# Patient Record
Sex: FEMALE | Race: WHITE | NOT HISPANIC OR LATINO | Employment: OTHER | URBAN - METROPOLITAN AREA
[De-identification: names, ages, dates, MRNs, and addresses within clinical notes are randomized per-mention and may not be internally consistent; named-entity substitution may affect disease eponyms.]

---

## 2017-01-17 ENCOUNTER — PATIENT MESSAGE (OUTPATIENT)
Dept: INTERNAL MEDICINE | Facility: CLINIC | Age: 32
End: 2017-01-17

## 2017-02-09 ENCOUNTER — TELEPHONE (OUTPATIENT)
Dept: INTERNAL MEDICINE | Facility: CLINIC | Age: 32
End: 2017-02-09

## 2017-02-16 ENCOUNTER — OFFICE VISIT (OUTPATIENT)
Dept: INTERNAL MEDICINE | Facility: CLINIC | Age: 32
End: 2017-02-16
Attending: INTERNAL MEDICINE
Payer: COMMERCIAL

## 2017-02-16 ENCOUNTER — TELEPHONE (OUTPATIENT)
Dept: INTERNAL MEDICINE | Facility: CLINIC | Age: 32
End: 2017-02-16

## 2017-02-16 VITALS
DIASTOLIC BLOOD PRESSURE: 70 MMHG | OXYGEN SATURATION: 96 % | WEIGHT: 114.63 LBS | BODY MASS INDEX: 19.57 KG/M2 | HEIGHT: 64 IN | HEART RATE: 75 BPM | SYSTOLIC BLOOD PRESSURE: 90 MMHG

## 2017-02-16 DIAGNOSIS — M65.4 DE QUERVAIN'S TENOSYNOVITIS, LEFT: ICD-10-CM

## 2017-02-16 DIAGNOSIS — D80.6 SPECIFIC ANTIBODY DEFICIENCY WITH NORMAL IMMUNOGLOBULIN CONCENTRATION AND NORMAL NUMBER OF B CELLS: ICD-10-CM

## 2017-02-16 DIAGNOSIS — Z00.00 ANNUAL PHYSICAL EXAM: Primary | ICD-10-CM

## 2017-02-16 PROCEDURE — 99999 PR PBB SHADOW E&M-EST. PATIENT-LVL III: CPT | Mod: PBBFAC,,, | Performed by: INTERNAL MEDICINE

## 2017-02-16 PROCEDURE — 99395 PREV VISIT EST AGE 18-39: CPT | Mod: S$GLB,,, | Performed by: INTERNAL MEDICINE

## 2017-02-16 NOTE — MR AVS SNAPSHOT
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      [facility]     [facility] On Call Nurse Care Line - 24/7 Assistance  Registered nurses in the [facility] On Call Center provide clinical advisement, health education, appointment booking, and other advisory services.  Call for this free service at [phone].             Medications           Message regarding Medications     Verify the changes and/or additions to your medication regime listed below are the same as discussed with your clinician today.  If any of these changes or additions are incorrect, please notify your healthcare provider.             Verify that the below list of medications is an accurate representation of the medications you are currently taking.  If none reported, the list may be blank. If incorrect, please contact your healthcare provider. Carry this list with you in case of emergency.           Current Medications     moxifloxacin (VIGAMOX) 0.5 % ophthalmic solution 1 drop 3 (three) times daily.    TOBREX 0.3 % ophthalmic ointment            Clinical Reference Information           Your Vitals Were     BP Pulse Height Weight SpO2 BMI    90/70 (BP Location: Left arm,  "Patient Position: Sitting, BP Method: Manual) 75 5' 4" (1.626 m) 52 kg (114 lb 10.2 oz) 96% 19.68 kg/m2      Blood Pressure          Most Recent Value    BP  90/70      Allergies as of 2/16/2017     Ceclor [Cefaclor]    Pcn [Penicillins]      Immunizations Administered on Date of Encounter - 2/16/2017     None      Instructions    Your test results will be communicated to you via: [facility], Telephone or Letter.  If you have not received your test results within one week. Please contact the clinic at [phone].      Nutrition While Breastfeeding  Do I need a special diet for breastfeeding?  You don't have to eat a special diet to produce enough milk for your baby. Also, your milk will be of good quality for your baby regardless of what you eat. However, your body needs fuel to make breastmilk, so eat your fill of a variety of foods. Breastfeeding isnt an excuse to eat and drink everything you want, but its not a reason to avoid favorite foods either.   Healthy diet for the new mother  A healthy diet is recommended for all women and offers many benefits to the new mother. Choosing a variety of healthy foods creates a pattern for the entire family, and each family member benefits. Women who are breastfeeding need about 500 extra calories per day. Some women might need more, while others might need less. When choosing foods, use the nutrition chart below as a guide.    Bread, cereal, rice, and pasta Vegetables Fruit   Milk, yogurt, and cheese Meat, poultry, fish,  dry beans, eggs, and nuts Fats, oils, and sweets  (use sparingly)   Whats good for you?  Here are some things to do:  · Breastfeeding women need to drink when they feel thirsty. There is no specific amount of water you need to drink to make enough milk.  · Follow healthy eating guidelines.  · Snack on fruit or low-fat dairy products if youre hungry between meals.  · If your healthcare provider recommends it, keep taking prenatal vitamins.  Whats not " good for you?  Here are other things to consider:  · Limit fatty foods and foods that are high in sugar (cookies, cakes).  · Be aware that what enters your body may pass into your breastmilk. Limit caffeine. It is not just in coffee, but is also in cola, tea, and chocolate.  · Talk with your healthcare provider before taking any medicines. It is important to let your healthcare provider know that you are nursing. Some medicines are not safe with breastfeeding.  · Remember: alcohol, cigarettes, and drugs also affect your breastmilk and your baby. Talk with your healthcare provider.  Date Last Reviewed: 9/7/2015 © 2000-2016 [employer]. [address] [place] [address], [place] [address]. All rights reserved. This information is not intended as a substitute for professional medical care. Always follow your healthcare professional's instructions.        Breastfeeding: Caring for Yourself  When you have a new little person in your life, its easy to forget about yourself. There are new demands on your time. There are also new responsibilities. But its important to take care of yourself. This will help you take better care of your new baby.     Healthy habits  Here are some healthy tips:  · Get exercise when you can. If you leak milk, it will help to nurse right before the activity.  · Avoid smoking. Smoking is unhealthy for you and may cause you to make less milk. Secondhand smoke is also harmful to your baby.  · Talk to your healthcare provider about alcohol, if you choose to drink.  · When youre sick, tell your healthcare provider that you are breastfeeding. Few medicines and illnesses affect breastfeeding, but it is important to check.  · Ask your healthcare provider before taking any prescription or over-the-counter medicines, herbs, or supplements.  Comfy clothes  Suggestions for being comfortable when breastfeeding include:  · Find a comfortable nursing bra. Many women find underwire uncomfortable. Some  stores offer on-site fittings. Ask your healthcare provider or nurse for a referral.  · If you have leaking milk, place breast pads inside your bra.  · Choose an extra-supportive bra for exercise. Or you can wear two bras at the same time for more support.  · Wear loose tops that can be lifted for breastfeeding. You can also buy clothes specially made for breastfeeding moms.  A note about sex  After delivery, it may take a while before your interest in sex returns. Share your feelings with your partner. Your healthcare provider will let you know when it is safe to resume having sex. When youre ready, know that:  · There are several forms of birth control that can be used while breastfeeding. Ask your healthcare provider what to use for pregnancy prevention while you are nursing.  · Breastfeeding hormones may cause vaginal dryness. Some women find using a water-based lubricant makes sex more comfortable.  · Milk may let down when you are aroused. Applying pressure on the nipple, using breast pads, or a towel may help with this.  When to call your healthcare provider  Call your healthcare provider if:  · You feel overwhelmed and don't know where to turn.  · You feel very sad or dont want to be with your baby.  · You feel like your baby cries all the time and won't be soothed  · You are unable to exercise, or have sex, without discomfort.  · You are unsure about a medicine, illness, or activity and its effect on breastfeeding.   Date Last Reviewed: 9/7/2015 © 2000-2016 [employer]. [address] [place] [address], [place] [address]. All rights reserved. This information is not intended as a substitute for professional medical care. Always follow your healthcare professional's instructions.             Language Assistance Services     ATTENTION: Language assistance services are available, free of charge. Please call [phone].      ATENCIÓN: Si habla español, tiene a leung disposición servicios gratuitos de  asistencia lingüística. Pita zamora [phone].     MIGUEL ÁNGEL Ý: N?u b?n nói Ti?ng Vi?t, có các d?ch v? h? tr? ngôn ng? mi?n phí dành cho b?n. G?i s? [phone].         [facility] complies with applicable Federal civil rights laws and does not discriminate on the basis of race, color, national origin, age, disability, or sex.

## 2017-02-16 NOTE — TELEPHONE ENCOUNTER
Received verbal msg from Sepideh Chan stating that pt called very upset crying about her appt that wasn't scheduled today for 1:40 pm that she was advised of/ was able to accommodate appt today due to  issues     Pt advised of appt/ no further questions or concerns at this time

## 2017-02-16 NOTE — PROGRESS NOTES
Subjective:       Patient ID: Karen Purdy is a 31 y.o. female.    Chief Complaint: Annual Exam     Karen Purdy is a 31 y.o.  female who presents for Annual Exam  .  HPI Comments: Pt complains of joint pain, tendonitis worsening, migrates.  She had lost wt recently over several months but has been recently increased her weight. She is breast feeding her 9 month old son.  Occasional hot flashes.  Child has not been able to take a bottle so she has been able to leave her son for any period of time.  He has started solids.     Pain in left hand just at wrist.     She states her anxiety was worse just after baby was born but this has improved with her sleep.     Review of Systems   Constitutional: Negative for chills and fever.   HENT: Negative for rhinorrhea and sore throat.    Respiratory: Negative for cough and shortness of breath.    Cardiovascular: Negative for chest pain and palpitations.   Gastrointestinal: Negative for nausea and vomiting.   Genitourinary: Negative for dysuria and hematuria.   Musculoskeletal: Negative for arthralgias and back pain.   Skin: Negative for color change and rash.   Neurological: Negative for weakness and numbness.   Psychiatric/Behavioral: Negative for agitation and dysphoric mood.       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     Unspecified vitamin D deficiency        Past Surgical History   Procedure Laterality Date    Knee surgery Right      torn meniscus       Family History   Problem Relation Age of Onset    Cancer Maternal Grandfather      lung    Dementia Paternal Grandmother     Hyperlipidemia Mother     Hypertension Mother      "Hypertension Father     Hyperlipidemia Father     Breast cancer Neg Hx     Colon cancer Neg Hx     Ovarian cancer Neg Hx        Social History   Substance Use Topics    Smoking status: Former Smoker    Smokeless tobacco: Never Used    Alcohol use No      Comment: socially       Objective:   Blood pressure 90/70, pulse 75, height 5' 4" (1.626 m), weight 52 kg (114 lb 10.2 oz), SpO2 96 %, currently breastfeeding.     Physical Exam   Constitutional: She is oriented to person, place, and time. She appears well-developed and well-nourished. No distress.   HENT:   Head: Normocephalic and atraumatic.   Right Ear: External ear normal.   Left Ear: External ear normal.   Eyes: Conjunctivae are normal. No scleral icterus.   Neck: No JVD present. No thyromegaly present.   Cardiovascular: Normal heart sounds.  Exam reveals no gallop and no friction rub.    No murmur heard.  Pulmonary/Chest: Effort normal and breath sounds normal. She has no wheezes. She has no rales.   Abdominal: Soft. Bowel sounds are normal. She exhibits no distension. There is no tenderness.   Musculoskeletal: She exhibits no edema or tenderness.        Arms:  Lymphadenopathy:     She has no cervical adenopathy.   Neurological: She is alert and oriented to person, place, and time.   Skin: Skin is warm and dry.   Psychiatric: She has a normal mood and affect. Thought content normal.       Prior labs reviewed  Assessment/Plan:        Karen was seen today for annual exam.    Diagnoses and all orders for this visit:    Annual physical exam  Recommend daily sunscreen, cardiovascular exercise min 30 min 5 days per week. Seatbelts routinely.    Breast feeding status of mother  Increase fluids    De Quervain's tenosynovitis, left  Tylenol prn. 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

## 2017-02-16 NOTE — PATIENT INSTRUCTIONS
Your test results will be communicated to you via: [facility], Telephone or Letter.  If you have not received your test results within one week. Please contact the clinic at [phone].      Nutrition While Breastfeeding  Do I need a special diet for breastfeeding?  You don't have to eat a special diet to produce enough milk for your baby. Also, your milk will be of good quality for your baby regardless of what you eat. However, your body needs fuel to make breastmilk, so eat your fill of a variety of foods. Breastfeeding isnt an excuse to eat and drink everything you want, but its not a reason to avoid favorite foods either.   Healthy diet for the new mother  A healthy diet is recommended for all women and offers many benefits to the new mother. Choosing a variety of healthy foods creates a pattern for the entire family, and each family member benefits. Women who are breastfeeding need about 500 extra calories per day. Some women might need more, while others might need less. When choosing foods, use the nutrition chart below as a guide.    Bread, cereal, rice, and pasta Vegetables Fruit   Milk, yogurt, and cheese Meat, poultry, fish,  dry beans, eggs, and nuts Fats, oils, and sweets  (use sparingly)   Whats good for you?  Here are some things to do:  · Breastfeeding women need to drink when they feel thirsty. There is no specific amount of water you need to drink to make enough milk.  · Follow healthy eating guidelines.  · Snack on fruit or low-fat dairy products if youre hungry between meals.  · If your healthcare provider recommends it, keep taking prenatal vitamins.  Whats not good for you?  Here are other things to consider:  · Limit fatty foods and foods that are high in sugar (cookies, cakes).  · Be aware that what enters your body may pass into your breastmilk. Limit caffeine. It is not just in coffee, but is also in cola, tea, and chocolate.  · Talk with your healthcare provider before taking  any medicines. It is important to let your healthcare provider know that you are nursing. Some medicines are not safe with breastfeeding.  · Remember: alcohol, cigarettes, and drugs also affect your breastmilk and your baby. Talk with your healthcare provider.  Date Last Reviewed: 9/7/2015  © 2843-2962 [employer]. [address] [place] [address], [place] [address]. All rights reserved. This information is not intended as a substitute for professional medical care. Always follow your healthcare professional's instructions.        Breastfeeding: Caring for Yourself  When you have a new little person in your life, its easy to forget about yourself. There are new demands on your time. There are also new responsibilities. But its important to take care of yourself. This will help you take better care of your new baby.     Healthy habits  Here are some healthy tips:  · Get exercise when you can. If you leak milk, it will help to nurse right before the activity.  · Avoid smoking. Smoking is unhealthy for you and may cause you to make less milk. Secondhand smoke is also harmful to your baby.  · Talk to your healthcare provider about alcohol, if you choose to drink.  · When youre sick, tell your healthcare provider that you are breastfeeding. Few medicines and illnesses affect breastfeeding, but it is important to check.  · Ask your healthcare provider before taking any prescription or over-the-counter medicines, herbs, or supplements.  Comfy clothes  Suggestions for being comfortable when breastfeeding include:  · Find a comfortable nursing bra. Many women find underwire uncomfortable. Some stores offer on-site fittings. Ask your healthcare provider or nurse for a referral.  · If you have leaking milk, place breast pads inside your bra.  · Choose an extra-supportive bra for exercise. Or you can wear two bras at the same time for more support.  · Wear loose tops that can be lifted for breastfeeding. You can also buy  clothes specially made for breastfeeding moms.  A note about sex  After delivery, it may take a while before your interest in sex returns. Share your feelings with your partner. Your healthcare provider will let you know when it is safe to resume having sex. When youre ready, know that:  · There are several forms of birth control that can be used while breastfeeding. Ask your healthcare provider what to use for pregnancy prevention while you are nursing.  · Breastfeeding hormones may cause vaginal dryness. Some women find using a water-based lubricant makes sex more comfortable.  · Milk may let down when you are aroused. Applying pressure on the nipple, using breast pads, or a towel may help with this.  When to call your healthcare provider  Call your healthcare provider if:  · You feel overwhelmed and don't know where to turn.  · You feel very sad or dont want to be with your baby.  · You feel like your baby cries all the time and won't be soothed  · You are unable to exercise, or have sex, without discomfort.  · You are unsure about a medicine, illness, or activity and its effect on breastfeeding.   Date Last Reviewed: 9/7/2015  © 4720-7230 [employer]. [address] [place] [address], [place] [address]. All rights reserved. This information is not intended as a substitute for professional medical care. Always follow your healthcare professional's instructions.

## 2017-02-20 ENCOUNTER — PATIENT MESSAGE (OUTPATIENT)
Dept: INTERNAL MEDICINE | Facility: CLINIC | Age: 32
End: 2017-02-20

## 2017-10-04 ENCOUNTER — TELEPHONE (OUTPATIENT)
Dept: OBSTETRICS AND GYNECOLOGY | Facility: CLINIC | Age: 32
End: 2017-10-04

## 2017-10-04 ENCOUNTER — OFFICE VISIT (OUTPATIENT)
Dept: OBSTETRICS AND GYNECOLOGY | Facility: CLINIC | Age: 32
End: 2017-10-04
Attending: OBSTETRICS & GYNECOLOGY
Payer: COMMERCIAL

## 2017-10-04 ENCOUNTER — LAB VISIT (OUTPATIENT)
Dept: LAB | Facility: OTHER | Age: 32
End: 2017-10-04
Attending: OBSTETRICS & GYNECOLOGY
Payer: COMMERCIAL

## 2017-10-04 VITALS
BODY MASS INDEX: 20.06 KG/M2 | HEIGHT: 64 IN | WEIGHT: 117.5 LBS | DIASTOLIC BLOOD PRESSURE: 70 MMHG | SYSTOLIC BLOOD PRESSURE: 114 MMHG

## 2017-10-04 DIAGNOSIS — N91.5 OLIGOMENORRHEA, UNSPECIFIED TYPE: ICD-10-CM

## 2017-10-04 DIAGNOSIS — Z36.89 ESTABLISH GESTATIONAL AGE, ULTRASOUND: Primary | ICD-10-CM

## 2017-10-04 DIAGNOSIS — N91.5 OLIGOMENORRHEA, UNSPECIFIED TYPE: Primary | ICD-10-CM

## 2017-10-04 LAB
BASOPHILS # BLD AUTO: 0.03 K/UL
BASOPHILS NFR BLD: 0.3 %
C TRACH DNA SPEC QL NAA+PROBE: NOT DETECTED
DIFFERENTIAL METHOD: NORMAL
EOSINOPHIL # BLD AUTO: 0.1 K/UL
EOSINOPHIL NFR BLD: 1 %
ERYTHROCYTE [DISTWIDTH] IN BLOOD BY AUTOMATED COUNT: 12.4 %
HCG INTACT+B SERPL-ACNC: 468 MIU/ML
HCT VFR BLD AUTO: 41.1 %
HGB BLD-MCNC: 13.7 G/DL
LYMPHOCYTES # BLD AUTO: 2.5 K/UL
LYMPHOCYTES NFR BLD: 23.7 %
MCH RBC QN AUTO: 28.7 PG
MCHC RBC AUTO-ENTMCNC: 33.3 G/DL
MCV RBC AUTO: 86 FL
MONOCYTES # BLD AUTO: 0.8 K/UL
MONOCYTES NFR BLD: 7.8 %
N GONORRHOEA DNA SPEC QL NAA+PROBE: NOT DETECTED
NEUTROPHILS # BLD AUTO: 7 K/UL
NEUTROPHILS NFR BLD: 67 %
PLATELET # BLD AUTO: 348 K/UL
PMV BLD AUTO: 10.3 FL
RBC # BLD AUTO: 4.78 M/UL
WBC # BLD AUTO: 10.47 K/UL

## 2017-10-04 PROCEDURE — 84702 CHORIONIC GONADOTROPIN TEST: CPT

## 2017-10-04 PROCEDURE — 85025 COMPLETE CBC W/AUTO DIFF WBC: CPT

## 2017-10-04 PROCEDURE — 87591 N.GONORRHOEAE DNA AMP PROB: CPT

## 2017-10-04 PROCEDURE — 87086 URINE CULTURE/COLONY COUNT: CPT

## 2017-10-04 PROCEDURE — 99999 PR PBB SHADOW E&M-EST. PATIENT-LVL III: CPT | Mod: PBBFAC,,, | Performed by: OBSTETRICS & GYNECOLOGY

## 2017-10-04 PROCEDURE — 87088 URINE BACTERIA CULTURE: CPT

## 2017-10-04 PROCEDURE — 87147 CULTURE TYPE IMMUNOLOGIC: CPT

## 2017-10-04 PROCEDURE — 99215 OFFICE O/P EST HI 40 MIN: CPT | Mod: S$GLB,,, | Performed by: OBSTETRICS & GYNECOLOGY

## 2017-10-04 PROCEDURE — 87186 SC STD MICRODIL/AGAR DIL: CPT

## 2017-10-04 NOTE — TELEPHONE ENCOUNTER
----- Message from Magalys Yun MA sent at 10/4/2017 12:47 PM CDT -----  Pt needs to schedule her 8 week new ob appointment and dating u/s.

## 2017-10-04 NOTE — PROGRESS NOTES
"HPI: Pt is a 31 y.o. female who presents complaining of missed menses and (+) home UPT. She denies vaginal bleeding or abdominal pain. She does not have nausea and vomiting. LMP was 9/3/17.  The patient is currently breastfeeding her 16 mo. old though she is is weaning and is only feeding in the am at this point.  Last delivery was an  at 41w4d, uncomplicated.  She reports that she was supposed to have thyroid studies done prior to finding out she was pregnant due to hot flashes and hair loss, did not have these drawn and is interested in checking this in T2.  The patient is interested in aneuploidy screening.  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  Her  is of [employer] background and is possibly interested in carrier screening, they will discuss and if he is, we will refer him to genetics.    ROS:  GENERAL: Feeling well overall.   SKIN: Denies rash or lesions.   HEAD: Denies head injury or headache.   NODES: Denies enlarged lymph nodes.   CHEST: Denies chest pain or shortness of breath.   CARDIOVASCULAR: Denies palpitations or left sided chest pain.   ABDOMEN: No abdominal pain, constipation, diarrhea or rectal bleeding.   URINARY: No dysuria, hematuria, or burning on urination.  REPRODUCTIVE: See HPI.   BREASTS: Denies pain, lumps, or nipple discharge.   HEMATOLOGIC: No easy bruisability or excessive bleeding.   MUSCULOSKELETAL: Denies joint pain or swelling.   NEUROLOGIC: Denies syncope or weakness.   PSYCHIATRIC: Denies depression, anxiety or mood swings.    PE:   /70 (BP Method: Small (Manual))   Ht 5' 4" (1.626 m)   Wt 53.3 kg (117 lb 8.1 oz)   LMP 2017 (Exact Date)   BMI 20.17 kg/m²     APPEARANCE: Well nourished, well developed, in no acute distress.  AFFECT: WNL, alert and oriented x 3.  SKIN: No acne or hirsutism.  NECK: Neck symmetric, without masses or thyromegaly.  NODES: No inguinal, cervical, axillary or femoral lymph node " enlargement.  CHEST: Good respiratory effort.   ABDOMEN: Soft. No tenderness or masses. No hepatosplenomegaly. No hernias.  BREASTS: Symmetrical, no skin changes or visible lesions. No palpable masses, adenopathy, or nipple discharge bilaterally.  PELVIC: External female genitalia without lesions. Female hair distribution. Adequate perineal body, Normal urethral meatus. Vagina moist and well rugated without lesions or discharge. There is no significant cystocele or rectocele. Cervix pink without lesions, discharge or tenderness. Uterus is 4 week size, regular, mobile and nontender. Adnexa without masses.  EXTREMITIES: No edema.  PSYCH:  appropriate mood and affect  NEURO: CN II-XII grossly intact.    PROCEDURES:  - UPT: positive  - Urine dip: negative  - Dating U/S: to be scheduled with GynUS      Diagnosis:  1. Oligomenorrhea, unspecified type        Plan:     Orders Placed This Encounter    Urine culture    C. trachomatis/N. gonorrhoeae by AMP DNA Cervix    HIV-1 and HIV-2 antibodies    RPR    Hepatitis B surface antigen    Rubella antibody, IgG    CBC auto differential    hCG, quantitative    US OB/GYN Procedure ([facility])       - Rx: Prenatal vitamins  - She does want 1st trimester screening with MFM.     Patient was counseled today on routine 1st trimester precautions, including vaginal bleeding and abdominal pain. We also discussed proper weight gain based on the [place] [facility]'s recommendations based on her pre-pregnancy weight, foods to avoid in pregnancy (i.e. sushi, fish that are high in mercury, cold deli meat, and unpasteurized cheeses), environmental precautions such as cat litter, and prenatal vitamin options (i.e. stool softener, DHA).     Total face to face time spent with the patient was 40 minutes and over half spent in counseling on the above related issues.     Follow-up with me in 4 weeks for OB check

## 2017-10-06 ENCOUNTER — PATIENT MESSAGE (OUTPATIENT)
Dept: OBSTETRICS AND GYNECOLOGY | Facility: CLINIC | Age: 32
End: 2017-10-06

## 2017-10-09 ENCOUNTER — PATIENT MESSAGE (OUTPATIENT)
Dept: OBSTETRICS AND GYNECOLOGY | Facility: HOSPITAL | Age: 32
End: 2017-10-09

## 2017-10-09 RX ORDER — CLINDAMYCIN HYDROCHLORIDE 150 MG/1
150 CAPSULE ORAL 2 TIMES DAILY
Qty: 14 CAPSULE | Refills: 0 | Status: SHIPPED | OUTPATIENT
Start: 2017-10-09 | End: 2017-10-16

## 2017-10-10 ENCOUNTER — TELEPHONE (OUTPATIENT)
Dept: OBSTETRICS AND GYNECOLOGY | Facility: CLINIC | Age: 32
End: 2017-10-10

## 2017-10-10 LAB — BACTERIA UR CULT: NORMAL

## 2017-10-10 NOTE — TELEPHONE ENCOUNTER
----- Message from Mary Flannery sent at 10/10/2017  3:55 PM CDT -----  Contact: self  Pt needing a call back, regarding a Rx, she can be reached at [phone].

## 2017-10-10 NOTE — TELEPHONE ENCOUNTER
Called patient to go over Urine culture results. Counseled on GBS UTI and GBS in pregnancy. Patient with allergy to PCN and cephalosporins (anaphylaxis as a child). Called micro lab to request susceptibility be performed on urine culture to determine if susceptible to clindamycin. Explained to patient that this result should take about 24 hours. All questions were answered. Will follow up results and treat accordingly.

## 2017-10-11 ENCOUNTER — PATIENT MESSAGE (OUTPATIENT)
Dept: OBSTETRICS AND GYNECOLOGY | Facility: CLINIC | Age: 32
End: 2017-10-11

## 2017-10-11 NOTE — TELEPHONE ENCOUNTER
Spoke with patient informing her per  that clindamycin while breastfeeding is ok, the warning is there because it may cause the baby to have  Diarrhea. Pt stated that she understood.

## 2017-10-19 ENCOUNTER — TELEPHONE (OUTPATIENT)
Dept: OBSTETRICS AND GYNECOLOGY | Facility: CLINIC | Age: 32
End: 2017-10-19

## 2017-10-19 NOTE — TELEPHONE ENCOUNTER
----- Message from Liz Murillo CNM sent at 10/10/2017  6:06 PM CDT -----  Please call pt re meds.    Called patient to address questions regarding medications. No answer, left voicemail to call back.

## 2017-10-25 ENCOUNTER — PATIENT MESSAGE (OUTPATIENT)
Dept: OBSTETRICS AND GYNECOLOGY | Facility: CLINIC | Age: 32
End: 2017-10-25

## 2017-10-31 ENCOUNTER — PROCEDURE VISIT (OUTPATIENT)
Dept: OBSTETRICS AND GYNECOLOGY | Facility: CLINIC | Age: 32
End: 2017-10-31
Attending: OBSTETRICS & GYNECOLOGY
Payer: COMMERCIAL

## 2017-10-31 DIAGNOSIS — N91.2 AMENORRHEA: ICD-10-CM

## 2017-10-31 DIAGNOSIS — N91.5 OLIGOMENORRHEA, UNSPECIFIED TYPE: ICD-10-CM

## 2017-10-31 DIAGNOSIS — Z36.89 ESTABLISH GESTATIONAL AGE, ULTRASOUND: ICD-10-CM

## 2017-10-31 PROCEDURE — 76817 TRANSVAGINAL US OBSTETRIC: CPT | Mod: S$GLB,,, | Performed by: OBSTETRICS & GYNECOLOGY

## 2017-10-31 NOTE — PROCEDURES
Procedures   Obstetrical ultrasound completed today.  See report in imaging section of [facility].

## 2017-11-07 ENCOUNTER — INITIAL PRENATAL (OUTPATIENT)
Dept: OBSTETRICS AND GYNECOLOGY | Facility: CLINIC | Age: 32
End: 2017-11-07
Attending: OBSTETRICS & GYNECOLOGY
Payer: COMMERCIAL

## 2017-11-07 VITALS — BODY MASS INDEX: 20.62 KG/M2 | WEIGHT: 120.13 LBS | DIASTOLIC BLOOD PRESSURE: 68 MMHG | SYSTOLIC BLOOD PRESSURE: 90 MMHG

## 2017-11-07 DIAGNOSIS — O21.9 NAUSEA AND VOMITING DURING PREGNANCY PRIOR TO 22 WEEKS GESTATION: ICD-10-CM

## 2017-11-07 DIAGNOSIS — Z34.90 PREGNANCY WITH ONE FETUS, ANTEPARTUM: ICD-10-CM

## 2017-11-07 PROCEDURE — 99999 PR PBB SHADOW E&M-EST. PATIENT-LVL II: CPT | Mod: PBBFAC,,, | Performed by: OBSTETRICS & GYNECOLOGY

## 2017-11-07 PROCEDURE — 0502F SUBSEQUENT PRENATAL CARE: CPT | Mod: S$GLB,,, | Performed by: OBSTETRICS & GYNECOLOGY

## 2017-11-07 RX ORDER — DOXYLAMINE SUCCINATE AND PYRIDOXINE HYDROCHLORIDE, DELAYED RELEASE TABLETS 10 MG/10 MG 10; 10 MG/1; MG/1
2 TABLET, DELAYED RELEASE ORAL NIGHTLY PRN
Qty: 30 TABLET | Refills: 1 | Status: SHIPPED | OUTPATIENT
Start: 2017-11-07 | End: 2017-11-08

## 2017-11-08 ENCOUNTER — PATIENT MESSAGE (OUTPATIENT)
Dept: OBSTETRICS AND GYNECOLOGY | Facility: CLINIC | Age: 32
End: 2017-11-08

## 2017-11-08 NOTE — PROGRESS NOTES
Patient with continued nause and severe fatigue though reports insomnia.  Discussed diclegis, will try this, may try zofran for travel if needed [facility].  Discussed aneuploidy screening, will rn her insurance for MT21, sequential and anatomy ordered, will get sequential if MT21 not covered.  F/U in 4 weeks.

## 2017-11-14 RX ORDER — ONDANSETRON 4 MG/1
4 TABLET, FILM COATED ORAL DAILY PRN
Qty: 30 TABLET | Refills: 1 | Status: SHIPPED | OUTPATIENT
Start: 2017-11-14 | End: 2018-01-02

## 2017-11-17 ENCOUNTER — OFFICE VISIT (OUTPATIENT)
Dept: URGENT CARE | Facility: CLINIC | Age: 32
End: 2017-11-17
Payer: COMMERCIAL

## 2017-11-17 ENCOUNTER — PATIENT MESSAGE (OUTPATIENT)
Dept: OBSTETRICS AND GYNECOLOGY | Facility: CLINIC | Age: 32
End: 2017-11-17

## 2017-11-17 VITALS
SYSTOLIC BLOOD PRESSURE: 122 MMHG | RESPIRATION RATE: 18 BRPM | BODY MASS INDEX: 20.49 KG/M2 | HEART RATE: 83 BPM | TEMPERATURE: 97 F | HEIGHT: 64 IN | WEIGHT: 120 LBS | OXYGEN SATURATION: 97 % | DIASTOLIC BLOOD PRESSURE: 73 MMHG

## 2017-11-17 DIAGNOSIS — J02.9 SORE THROAT: Primary | ICD-10-CM

## 2017-11-17 LAB
CTP QC/QA: YES
S PYO RRNA THROAT QL PROBE: NEGATIVE

## 2017-11-17 PROCEDURE — 87880 STREP A ASSAY W/OPTIC: CPT | Mod: QW,S$GLB,, | Performed by: EMERGENCY MEDICINE

## 2017-11-17 PROCEDURE — 99214 OFFICE O/P EST MOD 30 MIN: CPT | Mod: S$GLB,,, | Performed by: EMERGENCY MEDICINE

## 2017-11-17 RX ORDER — DOXYLAMINE SUCCINATE AND PYRIDOXINE HYDROCHLORIDE, DELAYED RELEASE TABLETS 10 MG/10 MG 10; 10 MG/1; MG/1
2 TABLET, DELAYED RELEASE ORAL NIGHTLY
COMMUNITY
End: 2017-12-05 | Stop reason: SDUPTHER

## 2017-11-17 NOTE — PROGRESS NOTES
"Subjective:       Patient ID: Karen Purdy is a 32 y.o. female.    Vitals:  height is 5' 4" (1.626 m) and weight is 54.4 kg (120 lb). Her oral temperature is 97.1 °F (36.2 °C). Her blood pressure is 122/73 and her pulse is 83. Her respiration is 18 and oxygen saturation is 97%.     Chief Complaint: Sore Throat    Pt is going out of town next week and states her throat is sore, body aches, neck pain,  sputum, face hurts.  Pt states this started up yesterday.  Minimal cough. No fever. Pt is first trimester pregnancy      Sore Throat    This is a new problem. The current episode started yesterday. The problem has been unchanged. Neither side of throat is experiencing more pain than the other. There has been no fever. The fever has been present for less than 1 day. The pain is at a severity of 7/10. The pain is moderate. Associated symptoms include coughing (clearing throat). Pertinent negatives include no abdominal pain, congestion, ear pain, headaches, hoarse voice or shortness of breath. She has tried nothing for the symptoms. The treatment provided no relief.     Review of Systems   Constitution: Negative for chills, fever and malaise/fatigue.        Body aches   HENT: Positive for sore throat. Negative for congestion, ear pain and hoarse voice.    Eyes: Negative for discharge and redness.   Cardiovascular: Negative for chest pain, dyspnea on exertion and leg swelling.   Respiratory: Positive for cough (clearing throat) and sputum production (post nasal drainage). Negative for shortness of breath and wheezing.    Skin: Negative for rash.   Musculoskeletal: Negative for myalgias.        Neck pain   Gastrointestinal: Negative for abdominal pain and nausea.   Neurological: Negative for headaches.       Objective:      Physical Exam   Constitutional: She is oriented to person, place, and time. She appears well-developed and well-nourished. She is cooperative.  Non-toxic appearance. No distress.   HENT:   Head: " Normocephalic and atraumatic.   Right Ear: Hearing, tympanic membrane, external ear and ear canal normal.   Left Ear: Hearing, tympanic membrane, external ear and ear canal normal.   Nose: Mucosal edema present. No rhinorrhea or nasal deformity. No epistaxis. Right sinus exhibits no maxillary sinus tenderness and no frontal sinus tenderness. Left sinus exhibits no maxillary sinus tenderness and no frontal sinus tenderness.   Mouth/Throat: Uvula is midline and mucous membranes are normal. No trismus in the jaw. Normal dentition. No uvula swelling. Posterior oropharyngeal erythema present. Tonsils are 1+ on the right. Tonsils are 1+ on the left.   Eyes: Conjunctivae, EOM and lids are normal. Pupils are equal, round, and reactive to light. No scleral icterus.   Sclera clear bilat   Neck: Trachea normal, normal range of motion, full passive range of motion without pain and phonation normal. Neck supple.   Cardiovascular: Normal rate, regular rhythm, normal heart sounds and normal pulses.    Pulmonary/Chest: Effort normal and breath sounds normal. No respiratory distress.   Abdominal: Soft. Normal appearance and bowel sounds are normal. She exhibits no distension. There is no tenderness.   Musculoskeletal: Normal range of motion. She exhibits no edema or deformity.   Lymphadenopathy:     She has cervical adenopathy.   Neurological: She is alert and oriented to person, place, and time. She exhibits normal muscle tone. Coordination normal.   Skin: Skin is warm, dry and intact. She is not diaphoretic. No pallor.   Psychiatric: She has a normal mood and affect. Her speech is normal and behavior is normal. Judgment and thought content normal. Cognition and memory are normal.   Nursing note and vitals reviewed.      Office Visit on 11/17/2017   Component Date Value Ref Range Status    Rapid Strep A Screen 11/17/2017 Negative  Negative Final     Acceptable 11/17/2017 Yes   Final     Assessment:       1. Sore  throat        Plan:         Sore throat  -     POCT rapid strep A  -     Strep A culture, throat

## 2017-11-17 NOTE — PATIENT INSTRUCTIONS
Pharyngitis   If your condition worsens or fails to improve we recommend that you receive another evaluation at the ER immediately or contact your PCP to discuss your concerns or return here. You must understand that you've received an urgent care treatment only and that you may be released before all your medical problems are known or treated. You the patient will arrange for followup care as instructed.   The majority of all sore throats or tonsillitis are viral and antibiotics will not treat this.   If the strept culture was done and returns negative in 3-5 days and you are still having a sore throat, you may need to get a mono spot test done or repeated.   Tylenol or ibuprofen for pain may help as long as you are not allergic to these meds or have a medical condition such as stomach ulcers, liver or kidney disease or taking blood thinners etc that would   prevent you from using these medications.   Rest and fluids will help as well.   If you were prescribed antibiotics and are female and on BCP use additional methods to prevent pregnancy while on the antibiotics and for one cycle after   Since you are pregnant only use meds that your Ob doctor said you could have

## 2017-11-19 ENCOUNTER — TELEPHONE (OUTPATIENT)
Dept: URGENT CARE | Facility: CLINIC | Age: 32
End: 2017-11-19

## 2017-11-19 LAB — S PYO THROAT QL CULT: NEGATIVE

## 2017-11-19 NOTE — TELEPHONE ENCOUNTER
----- Message from Koki Castanon MD sent at 11/19/2017 12:29 PM CST -----  Notify patient that all lab results are normal. No need for meds for her throat. Neg strept. Followup with primary care doctor as needed

## 2017-11-19 NOTE — TELEPHONE ENCOUNTER
Notify patient with her negative strep. Patient states she is still feeling a little sick. Patient states she will f/u with her pcp.

## 2017-11-29 ENCOUNTER — TELEPHONE (OUTPATIENT)
Dept: OBSTETRICS AND GYNECOLOGY | Facility: CLINIC | Age: 32
End: 2017-11-29

## 2017-11-29 NOTE — TELEPHONE ENCOUNTER
Patient stated that while out of town her Rx for Diclegis ran out, she became extremely, went into the urgent care where she was given reglan. Pt stated that she threw that up shortly after, she made it home and was able to refill her Diclegis but still has not been able to keep anything down. Pt states that she vomits at least twice an hour this has been going on for a little over 24hrs. Pt was advised to try to get something on her stomach and make her way in the [facility] for IV fluids.

## 2017-11-29 NOTE — TELEPHONE ENCOUNTER
----- Message from Luba Castanon sent at 11/29/2017 10:26 AM CST -----  Contact: self  X  _  1st Request  _  2nd Request  _  3rd Request    Who: pt    Why: pt needs to speak with a nurse she is 13 weeks and she has been throwing up every hour.. Please advise    What Number to Call Back: [phone]    When to Expect a call back: (Before the end of the day)   -- if call after 3:00 call back will be tomorrow.

## 2017-12-05 ENCOUNTER — OFFICE VISIT (OUTPATIENT)
Dept: MATERNAL FETAL MEDICINE | Facility: CLINIC | Age: 32
End: 2017-12-05
Attending: OBSTETRICS & GYNECOLOGY
Payer: COMMERCIAL

## 2017-12-05 ENCOUNTER — ROUTINE PRENATAL (OUTPATIENT)
Dept: OBSTETRICS AND GYNECOLOGY | Facility: CLINIC | Age: 32
End: 2017-12-05
Attending: OBSTETRICS & GYNECOLOGY
Payer: COMMERCIAL

## 2017-12-05 ENCOUNTER — LAB VISIT (OUTPATIENT)
Dept: LAB | Facility: OTHER | Age: 32
End: 2017-12-05
Attending: OBSTETRICS & GYNECOLOGY
Payer: COMMERCIAL

## 2017-12-05 VITALS — WEIGHT: 123.25 LBS | BODY MASS INDEX: 21.15 KG/M2

## 2017-12-05 VITALS — DIASTOLIC BLOOD PRESSURE: 60 MMHG | BODY MASS INDEX: 21 KG/M2 | WEIGHT: 122.38 LBS | SYSTOLIC BLOOD PRESSURE: 100 MMHG

## 2017-12-05 DIAGNOSIS — Z36.82 ENCOUNTER FOR NUCHAL TRANSLUCENCY TESTING: ICD-10-CM

## 2017-12-05 DIAGNOSIS — O21.9 NAUSEA AND VOMITING DURING PREGNANCY PRIOR TO 22 WEEKS GESTATION: ICD-10-CM

## 2017-12-05 DIAGNOSIS — Z34.90 PREGNANCY WITH ONE FETUS, ANTEPARTUM: Primary | ICD-10-CM

## 2017-12-05 DIAGNOSIS — Z34.90 PREGNANCY WITH ONE FETUS, ANTEPARTUM: ICD-10-CM

## 2017-12-05 DIAGNOSIS — Z36.89 ENCOUNTER FOR FETAL ANATOMIC SURVEY: Primary | ICD-10-CM

## 2017-12-05 DIAGNOSIS — D80.6 SPECIFIC ANTIBODY DEFICIENCY WITH NORMAL IMMUNOGLOBULIN CONCENTRATION AND NORMAL NUMBER OF B CELLS: ICD-10-CM

## 2017-12-05 PROCEDURE — 99999 PR PBB SHADOW E&M-EST. PATIENT-LVL II: CPT | Mod: PBBFAC,,, | Performed by: OBSTETRICS & GYNECOLOGY

## 2017-12-05 PROCEDURE — 81508 FTL CGEN ABNOR TWO PROTEINS: CPT

## 2017-12-05 PROCEDURE — 99999 PR PBB SHADOW E&M-EST. PATIENT-LVL I: CPT | Mod: PBBFAC,,,

## 2017-12-05 PROCEDURE — 36415 COLL VENOUS BLD VENIPUNCTURE: CPT

## 2017-12-05 PROCEDURE — 0502F SUBSEQUENT PRENATAL CARE: CPT | Mod: S$GLB,,, | Performed by: OBSTETRICS & GYNECOLOGY

## 2017-12-05 PROCEDURE — 99499 UNLISTED E&M SERVICE: CPT | Mod: S$GLB,,, | Performed by: OBSTETRICS & GYNECOLOGY

## 2017-12-05 PROCEDURE — 76813 OB US NUCHAL MEAS 1 GEST: CPT | Mod: S$GLB,,, | Performed by: OBSTETRICS & GYNECOLOGY

## 2017-12-05 RX ORDER — DOXYLAMINE SUCCINATE AND PYRIDOXINE HYDROCHLORIDE, DELAYED RELEASE TABLETS 10 MG/10 MG 10; 10 MG/1; MG/1
2 TABLET, DELAYED RELEASE ORAL NIGHTLY
Qty: 30 TABLET | Refills: 3 | Status: SHIPPED | OUTPATIENT
Start: 2017-12-05 | End: 2018-03-22

## 2017-12-05 NOTE — PROGRESS NOTES
OB Ultrasound Report (see PDF version under imaging tab)    Indication  ========    First trimester screening.    Method  ======    Transabdominal ultrasound examination. View: Good view.    Pregnancy  =========    Olivarez pregnancy. Number of fetuses: 1.    Dating  ======    Cycle: regular cycle  Ultrasound examination on: 12/5/2017  GA by U/S based upon: CRL  GA by U/S 13 w + 4 d  JM by U/S: 6/8/2018  Assigned: Dating performed on 10/31/2017, based on the LMP  Assigned GA 13 w + 2 d  Assigned JM: 6/10/2018    General Evaluation  ===============    Cardiac activity: present.  Cord vessels: 3 vessel cord.  Amniotic fluid: normal amount.    Fetal Biometry  ===========    CRL 74.5 mm 13w 4d Hadlock  NT 1.9 mm   bpm    Fetal Anatomy  ===========    The following structures appear normal:  Cranium. Thorax. Abdominal wall.    The following structures were visualized:  GI tract. Urogenital tract. Arms. Legs.    Maternal Structures  ===============    Uterus / Cervix  Uterus: Normal  Ovaries / Tubes / Adnexa  Rt ovary: Visualized  Rt ovary D1 3.3 cm  Rt ovary D2 3.4 cm  Rt ovary D3 1.6 cm  Rt ovary mean 2.7 cm  Rt ovary vol 9.1 cm³  Rt ovarian corpus luteum: visualized  Rt ovarian corpus luteum D1 13.2 mm  Rt ovarian corpus luteum D2 15.8 mm  Rt ovarian corpus luteum D3 7.5 mm  Lt ovary: Not visualized  Pouch of Ishan / Other Structures  Cul de Sac: Visualized    Impression  =========    Fetal size is consistent with established dating, and normal fetal heart motion is seen. The nuchal translucency is measured, and a  sample of maternal blood will be sent today for the first portion of the integrated screen.    Recommendation  ==============    First portion of sequential screening completed today. Results to be sent to primary pregnancy care provider. Recommend to complete  second blood draw beyond 15 weeks EGA of pregnancy. Ultrasound for fetal anatomical survey scheduled by M today for 19-20  weeks  EGA.

## 2017-12-05 NOTE — PROGRESS NOTES
Recent severe URI, also flu-like symptoms after travelling.  Recent ran out of diclegis and has increased N/V at that time.  Was seen in urgent care and was given reglan and IVF.  Remains with very low energy.    Able to tolerate po with Diclegis, recently refilled.

## 2017-12-07 LAB
# FETUSES US: NORMAL
AGE AT DELIVERY: 33
B-HCG MOM SERPL: NORMAL
B-HCG SERPL-ACNC: 73.2 IU/ML
FET CRL US.MEAS: 74.5 MM
FET NASAL BONE LENGTH US.MEAS: NORMAL MM
FET NUCHAL FOLD MOM THICKNESS US.MEAS: NORMAL
FET NUCHAL FOLD THICKNESS US.MEAS: 1.9 MM
FET TS 21 RISK FROM MAT AGE: NORMAL
GA (DAYS): 4 D
GA (WEEKS): 13 WK
IDDM PATIENT QL: NORMAL
INTEGRATED SCN PATIENT-IMP: NEGATIVE
PAPP-A MOM SERPL: NORMAL
PAPP-A SERPL-MCNC: 929.4 NG/ML
SEQUENTIAL SCREEN I INTERP.: NORMAL
SMOKING STATUS FTND: NO
TS 18 RISK FETUS: NORMAL
TS 21 RISK FETUS: NORMAL
US DATE: NORMAL

## 2018-01-02 ENCOUNTER — ROUTINE PRENATAL (OUTPATIENT)
Dept: OBSTETRICS AND GYNECOLOGY | Facility: CLINIC | Age: 33
End: 2018-01-02
Attending: OBSTETRICS & GYNECOLOGY
Payer: COMMERCIAL

## 2018-01-02 VITALS — SYSTOLIC BLOOD PRESSURE: 98 MMHG | WEIGHT: 132.69 LBS | DIASTOLIC BLOOD PRESSURE: 62 MMHG | BODY MASS INDEX: 22.78 KG/M2

## 2018-01-02 DIAGNOSIS — O21.9 NAUSEA AND VOMITING DURING PREGNANCY PRIOR TO 22 WEEKS GESTATION: ICD-10-CM

## 2018-01-02 DIAGNOSIS — Z34.90 PREGNANCY WITH ONE FETUS, ANTEPARTUM: Primary | ICD-10-CM

## 2018-01-02 PROCEDURE — 0502F SUBSEQUENT PRENATAL CARE: CPT | Mod: S$GLB,,, | Performed by: OBSTETRICS & GYNECOLOGY

## 2018-01-02 PROCEDURE — 99999 PR PBB SHADOW E&M-EST. PATIENT-LVL II: CPT | Mod: PBBFAC,,, | Performed by: OBSTETRICS & GYNECOLOGY

## 2018-01-02 NOTE — PROGRESS NOTES
Doing well  Weight gain good, still taking diclegis but feels she may not continue to need.  Discussed upcoming visits, interested in connected MOM, ordered today.  F/U 4 weeks, discueed anatomy scan.

## 2018-01-16 ENCOUNTER — PATIENT MESSAGE (OUTPATIENT)
Dept: OBSTETRICS AND GYNECOLOGY | Facility: CLINIC | Age: 33
End: 2018-01-16

## 2018-01-16 ENCOUNTER — OFFICE VISIT (OUTPATIENT)
Dept: MATERNAL FETAL MEDICINE | Facility: CLINIC | Age: 33
End: 2018-01-16
Attending: OBSTETRICS & GYNECOLOGY
Payer: COMMERCIAL

## 2018-01-16 VITALS — WEIGHT: 132.25 LBS | BODY MASS INDEX: 22.71 KG/M2

## 2018-01-16 DIAGNOSIS — Z36.89 ENCOUNTER FOR FETAL ANATOMIC SURVEY: ICD-10-CM

## 2018-01-16 DIAGNOSIS — O43.192 MARGINAL INSERTION OF UMBILICAL CORD AFFECTING MANAGEMENT OF MOTHER IN SECOND TRIMESTER: ICD-10-CM

## 2018-01-16 DIAGNOSIS — Z36.82 ENCOUNTER FOR ANTENATAL SCREENING FOR NUCHAL TRANSLUCENCY: Primary | ICD-10-CM

## 2018-01-16 DIAGNOSIS — O43.129 VELAMENTOUS INSERTION OF UMBILICAL CORD, ANTEPARTUM: ICD-10-CM

## 2018-01-16 DIAGNOSIS — Z34.90 PREGNANCY WITH ONE FETUS, ANTEPARTUM: ICD-10-CM

## 2018-01-16 DIAGNOSIS — Z36.89 ENCOUNTER FOR ULTRASOUND TO CHECK FETAL GROWTH: ICD-10-CM

## 2018-01-16 PROCEDURE — 99214 OFFICE O/P EST MOD 30 MIN: CPT | Mod: 25,S$GLB,, | Performed by: OBSTETRICS & GYNECOLOGY

## 2018-01-16 PROCEDURE — 99999 PR PBB SHADOW E&M-EST. PATIENT-LVL I: CPT | Mod: PBBFAC,,,

## 2018-01-16 PROCEDURE — 76805 OB US >/= 14 WKS SNGL FETUS: CPT | Mod: S$GLB,,, | Performed by: OBSTETRICS & GYNECOLOGY

## 2018-01-16 PROCEDURE — 76817 TRANSVAGINAL US OBSTETRIC: CPT | Mod: S$GLB,,, | Performed by: OBSTETRICS & GYNECOLOGY

## 2018-01-16 NOTE — PROGRESS NOTES
OB Ultrasound Report (see PDF version under imaging tab) and consultation    Indication  ========    Fetal anatomy survey.    Method  ======    Transabdominal and transvaginal ultrasound examination. View: Good view.    Pregnancy  =========    Olivarez pregnancy. Number of fetuses: 1.    Dating  ======    Cycle: regular cycle  Ultrasound examination on: 1/16/2018  GA by U/S based upon: AC, BPD, Femur, HC  GA by U/S 18 w + 5 d  JM by U/S: 6/14/2018  Assigned: Dating performed on 10/31/2017, based on the LMP  Assigned GA 19 w + 2 d  Assigned JM: 6/10/2018    General Evaluation  ===============    Cardiac activity: present.  bpm.  Fetal movements: visualized.  Presentation: breech.  Placenta: anterior.  Umbilical cord: Cord vessels: 3 vessel cord. Cord insertion: placental insertion: marginal.  Amniotic fluid: Amount of AF: normal amount.    Fetal Biometry  ===========    Fetal Biometry  BPD 40.5 mm 18w 2d Hadlock  OFD 54.8 mm 19w 3d Rashad  .3 mm 18w 2d Hadlock  .0 mm 19w 0d Hadlock  Femur 29.3 mm 19w 0d Hadlock  Cerebellum tr 19.0 mm 19w 1d Leiva  CM 2.5 mm  Nuchal fold 3.51 mm  Humerus 27.6 mm 18w 6d Rashad   g  Calculated by: Hadlock (BPD-HC-AC-FL)  EFW (lb) 0 lb  EFW (oz) 9 oz  Cephalic index 0.74  HC / AC 1.13  FL / BPD 0.72  FL / AC 0.22   bpm  Head / Face / Neck   6.5 mm    Fetal Anatomy  ===========    Cranium: normal  Lateral ventricles: normal  Choroid plexus: normal  Midline falx: normal  Cavum septi pellucidi: normal  Cerebellum: normal  Cisterna magna: normal  Lips: normal  Profile: suboptimal  Nose: normal  RVOT: suboptimal  LVOT: suboptimal  4-chamber view: septum suboptimal, 4-chamber normal  Situs: normal  Aortic arch: normal  Ductal arch: normal  SVC: normal  IVC: normal  3-vessel view: normal  Rt lung: normal  Lt lung: normal  Diaphragm: suboptimal  Cord insertion: normal  Stomach: normal  Kidneys: normal  Bladder: normal  Cervical spine: normal  Thoracic  spine: normal  Lumbar spine: normal  Sacral spine: normal  Arms: both visualized  Legs: both visualized  Rt upper arm: normal  Rt forearm: normal  Rt hand: visualized  Rt hand: open  Lt upper arm: normal  Lt forearm: normal  Lt hand: visualized  Lt hand: open  Rt upper leg: normal  Rt lower leg: normal  Rt foot: visualized  Rt foot: plantar surface wnl  Lt upper leg: normal  Lt lower leg: normal  Lt foot: visualized  Lt foot: plantar surface wnl  Wants to know gender: no    Maternal Structures  ===============    Uterus / Cervix  Approach: Transabdominal  Cervical length 46.8 mm  Ovaries / Tubes / Adnexa  Rt ovary: Not visualized  Lt ovary: Not visualized    Consultation  ==========    Type: Abnormal Ultrasound Finding.  I spent 30 minutes on the consultation today with the patient regarding findings from today's ultrasound exam. More than 50% of the  consultation was spent in face-to-face counseling and coordination of care.  A marginal placental cord insertion site (cord inserted onto placenta less than 1 cm from the placental edge) is identified. Marginal  cord insertions (MCI) have a modest association with fetal growth restriction; therefore, a follow-up exam to assess fetal growth at 30 -  34 weeks is recommended and will be scheduled. Additionally, the placental cord insertion site will be reexamined at the follow-up  study. If the placental cord insertion site is located in the lower uterine segment, will also assess for vasa previa by transvaginal scan  with color flow imaging. These possible outcomes and plan for follow-up were discussed with the patient today.    Ultrasound Impression  =========    A delaney living IUP is identified. Fetal size is appropriate for established dating. No fetal structural malformations are identified;  however, the anatomic survey is incomplete as noted above. The patient will be scheduled for a f/u exam to complete the anatomic  survey. Cervical length is normal, and  placental location is normal without evidence of previa.  The placental cord insertion site is marginal at the distal end of the placenta. TV scanning was therefore also performed to assess for  evidence of vasa previa. Vasa previa is not suspected; however, because the cord is tethered near the internal os (located  approximately 4 - 5 cm lateral and cephalad to the internal cervical os), a free loop of cord remained near the internal cervical os. Will  reassess location of the distal end of the cord and will reassess for vasa previa at the next exam. F/U growth exams will also be  scheduled at 30 - 32 weeks.

## 2018-01-17 ENCOUNTER — TELEPHONE (OUTPATIENT)
Dept: OBSTETRICS AND GYNECOLOGY | Facility: CLINIC | Age: 33
End: 2018-01-17

## 2018-01-17 NOTE — TELEPHONE ENCOUNTER
Called patient to address concerns about placentation and cord location.  Have spoken with Dr. Escoto about this, appears to be a marginal insertion with corn near the internal os.  Discussed this with the patient.  Voiced understanding. Recommend changing exercise to be more mild, also discussed pelvic rest as a precaution.

## 2018-01-19 ENCOUNTER — TELEPHONE (OUTPATIENT)
Dept: OBSTETRICS AND GYNECOLOGY | Facility: CLINIC | Age: 33
End: 2018-01-19

## 2018-01-19 ENCOUNTER — LAB VISIT (OUTPATIENT)
Dept: LAB | Facility: OTHER | Age: 33
End: 2018-01-19
Attending: OBSTETRICS & GYNECOLOGY
Payer: COMMERCIAL

## 2018-01-19 DIAGNOSIS — Z36.82 ENCOUNTER FOR ANTENATAL SCREENING FOR NUCHAL TRANSLUCENCY: Primary | ICD-10-CM

## 2018-01-19 DIAGNOSIS — Z36.82 ENCOUNTER FOR ANTENATAL SCREENING FOR NUCHAL TRANSLUCENCY: ICD-10-CM

## 2018-01-19 PROCEDURE — 81511 FTL CGEN ABNOR FOUR ANAL: CPT

## 2018-01-19 PROCEDURE — 36415 COLL VENOUS BLD VENIPUNCTURE: CPT

## 2018-01-22 LAB
# FETUSES US: NORMAL
AFP MOM SERPL: 0.69
AFP SERPL-MCNC: 42.6 NG/ML
AGE AT DELIVERY: 33
B-HCG MOM SERPL: 1.12
B-HCG SERPL-ACNC: 23.7 IU/ML
COLLECT DATE BLD: NORMAL
COLLECT DATE: NORMAL
FET NASAL BONE LENGTH US.MEAS: NORMAL MM
FET NUCHAL FOLD MOM THICKNESS US.MEAS: 1.1
FET NUCHAL FOLD THICKNESS US.MEAS: 1.9 MM
FET TS 21 RISK FROM MAT AGE: NORMAL
GA (DAYS): 4 D
GA (WEEKS): 20 WK
GA METHOD: NORMAL
GEST. AGE (DAYS) 2ND SAMPLE (SS2): 0
GEST. AGE (WKS) 1ST SAMPLE (SS2): 13
IDDM PATIENT QL: NORMAL
INHIBIN A MOM SERPL: 0.69
INHIBIN A SERPL-MCNC: 130.5 PG/ML
INTEGRATED SCN PATIENT-IMP: NEGATIVE
PAPP-A MOM SERPL: 0.58
PAPP-A SERPL-MCNC: 929.4 NG/ML
SEQUENTIAL SCREEN PART 2 INTERP: NORMAL
TS 18 RISK FETUS: NORMAL
TS 21 RISK FETUS: NORMAL
U ESTRIOL MOM SERPL: 0.86
U ESTRIOL SERPL-MCNC: 1.74 NG/ML

## 2018-01-30 ENCOUNTER — ROUTINE PRENATAL (OUTPATIENT)
Dept: OBSTETRICS AND GYNECOLOGY | Facility: CLINIC | Age: 33
End: 2018-01-30
Attending: OBSTETRICS & GYNECOLOGY
Payer: COMMERCIAL

## 2018-01-30 ENCOUNTER — TELEPHONE (OUTPATIENT)
Dept: OBSTETRICS AND GYNECOLOGY | Facility: CLINIC | Age: 33
End: 2018-01-30

## 2018-01-30 VITALS
WEIGHT: 132.06 LBS | BODY MASS INDEX: 22.67 KG/M2 | DIASTOLIC BLOOD PRESSURE: 62 MMHG | SYSTOLIC BLOOD PRESSURE: 110 MMHG

## 2018-01-30 DIAGNOSIS — O43.199 MARGINAL INSERTION OF UMBILICAL CORD AFFECTING MANAGEMENT OF MOTHER: ICD-10-CM

## 2018-01-30 DIAGNOSIS — Z34.90 PREGNANCY WITH ONE FETUS, ANTEPARTUM: Primary | ICD-10-CM

## 2018-01-30 DIAGNOSIS — Z3A.21 21 WEEKS GESTATION OF PREGNANCY: Primary | ICD-10-CM

## 2018-01-30 PROCEDURE — 99999 PR PBB SHADOW E&M-EST. PATIENT-LVL II: CPT | Mod: PBBFAC,,, | Performed by: OBSTETRICS & GYNECOLOGY

## 2018-01-30 PROCEDURE — 0502F SUBSEQUENT PRENATAL CARE: CPT | Mod: S$GLB,,, | Performed by: OBSTETRICS & GYNECOLOGY

## 2018-01-30 NOTE — PROGRESS NOTES
Patient seen and examined.  No complaints, denies VB/LOF/Ctxns, reports good fetal movement. Precautions for Bleeding/ ROM/ Decreased fetal movement, reviewed.  F/U scheduled 4 weeks.  Discussed restrictions with marginal insert.  Cannot r/o vasa previa.  Discussed how this may effect delivery.

## 2018-02-15 ENCOUNTER — PATIENT MESSAGE (OUTPATIENT)
Dept: OBSTETRICS AND GYNECOLOGY | Facility: CLINIC | Age: 33
End: 2018-02-15

## 2018-02-27 ENCOUNTER — LAB VISIT (OUTPATIENT)
Dept: LAB | Facility: OTHER | Age: 33
End: 2018-02-27
Attending: OBSTETRICS & GYNECOLOGY
Payer: COMMERCIAL

## 2018-02-27 ENCOUNTER — OFFICE VISIT (OUTPATIENT)
Dept: MATERNAL FETAL MEDICINE | Facility: CLINIC | Age: 33
End: 2018-02-27
Attending: OBSTETRICS & GYNECOLOGY
Payer: COMMERCIAL

## 2018-02-27 ENCOUNTER — TELEPHONE (OUTPATIENT)
Dept: OBSTETRICS AND GYNECOLOGY | Facility: CLINIC | Age: 33
End: 2018-02-27

## 2018-02-27 ENCOUNTER — ROUTINE PRENATAL (OUTPATIENT)
Dept: OBSTETRICS AND GYNECOLOGY | Facility: CLINIC | Age: 33
End: 2018-02-27
Attending: OBSTETRICS & GYNECOLOGY
Payer: COMMERCIAL

## 2018-02-27 VITALS
SYSTOLIC BLOOD PRESSURE: 110 MMHG | DIASTOLIC BLOOD PRESSURE: 70 MMHG | BODY MASS INDEX: 23.27 KG/M2 | WEIGHT: 135.56 LBS

## 2018-02-27 DIAGNOSIS — O43.199 MARGINAL INSERTION OF UMBILICAL CORD AFFECTING MANAGEMENT OF MOTHER: ICD-10-CM

## 2018-02-27 DIAGNOSIS — Z36.89 ENCOUNTER FOR ULTRASOUND TO CHECK FETAL GROWTH: ICD-10-CM

## 2018-02-27 DIAGNOSIS — Z3A.21 21 WEEKS GESTATION OF PREGNANCY: ICD-10-CM

## 2018-02-27 DIAGNOSIS — O43.129 VELAMENTOUS INSERTION OF UMBILICAL CORD, ANTEPARTUM: ICD-10-CM

## 2018-02-27 DIAGNOSIS — Z3A.25 25 WEEKS GESTATION OF PREGNANCY: Primary | ICD-10-CM

## 2018-02-27 DIAGNOSIS — Z23 NEED FOR TDAP VACCINATION: ICD-10-CM

## 2018-02-27 LAB — GLUCOSE SERPL-MCNC: 95 MG/DL

## 2018-02-27 PROCEDURE — 99999 PR PBB SHADOW E&M-EST. PATIENT-LVL III: CPT | Mod: PBBFAC,,, | Performed by: NURSE PRACTITIONER

## 2018-02-27 PROCEDURE — 0502F SUBSEQUENT PRENATAL CARE: CPT | Mod: S$GLB,,, | Performed by: NURSE PRACTITIONER

## 2018-02-27 PROCEDURE — 99499 UNLISTED E&M SERVICE: CPT | Mod: S$GLB,,, | Performed by: OBSTETRICS & GYNECOLOGY

## 2018-02-27 PROCEDURE — 36415 COLL VENOUS BLD VENIPUNCTURE: CPT

## 2018-02-27 PROCEDURE — 76816 OB US FOLLOW-UP PER FETUS: CPT | Mod: S$GLB,,, | Performed by: OBSTETRICS & GYNECOLOGY

## 2018-02-27 PROCEDURE — 82950 GLUCOSE TEST: CPT

## 2018-02-27 NOTE — TELEPHONE ENCOUNTER
----- Message from Capri Smith sent at 2/27/2018  8:09 AM CST -----  Contact: Patient   X _1st Request  _  2nd Request  _  3rd Request    Who:WILLIE CALVIN [[id number]]    Why:Patient states she is running a little late she is in the lab waiting to drink the glucose medicine she will be up as soon as they give it to her     What Number to Call Back:[phone]    When to Expect a call back: (Before the end of the day)   -- if call after 3:00 call back will be tomorrow.

## 2018-02-27 NOTE — TELEPHONE ENCOUNTER
----- Message from Isabella Roe MD sent at 2/27/2018 11:06 AM CST -----      ----- Message -----  From: Interface, Lab In [facility]  Sent: 2/27/2018  10:55 AM  To: Isabella Roe MD

## 2018-02-27 NOTE — PROGRESS NOTES
OB Ultrasound Report (see PDF version under imaging tab)    Indication  ========    Evaluation of fetal growth/finish anatomy/marginal cord insertion.    Method  ======    Transabdominal ultrasound examination. View: Good view.    Pregnancy  =========    Olivarez pregnancy. Number of fetuses: 1.    Dating  ======    Cycle: regular cycle  Ultrasound examination on: 2/27/2018  GA by U/S based upon: AC, BPD, Femur, HC  GA by U/S 24 w + 6 d  JM by U/S: 6/13/2018  Assigned: Dating performed on 10/31/2017, based on the LMP  Assigned GA 25 w + 2 d  Assigned JM: 6/10/2018    General Evaluation  ===============    Cardiac activity: present.  bpm.  Fetal movements: visualized.  Presentation: footling breech.  Placenta:  Placental site: anterior.  Umbilical cord: Cord vessels: 3 vessel cord. Cord insertion: placental insertion: marginal.  Amniotic fluid: Amount of AF: normal amount.    Fetal Biometry  ===========    Fetal Biometry  BPD 60.1 mm 24w 4d Hadlock  OFD 77.8 mm 25w 3d Rashad  .0 mm 24w 2d Hadlock  .4 mm 25w 5d Hadlock  Femur 44.5 mm 24w 5d Hadlock   g 42% Javier  Calculated by: Hadlock (BPD-HC-AC-FL)  EFW (lb) 1 lb  EFW (oz) 11 oz  Cephalic index 0.77  HC / AC 1.05  FL / BPD 0.74  FL / AC 0.21   bpm    Fetal Anatomy  ===========    Cranium: normal  Posterior fossa: normal  Profile: suboptimal  4-chamber view: normal  RVOT: normal  LVOT: normal  Heart / Thorax: septum appears wnl  Diaphragm: normal  Stomach: normal  Kidneys: normal  Bladder: normal  Wants to know gender: no  Other: A full anatomy survey previously performed.    Impression  =========    Other than the facial profile, the fetal anatomic survey is completed today and is normal. Will attempt to visualize the facial profile  again when the patient returns at 30 - 32 weeks for the f/u growth scan.  Interval fetal growth has been normal. The marginal cord insertion site is confirmed and now appears to be located more  cephalad  than at the time of the last study. Color flow imaging over the internal cervical os is not suggestive of vasa previa.

## 2018-02-27 NOTE — PROGRESS NOTES
Here for routine OB appt at 25w2d, with c/o having some diarrhea and indigestion at times. Discussed staying hydrated and letting it run its course. Encouraged tums or zantac prn. Does not plan to find out gender. Recently moved. Having left hip pain-has seen PT in the past for it and has a support belt at home. GTT and MFM u/s today. Denies VB and cramping. +FM  Pain, bleeding, and PTL precautions given.  F/U scheduled 4 weeks  Tdap at next visit.

## 2018-03-06 ENCOUNTER — PATIENT MESSAGE (OUTPATIENT)
Dept: OBSTETRICS AND GYNECOLOGY | Facility: CLINIC | Age: 33
End: 2018-03-06

## 2018-03-19 ENCOUNTER — PATIENT MESSAGE (OUTPATIENT)
Dept: OBSTETRICS AND GYNECOLOGY | Facility: CLINIC | Age: 33
End: 2018-03-19

## 2018-03-19 ENCOUNTER — TELEPHONE (OUTPATIENT)
Dept: OBSTETRICS AND GYNECOLOGY | Facility: CLINIC | Age: 33
End: 2018-03-19

## 2018-03-19 NOTE — TELEPHONE ENCOUNTER
Pt is c/o frequent urination pt was advised to come in and give a urine sample how ever her son was taking a nap and then he had soccer practice so she isn't going to be able to come in pt was advised to call or come in to l&d if her symptons get worse.

## 2018-03-20 ENCOUNTER — TELEPHONE (OUTPATIENT)
Dept: OBSTETRICS AND GYNECOLOGY | Facility: CLINIC | Age: 33
End: 2018-03-20

## 2018-03-20 ENCOUNTER — HOSPITAL ENCOUNTER (EMERGENCY)
Facility: OTHER | Age: 33
Discharge: HOME | End: 2018-03-20
Attending: STUDENT IN AN ORGANIZED HEALTH CARE EDUCATION/TRAINING PROGRAM
Payer: COMMERCIAL

## 2018-03-20 VITALS
SYSTOLIC BLOOD PRESSURE: 119 MMHG | TEMPERATURE: 98 F | HEART RATE: 72 BPM | OXYGEN SATURATION: 100 % | DIASTOLIC BLOOD PRESSURE: 72 MMHG

## 2018-03-20 DIAGNOSIS — E86.0 MILD DEHYDRATION: ICD-10-CM

## 2018-03-20 DIAGNOSIS — Z3A.28 28 WEEKS GESTATION OF PREGNANCY: ICD-10-CM

## 2018-03-20 DIAGNOSIS — N30.00 ACUTE CYSTITIS WITHOUT HEMATURIA: Primary | ICD-10-CM

## 2018-03-20 LAB
AMORPH CRY URNS QL MICRO: ABNORMAL
BACTERIA #/AREA URNS HPF: ABNORMAL /HPF
BILIRUB UR QL STRIP: NEGATIVE
CLARITY UR: ABNORMAL
COLOR UR: YELLOW
GLUCOSE UR QL STRIP: NEGATIVE
HGB UR QL STRIP: NEGATIVE
KETONES UR QL STRIP: NEGATIVE
LEUKOCYTE ESTERASE UR QL STRIP: ABNORMAL
MICROSCOPIC COMMENT: ABNORMAL
NITRITE UR QL STRIP: NEGATIVE
PH UR STRIP: 7 [PH] (ref 5–8)
PROT UR QL STRIP: NEGATIVE
RBC #/AREA URNS HPF: 0 /HPF (ref 0–4)
SP GR UR STRIP: 1.01 (ref 1–1.03)
URN SPEC COLLECT METH UR: ABNORMAL
UROBILINOGEN UR STRIP-ACNC: NEGATIVE EU/DL
WBC #/AREA URNS HPF: 4 /HPF (ref 0–5)

## 2018-03-20 PROCEDURE — 25000003 PHARM REV CODE 250: Performed by: STUDENT IN AN ORGANIZED HEALTH CARE EDUCATION/TRAINING PROGRAM

## 2018-03-20 PROCEDURE — 99284 EMERGENCY DEPT VISIT MOD MDM: CPT | Mod: 25,,, | Performed by: STUDENT IN AN ORGANIZED HEALTH CARE EDUCATION/TRAINING PROGRAM

## 2018-03-20 PROCEDURE — 81000 URINALYSIS NONAUTO W/SCOPE: CPT

## 2018-03-20 PROCEDURE — 59025 FETAL NON-STRESS TEST: CPT

## 2018-03-20 PROCEDURE — 87086 URINE CULTURE/COLONY COUNT: CPT

## 2018-03-20 PROCEDURE — 99284 EMERGENCY DEPT VISIT MOD MDM: CPT | Mod: 25

## 2018-03-20 PROCEDURE — 59025 FETAL NON-STRESS TEST: CPT | Mod: 26,,, | Performed by: STUDENT IN AN ORGANIZED HEALTH CARE EDUCATION/TRAINING PROGRAM

## 2018-03-20 RX ORDER — ONDANSETRON 4 MG/1
4 TABLET, ORALLY DISINTEGRATING ORAL ONCE
Status: COMPLETED | OUTPATIENT
Start: 2018-03-20 | End: 2018-03-20

## 2018-03-20 RX ORDER — NITROFURANTOIN 25; 75 MG/1; MG/1
100 CAPSULE ORAL 2 TIMES DAILY
Qty: 14 CAPSULE | Refills: 0 | Status: SHIPPED | OUTPATIENT
Start: 2018-03-20 | End: 2018-03-27

## 2018-03-20 RX ORDER — NITROFURANTOIN 25; 75 MG/1; MG/1
100 CAPSULE ORAL 2 TIMES DAILY
Qty: 10 CAPSULE | Refills: 0 | Status: SHIPPED | OUTPATIENT
Start: 2018-03-20 | End: 2018-03-20

## 2018-03-20 RX ORDER — ACETAMINOPHEN 500 MG
1000 TABLET ORAL EVERY 6 HOURS PRN
Status: DISCONTINUED | OUTPATIENT
Start: 2018-03-20 | End: 2018-03-20 | Stop reason: HOSPADM

## 2018-03-20 RX ADMIN — SODIUM CHLORIDE, POTASSIUM CHLORIDE, SODIUM LACTATE AND CALCIUM CHLORIDE 1000 ML: 600; 310; 30; 20 INJECTION, SOLUTION INTRAVENOUS at 10:03

## 2018-03-20 RX ADMIN — ONDANSETRON 4 MG: 4 TABLET, ORALLY DISINTEGRATING ORAL at 10:03

## 2018-03-20 RX ADMIN — ACETAMINOPHEN 1000 MG: 500 TABLET ORAL at 10:03

## 2018-03-20 NOTE — ED NOTES
Pt presents to [facility] c/o contractions. Pt placed in assessment 2 oriented to room and dr clements notified af arrival

## 2018-03-20 NOTE — TELEPHONE ENCOUNTER
----- Message from Neftaly Leo sent at 3/20/2018  8:49 AM CDT -----  28 WEEKS OB PT HAVING BAD CRAMPING ON HER WAY TO L& D [phone]

## 2018-03-20 NOTE — DISCHARGE INSTRUCTIONS
Call clinic [phone] or L & D after hours at [phone] for vaginal bleeding, leakage of fluids, regular contractions every 5 mins for 2 hours, decreased fetal movements ( 10 kicks in 2 hours), headache not relieved by Tylenol, blurry vision, or temp of 100.4 or greater.  Begin doing fetal kick counts, at least 10 movements in 2 hours starting at 28 weeks gestation.  Keep next clinic appointment.

## 2018-03-20 NOTE — ED PROVIDER NOTES
"Encounter Date: 3/20/2018       History     Chief Complaint   Patient presents with    Contractions     Karen Purdy is a 32 y.o. [id number] at 28w2d presents complaining of abdominal pain. Patient states that yesterday she started having pain "like I had a bladder infection". She states she wasn't able to come into the office yesterday. She complains of vaginal pain, dysuria, and urgency. Patient previously had GBS uti in 1st trimester, was treated. Patient states that last night she had some abdominal cramping last a few hours however that is was not rhythmic like contraction pain. She also complains of lower back pain "similar to the kind you get with menstrual cramps". Patient denies taking anything for pain medication. Denies history of UTIs and kidney stones. This IUP is complicated by GBS UTI in pregnancy, marginal cord insertion, and immunodeficiency with antibody defects.  Patient denies contractions, denies vaginal bleeding, denies LOF.   Fetal Movement: normal.            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     Unspecified vitamin D deficiency      Past Surgical History:   Procedure Laterality Date    KNEE SURGERY Right     torn meniscus     Family History   Problem Relation Age of Onset    Cancer Maternal Grandfather      lung    Dementia Paternal Grandmother     Hyperlipidemia Mother     Hypertension Mother     Hypertension Father     Hyperlipidemia Father     Breast cancer Neg Hx     Colon cancer Neg Hx     Ovarian cancer Neg Hx      Social History   Substance Use Topics    Smoking status: Former Smoker    Smokeless tobacco: Never Used "    Alcohol use No      Comment: socially     Review of Systems   Constitutional: Negative for chills, fatigue and fever.   HENT: Negative for congestion.    Eyes: Negative for visual disturbance.   Respiratory: Negative for cough and shortness of breath.    Cardiovascular: Negative for chest pain and palpitations.   Gastrointestinal: Positive for nausea. Negative for abdominal distention, abdominal pain, constipation, diarrhea and vomiting.   Genitourinary: Positive for dysuria, frequency, pelvic pain and urgency. Negative for difficulty urinating, flank pain, hematuria, vaginal bleeding and vaginal discharge.   Musculoskeletal: Positive for back pain.   Skin: Negative for rash.   Neurological: Negative for dizziness, seizures, light-headedness and headaches.   Hematological: Does not bruise/bleed easily.   Psychiatric/Behavioral: Negative for dysphoric mood. The patient is not nervous/anxious.        Physical Exam     Initial Vitals   BP Pulse Resp Temp SpO2   -- -- -- -- --      MAP       --         Physical Exam    Vitals reviewed.  Constitutional: She appears well-developed and well-nourished. She is not diaphoretic. No distress.   HENT:   Head: Normocephalic and atraumatic.   Eyes: Conjunctivae are normal.   Neck: Neck supple.   Cardiovascular: Normal rate and regular rhythm.   Pulmonary/Chest: Breath sounds normal. No respiratory distress. She has no wheezes.   Abdominal: Soft. She exhibits no distension. There is no tenderness. There is no rebound.   Suprapubic tenderness present  NO CVA tenderness bilaterally      Genitourinary:   Genitourinary Comments: BME revealed cl/th/high  Anterior vaginal wall tender to palpation.    Musculoskeletal: She exhibits no edema.   Neurological: She is alert and oriented to person, place, and time.   Skin: Skin is warm and dry.   Psychiatric: She has a normal mood and affect. Her behavior is normal. Judgment and thought content normal.     OB LABOR EXAM:   Pre-Term Labor:  No.   Membranes ruptured: No.   Method: Sterile vaginal exam per MD and Sterile speculum exam per MD.   Vaginal Bleeding: none present.               Comments: Physiologic cervical mucus present at os  Cervical ectropion noted  Cervix closed/thick/high on BME         ED Course   Obtain Fetal nonstress test (NST)  Date/Time: 3/20/2018 10:39 AM  Performed by: TIMOTHY SERNA  Authorized by: TIMOTHY SERNA     Nonstress Test:     Variability:  6-25 BPM    Decelerations:  None    Accelerations:  10 bpm    Baseline:  140    Uterine Irritability: No      Contractions:  Not present  Biophysical Profile:     Overall Impression:  Reassuring      Labs Reviewed   URINALYSIS - Abnormal; Notable for the following:        Result Value    Appearance, UA Cloudy (*)     Leukocytes, UA Trace (*)     All other components within normal limits   URINALYSIS MICROSCOPIC - Abnormal; Notable for the following:     Bacteria, UA Few (*)     All other components within normal limits   CULTURE, URINE   VAGINOSIS SCREEN BY DNA PROBE   POCT URINALYSIS, DIPSTICK OR TABLET REAGENT, AUTOMATED, WITH MICROSCOP             Medical Decision Making:   ED Management:  - UA revealed few leukocytes  - Afebrile, VSS  - Urine culture sent 2/2 to history of GBS uti in early pregnancy  - Will empirically treat patient with macrobid 100mg BID x 7 days  - Cervix closed/thick/high on exam  - NST reassuring, no uterine irritability, no contractions  - wet prep WNL, no yeast, trich or BV seen  - no affirms in OB ED  - Patient slightly nauseated, improved with zofran and IV fluid bolus  - Patient still complains of lower back, slightly improved with tylenol  - Dr. Parker and I both discussed with patient that we have low suspicion for kidney infection or kidney stone. Dr. Parker discussed patient with Dr. Pearson  - ED precautions given to patient, patient will have close f/u with Dr. Pearson for symptom improvement   - ED and labor precautions discussed                Attending Attestation:   Physician Attestation Statement for Resident:  As the supervising MD   Physician Attestation Statement: I have personally seen and examined this patient.   I agree with the above history. -:   As the supervising MD I agree with the above PE.    As the supervising MD I agree with the above treatment, course, plan, and disposition.  I was personally present during the critical portions of the procedure(s) performed by the resident and was immediately available in the ED to provide services and assistance as needed during the entire procedure.  I have reviewed and agree with the residents interpretation of the following: rhythm strips and lab data.  I have reviewed the following: old records at this facility.                    ED Course as of Mar 20 1136   Tue Mar 20, 2018   0928 33 yo  at 28 weeks with dysuria and frequency.  [AV]   0944 UA with trace leuks.  SVE C/T/H.  [AV]   1005 UA with trace leuks and wet prep negative.  Patient now with c/o nausea.  IVF bolus, zofran, tylenol.  [AV]   1101 Feeling improved s/p IVFs, zofran, tylenol.  Afebrile.  Will treat empirically for UTI with Macrobid.  NST reactive.  No contractions.  [AV]      ED Course User Index  [AV] Tigist Barron MD     Clinical Impression:   The primary encounter diagnosis was Acute cystitis without hematuria. Diagnoses of 28 weeks gestation of pregnancy and Mild dehydration were also pertinent to this visit.                           Jaz Pond MD  Resident  18 [phone]       Tigist Barron MD  18 [phone]

## 2018-03-21 LAB
BACTERIA UR CULT: NORMAL
BACTERIA UR CULT: NORMAL

## 2018-03-22 ENCOUNTER — ROUTINE PRENATAL (OUTPATIENT)
Dept: OBSTETRICS AND GYNECOLOGY | Facility: CLINIC | Age: 33
End: 2018-03-22
Attending: OBSTETRICS & GYNECOLOGY
Payer: COMMERCIAL

## 2018-03-22 VITALS
SYSTOLIC BLOOD PRESSURE: 100 MMHG | BODY MASS INDEX: 24.07 KG/M2 | DIASTOLIC BLOOD PRESSURE: 72 MMHG | WEIGHT: 140.19 LBS

## 2018-03-22 DIAGNOSIS — D80.9 IMMUNODEFICIENCY WITH PREDOMINANTLY ANTIBODY DEFECTS: ICD-10-CM

## 2018-03-22 DIAGNOSIS — O43.199 MARGINAL INSERTION OF UMBILICAL CORD AFFECTING MANAGEMENT OF MOTHER: ICD-10-CM

## 2018-03-22 DIAGNOSIS — Z34.90 PREGNANCY WITH ONE FETUS, ANTEPARTUM: Primary | ICD-10-CM

## 2018-03-22 PROBLEM — O21.9 NAUSEA AND VOMITING DURING PREGNANCY PRIOR TO 22 WEEKS GESTATION: Status: RESOLVED | Noted: 2017-11-07 | Resolved: 2018-03-22

## 2018-03-22 PROCEDURE — 99999 PR PBB SHADOW E&M-EST. PATIENT-LVL II: CPT | Mod: PBBFAC,,, | Performed by: OBSTETRICS & GYNECOLOGY

## 2018-03-22 PROCEDURE — 0502F SUBSEQUENT PRENATAL CARE: CPT | Mod: S$GLB,,, | Performed by: OBSTETRICS & GYNECOLOGY

## 2018-03-26 ENCOUNTER — PATIENT MESSAGE (OUTPATIENT)
Dept: OBSTETRICS AND GYNECOLOGY | Facility: CLINIC | Age: 33
End: 2018-03-26

## 2018-04-05 ENCOUNTER — ROUTINE PRENATAL (OUTPATIENT)
Dept: OBSTETRICS AND GYNECOLOGY | Facility: CLINIC | Age: 33
End: 2018-04-05
Payer: COMMERCIAL

## 2018-04-05 VITALS
WEIGHT: 141.75 LBS | BODY MASS INDEX: 24.33 KG/M2 | DIASTOLIC BLOOD PRESSURE: 64 MMHG | SYSTOLIC BLOOD PRESSURE: 100 MMHG

## 2018-04-05 DIAGNOSIS — Z23 NEED FOR TDAP VACCINATION: ICD-10-CM

## 2018-04-05 DIAGNOSIS — Z3A.30 30 WEEKS GESTATION OF PREGNANCY: Primary | ICD-10-CM

## 2018-04-05 PROCEDURE — 0502F SUBSEQUENT PRENATAL CARE: CPT | Mod: S$GLB,,, | Performed by: OBSTETRICS & GYNECOLOGY

## 2018-04-05 PROCEDURE — 99999 PR PBB SHADOW E&M-EST. PATIENT-LVL II: CPT | Mod: PBBFAC,,,

## 2018-04-05 NOTE — PROGRESS NOTES
Here for routine OB appt at 30w4d, with no complaints.  Reports good FM.  Denies LOF, denies VB, denies contractions. Denies any UTI s/s. Seen in [place] a few weeks ago-better with IVF. Not finding out gender. Occ hip pain.  Reviewed warning signs of Labor and Preeclampsia.  Daily FM counts reinforced.  F/U scheduled 2 weeks; Tdap today.

## 2018-04-16 ENCOUNTER — ROUTINE PRENATAL (OUTPATIENT)
Dept: OBSTETRICS AND GYNECOLOGY | Facility: CLINIC | Age: 33
End: 2018-04-16
Attending: OBSTETRICS & GYNECOLOGY
Payer: COMMERCIAL

## 2018-04-16 VITALS
WEIGHT: 146.19 LBS | SYSTOLIC BLOOD PRESSURE: 102 MMHG | DIASTOLIC BLOOD PRESSURE: 60 MMHG | BODY MASS INDEX: 25.09 KG/M2

## 2018-04-16 DIAGNOSIS — Z34.90 PREGNANCY WITH ONE FETUS, ANTEPARTUM: ICD-10-CM

## 2018-04-16 DIAGNOSIS — O12.03 SWELLING OF LOWER EXTREMITY DURING PREGNANCY IN THIRD TRIMESTER: Primary | ICD-10-CM

## 2018-04-16 DIAGNOSIS — O43.199 MARGINAL INSERTION OF UMBILICAL CORD AFFECTING MANAGEMENT OF MOTHER: ICD-10-CM

## 2018-04-16 PROCEDURE — 0502F SUBSEQUENT PRENATAL CARE: CPT | Mod: S$GLB,,, | Performed by: OBSTETRICS & GYNECOLOGY

## 2018-04-16 PROCEDURE — 99999 PR PBB SHADOW E&M-EST. PATIENT-LVL II: CPT | Mod: PBBFAC,,, | Performed by: OBSTETRICS & GYNECOLOGY

## 2018-04-16 NOTE — PROGRESS NOTES
Some pain over abdomen associated with growth.  Discussed maternity spanx and support belt.  Will try.  Also given compression stocking rx as concerned about swelling.  Repeat U/S discussed to follow growth, marginal insertion and re-evaluate possible vasa previa, though low suspicion last u/s.  F/U in 2 weeks.  All scheduled/

## 2018-04-17 ENCOUNTER — OFFICE VISIT (OUTPATIENT)
Dept: MATERNAL FETAL MEDICINE | Facility: CLINIC | Age: 33
End: 2018-04-17
Payer: COMMERCIAL

## 2018-04-17 DIAGNOSIS — O43.199 MARGINAL INSERTION OF UMBILICAL CORD AFFECTING MANAGEMENT OF MOTHER: ICD-10-CM

## 2018-04-17 DIAGNOSIS — Z36.89 ENCOUNTER FOR ULTRASOUND TO CHECK FETAL GROWTH: ICD-10-CM

## 2018-04-17 PROCEDURE — 99499 UNLISTED E&M SERVICE: CPT | Mod: S$GLB,,, | Performed by: OBSTETRICS & GYNECOLOGY

## 2018-04-17 PROCEDURE — 76816 OB US FOLLOW-UP PER FETUS: CPT | Mod: S$GLB,,, | Performed by: OBSTETRICS & GYNECOLOGY

## 2018-04-17 PROCEDURE — 76817 TRANSVAGINAL US OBSTETRIC: CPT | Mod: S$GLB,,, | Performed by: OBSTETRICS & GYNECOLOGY

## 2018-04-17 NOTE — PROGRESS NOTES
OB Ultrasound Report (see PDF version under imaging tab)    Indication  ========    Evaluation of fetal growth, Marginal cord insertion.    Method  ======    [place] E10, Transabdominal and transvaginal ultrasound examination. View: Suboptimal view: limited by fetal position.    Pregnancy  =========    Olivarez pregnancy. Number of fetuses: 1.    Dating  ======    Cycle: regular cycle  Ultrasound examination on: 4/17/2018  GA by U/S based upon: AC, BPD, Femur, HC  GA by U/S 31 w + 4 d  JM by U/S: 6/15/2018  Assigned: Dating performed on 10/31/2017, based on the LMP  Assigned GA 32 w + 2 d  Assigned JM: 6/10/2018    General Evaluation  ===============    Cardiac activity: present.  bpm.  Fetal movements: visualized.  Presentation: cephalic.  Placenta:  Placental site: anterior.  Umbilical cord: Cord vessels: 3 vessel cord. placental insertion: marginal.  Amniotic fluid: Amount of AF: normal amount. MVP 4.7 cm.    Fetal Biometry  ===========    Fetal Biometry  BPD 77.1 mm 31w 0d Hadlock  .7 mm 34w 2d Rashad  .2 mm 32w 0d Hadlock  .7 mm 33w 0d Hadlock  Femur 57.6 mm 30w 1d Hadlock  EFW 1,854 g 22% Javier  Calculated by: Hadlock (BPD-HC-AC-FL)  EFW (lb) 4 lb  EFW (oz) 1 oz  Cephalic index 0.74  HC / AC 1.00  FL / BPD 0.75  FL / AC 0.20  MVP 4.7 cm   bpm  Head / Face / Neck   6.4 mm    Fetal Anatomy  ===========    Cranium: normal  Profile: normal  4-chamber view: visualized  Stomach: normal  Kidneys: normal  Bladder: normal  Wants to know gender: no  Other: A full anatomy survey previously performed.    Impression  =========    Fetal size is AGA with the EFW plotting at the 22nd percentile. The fetal facial profile is visualized adequately today and is normal.  The placental cord insertion site is marginal in the left, lower third of the uterus. Both TA and TV scanning were used to assess for  evidence of vasa previa. Color flow imaging is not suggestive of vasa previa.  AFV is  normal.    Recommendation  ==============    Repeat ultrasound study as clinically indicated.

## 2018-05-03 ENCOUNTER — LAB VISIT (OUTPATIENT)
Dept: LAB | Facility: OTHER | Age: 33
End: 2018-05-03
Attending: OBSTETRICS & GYNECOLOGY
Payer: COMMERCIAL

## 2018-05-03 ENCOUNTER — ROUTINE PRENATAL (OUTPATIENT)
Dept: OBSTETRICS AND GYNECOLOGY | Facility: CLINIC | Age: 33
End: 2018-05-03
Attending: OBSTETRICS & GYNECOLOGY
Payer: COMMERCIAL

## 2018-05-03 VITALS
SYSTOLIC BLOOD PRESSURE: 108 MMHG | BODY MASS INDEX: 25.16 KG/M2 | WEIGHT: 146.63 LBS | DIASTOLIC BLOOD PRESSURE: 60 MMHG

## 2018-05-03 DIAGNOSIS — Z34.90 PREGNANCY WITH ONE FETUS, ANTEPARTUM: Primary | ICD-10-CM

## 2018-05-03 DIAGNOSIS — O43.199 MARGINAL INSERTION OF UMBILICAL CORD AFFECTING MANAGEMENT OF MOTHER: ICD-10-CM

## 2018-05-03 DIAGNOSIS — Z34.90 PREGNANCY WITH ONE FETUS, ANTEPARTUM: ICD-10-CM

## 2018-05-03 LAB
BASOPHILS # BLD AUTO: 0.02 K/UL
BASOPHILS NFR BLD: 0.2 %
DIFFERENTIAL METHOD: ABNORMAL
EOSINOPHIL # BLD AUTO: 0.1 K/UL
EOSINOPHIL NFR BLD: 0.6 %
ERYTHROCYTE [DISTWIDTH] IN BLOOD BY AUTOMATED COUNT: 13.2 %
HCT VFR BLD AUTO: 36.1 %
HGB BLD-MCNC: 12 G/DL
LYMPHOCYTES # BLD AUTO: 1.7 K/UL
LYMPHOCYTES NFR BLD: 13.6 %
MCH RBC QN AUTO: 29.1 PG
MCHC RBC AUTO-ENTMCNC: 33.2 G/DL
MCV RBC AUTO: 87 FL
MONOCYTES # BLD AUTO: 0.9 K/UL
MONOCYTES NFR BLD: 6.8 %
NEUTROPHILS # BLD AUTO: 9.8 K/UL
NEUTROPHILS NFR BLD: 78 %
PLATELET # BLD AUTO: 193 K/UL
PMV BLD AUTO: 11.1 FL
RBC # BLD AUTO: 4.13 M/UL
WBC # BLD AUTO: 12.61 K/UL

## 2018-05-03 PROCEDURE — 99999 PR PBB SHADOW E&M-EST. PATIENT-LVL II: CPT | Mod: PBBFAC,,, | Performed by: OBSTETRICS & GYNECOLOGY

## 2018-05-03 PROCEDURE — 86703 HIV-1/HIV-2 1 RESULT ANTBDY: CPT

## 2018-05-03 PROCEDURE — 36415 COLL VENOUS BLD VENIPUNCTURE: CPT

## 2018-05-03 PROCEDURE — 86592 SYPHILIS TEST NON-TREP QUAL: CPT

## 2018-05-03 PROCEDURE — 87184 SC STD DISK METHOD PER PLATE: CPT

## 2018-05-03 PROCEDURE — 0502F SUBSEQUENT PRENATAL CARE: CPT | Mod: S$GLB,,, | Performed by: OBSTETRICS & GYNECOLOGY

## 2018-05-03 PROCEDURE — 87147 CULTURE TYPE IMMUNOLOGIC: CPT

## 2018-05-03 PROCEDURE — 87081 CULTURE SCREEN ONLY: CPT

## 2018-05-03 PROCEDURE — 85025 COMPLETE CBC W/AUTO DIFF WBC: CPT

## 2018-05-03 NOTE — PROGRESS NOTES
Increased vaginal pressure, states baby feels lower, increased madhuri-ledezma.   GBBS today as well.    No dilation noted.    Pelvic pain improved.  F/U 1

## 2018-05-04 LAB
HIV 1+2 AB+HIV1 P24 AG SERPL QL IA: NEGATIVE
RPR SER QL: NORMAL

## 2018-05-07 LAB — BACTERIA SPEC AEROBE CULT: NORMAL

## 2018-05-17 ENCOUNTER — ROUTINE PRENATAL (OUTPATIENT)
Dept: OBSTETRICS AND GYNECOLOGY | Facility: CLINIC | Age: 33
End: 2018-05-17
Attending: OBSTETRICS & GYNECOLOGY
Payer: COMMERCIAL

## 2018-05-17 ENCOUNTER — OFFICE VISIT (OUTPATIENT)
Dept: MATERNAL FETAL MEDICINE | Facility: CLINIC | Age: 33
End: 2018-05-17
Attending: OBSTETRICS & GYNECOLOGY
Payer: COMMERCIAL

## 2018-05-17 VITALS — BODY MASS INDEX: 25.51 KG/M2 | DIASTOLIC BLOOD PRESSURE: 62 MMHG | WEIGHT: 148.56 LBS | SYSTOLIC BLOOD PRESSURE: 98 MMHG

## 2018-05-17 DIAGNOSIS — Z34.90 PREGNANCY WITH ONE FETUS, ANTEPARTUM: Primary | ICD-10-CM

## 2018-05-17 DIAGNOSIS — Z36.89 ENCOUNTER FOR ULTRASOUND TO CHECK FETAL GROWTH: ICD-10-CM

## 2018-05-17 DIAGNOSIS — O43.199 MARGINAL INSERTION OF UMBILICAL CORD AFFECTING MANAGEMENT OF MOTHER: ICD-10-CM

## 2018-05-17 PROCEDURE — 99999 PR PBB SHADOW E&M-EST. PATIENT-LVL II: CPT | Mod: PBBFAC,,, | Performed by: OBSTETRICS & GYNECOLOGY

## 2018-05-17 PROCEDURE — 0502F SUBSEQUENT PRENATAL CARE: CPT | Mod: S$GLB,,, | Performed by: OBSTETRICS & GYNECOLOGY

## 2018-05-17 PROCEDURE — 76816 OB US FOLLOW-UP PER FETUS: CPT | Mod: S$GLB,,, | Performed by: OBSTETRICS & GYNECOLOGY

## 2018-05-17 PROCEDURE — 99499 UNLISTED E&M SERVICE: CPT | Mod: S$GLB,,, | Performed by: OBSTETRICS & GYNECOLOGY

## 2018-05-17 NOTE — PROGRESS NOTES
Patient seen and examined.  No complaints, denies VB/LOF/Ctxns, reports good fetal movement. Precautions for Bleeding/ ROM/ Decreased fetal movement/ Pre-E reviewed.  F/U scheduled 1 weeks  Discussed GBS +, f/u U/S today.

## 2018-05-24 ENCOUNTER — ROUTINE PRENATAL (OUTPATIENT)
Dept: OBSTETRICS AND GYNECOLOGY | Facility: CLINIC | Age: 33
End: 2018-05-24
Attending: OBSTETRICS & GYNECOLOGY
Payer: COMMERCIAL

## 2018-05-24 VITALS — BODY MASS INDEX: 25.66 KG/M2 | WEIGHT: 149.5 LBS | SYSTOLIC BLOOD PRESSURE: 98 MMHG | DIASTOLIC BLOOD PRESSURE: 70 MMHG

## 2018-05-24 DIAGNOSIS — Z34.90 PREGNANCY WITH ONE FETUS, ANTEPARTUM: Primary | ICD-10-CM

## 2018-05-24 DIAGNOSIS — O43.199 MARGINAL INSERTION OF UMBILICAL CORD AFFECTING MANAGEMENT OF MOTHER: ICD-10-CM

## 2018-05-24 PROCEDURE — 99999 PR PBB SHADOW E&M-EST. PATIENT-LVL II: CPT | Mod: PBBFAC,,, | Performed by: OBSTETRICS & GYNECOLOGY

## 2018-05-24 PROCEDURE — 0502F SUBSEQUENT PRENATAL CARE: CPT | Mod: S$GLB,,, | Performed by: OBSTETRICS & GYNECOLOGY

## 2018-05-24 NOTE — PROGRESS NOTES
Patient seen and examined.  No complaints, denies VB/LOF/Ctxns, reports good fetal movement. Precautions for Bleeding/ ROM/ Decreased fetal movement/ Pre-E reviewed.  F/U scheduled 1 weeks

## 2018-05-29 ENCOUNTER — HOSPITAL ENCOUNTER (EMERGENCY)
Facility: OTHER | Age: 33
Discharge: HOME | End: 2018-05-29
Attending: OBSTETRICS & GYNECOLOGY
Payer: COMMERCIAL

## 2018-05-29 VITALS
HEART RATE: 77 BPM | OXYGEN SATURATION: 99 % | DIASTOLIC BLOOD PRESSURE: 64 MMHG | RESPIRATION RATE: 18 BRPM | SYSTOLIC BLOOD PRESSURE: 106 MMHG | TEMPERATURE: 97 F

## 2018-05-29 DIAGNOSIS — Z36.89 NST (NON-STRESS TEST) REACTIVE: ICD-10-CM

## 2018-05-29 DIAGNOSIS — Z3A.38 38 WEEKS GESTATION OF PREGNANCY: ICD-10-CM

## 2018-05-29 DIAGNOSIS — Z03.71 ENCOUNTER FOR SUSPECTED PREMATURE RUPTURE OF MEMBRANES, WITH RUPTURE OF MEMBRANES NOT FOUND: Primary | ICD-10-CM

## 2018-05-29 PROCEDURE — 99284 EMERGENCY DEPT VISIT MOD MDM: CPT | Mod: 25

## 2018-05-29 PROCEDURE — 59025 FETAL NON-STRESS TEST: CPT | Mod: 26,,, | Performed by: OBSTETRICS & GYNECOLOGY

## 2018-05-29 PROCEDURE — 99283 EMERGENCY DEPT VISIT LOW MDM: CPT | Mod: 25,,, | Performed by: OBSTETRICS & GYNECOLOGY

## 2018-05-29 PROCEDURE — 59025 FETAL NON-STRESS TEST: CPT

## 2018-05-29 NOTE — ED PROVIDER NOTES
"Encounter Date: 2018       History     Chief Complaint   Patient presents with    Rupture of Membranes     Karen Purdy is a 32 y.o.  at 38w2d who presents to the OB ED today (2018) with CC of possible ROM this morning. She has reflux this pregnancy, and vomited after laying down to take a nap. When she was vomiting, she says "I either urinated on myself, or maybe my water broke." She has had no LOF since.  She reports no vaginal bleeding, no leakage of fluid, and no contractions.   She reports good fetal movement.  This pregnancy is complicated by immune deficiency with predominantly antibody defects, as well as marginal cord insertion (vasa previa ruled out on US).              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     Unspecified vitamin D deficiency      Past Surgical History:   Procedure Laterality Date    KNEE SURGERY Right     torn meniscus     Family History   Problem Relation Age of Onset    Cancer Maternal Grandfather         lung    Dementia Paternal Grandmother     Hyperlipidemia Mother     Hypertension Mother     Hypertension Father     Hyperlipidemia Father     Breast cancer Neg Hx     Colon cancer Neg Hx     Ovarian cancer Neg Hx      Social History   Substance Use Topics    Smoking status: Former Smoker    Smokeless tobacco: Never Used    Alcohol use No      Comment: socially     Review of Systems   Constitutional: Negative for chills, diaphoresis and fever.   HENT: Negative for nosebleeds and sore throat.    Eyes: Negative for photophobia and visual disturbance.   Respiratory: Negative for cough and shortness of " breath.    Cardiovascular: Negative for chest pain.   Gastrointestinal: Positive for vomiting. Negative for constipation, diarrhea and nausea.   Genitourinary: Positive for vaginal discharge (possibly clear fluid versus urine). Negative for dysuria, flank pain, vaginal bleeding and vaginal pain.   Musculoskeletal: Negative for back pain.   Skin: Negative for rash.   Neurological: Negative for syncope, weakness and headaches.   Hematological: Does not bruise/bleed easily.       Physical Exam     Initial Vitals   BP Pulse Resp Temp SpO2   05/29/18 1427 05/29/18 1427 05/29/18 1427 05/29/18 1427 05/29/18 1428   106/64 82 18 97.2 °F (36.2 °C) 99 %      MAP       05/29/18 1427       78         Physical Exam    Constitutional: She appears well-developed and well-nourished. She is not diaphoretic. No distress.   HENT:   Head: Normocephalic and atraumatic.   Eyes: Conjunctivae are normal.   Neck: Normal range of motion.   Cardiovascular: Normal rate, regular rhythm, normal heart sounds and intact distal pulses.   Pulmonary/Chest: Breath sounds normal. No respiratory distress.   Abdominal: Soft. Bowel sounds are normal. There is no tenderness.   gravid   Genitourinary: Vagina normal.   Genitourinary Comments: Cervix visually closed  No pooling of fluid  Negative valsalva  Negative nitrazine  Negative ferning   Neurological: She is alert and oriented to person, place, and time.   Psychiatric: She has a normal mood and affect. Her behavior is normal. Judgment and thought content normal.         ED Course   Obtain Fetal nonstress test (NST)  Date/Time: 5/29/2018 6:02 PM  Performed by: MICHELA PATEL  Authorized by: SINDY FREEMAN     Nonstress Test:     Variability:  6-25 BPM    Decelerations:  None    Accelerations:  15 bpm    Acoustic Stimulator: No      Baseline:  135    Uterine Irritability: No      Contractions:  Not present  Biophysical Profile:     Nonstress Test Interpretation: reactive      Overall Impression:   Reassuring  Post-procedure:     Patient tolerance:  Patient tolerated the procedure well with no immediate complications      Labs Reviewed - No data to display          Medical Decision Making:   Initial Assessment:   Possible ROM during episode of vomiting today.  ED Management:  NST reactive and reassuring  No contractions on toco  Rupture exam negative  No further vomiting, patient denies nausea  Patient given PTL precautions and discharged in good condition  Other:   I discussed test(s) with the performing physician.  I have discussed this case with another health care provider.                      Clinical Impression:     1. Encounter for suspected premature rupture of membranes, with rupture of membranes not found    2. 38 weeks gestation of pregnancy    3. NST (non-stress test) reactive                                 Casa Roberts MD  Resident  05/29/18 [id number]

## 2018-05-29 NOTE — DISCHARGE INSTRUCTIONS
Call clinic [phone] or L & D after hours at [phone] for vaginal bleeding, leakage of fluids, regular contractions every 5 mins for 2 hours, decreased fetal movements ( 10 kicks in 2 hours), headache not relieved by Tylenol, blurry vision, or temp of 100.4 or greater.  Begin doing fetal kick counts, at least 10 movements in 2 hours starting at 28 weeks gestation.  Keep next clinic appointment

## 2018-05-29 NOTE — ED TRIAGE NOTES
Pt arrived to OB ED with complaints of SROM at 1100 today clear.  Pt reports no leaking since initial gush.  Pt denies vaginal bleeding or ctx.  Reports +FM.  MD notified.  TOCO and EFM applied.

## 2018-05-31 ENCOUNTER — ROUTINE PRENATAL (OUTPATIENT)
Dept: OBSTETRICS AND GYNECOLOGY | Facility: CLINIC | Age: 33
End: 2018-05-31
Attending: OBSTETRICS & GYNECOLOGY
Payer: COMMERCIAL

## 2018-05-31 VITALS
DIASTOLIC BLOOD PRESSURE: 63 MMHG | BODY MASS INDEX: 25.88 KG/M2 | SYSTOLIC BLOOD PRESSURE: 100 MMHG | WEIGHT: 150.81 LBS

## 2018-05-31 DIAGNOSIS — Z34.90 PREGNANCY WITH ONE FETUS, ANTEPARTUM: Primary | ICD-10-CM

## 2018-05-31 PROCEDURE — 0502F SUBSEQUENT PRENATAL CARE: CPT | Mod: S$GLB,,, | Performed by: OBSTETRICS & GYNECOLOGY

## 2018-05-31 PROCEDURE — 99999 PR PBB SHADOW E&M-EST. PATIENT-LVL II: CPT | Mod: PBBFAC,,, | Performed by: OBSTETRICS & GYNECOLOGY

## 2018-06-01 ENCOUNTER — ANESTHESIA EVENT (OUTPATIENT)
Dept: OBSTETRICS AND GYNECOLOGY | Facility: OTHER | Age: 33
End: 2018-06-01
Payer: COMMERCIAL

## 2018-06-01 ENCOUNTER — ANESTHESIA (OUTPATIENT)
Dept: OBSTETRICS AND GYNECOLOGY | Facility: OTHER | Age: 33
End: 2018-06-01
Payer: COMMERCIAL

## 2018-06-01 ENCOUNTER — HOSPITAL ENCOUNTER (INPATIENT)
Facility: OTHER | Age: 33
LOS: 1 days | Discharge: HOME | End: 2018-06-02
Attending: OBSTETRICS & GYNECOLOGY | Admitting: OBSTETRICS & GYNECOLOGY
Payer: COMMERCIAL

## 2018-06-01 DIAGNOSIS — O47.9 UTERINE CONTRACTIONS DURING PREGNANCY: ICD-10-CM

## 2018-06-01 DIAGNOSIS — Z3A.38 38 WEEKS GESTATION OF PREGNANCY: Primary | ICD-10-CM

## 2018-06-01 PROBLEM — B95.5 BETA HEMOLYTIC STREPTOCOCCUS URINARY TRACT INFECTION AFFECTING PREGNANCY IN THIRD TRIMESTER: Status: ACTIVE | Noted: 2018-06-01

## 2018-06-01 PROBLEM — O43.199 MARGINAL INSERTION OF UMBILICAL CORD AFFECTING MANAGEMENT OF MOTHER: Status: RESOLVED | Noted: 2018-01-30 | Resolved: 2018-06-01

## 2018-06-01 PROBLEM — O23.43 BETA HEMOLYTIC STREPTOCOCCUS URINARY TRACT INFECTION AFFECTING PREGNANCY IN THIRD TRIMESTER: Status: ACTIVE | Noted: 2018-06-01

## 2018-06-01 PROBLEM — B95.5 BETA HEMOLYTIC STREPTOCOCCUS URINARY TRACT INFECTION AFFECTING PREGNANCY IN THIRD TRIMESTER: Status: RESOLVED | Noted: 2018-06-01 | Resolved: 2018-06-01

## 2018-06-01 PROBLEM — O23.43 BETA HEMOLYTIC STREPTOCOCCUS URINARY TRACT INFECTION AFFECTING PREGNANCY IN THIRD TRIMESTER: Status: RESOLVED | Noted: 2018-06-01 | Resolved: 2018-06-01

## 2018-06-01 LAB
ABO + RH BLD: NORMAL
BASOPHILS # BLD AUTO: 0.03 K/UL
BASOPHILS NFR BLD: 0.2 %
BLD GP AB SCN CELLS X3 SERPL QL: NORMAL
DIFFERENTIAL METHOD: ABNORMAL
EOSINOPHIL # BLD AUTO: 0.1 K/UL
EOSINOPHIL NFR BLD: 0.7 %
ERYTHROCYTE [DISTWIDTH] IN BLOOD BY AUTOMATED COUNT: 13.1 %
HCT VFR BLD AUTO: 37.9 %
HGB BLD-MCNC: 12.6 G/DL
LYMPHOCYTES # BLD AUTO: 2.1 K/UL
LYMPHOCYTES NFR BLD: 14.6 %
MCH RBC QN AUTO: 29 PG
MCHC RBC AUTO-ENTMCNC: 33.2 G/DL
MCV RBC AUTO: 87 FL
MONOCYTES # BLD AUTO: 0.8 K/UL
MONOCYTES NFR BLD: 5.4 %
NEUTROPHILS # BLD AUTO: 11.3 K/UL
NEUTROPHILS NFR BLD: 78.8 %
PLATELET # BLD AUTO: 169 K/UL
PMV BLD AUTO: 11.4 FL
RBC # BLD AUTO: 4.34 M/UL
WBC # BLD AUTO: 14.36 K/UL

## 2018-06-01 PROCEDURE — 51702 INSERT TEMP BLADDER CATH: CPT

## 2018-06-01 PROCEDURE — 25000003 PHARM REV CODE 250: Performed by: STUDENT IN AN ORGANIZED HEALTH CARE EDUCATION/TRAINING PROGRAM

## 2018-06-01 PROCEDURE — 25000003 PHARM REV CODE 250: Performed by: OBSTETRICS & GYNECOLOGY

## 2018-06-01 PROCEDURE — 10907ZC DRAINAGE OF AMNIOTIC FLUID, THERAPEUTIC FROM PRODUCTS OF CONCEPTION, VIA NATURAL OR ARTIFICIAL OPENING: ICD-10-PCS | Performed by: OBSTETRICS & GYNECOLOGY

## 2018-06-01 PROCEDURE — 63600175 PHARM REV CODE 636 W HCPCS: Performed by: OBSTETRICS & GYNECOLOGY

## 2018-06-01 PROCEDURE — 27800517 HC TRAY,EPIDURAL-CONTINUOUS: Performed by: STUDENT IN AN ORGANIZED HEALTH CARE EDUCATION/TRAINING PROGRAM

## 2018-06-01 PROCEDURE — 99283 EMERGENCY DEPT VISIT LOW MDM: CPT | Mod: 25,,, | Performed by: OBSTETRICS & GYNECOLOGY

## 2018-06-01 PROCEDURE — 62326 NJX INTERLAMINAR LMBR/SAC: CPT | Performed by: ANESTHESIOLOGY

## 2018-06-01 PROCEDURE — 0HQ9XZZ REPAIR PERINEUM SKIN, EXTERNAL APPROACH: ICD-10-PCS | Performed by: OBSTETRICS & GYNECOLOGY

## 2018-06-01 PROCEDURE — 59025 FETAL NON-STRESS TEST: CPT | Mod: 26,,, | Performed by: OBSTETRICS & GYNECOLOGY

## 2018-06-01 PROCEDURE — 86901 BLOOD TYPING SEROLOGIC RH(D): CPT

## 2018-06-01 PROCEDURE — 59025 FETAL NON-STRESS TEST: CPT

## 2018-06-01 PROCEDURE — 59400 OBSTETRICAL CARE: CPT | Mod: QY,,, | Performed by: ANESTHESIOLOGY

## 2018-06-01 PROCEDURE — 4A0HXCZ MEASUREMENT OF PRODUCTS OF CONCEPTION, CARDIAC RATE, EXTERNAL APPROACH: ICD-10-PCS | Performed by: OBSTETRICS & GYNECOLOGY

## 2018-06-01 PROCEDURE — 11000001 HC ACUTE MED/SURG PRIVATE ROOM

## 2018-06-01 PROCEDURE — 99285 EMERGENCY DEPT VISIT HI MDM: CPT | Mod: 25

## 2018-06-01 PROCEDURE — 72200005 HC VAGINAL DELIVERY LEVEL II

## 2018-06-01 PROCEDURE — 72100002 HC LABOR CARE, 1ST 8 HOURS

## 2018-06-01 PROCEDURE — 85025 COMPLETE CBC W/AUTO DIFF WBC: CPT

## 2018-06-01 PROCEDURE — 63600175 PHARM REV CODE 636 W HCPCS: Performed by: STUDENT IN AN ORGANIZED HEALTH CARE EDUCATION/TRAINING PROGRAM

## 2018-06-01 PROCEDURE — 63600175 PHARM REV CODE 636 W HCPCS: Performed by: ANESTHESIOLOGY

## 2018-06-01 PROCEDURE — 27200710 HC EPIDURAL INFUSION PUMP SET: Performed by: STUDENT IN AN ORGANIZED HEALTH CARE EDUCATION/TRAINING PROGRAM

## 2018-06-01 RX ORDER — FENTANYL/BUPIVACAINE/NS/PF 2MCG/ML-.1
PLASTIC BAG, INJECTION (ML) INJECTION CONTINUOUS
Status: DISCONTINUED | OUTPATIENT
Start: 2018-06-01 | End: 2018-06-01

## 2018-06-01 RX ORDER — MISOPROSTOL 200 UG/1
TABLET ORAL
Status: DISCONTINUED
Start: 2018-06-01 | End: 2018-06-01 | Stop reason: WASHOUT

## 2018-06-01 RX ORDER — OXYTOCIN/RINGER'S LACTATE 20/1000 ML
41.65 PLASTIC BAG, INJECTION (ML) INTRAVENOUS CONTINUOUS
Status: ACTIVE | OUTPATIENT
Start: 2018-06-01 | End: 2018-06-01

## 2018-06-01 RX ORDER — ONDANSETRON 8 MG/1
8 TABLET, ORALLY DISINTEGRATING ORAL EVERY 8 HOURS PRN
Status: DISCONTINUED | OUTPATIENT
Start: 2018-06-01 | End: 2018-06-02 | Stop reason: HOSPADM

## 2018-06-01 RX ORDER — LIDOCAINE HYDROCHLORIDE AND EPINEPHRINE 15; 5 MG/ML; UG/ML
INJECTION, SOLUTION EPIDURAL
Status: DISCONTINUED | OUTPATIENT
Start: 2018-06-01 | End: 2018-06-01

## 2018-06-01 RX ORDER — CARBOPROST TROMETHAMINE 250 UG/ML
INJECTION, SOLUTION INTRAMUSCULAR
Status: DISCONTINUED
Start: 2018-06-01 | End: 2018-06-01 | Stop reason: WASHOUT

## 2018-06-01 RX ORDER — FENTANYL/BUPIVACAINE/NS/PF 2MCG/ML-.1
PLASTIC BAG, INJECTION (ML) INJECTION
Status: DISPENSED
Start: 2018-06-01 | End: 2018-06-01

## 2018-06-01 RX ORDER — DIPHENHYDRAMINE HCL 25 MG
25 CAPSULE ORAL EVERY 4 HOURS PRN
Status: DISCONTINUED | OUTPATIENT
Start: 2018-06-01 | End: 2018-06-02 | Stop reason: HOSPADM

## 2018-06-01 RX ORDER — VANCOMYCIN HCL IN 5 % DEXTROSE 1G/250ML
1000 PLASTIC BAG, INJECTION (ML) INTRAVENOUS
Status: DISCONTINUED | OUTPATIENT
Start: 2018-06-01 | End: 2018-06-01

## 2018-06-01 RX ORDER — OXYTOCIN/RINGER'S LACTATE 20/1000 ML
2 PLASTIC BAG, INJECTION (ML) INTRAVENOUS CONTINUOUS
Status: DISCONTINUED | OUTPATIENT
Start: 2018-06-01 | End: 2018-06-02 | Stop reason: HOSPADM

## 2018-06-01 RX ORDER — HYDROCORTISONE 25 MG/G
CREAM TOPICAL 3 TIMES DAILY PRN
Status: DISCONTINUED | OUTPATIENT
Start: 2018-06-01 | End: 2018-06-02 | Stop reason: HOSPADM

## 2018-06-01 RX ORDER — OXYTOCIN 10 [USP'U]/ML
INJECTION, SOLUTION INTRAMUSCULAR; INTRAVENOUS
Status: DISCONTINUED
Start: 2018-06-01 | End: 2018-06-01 | Stop reason: WASHOUT

## 2018-06-01 RX ORDER — SODIUM CITRATE AND CITRIC ACID MONOHYDRATE 334; 500 MG/5ML; MG/5ML
30 SOLUTION ORAL ONCE
Status: DISCONTINUED | OUTPATIENT
Start: 2018-06-01 | End: 2018-06-01

## 2018-06-01 RX ORDER — SODIUM CHLORIDE, SODIUM LACTATE, POTASSIUM CHLORIDE, CALCIUM CHLORIDE 600; 310; 30; 20 MG/100ML; MG/100ML; MG/100ML; MG/100ML
INJECTION, SOLUTION INTRAVENOUS CONTINUOUS
Status: DISCONTINUED | OUTPATIENT
Start: 2018-06-01 | End: 2018-06-01

## 2018-06-01 RX ORDER — DIPHENHYDRAMINE HYDROCHLORIDE 50 MG/ML
25 INJECTION INTRAMUSCULAR; INTRAVENOUS EVERY 4 HOURS PRN
Status: DISCONTINUED | OUTPATIENT
Start: 2018-06-01 | End: 2018-06-02 | Stop reason: HOSPADM

## 2018-06-01 RX ORDER — HYDROCODONE BITARTRATE AND ACETAMINOPHEN 10; 325 MG/1; MG/1
1 TABLET ORAL EVERY 4 HOURS PRN
Status: DISCONTINUED | OUTPATIENT
Start: 2018-06-01 | End: 2018-06-02 | Stop reason: HOSPADM

## 2018-06-01 RX ORDER — HYDROCODONE BITARTRATE AND ACETAMINOPHEN 5; 325 MG/1; MG/1
1 TABLET ORAL EVERY 4 HOURS PRN
Status: DISCONTINUED | OUTPATIENT
Start: 2018-06-01 | End: 2018-06-02 | Stop reason: HOSPADM

## 2018-06-01 RX ORDER — ACETAMINOPHEN 325 MG/1
650 TABLET ORAL EVERY 6 HOURS PRN
Status: DISCONTINUED | OUTPATIENT
Start: 2018-06-01 | End: 2018-06-02 | Stop reason: HOSPADM

## 2018-06-01 RX ORDER — IBUPROFEN 600 MG/1
600 TABLET ORAL EVERY 6 HOURS
Status: DISCONTINUED | OUTPATIENT
Start: 2018-06-01 | End: 2018-06-02 | Stop reason: HOSPADM

## 2018-06-01 RX ORDER — EPHEDRINE SULFATE 50 MG/ML
INJECTION, SOLUTION INTRAVENOUS
Status: DISCONTINUED | OUTPATIENT
Start: 2018-06-01 | End: 2018-06-01

## 2018-06-01 RX ORDER — DOCUSATE SODIUM 100 MG/1
200 CAPSULE, LIQUID FILLED ORAL 2 TIMES DAILY PRN
Status: DISCONTINUED | OUTPATIENT
Start: 2018-06-01 | End: 2018-06-02 | Stop reason: HOSPADM

## 2018-06-01 RX ORDER — FENTANYL/BUPIVACAINE/NS/PF 2MCG/ML-.1
PLASTIC BAG, INJECTION (ML) INJECTION CONTINUOUS PRN
Status: DISCONTINUED | OUTPATIENT
Start: 2018-06-01 | End: 2018-06-01

## 2018-06-01 RX ORDER — SODIUM CHLORIDE, SODIUM LACTATE, POTASSIUM CHLORIDE, CALCIUM CHLORIDE 600; 310; 30; 20 MG/100ML; MG/100ML; MG/100ML; MG/100ML
INJECTION, SOLUTION INTRAVENOUS CONTINUOUS
Status: DISCONTINUED | OUTPATIENT
Start: 2018-06-01 | End: 2018-06-01 | Stop reason: SDUPTHER

## 2018-06-01 RX ORDER — PHENYLEPHRINE HYDROCHLORIDE 10 MG/ML
INJECTION INTRAVENOUS
Status: DISCONTINUED | OUTPATIENT
Start: 2018-06-01 | End: 2018-06-01

## 2018-06-01 RX ORDER — MISOPROSTOL 200 UG/1
600 TABLET ORAL
Status: DISCONTINUED | OUTPATIENT
Start: 2018-06-01 | End: 2018-06-02 | Stop reason: HOSPADM

## 2018-06-01 RX ORDER — METHYLERGONOVINE MALEATE 0.2 MG/ML
INJECTION INTRAVENOUS
Status: DISCONTINUED
Start: 2018-06-01 | End: 2018-06-01 | Stop reason: WASHOUT

## 2018-06-01 RX ORDER — OXYTOCIN/RINGER'S LACTATE 20/1000 ML
2 PLASTIC BAG, INJECTION (ML) INTRAVENOUS CONTINUOUS
Status: DISCONTINUED | OUTPATIENT
Start: 2018-06-01 | End: 2018-06-01

## 2018-06-01 RX ADMIN — EPHEDRINE SULFATE 25 MG: 50 INJECTION INTRAMUSCULAR; INTRAVENOUS; SUBCUTANEOUS at 10:06

## 2018-06-01 RX ADMIN — LIDOCAINE HYDROCHLORIDE,EPINEPHRINE BITARTRATE 3 ML: 15; .005 INJECTION, SOLUTION EPIDURAL; INFILTRATION; INTRACAUDAL; PERINEURAL at 10:06

## 2018-06-01 RX ADMIN — PHENYLEPHRINE HYDROCHLORIDE 100 MCG: 10 INJECTION INTRAVENOUS at 10:06

## 2018-06-01 RX ADMIN — SODIUM CHLORIDE, SODIUM LACTATE, POTASSIUM CHLORIDE, AND CALCIUM CHLORIDE 1000 ML: .6; .31; .03; .02 INJECTION, SOLUTION INTRAVENOUS at 09:06

## 2018-06-01 RX ADMIN — Medication 10 ML/HR: at 10:06

## 2018-06-01 RX ADMIN — SODIUM CHLORIDE, SODIUM LACTATE, POTASSIUM CHLORIDE, AND CALCIUM CHLORIDE: .6; .31; .03; .02 INJECTION, SOLUTION INTRAVENOUS at 11:06

## 2018-06-01 RX ADMIN — Medication 41.65 MILLI-UNITS/MIN: at 02:06

## 2018-06-01 RX ADMIN — PHENYLEPHRINE HYDROCHLORIDE 150 MCG: 10 INJECTION INTRAVENOUS at 10:06

## 2018-06-01 RX ADMIN — Medication 2 MILLI-UNITS/MIN: at 11:06

## 2018-06-01 RX ADMIN — Medication 1000 MG: at 10:06

## 2018-06-01 RX ADMIN — PHENYLEPHRINE HYDROCHLORIDE 200 MCG: 10 INJECTION INTRAVENOUS at 11:06

## 2018-06-01 RX ADMIN — IBUPROFEN 600 MG: 600 TABLET, FILM COATED ORAL at 08:06

## 2018-06-01 NOTE — Clinical Note
I certify that Inpatient services for greater than or equal to 2 midnights are medically necessary:: Yes   Transfer To (Destination): [facility] LABOR AND DELIVERY [[id number]]   Diagnosis: 38 weeks gestation of pregnancy [127385]   Admitting Provider:: CLINT GARCIA [[id number]]   Future Attending Provider: JASPER ZHENG [[id number]]   Bed Type Preference: Standard [6]   Bed request comments: None   Reason for IP Medical Treatment  (Clinical interventions that can only be accomplished in the IP setting? ) :: Labor   Estimated Length of Stay:: 2 midnights   Plans for Post-Acute care--if anticipated (pick the single best option):: A. No post acute care anticipated at this time   Special Needs:: No Special Needs [1]

## 2018-06-01 NOTE — ANESTHESIA PROCEDURE NOTES
Epidural    Patient location during procedure: OB   Reason for block: primary anesthetic   Diagnosis: IUP   Start time: 6/1/2018 9:51 AM  Timeout: 6/1/2018 9:50 AM  End time: 6/1/2018 10:05 AM  Surgery related to: Vaginal Delivery  Staffing  Anesthesiologist: BRYN WEBB  Resident/CRNA: CHACORTA BARKLEY  Other anesthesia staff: LEE SOTO  Performed: resident/CRNA   Preanesthetic Checklist  Completed: patient identified, site marked, surgical consent, pre-op evaluation, timeout performed, IV checked, risks and benefits discussed, monitors and equipment checked, anesthesia consent given, hand hygiene performed and patient being monitored  Preparation  Patient position: sitting  Prep: ChloraPrep  Patient monitoring: Pulse Ox  Epidural  Skin Anesthetic: lidocaine 1%  Skin Wheal: 3 mL  Administration type: continuous  Approach: midline  Interspace: L3-4  Injection technique: TESSIE saline  Needle and Epidural Catheter  Needle type: Tuohy   Needle gauge: 17  Needle length: 3.5 inches  Needle insertion depth: 4.5 cm  Catheter type: springwound and multi-orifice  Catheter size: 19 G  Catheter at skin depth: 9.5 cm  Test dose: 3 mL of lidocaine 1.5% with Epi 1-to-200,000  Additional Documentation: incremental injection, negative aspiration for heme and CSF, no paresthesia on injection, no signs/symptoms of IV or SA injection, no significant pain on injection and no significant complaints from patient  Needle localization: anatomical landmarks  Medications:  Bolus administered: 10 mL of 0.1% bupivacaine  Opioid administered: 20 mcg of   fentanyl  Volume per aspiration: 5 mL  Time between aspirations: 3 minutes  Assessment  Ease of block: easy  Patient's tolerance of the procedure: comfortable throughout block and no complaints

## 2018-06-01 NOTE — HOSPITAL COURSE
2018 - Admitted due to cervical change. Infrequent contractions. Will AROM and pitocin if needed. GBS positive - will need vancomycin due to allergies.   2018 - Patient PPD 1 s/p , uncomplicated. Meeting all post partum milestones. Stable for discharge.

## 2018-06-01 NOTE — H&P
"[facility]  Obstetrics  History & Physical    Patient Name: Karen Purdy  MRN: [id number]  Admission Date: 2018  Primary Care Provider: Leah Garcia MD    Subjective:     Principal Problem:38 weeks gestation of pregnancy    History of Present Illness:  Karen Purdy is a 32 y.o. [id number] at 38w5d presented complaining of contractions that have been occurring for several days.  She reports the contractions have become more "labor-like" over the last day, states occurring every 4 minutes. She also reports vaginal spotting that started around 430 this AM. She denies LOF, reports good FM.    This IUP is complicated by marginal cord insertion.    GBS positive.        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       Review of patient's allergies indicates:   Allergen Reactions    Ceclor [cefaclor] Anaphylaxis, Swelling and Rash    Pcn [penicillins] Anaphylaxis, Swelling and Rash        Family History     Problem Relation (Age of Onset)    Cancer Maternal Grandfather    Dementia Paternal Grandmother    Hyperlipidemia Mother, Father    Hypertension Mother, Father        Social History Main Topics    Smoking status: Former Smoker    Smokeless " tobacco: Never Used    Alcohol use No      Comment: socially    Drug use: No    Sexual activity: Yes     Partners: Male     Birth control/ protection: None     Review of Systems   Constitutional: Negative.  Negative for activity change, fatigue and fever.   Respiratory: Negative for cough and shortness of breath.    Cardiovascular: Negative for chest pain and palpitations.   Gastrointestinal: Positive for abdominal pain. Negative for constipation, diarrhea, nausea and vomiting.   Genitourinary: Positive for pelvic pain. Negative for frequency, vaginal bleeding, vaginal discharge, vaginal pain, postmenopausal bleeding and vaginal odor.   Neurological: Negative for headaches.   Psychiatric/Behavioral: Negative for depression.   Breast: Negative for breast pain     Objective:     Vital Signs (Most Recent):  Temp: 97.2 °F (36.2 °C) (06/01/18 0557)  Pulse: 78 (06/01/18 0608)  BP: (!) 117/55 (06/01/18 0557)  SpO2: 97 % (06/01/18 0608) Vital Signs (24h Range):  Temp:  [97.2 °F (36.2 °C)] 97.2 °F (36.2 °C)  Pulse:  [68-78] 78  SpO2:  [97 %-100 %] 97 %  BP: (117)/(55) 117/55     Weight: 68 kg (150 lb)  Body mass index is 25.75 kg/m².    FHT: Cat 1 (reassuring)  TOCO:  Q 5-10 minutes    Physical Exam:   Constitutional: She is oriented to person, place, and time. She appears well-developed and well-nourished. No distress.    HENT:   Head: Normocephalic and atraumatic.    Eyes: Conjunctivae are normal.    Neck: Normal range of motion.    Cardiovascular: Normal rate and intact distal pulses.     Pulmonary/Chest: Effort normal and breath sounds normal.        Abdominal: Soft. Bowel sounds are normal. She exhibits no distension. There is no tenderness. There is no rebound and no guarding.   Gravid             Musculoskeletal: Normal range of motion and moves all extremeties.       Neurological: She is alert and oriented to person, place, and time.    Skin: Skin is warm and dry. She is not diaphoretic.    Psychiatric: She has a  normal mood and affect. Her behavior is normal. Judgment and thought content normal.       Cervix:  Dilation:  6  Effacement:  90%  Station: -2  Presentation: Vertex     Significant Labs:  Lab Results   Component Value Date    GROUPTRH A POS 2016    HEPBSAG Negative 10/04/2017    STREPBCULT  2018     STREPTOCOCCUS AGALACTIAE (GROUP B)  Beta-hemolytic streptococci are routinely susceptible to   penicillins,cephalosporins and carbapenems.         I have personallly reviewed all pertinent lab results from the last 24 hours.    Assessment/Plan:     32 y.o. female  at 38w5d for:    * 38 weeks gestation of pregnancy    - Consents signed and to chart  - Admit to Labor and Delivery unit  - Plan for active for labor management    - Epidural per Anesthesia  - Draw CBC, T&S  - Notify Staff  - Ultrasound performed, fetus in vertex position.  - Recheck in 2 hrs or PRN  - Post-Partum Hemorrhage risk - low        Beta hemolytic Streptococcus urinary tract infection affecting pregnancy in third trimester    - Allergic to PCN cefalcor with anaphylaxis   - Will get vancomycin during labor        Marginal insertion of umbilical cord affecting management of mother    - Vasa previa ruled out            Casa Roberts MD  Obstetrics  [facility]    .  I have reviewed the resident's note, evaluated the patient and agree with the diagnosis and management plan

## 2018-06-01 NOTE — ASSESSMENT & PLAN NOTE
- Consents signed and to chart  - Admit to Labor and Delivery unit  - Plan for active for labor management    - Epidural per Anesthesia  - Draw CBC, T&S  - Notify Staff  - Ultrasound performed, fetus in vertex position.  - Recheck in 2 hrs or PRN  - Post-Partum Hemorrhage risk - low

## 2018-06-01 NOTE — SUBJECTIVE & OBJECTIVE
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       Review of patient's allergies indicates:   Allergen Reactions    Ceclor [cefaclor] Anaphylaxis, Swelling and Rash    Pcn [penicillins] Anaphylaxis, Swelling and Rash        Family History     Problem Relation (Age of Onset)    Cancer Maternal Grandfather    Dementia Paternal Grandmother    Hyperlipidemia Mother, Father    Hypertension Mother, Father        Social History Main Topics    Smoking status: Former Smoker    Smokeless tobacco: Never Used    Alcohol use No      Comment: socially    Drug use: No    Sexual activity: Yes     Partners: Male     Birth control/ protection: None     Review of Systems   Constitutional: Negative.  Negative for activity change, fatigue and fever.   Respiratory: Negative for cough and shortness of breath.    Cardiovascular: Negative for chest pain and palpitations.   Gastrointestinal: Positive for abdominal pain. Negative for constipation, diarrhea, nausea and vomiting.   Genitourinary: Positive for pelvic pain. Negative for frequency, vaginal bleeding, vaginal discharge, vaginal pain, postmenopausal bleeding and vaginal odor.   Neurological: Negative for headaches.    Psychiatric/Behavioral: Negative for depression.   Breast: Negative for breast pain     Objective:     Vital Signs (Most Recent):  Temp: 97.2 °F (36.2 °C) (06/01/18 0557)  Pulse: 78 (06/01/18 0608)  BP: (!) 117/55 (06/01/18 0557)  SpO2: 97 % (06/01/18 0608) Vital Signs (24h Range):  Temp:  [97.2 °F (36.2 °C)] 97.2 °F (36.2 °C)  Pulse:  [68-78] 78  SpO2:  [97 %-100 %] 97 %  BP: (117)/(55) 117/55     Weight: 68 kg (150 lb)  Body mass index is 25.75 kg/m².    FHT: Cat 1 (reassuring)  TOCO:  Q 5-10 minutes    Physical Exam:   Constitutional: She is oriented to person, place, and time. She appears well-developed and well-nourished. No distress.    HENT:   Head: Normocephalic and atraumatic.    Eyes: Conjunctivae are normal.    Neck: Normal range of motion.    Cardiovascular: Normal rate and intact distal pulses.     Pulmonary/Chest: Effort normal and breath sounds normal.        Abdominal: Soft. Bowel sounds are normal. She exhibits no distension. There is no tenderness. There is no rebound and no guarding.   Gravid             Musculoskeletal: Normal range of motion and moves all extremeties.       Neurological: She is alert and oriented to person, place, and time.    Skin: Skin is warm and dry. She is not diaphoretic.    Psychiatric: She has a normal mood and affect. Her behavior is normal. Judgment and thought content normal.       Cervix:  Dilation:  6  Effacement:  90%  Station: -2  Presentation: Vertex     Significant Labs:  Lab Results   Component Value Date    GROUPTRH A POS 05/12/2016    HEPBSAG Negative 10/04/2017    STREPBCULT  05/03/2018     STREPTOCOCCUS AGALACTIAE (GROUP B)  Beta-hemolytic streptococci are routinely susceptible to   penicillins,cephalosporins and carbapenems.         I have personallly reviewed all pertinent lab results from the last 24 hours.

## 2018-06-01 NOTE — PROGRESS NOTES
"LABOR NOTE    Karen Purdy is a 32 y.o. 32 y.o.  at 38w5d GA who presented in labor.    S: Complaints: Nausea due to low BP - being assessed by anesthesia. Epidural working well.     O: BP (!) 117/55 (BP Location: Left arm, Patient Position: Lying)   Pulse 78   Temp 97.2 °F (36.2 °C) (Temporal)   Wt 68 kg (150 lb)   LMP 2017 (Exact Date)   SpO2 97%   Breastfeeding? No   BMI 25.75 kg/m²     FHT: Baseline rate - 125 BPM. Categotry 1 tracing. Moderate BTBV. No decelerations.   CTX: Every 6-10 minutes  SVE: 6/80/-2, membranes AROM @ 0945.    A:   32 y.o.  at 38w5d, in active labor with reassuring fetal heart tracing.     Active Hospital Problems    Diagnosis  POA    *38 weeks gestation of pregnancy [Z3A.38]  Not Applicable    Beta hemolytic Streptococcus urinary tract infection affecting pregnancy in third trimester [O23.43, B95.5]  Unknown    Marginal insertion of umbilical cord affecting management of mother [O43.199]  Yes      Resolved Hospital Problems    Diagnosis Date Resolved POA   No resolved problems to display.       P:    Active labor management - S/p AROM  Patient would like to "try" without pitocin  Will start Pitocin augmentation per protocol in 1 hour if contractions do not become more frequent  Continue close maternal and fetal monitoring  Recheck 2 hours or sooner if needed.    Casa Roberts M.D.  Obstetrics & Gynecology  PGY-1      "

## 2018-06-01 NOTE — PROGRESS NOTES
LABOR NOTE    Karen Purdy is a 32 y.o. 32 y.o.  at 38w5d GA who presented in labor.    S: Complaints: none    O: BP (!) 117/55 (BP Location: Left arm, Patient Position: Lying)   Pulse 78   Temp 97.2 °F (36.2 °C) (Temporal)   Wt 68 kg (150 lb)   LMP 2017 (Exact Date)   SpO2 97%   Breastfeeding? No   BMI 25.75 kg/m²     FHT: Baseline rate - 125 BPM. Categotry 1 tracing. Moderate BTBV. No decelerations.   CTX: Every 6-10 minutes  SVE: 680/-2, membranes AROM @ 0945.    A:   32 y.o.  at 38w5d, in active labor with reassuring fetal heart tracing.     Active Hospital Problems    Diagnosis  POA    *38 weeks gestation of pregnancy [Z3A.38]  Not Applicable    Beta hemolytic Streptococcus urinary tract infection affecting pregnancy in third trimester [O23.43, B95.5]  Unknown    Marginal insertion of umbilical cord affecting management of mother [O43.199]  Yes      Resolved Hospital Problems    Diagnosis Date Resolved POA   No resolved problems to display.       P:    Active labor management - S/p AROM  Pitocin started  Continue close maternal and fetal monitoring  Recheck 2 hours or sooner if needed.    Jacob Avila MD  OB/GYN PGY-2  Pager: [phone]

## 2018-06-01 NOTE — DISCHARGE INSTRUCTIONS
Breastfeeding Discharge Instructions       Feed the baby at the earliest sign of hunger or comfort  o Hands to mouth, sucking motions  o Rooting or searching for something to suck on  o Dont wait for crying - it is a sign of distress     The feedings may be 8-12 times per 24hrs and will not follow a schedule   Avoid pacifiers and bottles for the first 4 weeks   Alternate the breast you start the feeding with, or start with the breast that feels the fullest   Switch breasts when the baby takes himself off the breast or falls asleep   Keep offering breasts until the baby looks full, no longer gives hunger signs, and stays asleep when placed on his back in the crib   If the baby is sleepy and wont wake for a feeding, put the baby skin-to-skin dressed in a diaper against the mothers bare chest   Sleep near your baby   The baby should be positioned and latched on to the breast correctly  o Chest-to-chest, chin in the breast  o Babys lips are flipped outward  o Babys mouth is stretched open wide like a shout  o Babys sucking should feel like tugging to the mother  - The baby should be drinking at the breast:  o You should hear swallowing or gulping throughout the feeding  o You should see milk on the babys lips when he comes off the breast  o Your breasts should be softer when the baby is finished feeding  o The baby should look relaxed at the end of feedings  o After the 4th day and your milk is in:  o The babys poop should turn bright yellow and be loose, watery, and seedy  o The baby should have at least 3-4 poops the size of the palm of your hand per day  o The baby should have at least 5-6 wet diapers per day  o The urine should be light yellow in color  You should drink when you are thirsty and eat a healthy diet when you are    hungry.     Take naps to get the rest you need.   Take medications and/or drink alcohol only with permission of your obstetrician    or the babys pediatrician.  You can  also call the [facility],   ([phone]), Monday-Friday, 8am-5pm Central time, to get the most   up-to-date evidence-based information on the use of medications during   pregnancy and breastfeeding.      The baby should be examined by a pediatrician at 3-5 days of age.  Once your   milk comes in, the baby should be gaining at least ½ - 1oz each day and should be back to birthweight no later than 10-14 days of age.          Community Resources    [facility] Breastfeeding Warmline: [phone]   Local [facility] clinics: provide incentives and breastpumps to eligible mothers  [employer] Leche [employer] (LLLI):  mother-to-mother support group website        www.[employer]  Local [employer] Leche League mother-to-mother support groups:        www."[employer]        [facility]eche League [facility]   Dr. Andrew Daigle website for latch videos and general information:        www.breastfeedinginc.ca  [facility] is a call center that provides information about the safety of taking medications while breastfeeding.  Call 1-468.261.4471, M-F, 8am-5pm, [place].  International Lactation Consultant Association provides resources for assistance:        www.ilca.org  [place] Breastfeeding Coalition provides informationand resources for parents  and the community    www.LaBreastfeedingSupport.org     Melany Miguel is a mom-to-mom support group:                             www.[employer].com//breastfeedng-support/  [employer] for Healthy Babies:  1-832-391-BABY(7178)  [employer]: a breastfeeding support group for women of color, [phone]

## 2018-06-01 NOTE — ANESTHESIA PREPROCEDURE EVALUATION
"Karen Purdy is a 32 y.o. female [id number] at 39w5d complaining of contractions that have been occurring for several days.  She reports the contractions have become more "labor-like" over the last day, states occurring every 4 minutes.  She also reports vaginal spotting that started around 430 this AM. She denies LOF, reports good FM.      This IUP is complicated by marginal cord insertion.      OB History    Para Term  AB Living   3 1 1 0 1 1   SAB TAB Ectopic Multiple Live Births   0 1 0 0 1      # Outcome Date GA Lbr Agapito/2nd Weight Sex Delivery Anes PTL Lv   3 Current            2 Term 16 41w4d / 01:11 3.09 kg (6 lb 13 oz) M Vag-Spont EPI N SHERRY   1 TAB                   Wt Readings from Last 1 Encounters:   18 0849 68 kg (150 lb)       BP Readings from Last 3 Encounters:   18 (!) 117/55   18 100/63   18 106/64       Patient Active Problem List   Diagnosis    Anxiety    Unspecified vitamin D deficiency    Chronic fatigue    Immunodeficiency with predominantly antibody defects    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    Pregnancy with one fetus, antepartum    Marginal insertion of umbilical cord affecting management of mother    38 weeks gestation of pregnancy       Past Surgical History:   Procedure Laterality Date    KNEE SURGERY Right     torn meniscus       Social History     Social History    Marital status:      Spouse name: N/A    Number of children: N/A    Years of education: N/A     Occupational History    [employer]     Social History Main Topics    Smoking status: Former Smoker    Smokeless tobacco: Never Used    Alcohol use No      Comment: socially    Drug use: No    Sexual activity: Yes     Partners: Male     Birth control/ protection: None     Other Topics Concern    Not on file     Social History Narrative    No narrative on file         " Chemistry        Component Value Date/Time     2013 0859    K 4.6 2013 0859     2013 0859    CO2 25 2013 0859    BUN 16 2013 0859    CREATININE 1.0 2013 0859    GLU 96 2013 0859        Component Value Date/Time    CALCIUM 10.3 2013 0859    ALKPHOS 56 2013 0859    AST 21 2013 0859    ALT 16 2013 0859    BILITOT 0.5 2013 0859    ESTGFRAFRICA >60 2013 0859    EGFRNONAA >60 2013 0859            Lab Results   Component Value Date    WBC 14.36 (H) 2018    HGB 12.6 2018    HCT 37.9 2018    MCV 87 2018     2018       No results for input(s): PT, INR, PROTIME, APTT in the last 72 hours.                  Anesthesia Evaluation    I have reviewed the Patient Summary Reports.    I have reviewed the Nursing Notes.   I have reviewed the Medications.     Review of Systems  Anesthesia Hx:  History of prior surgery of interest to airway management or planning: Previous anesthesia: Epidural   Procedure performed at an [facility]. Personal Hx of Anesthesia complications, Post-Operative Nausea/Vomiting   Hematology/Oncology:  Hematology Normal        Cardiovascular:  Cardiovascular Normal Exercise tolerance: good     Pulmonary:   Denies Asthma.  Denies Shortness of breath.    Renal/:  Renal/ Normal     Hepatic/GI:  Hepatic/GI Normal    OB/GYN/PEDS:  [id number]   Neurological:  Neurology Normal    Endocrine:  Endocrine Normal        Physical Exam  General:  Well nourished    Airway/Jaw/Neck:  Airway Findings: Mouth Opening: Normal Tongue: Normal  Mallampati: II  TM Distance: Normal, at least 6 cm      Dental:  Dental Findings: In tact        Mental Status:  Mental Status Findings:  Cooperative, Alert and Oriented         Anesthesia Plan  Type of Anesthesia, risks & benefits discussed:  Anesthesia Type:  general, epidural, CSE, spinal  Patient's Preference:   Intra-op Monitoring Plan: standard ASA  monitors  Intra-op Monitoring Plan Comments:   Post Op Pain Control Plan:   Post Op Pain Control Plan Comments:   Induction:    Beta Blocker:         Informed Consent: Patient understands risks and agrees with Anesthesia plan.  Questions answered. Anesthesia consent signed with patient.  ASA Score: 2     Day of Surgery Review of History & Physical: I have interviewed and examined the patient. I have reviewed the patient's H&P dated:    H&P update referred to the surgeon.         Ready For Surgery From Anesthesia Perspective.

## 2018-06-01 NOTE — ED PROVIDER NOTES
"Encounter Date: 2018       History     Chief Complaint   Patient presents with    Contractions    Vaginal Bleeding     Karen Purdy is a 32 y.o. [id number] at 38w5d presents complaining of contractions that have been occurring for several days.  She reports the contractions have become more "labor-like" over the last day, states occurring every 4 minutes.  She also reports vaginal spotting that started around 430 this AM. She denies LOF, reports good FM.    This IUP is complicated by marginal cord insertion.            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     Unspecified vitamin D deficiency      Past Surgical History:   Procedure Laterality Date    KNEE SURGERY Right     torn meniscus     Family History   Problem Relation Age of Onset    Cancer Maternal Grandfather         lung    Dementia Paternal Grandmother     Hyperlipidemia Mother     Hypertension Mother     Hypertension Father     Hyperlipidemia Father     Breast cancer Neg Hx     Colon cancer Neg Hx     Ovarian cancer Neg Hx      Social History   Substance Use Topics    Smoking status: Former Smoker    Smokeless tobacco: Never Used    Alcohol use No      Comment: socially     Review of Systems   Constitutional: Negative for fever.   HENT: Negative for sore throat.    Respiratory: Negative for shortness of breath.    Cardiovascular: Negative for chest pain.   Gastrointestinal: Positive for abdominal pain (contractions). Negative for nausea.   Genitourinary: Negative for dysuria.   Musculoskeletal: Negative for back pain.   Skin: Negative for rash.   Neurological: Negative for weakness. "   Hematological: Does not bruise/bleed easily.       Physical Exam     Initial Vitals   BP Pulse Resp Temp SpO2   06/01/18 0557 06/01/18 0557 06/01/18 0952 06/01/18 0557 06/01/18 0558   (!) 117/55 68 18 97.2 °F (36.2 °C) 100 %      MAP       06/01/18 0557       75.67         Physical Exam    Nursing note and vitals reviewed.  Constitutional: She appears well-developed and well-nourished. She is not diaphoretic. No distress.   HENT:   Head: Normocephalic and atraumatic.   Eyes: Conjunctivae and EOM are normal.   Neck: Normal range of motion.   Cardiovascular: Normal rate.   Pulmonary/Chest: No respiratory distress.   Musculoskeletal: Normal range of motion.   Neurological: She is alert and oriented to person, place, and time.   Skin: Skin is warm and dry.   Psychiatric: She has a normal mood and affect.     OB LABOR EXAM:   Pre-Term Labor: No.   Membranes ruptured: No.   Method: Sterile vaginal exam per MD.   Vaginal Bleeding: bloody show.     Dilatation: 4.   Station: -2.   Effacement: 80%.       Comments: 2 hour recheck with progression to 6 cm       ED Course   Obtain Fetal nonstress test (NST)  Date/Time: 6/1/2018 6:21 AM  Performed by: NEO FREEMAN  Authorized by: RICHELLE PINK     Nonstress Test:     Variability:  6-25 BPM    Decelerations:  None    Accelerations:  15 bpm    Baseline:  125    Contractions:  Regular (occasional)    Contraction Frequency:  6 min  Biophysical Profile:     Nonstress Test Interpretation: reactive      Overall Impression:  Reassuring        Labs Reviewed   CBC W/ AUTO DIFFERENTIAL - Abnormal; Notable for the following:        Result Value    WBC 14.36 (*)     Gran # (ANC) 11.3 (*)     Gran% 78.8 (*)     Lymph% 14.6 (*)     All other components within normal limits             Medical Decision Making:   ED Management:  - NST reactive and reassuring  - Cervix 4/80/-2 --> 6 cm on 2 hour recheck   - Will admit to L&D, management per primary    - Consents signed and to chart  -  Admit to Labor and Delivery unit  - Plan for active for labor management    - Epidural per Anesthesia  - Draw CBC, T&S  - Notify Staff  - Ultrasound performed, fetus in vertex position.  - Recheck in 2 hrs or PRN  - Post-Partum Hemorrhage risk - low          Other:   I have discussed this case with another health care provider.              Attending Attestation:   Physician Attestation Statement for Resident:  As the supervising MD   Physician Attestation Statement: I have personally seen and examined this patient.   I agree with the above history. -:   As the supervising MD I agree with the above treatment, course, plan, and disposition.   -: Patient evaluated and found to be stable, agree with resident's assessment and plan.  I was personally present during the critical portions of the procedure(s) performed by the resident and was immediately available in the ED to provide services and assistance as needed during the entire procedure.  I have reviewed the following: old records at this facility.                    ED Course as of    0616 I evaluated Ms. Purdy and discussed plan with Dr. Sanabria.  Pt presents at 38wga with contraction that started yesterday, and then noted vaginal bleeding this morning.  She was 4cm on exam yesterday with Dr. Pearson.  Pregnancy complicated by marginal cord insertion, GBS+ and immune deficiency.  Fetal status reactive and reassuring with baseline at 125.  Contractions every 4 minutes.  Cervix unchanged from exam yesterday, 4/80/-2  [HU]   0806 Repeat SVE /80/-2, BBOW, vertex, increased bloody show. Admit to labor and delivery. GBBS+, sens to erythromycin.   [AR]      ED Course User Index  [AR] Yani Cruz MD  [HU] Aniyah Gomez DO     Clinical Impression:   The primary encounter diagnosis was 38 weeks gestation of pregnancy. Diagnoses of Uterine contractions during pregnancy and  (spontaneous vaginal delivery) were also pertinent to  this visit.    Disposition:   Disposition: Admitted  Condition: Stable                        Margarita Sanabria MD  Resident  06/03/18 4600       Yani Cruz MD  06/05/18 8289

## 2018-06-01 NOTE — L&D DELIVERY NOTE
[facility]  Vaginal Delivery   Operative Note    Fetus in vertex presentation. Position OA.   after approximately 5 minutes of maternal pushing.  Patient was under epidural anesthesia.  Infant delivered OA over intact perineum.  No nuchal cord.  Female infant also tolerated the delivery well, bulb suctioning was performed, and she was placed on mothers abdomen for skin-to-skin contact. Bulb suctioning was again performed by RN.  Cord clamping was delayed until pulse was diminished to palpation in the cord.  Cord was then clamped and cut, and umbilical arterial gas and venous blood were obtained.  Gentle traction on the umbilical cord yielded delivery of the placenta, and IV pitocin was started.   Bimanual uterine massage confirmed good uterine tone.   Cervix, vagina, and vulva were inspected. Cervix was found to be free of lacerations.  First degree perineal laceration was identified and was repaired with 2-0 vicryl in the usual fashion and was then found to be hemostatic.  Uterine tone noted again.   Patient and infant tolerated delivery well.  EBL 150mL.  Dr. Pearson was present for the entire procedure.   S/L/N counts correct x 2.    Casa Roberts M.D.  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    Pregnancy with one fetus, antepartum    Marginal insertion of umbilical cord affecting management of mother    38 weeks gestation of pregnancy    Beta hemolytic Streptococcus urinary tract infection affecting pregnancy in third trimester     Admitting GA: 38w5d    Delivery Information for Lucero Zavala    Birth information:  YOB: 2019   Time of  birth: 2:08 PM   Sex: female   Head Delivery Date/Time: 2018  2:08 PM   Delivery type: Vaginal, Spontaneous Delivery   Gestational Age: 38w5d    Delivery Providers    Delivering clinician:  Michelle Pearson DO   Provider Role    Casa Roberts MD Resident    Rosalba Mcclain, RN Delivery Nurse    Priscilla Pruitt, RN Delivery Nurse             Measurements    Weight:  3430 g  Length:  48.9 cm  Head circumference:  32.4 cm  Chest circumference:  34.3 cm          Assessment    Living status:  Living  Apgars:     1 Minute:   5 Minute:   10 Minute:   15 Minute:   20 Minute:     Skin Color:   0  1       Heart Rate:   2  2       Reflex Irritability:   2  2       Muscle Tone:   2  2       Respiratory Effort:   2  2       Total:   8  9               Apgars Assigned By:  FELTON SILVEIRA         Assisted Delivery Details:    Forceps attempted?:  No  Vacuum extractor attempted?:  No         Shoulder Dystocia    Shoulder dystocia present?:  No           Presentation and Position    Presentation:  Vertex  Position:  Middle Occiput Anterior           Interventions/Resuscitation    Method:  Bulb Suctioning, Tactile Stimulation       Cord    Vessels:  3 vessels  Complications:  None  Delayed Cord Clamping?:  Yes  Cord Clamped Date/Time:  2018  2:08 PM  Cord Blood Disposition:  Sent with Baby  Gases Sent?:  No  Stem Cell Collection (by MD):  No       Placenta    Date and time:  2018  2:13 PM  Removal:  Spontaneous  Appearance:  Intact  Placenta disposition:  family           Labor Events:       labor: No     Labor Onset Date/Time:         Dilation Complete Date/Time: 2018 13:45     Start Pushing Date/Time: 2018 14:03     Rupture Date/Time:              Rupture type: bulging         Fluid Amount:        Fluid Color:        Fluid Odor:        Membrane Status (PeriCalm): ARM (Artificial Rupture)      Rupture Date/Time (PeriCalm): 2018 10:54:00      Fluid Amount (PeriCalm): Moderate       Fluid Color (PeriCalm): Clear       steroids: None     Antibiotics given for GBS: Yes     Induction: none     Indications for induction:        Augmentation: oxytocin;amniotomy     Indications for augmentation: Ineffective Contraction Pattern     Labor complications: None     Additional complications:          Cervical ripening:                     Delivery:      Episiotomy: None     Indication for Episiotomy:       Perineal Lacerations: 1st Repaired:  Yes   Periurethral Laceration: none Repaired:     Labial Laceration: none Repaired:     Sulcus Laceration: none Repaired:     Vaginal Laceration: No Repaired:     Cervical Laceration: No Repaired:     Repair suture:       Repair # of packets: 2     Vaginal delivery QBL (mL): 150      QBL (mL): 0     Combined Blood Loss (mL): 150     Vaginal Sweep Performed: Yes     Surgicount Correct: Yes       Other providers:       Anesthesia    Method:  Epidural          Details (if applicable):  Trial of Labor      Categorization:      Priority:     Indications for :     Incision Type:       Additional  information:  Forceps:    Vacuum:    Breech:    Observed anomalies    Other (Comments):

## 2018-06-01 NOTE — HPI
"Karen Purdy is a 32 y.o. [id number] at 38w5d presented complaining of contractions that have been occurring for several days.  She reports the contractions have become more "labor-like" over the last day, states occurring every 4 minutes. She also reports vaginal spotting that started around 430 this AM. She denies LOF, reports good FM.    This IUP is complicated by marginal cord insertion.    GBS positive.  "

## 2018-06-02 VITALS
WEIGHT: 150 LBS | HEART RATE: 70 BPM | DIASTOLIC BLOOD PRESSURE: 54 MMHG | TEMPERATURE: 98 F | SYSTOLIC BLOOD PRESSURE: 99 MMHG | RESPIRATION RATE: 18 BRPM | OXYGEN SATURATION: 96 % | BODY MASS INDEX: 25.61 KG/M2 | HEIGHT: 64 IN

## 2018-06-02 PROBLEM — Z3A.38 38 WEEKS GESTATION OF PREGNANCY: Status: RESOLVED | Noted: 2018-06-01 | Resolved: 2018-06-02

## 2018-06-02 LAB
BASOPHILS # BLD AUTO: 0.03 K/UL
BASOPHILS NFR BLD: 0.2 %
DIFFERENTIAL METHOD: ABNORMAL
EOSINOPHIL # BLD AUTO: 0.2 K/UL
EOSINOPHIL NFR BLD: 1.1 %
ERYTHROCYTE [DISTWIDTH] IN BLOOD BY AUTOMATED COUNT: 13 %
HCT VFR BLD AUTO: 34.9 %
HGB BLD-MCNC: 11.5 G/DL
LYMPHOCYTES # BLD AUTO: 2.2 K/UL
LYMPHOCYTES NFR BLD: 15.5 %
MCH RBC QN AUTO: 28.8 PG
MCHC RBC AUTO-ENTMCNC: 33 G/DL
MCV RBC AUTO: 88 FL
MONOCYTES # BLD AUTO: 1.1 K/UL
MONOCYTES NFR BLD: 7.3 %
NEUTROPHILS # BLD AUTO: 10.9 K/UL
NEUTROPHILS NFR BLD: 75.6 %
PLATELET # BLD AUTO: 159 K/UL
PMV BLD AUTO: 11.3 FL
RBC # BLD AUTO: 3.99 M/UL
WBC # BLD AUTO: 14.42 K/UL

## 2018-06-02 PROCEDURE — 36415 COLL VENOUS BLD VENIPUNCTURE: CPT

## 2018-06-02 PROCEDURE — 25000003 PHARM REV CODE 250: Performed by: STUDENT IN AN ORGANIZED HEALTH CARE EDUCATION/TRAINING PROGRAM

## 2018-06-02 PROCEDURE — 85025 COMPLETE CBC W/AUTO DIFF WBC: CPT

## 2018-06-02 PROCEDURE — 99024 POSTOP FOLLOW-UP VISIT: CPT | Mod: ,,, | Performed by: OBSTETRICS & GYNECOLOGY

## 2018-06-02 RX ADMIN — IBUPROFEN 600 MG: 600 TABLET, FILM COATED ORAL at 02:06

## 2018-06-02 RX ADMIN — IBUPROFEN 600 MG: 600 TABLET, FILM COATED ORAL at 08:06

## 2018-06-02 RX ADMIN — IBUPROFEN 600 MG: 600 TABLET, FILM COATED ORAL at 01:06

## 2018-06-02 NOTE — ASSESSMENT & PLAN NOTE
Postpartum care:  - Patient doing well. Continue routine management and advances.  - Continue PO pain meds. Pain well controlled.  - Encourage ambulation.   - Heme: Pre Delivery h/h 12/38 --> Post Delivery h/h 11/35  - Contraception - defer  - Lactation - The patient is breast feeding. Lactation nurse following along PRN  - Rh Status - pos

## 2018-06-02 NOTE — SUBJECTIVE & OBJECTIVE
Hospital course: 2018 - Admitted due to cervical change. Infrequent contractions. Will AROM and pitocin if needed. GBS positive - will need vancomycin due to allergies.   2018 - Patient PPD 1 s/p , uncomplicated. Meeting all post partum milestones. Stable for discharge.     Interval History:     Patient is PPD 1 s/p . Meeting all post partum milestones, no complaints.       Objective:     Vital Signs (Most Recent):  Temp: 98.4 °F (36.9 °C) (18 0040)  Pulse: 65 (18 0040)  Resp: 18 (18 0520)  BP: 115/66 (180)  SpO2: 100 % (18) Vital Signs (24h Range):  Temp:  [97.2 °F (36.2 °C)-98.7 °F (37.1 °C)] 98.4 °F (36.9 °C)  Pulse:  [] 65  Resp:  [18] 18  SpO2:  [96 %-100 %] 100 %  BP: ()/(42-79) 115/66     Weight: 68 kg (150 lb)  Body mass index is 25.75 kg/m².      Intake/Output Summary (Last 24 hours) at 18 0642  Last data filed at 18 2030   Gross per 24 hour   Intake                0 ml   Output             2750 ml   Net            -2750 ml       Significant Labs:  Lab Results   Component Value Date    GROUPTRH A POS 2018    HEPBSAG Negative 10/04/2017    STREPBCULT  2018     STREPTOCOCCUS AGALACTIAE (GROUP B)  Beta-hemolytic streptococci are routinely susceptible to   penicillins,cephalosporins and carbapenems.         Recent Labs  Lab 18  0500   HGB 11.5*   HCT 34.9*       I have personallly reviewed all pertinent lab results from the last 24 hours.    Physical Exam:   Constitutional: She appears well-developed and well-nourished. No distress.       Cardiovascular: Normal rate and regular rhythm.     Pulmonary/Chest: Effort normal.        Abdominal: Soft. She exhibits no distension and no abdominal incision. There is no tenderness.     Genitourinary: Vagina normal.                Skin: She is not diaphoretic.

## 2018-06-02 NOTE — LACTATION NOTE
06/02/18 1030   Maternal Infant Assessment   Breast Shape Bilateral:;angled   Breast Density Bilateral:;soft   Areola Bilateral:;elastic   Nipple(s) Bilateral:;everted   Infant Assessment   Sucking Reflex present   Rooting Reflex present   Swallow Reflex present   LATCH Score   Latch 2-->grasps breast, tongue down, lips flanged, rhythmic sucking   Audible Swallowing 2-->spontaneous and intermittent (24 hrs old)   Type Of Nipple 2-->everted (after stimulation)   Comfort (Breast/Nipple) 2-->soft/nontender   Hold (Positioning) 2-->no assist from staff, mother able to position/hold infant   Score (less than 7 for 2/more consecutive times, consult Lactation Consultant) 10   Maternal Infant Feeding   Maternal Emotional State independent   Infant Positioning cradle   Signs of Milk Transfer audible swallow   Time Spent (min) 30-60 min   Breastfeeding History   Currently Breastfeeding yes   Feeding Infant   Effective Latch During Feeding yes   Audible Swallow yes   Suck/Swallow Coordination present   Skin-to-Skin Contact During Feeding yes   Lactation Referrals   Lactation Consult Breastfeeding assessment;Initial assessment   Lactation Interventions   Attachment Promotion counseling provided;skin-to-skin contact encouraged   Breastfeeding Assistance feeding cue recognition promoted;infant latch-on verified;infant suck/swallow verified;support offered   Maternal Breastfeeding Support lactation counseling provided   pt able to latch baby to breast independently. Baby needs stimulation to keep actively nursing. Good tugs and pulls observed. Discharge lactation education given. Questions answered. Pt has lactation contact number.

## 2018-06-02 NOTE — PLAN OF CARE
Problem: Patient Care Overview  Goal: Plan of Care Review  Pt to follow basic lactation education.

## 2018-06-02 NOTE — DISCHARGE SUMMARY
"[facility]  Obstetrics  Discharge Summary      Patient Name: Karen Purdy  MRN: [id number]  Admission Date: 2018  Hospital Length of Stay: 1 days  Discharge Date and Time: No discharge date for patient encounter.  Attending Physician: Michelle Pearson DO   Discharging Provider: Jacob Avila MD  Primary Care Provider: Leah Garcia MD    HPI: Karen Purdy is a 32 y.o. [id number] at 38w5d presented complaining of contractions that have been occurring for several days.  She reports the contractions have become more "labor-like" over the last day, states occurring every 4 minutes. She also reports vaginal spotting that started around 430 this AM. She denies LOF, reports good FM.    This IUP is complicated by marginal cord insertion.    GBS positive.    * No surgery found *     Hospital Course:   2018 - Admitted due to cervical change. Infrequent contractions. Will AROM and pitocin if needed. GBS positive - will need vancomycin due to allergies.   2018 - Patient PPD 1 s/p , uncomplicated. Meeting all post partum milestones. Stable for discharge.         Final Active Diagnoses:    Diagnosis Date Noted POA     (spontaneous vaginal delivery) [O80] 2016 Not Applicable      Problems Resolved During this Admission:    Diagnosis Date Noted Date Resolved POA    PRINCIPAL PROBLEM:  38 weeks gestation of pregnancy [Z3A.38] 2018 Not Applicable    Beta hemolytic Streptococcus urinary tract infection affecting pregnancy in third trimester [O23.43, B95.5] 2018 Unknown    38 weeks gestation of pregnancy [Z3A.38] 2018 Not Applicable    Marginal insertion of umbilical cord affecting management of mother [O43.199] 2018 Yes        Labs: All labs within the past 24 hours have been reviewed    Feeding Method: breast    Immunizations     Date Immunization Status Dose Route/Site Given by    18 1542 " MMR Incomplete 0.5 mL Subcutaneous/Left deltoid     06/01/18 1542 Tdap Incomplete 0.5 mL Intramuscular/Left deltoid           Delivery:    Episiotomy: None   Lacerations: 1st   Repair suture:     Repair # of packets: 2   Blood loss (ml): 150     Birth information:  YOB: 2019   Time of birth: 2:08 PM   Sex: female   Delivery type: Vaginal, Spontaneous Delivery   Gestational Age: 38w5d    Delivery Clinician:      Other providers:       Additional  information:  Forceps:    Vacuum:    Breech:    Observed anomalies      Living?:           APGARS  One minute Five minutes Ten minutes   Skin color:         Heart rate:         Grimace:         Muscle tone:         Breathing:         Totals: 8  9        Placenta: Delivered:       appearance    Pending Diagnostic Studies:     None          Discharged Condition: good    Disposition: Home or Self Care    Follow Up:  Follow-up Information     Michelle Pearson DO In 6 weeks.    Specialty:  Obstetrics and Gynecology  Why:  Post Partum Visit  Contact information:  [address] [place] [id number]  [phone]                 Patient Instructions:     Diet Adult Regular     Activity as tolerated     Notify your health care provider if you experience any of the following:  temperature >100.4     Notify your health care provider if you experience any of the following:  persistent nausea and vomiting or diarrhea     Notify your health care provider if you experience any of the following:  severe uncontrolled pain     Notify your health care provider if you experience any of the following:  redness, tenderness, or signs of infection (pain, swelling, redness, odor or green/yellow discharge around incision site)     Notify your health care provider if you experience any of the following:  difficulty breathing or increased cough       Medications:  Current Discharge Medication List      CONTINUE these medications which have NOT CHANGED    Details    PRENATAL VIT CALC,IRON,FOLIC (PRENATAL VITAMIN ORAL) Take by mouth.             Jacob Avila MD  Obstetrics  [facility]

## 2018-06-02 NOTE — PROGRESS NOTES
[facility]  Obstetrics  Postpartum Progress Note    Patient Name: Karen Purdy  MRN: [id number]  Admission Date: 2018  Hospital Length of Stay: 1 days  Attending Physician: Michelle Pearson DO  Primary Care Provider: Leah Garcia MD    Subjective:     Principal Problem: (spontaneous vaginal delivery)    Hospital course: 2018 - Admitted due to cervical change. Infrequent contractions. Will AROM and pitocin if needed. GBS positive - will need vancomycin due to allergies.   2018 - Patient PPD 1 s/p , uncomplicated. Meeting all post partum milestones. Stable for discharge.     Interval History:     Patient is PPD 1 s/p . Meeting all post partum milestones, no complaints.       Objective:     Vital Signs (Most Recent):  Temp: 98.4 °F (36.9 °C) (18 0040)  Pulse: 65 (18 0040)  Resp: 18 (18 0520)  BP: 115/66 (180)  SpO2: 100 % (180) Vital Signs (24h Range):  Temp:  [97.2 °F (36.2 °C)-98.7 °F (37.1 °C)] 98.4 °F (36.9 °C)  Pulse:  [] 65  Resp:  [18] 18  SpO2:  [96 %-100 %] 100 %  BP: ()/(42-79) 115/66     Weight: 68 kg (150 lb)  Body mass index is 25.75 kg/m².      Intake/Output Summary (Last 24 hours) at 18 0642  Last data filed at 18 2030   Gross per 24 hour   Intake                0 ml   Output             2750 ml   Net            -2750 ml       Significant Labs:  Lab Results   Component Value Date    GROUPTRH A POS 2018    HEPBSAG Negative 10/04/2017    STREPBCULT  2018     STREPTOCOCCUS AGALACTIAE (GROUP B)  Beta-hemolytic streptococci are routinely susceptible to   penicillins,cephalosporins and carbapenems.         Recent Labs  Lab 18  0500   HGB 11.5*   HCT 34.9*       I have personallly reviewed all pertinent lab results from the last 24 hours.    Physical Exam:   Constitutional: She appears well-developed and well-nourished. No distress.       Cardiovascular: Normal rate and regular  rhythm.     Pulmonary/Chest: Effort normal.        Abdominal: Soft. She exhibits no distension and no abdominal incision. There is no tenderness.     Genitourinary: Vagina normal.                Skin: She is not diaphoretic.        Assessment/Plan:     32 y.o. female  for:    *  (spontaneous vaginal delivery)    Postpartum care:  - Patient doing well. Continue routine management and advances.  - Continue PO pain meds. Pain well controlled.  - Encourage ambulation.   - Heme: Pre Delivery h/h  --> Post Delivery h/h   - Contraception - defer  - Lactation - The patient is breast feeding. Lactation nurse following along PRN  - Rh Status - pos                    Disposition: As patient meets milestones, will plan to discharge today.    Jacob Avila MD  Obstetrics  [facility] [place]

## 2018-06-02 NOTE — PLAN OF CARE
Problem: Patient Care Overview  Goal: Plan of Care Review  Outcome: Outcome(s) achieved Date Met: 06/02/18  VSS, Ambulating and voiding without difficulty. Fundus is firm and midline. Vaginal bleeding is small. Tolerating a regular diet. Pain controlled with oral pain medication. Mother baby care guide reviewed on previous shift; questions answered. Ok to d/c home per MD Order. Discharge instructions reviewed with patient and spouse; patient verbalizes understanding. ID bands verified. Paperwork signed.

## 2018-06-02 NOTE — ANESTHESIA POSTPROCEDURE EVALUATION
"Anesthesia Post Evaluation    Patient: Karen Purdy    Procedure(s) Performed: * No procedures listed *    Final Anesthesia Type: epidural  Patient location during evaluation: labor & delivery  Patient participation: Yes- Able to Participate  Level of consciousness: awake and alert and oriented  Post-procedure vital signs: reviewed and stable  Pain management: adequate  Airway patency: patent  PONV status at discharge: No PONV  Anesthetic complications: no      Cardiovascular status: blood pressure returned to baseline  Respiratory status: unassisted and room air  Hydration status: euvolemic          Visit Vitals  BP (!) 99/54   Pulse 70   Temp 36.9 °C (98.4 °F) (Oral)   Resp 18   Ht 5' 4" (1.626 m)   Wt 68 kg (150 lb)   LMP 09/03/2017 (Exact Date)   SpO2 96%   Breastfeeding? Yes   BMI 25.75 kg/m²       Pain/Junior Score: Pain Rating Prior to Med Admin: 3 (6/2/2018  8:43 AM)  Pain Rating Post Med Admin: 0 (6/2/2018  9:40 AM)      "

## 2018-07-12 ENCOUNTER — OFFICE VISIT (OUTPATIENT)
Dept: OBSTETRICS AND GYNECOLOGY | Facility: CLINIC | Age: 33
End: 2018-07-12
Attending: OBSTETRICS & GYNECOLOGY
Payer: COMMERCIAL

## 2018-07-12 VITALS
WEIGHT: 124.75 LBS | HEIGHT: 64 IN | SYSTOLIC BLOOD PRESSURE: 92 MMHG | DIASTOLIC BLOOD PRESSURE: 60 MMHG | BODY MASS INDEX: 21.3 KG/M2

## 2018-07-12 PROBLEM — Z34.90 PREGNANCY WITH ONE FETUS, ANTEPARTUM: Status: RESOLVED | Noted: 2017-11-07 | Resolved: 2018-07-12

## 2018-07-12 PROCEDURE — 0503F POSTPARTUM CARE VISIT: CPT | Mod: S$GLB,,, | Performed by: OBSTETRICS & GYNECOLOGY

## 2018-07-12 PROCEDURE — 99999 PR PBB SHADOW E&M-EST. PATIENT-LVL III: CPT | Mod: PBBFAC,,, | Performed by: OBSTETRICS & GYNECOLOGY

## 2018-07-12 NOTE — PROGRESS NOTES
"CC: Post-partum follow-up    HPI:  Karen Purdy is a 32 y.o. female  presents for post-partum visit s/p a .  Some pain at laceration site, feels that she is not completely healed.    Delivery Date: 2018  Delivery MD: Michel  Gender: female  Birth Weight: 3430g  Breast Feeding: YES  Depression: NO  Contraception: IUD at a future time    Pregnancy was complicated by:  History of immune disorder  ROS:  GENERAL: No fever, chills, fatigability or weight loss.  VULVAR: No pain, no lesions and no itching.  VAGINAL: No relaxation, no itching, no discharge, no abnormal bleeding and no lesions.  ABDOMEN: No abdominal pain. Denies nausea. Denies vomiting. No diarrhea. No constipation  BREAST: Denies pain. No lumps. No discharge.  URINARY: No incontinence, no nocturia, no frequency and no dysuria.  CARDIOVASCULAR: No chest pain. No shortness of breath. No leg cramps.  NEUROLOGICAL: No headaches. No vision changes.    PHYSICAL EXAM:  BP 92/60   Ht 5' 4" (1.626 m)   Wt 56.6 kg (124 lb 12.5 oz)   Breastfeeding? Yes   BMI 21.42 kg/m²   PELVIC: exam chaperoned by nurse, normal vagina and vulva, though incompletely healed, some suture material remaining at laceration site, atrophic vaginal changes noted, multiparous os, uterus normal size, shape, consistency, no mass or tenderness          Diagnosis:  1.  (spontaneous vaginal delivery)        Plan:   F/u in 2 weeks for re-eval of lac site.         Patient was counseled today on A.C.S. Pap guidelines and recommendations for yearly pelvic exams and monthly self breast exams; to see her PCP for other health maintenance.  *  "

## 2018-07-27 ENCOUNTER — POSTPARTUM VISIT (OUTPATIENT)
Dept: OBSTETRICS AND GYNECOLOGY | Facility: CLINIC | Age: 33
End: 2018-07-27
Attending: OBSTETRICS & GYNECOLOGY
Payer: COMMERCIAL

## 2018-07-27 VITALS
DIASTOLIC BLOOD PRESSURE: 63 MMHG | WEIGHT: 124.56 LBS | HEIGHT: 64 IN | SYSTOLIC BLOOD PRESSURE: 104 MMHG | BODY MASS INDEX: 21.27 KG/M2

## 2018-07-27 DIAGNOSIS — S31.41XD VAGINAL LACERATION, SUBSEQUENT ENCOUNTER: Primary | ICD-10-CM

## 2018-07-27 PROCEDURE — 0503F POSTPARTUM CARE VISIT: CPT | Mod: S$GLB,,, | Performed by: OBSTETRICS & GYNECOLOGY

## 2018-07-27 NOTE — PROGRESS NOTES
"CC: Post-partum follow-up    HPI:  Karen Purdy is a 32 y.o. female  presents for post-partum visit s/p a .  Some pain at laceration site, but improved, states it feels somewhat tight.    Delivery Date: 2018  Delivery MD: Michel  Gender: female  Birth Weight: 3430g  Breast Feeding: YES  Depression: NO  Contraception: IUD at a future time    Pregnancy was complicated by:  History of immune disorder  ROS:  GENERAL: No fever, chills, fatigability or weight loss.  VULVAR: No pain, no lesions and no itching.  VAGINAL: No relaxation, no itching, no discharge, no abnormal bleeding and no lesions.  ABDOMEN: No abdominal pain. Denies nausea. Denies vomiting. No diarrhea. No constipation  BREAST: Denies pain. No lumps. No discharge.  URINARY: No incontinence, no nocturia, no frequency and no dysuria.  CARDIOVASCULAR: No chest pain. No shortness of breath. No leg cramps.  NEUROLOGICAL: No headaches. No vision changes.    PHYSICAL EXAM:  /63   Ht 5' 4" (1.626 m)   Wt 56.5 kg (124 lb 9 oz)   Breastfeeding? Yes   BMI 21.38 kg/m²   PELVIC: exam chaperoned by nurse, normal vagina and vulva, healed completely at this time.      Diagnosis:  1. Vaginal laceration, subsequent encounter        Plan:     Orders Placed This Encounter    conjugated estrogens (PREMARIN) vaginal cream         Patient was counseled today on A.C.S. Pap guidelines and recommendations for yearly pelvic exams and monthly self breast exams; to see her PCP for other health maintenance.  *  "

## 2018-09-04 ENCOUNTER — OFFICE VISIT (OUTPATIENT)
Dept: INTERNAL MEDICINE | Facility: CLINIC | Age: 33
End: 2018-09-04
Payer: COMMERCIAL

## 2018-09-04 ENCOUNTER — LAB VISIT (OUTPATIENT)
Dept: LAB | Facility: OTHER | Age: 33
End: 2018-09-04
Payer: COMMERCIAL

## 2018-09-04 VITALS
HEART RATE: 87 BPM | BODY MASS INDEX: 20.67 KG/M2 | HEIGHT: 64 IN | DIASTOLIC BLOOD PRESSURE: 73 MMHG | WEIGHT: 121.06 LBS | SYSTOLIC BLOOD PRESSURE: 116 MMHG

## 2018-09-04 DIAGNOSIS — J02.9 ACUTE PHARYNGITIS, UNSPECIFIED ETIOLOGY: Primary | ICD-10-CM

## 2018-09-04 DIAGNOSIS — J02.9 ACUTE PHARYNGITIS, UNSPECIFIED ETIOLOGY: ICD-10-CM

## 2018-09-04 LAB
CTP QC/QA: YES
HETEROPH AB SERPL QL IA: NEGATIVE
S PYO RRNA THROAT QL PROBE: NEGATIVE

## 2018-09-04 PROCEDURE — 99999 PR PBB SHADOW E&M-EST. PATIENT-LVL IV: CPT | Mod: PBBFAC,,, | Performed by: NURSE PRACTITIONER

## 2018-09-04 PROCEDURE — 36415 COLL VENOUS BLD VENIPUNCTURE: CPT

## 2018-09-04 PROCEDURE — 87081 CULTURE SCREEN ONLY: CPT

## 2018-09-04 PROCEDURE — 87880 STREP A ASSAY W/OPTIC: CPT | Mod: QW,S$GLB,, | Performed by: NURSE PRACTITIONER

## 2018-09-04 PROCEDURE — 3008F BODY MASS INDEX DOCD: CPT | Mod: CPTII,S$GLB,, | Performed by: NURSE PRACTITIONER

## 2018-09-04 PROCEDURE — 99213 OFFICE O/P EST LOW 20 MIN: CPT | Mod: S$GLB,,, | Performed by: NURSE PRACTITIONER

## 2018-09-04 PROCEDURE — 86308 HETEROPHILE ANTIBODY SCREEN: CPT

## 2018-09-04 NOTE — PROGRESS NOTES
"Subjective:       Patient ID: Karen Purdy is a 32 y.o. female.    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 She presents with about a 4 day h/o gradually worsening sore throat. Associated symptoms include mild nasal congestion, pnd, a tickle in her throat causing a cough, R plugged ear sensation, and intermittent light-headedness. She admits that her 2 year old son has a "cold" and ear infection right now and that he attends . She also has a 3 month old whom she breasts feeds. Treatments tried include Motrin with significant relief of throat pain. She denies sinus pressure/pain.  Sore Throat    The current episode started in the past 7 days (Saturday). The problem has been gradually worsening. Sore throat worse side: pain on both sides. There has been no fever. The pain is at a severity of 5/10. The pain is moderate. Associated symptoms include congestion, coughing (dry), ear pain, headaches, a plugged ear sensation (Right ear), swollen glands and trouble swallowing. Pertinent negatives include no abdominal pain, diarrhea, drooling, ear discharge, hoarse voice, neck pain, shortness of breath, stridor or vomiting. Associated symptoms comments: PND. She has had exposure to strep and mono (x 2 at the ages of 19 and 20). She has tried NSAIDs (Motrin) for the symptoms. The treatment provided significant relief.      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    Past Surgical History: Patient has a past surgical history that includes Knee surgery (Right).    Social History: Patient reports that " "she has quit smoking. she has never used smokeless tobacco. She reports that she does not drink alcohol or use drugs.    Family History: family history includes Cancer in her maternal grandfather; Dementia in her paternal grandmother; Hyperlipidemia in her father and mother; Hypertension in her father and mother.    Medications:   Current Outpatient Medications   Medication Sig    PRENATAL VIT CALC,IRON,FOLIC (PRENATAL VITAMIN ORAL) Take by mouth.    conjugated estrogens (PREMARIN) vaginal cream Place 1 g vaginally every Mon, Wed, Fri.     No current facility-administered medications for this visit.        Allergies: Patient is allergic to ceclor [cefaclor] and pcn [penicillins].    Review of Systems   Constitutional: Negative for activity change, appetite change, fatigue, fever and unexpected weight change.   HENT: Positive for congestion, ear pain, postnasal drip and trouble swallowing. Negative for dental problem, drooling, ear discharge, facial swelling, hearing loss, hoarse voice, nosebleeds, rhinorrhea, sinus pressure, sneezing, sore throat, tinnitus and voice change.    Eyes: Negative for pain and visual disturbance.   Respiratory: Positive for cough (dry). Negative for chest tightness, shortness of breath, wheezing and stridor.    Cardiovascular: Negative for chest pain.   Gastrointestinal: Negative for abdominal pain, diarrhea and vomiting.   Musculoskeletal: Negative for gait problem and neck pain.   Skin: Negative for color change and rash.   Allergic/Immunologic: Negative for environmental allergies.   Neurological: Positive for headaches. Negative for dizziness, seizures, syncope, facial asymmetry, speech difficulty, weakness, light-headedness and numbness.   Psychiatric/Behavioral: Negative for agitation and confusion. The patient is not nervous/anxious.        Objective:       /73 (BP Location: Left arm, Patient Position: Sitting)   Pulse 87   Ht 5' 4" (1.626 m)   Wt 54.9 kg (121 lb 0.5 oz) "   BMI 20.78 kg/m²     Physical Exam   Constitutional: She is oriented to person, place, and time. She appears well-developed and well-nourished.   HENT:   Head: Normocephalic and atraumatic. Not macrocephalic and not microcephalic. Head is without raccoon's eyes, without Cordero's sign, without abrasion, without contusion, without laceration, without right periorbital erythema and without left periorbital erythema. Hair is normal.   Right Ear: Tympanic membrane, external ear and ear canal normal. No lacerations. No drainage, swelling or tenderness. No foreign bodies. No mastoid tenderness. Tympanic membrane is not injected, not scarred, not perforated, not erythematous, not retracted and not bulging. Tympanic membrane mobility is normal. No middle ear effusion. No hemotympanum. No decreased hearing is noted.   Left Ear: Tympanic membrane, external ear and ear canal normal. No lacerations. No drainage, swelling or tenderness. No foreign bodies. No mastoid tenderness. Tympanic membrane is not injected, not scarred, not perforated, not erythematous, not retracted and not bulging. Tympanic membrane mobility is normal.  No middle ear effusion. No hemotympanum. No decreased hearing is noted.   Nose: Nose normal. No mucosal edema, rhinorrhea, nose lacerations, sinus tenderness, nasal deformity, septal deviation or nasal septal hematoma. No epistaxis.  No foreign bodies. Right sinus exhibits no maxillary sinus tenderness and no frontal sinus tenderness. Left sinus exhibits no maxillary sinus tenderness and no frontal sinus tenderness.   Mouth/Throat: Uvula is midline, oropharynx is clear and moist and mucous membranes are normal. Mucous membranes are not pale, not dry and not cyanotic. She does not have dentures. No oral lesions. No trismus in the jaw. Normal dentition. No dental abscesses, uvula swelling, lacerations or dental caries. No oropharyngeal exudate, posterior oropharyngeal edema, posterior oropharyngeal erythema  or tonsillar abscesses. No tonsillar exudate.   Eyes: Conjunctivae and lids are normal. No scleral icterus.   Neck: Trachea normal. Neck supple. No spinous process tenderness and no muscular tenderness present. No neck rigidity. No edema, no erythema and normal range of motion present. No thyroid mass and no thyromegaly present.   Cardiovascular: Normal rate, regular rhythm, normal heart sounds and intact distal pulses. Exam reveals no gallop and no friction rub.   No murmur heard.  Pulmonary/Chest: Effort normal and breath sounds normal. No stridor. No respiratory distress. She has no wheezes. She exhibits no tenderness.   Abdominal: Soft. Bowel sounds are normal.   Musculoskeletal: Normal range of motion.   Lymphadenopathy:        Head (right side): No submental, no submandibular, no tonsillar, no preauricular and no posterior auricular adenopathy present.        Head (left side): No submental, no submandibular, no tonsillar, no preauricular, no posterior auricular and no occipital adenopathy present.   Neurological: She is alert and oriented to person, place, and time.   Skin: Skin is warm and dry.   Psychiatric: Her behavior is normal. Judgment and thought content normal. Her affect is blunt.   Vitals reviewed.      Assessment:       1. Acute pharyngitis, unspecified etiology        Plan:       Negative Rapid strep test   Throat culture.  Monospot.  Warm salt water gargles(1/2 teaspoon of salt to 1 cup warm water and gargle as desired).  Warm tea with honey.  Throat lozenges.  Fluids.  Rest.  Report to Emergency Department if symptoms worsen.  F/U with ENT.  F/U with PCP.  RTC prn.

## 2018-09-04 NOTE — PROGRESS NOTES
Collected specimen for Step A and results negative.  Collected specimen for throat culture, placed label and printed the lab slip

## 2018-09-04 NOTE — PATIENT INSTRUCTIONS
Negative Rapid strep test   Throat culture.  Monospot.  Warm salt water gargles(1/2 teaspoon of salt to 1 cup warm water and gargle as desired).  Warm tea with honey.  Throat lozenges.  Fluids.  Rest.  Report to Emergency Department if symptoms worsen.  F/U with ENT.  F/U with PCP.  RTC prn.      Pharyngitis (Sore Throat), Report Pending    Pharyngitis (sore throat) is often due to a virus. It can also be caused by the streptococcus, or strep, bacterium, often called strep throat. Both viral and strep infections can cause throat pain that is worse when swallowing, aching all over with headache, and fever. Both types of infections are contagious. They may be spread by coughing, kissing, or touching others after touching your mouth or nose.  A test has been done to find out whether you (or your child, if your child is the patient) have strep throat. Call this facility or your healthcare provider if you were not given your test results. If the test is positive for strep infection, you will need to take antibiotic medicines. A prescription can be called into your pharmacy at that time. If the test is negative, you probably have a viral pharyngitis. This does not need to be treated with antibiotics. Until you receive the results of the strep test, you should stay home from work. If your child is being tested, he or she should stay home from school.  Home care  · Rest at home. Drink plenty of fluids so you won't get dehydrated.  · If the test is positive for strep, don't go to work or school for the first 2 days of taking the antibiotics. After this time, you will not be contagious. You can then return to work or school if you are feeling better.   · Take the antibiotic medicine for the full 10 days, even if you feel better. This is very important to make sure the infection is treated. It is also important to prevent drug-resistant germs from developing. If you were given an antibiotic shot, you won't need more  antibiotics.  · For children: Use acetaminophen for fever, fussiness, or discomfort. In infants older than 6 months of age, you may use ibuprofen instead of acetaminophen. Talk with your child's healthcare provider before giving these medicines if your child has chronic liver or kidney disease or ever had a stomach ulcer or GI bleeding. Never give aspirin to a child under 18 years of age who is ill with a fever. It may cause severe liver damage.  · For adults: Use acetaminophen or ibuprofen to control pain or fever, unless another medicine was prescribed for this. Talk with your healthcare provider before taking these medicines if you have chronic liver or kidney disease or ever had a stomach ulcer or GI bleeding.  · Use throat lozenges or numbing throat sprays to help reduce pain. Gargling with warm salt water will also help reduce throat pain. For this, dissolve 1/2 teaspoon of salt in 1 glass of warm water. To help soothe a sore throat, children can sip on juice or a popsicle. Children 5 years and older can also suck on a lollipop or hard candy.  · Don't eat salty or spicy foods. These can irritate the throat.  Follow-up care  Follow up with your healthcare provider or our staff if you don't get better over the next week.  When to seek medical advice  Call your healthcare provider right away if any of these occur:  · Fever as directed by your healthcare provider. For children, seek care if:  ¨ Your child is of any age and has repeated fevers above 104°F (40°C).  ¨ Your child is younger than 2 years of age and has a fever of 100.4°F (38°C) that continues for more than 1 day.  ¨ Your child is 2 years old or older and has a fever of 100.4°F (38°C) that continues for more than 3 days.  · New or worsening ear pain, sinus pain, or headache  · Painful lumps in the back of neck  · Stiff neck  · Lymph nodes are getting larger  · Inability to swallow liquids, excessive drooling, or inability to open mouth wide due to  throat pain  · Signs of dehydration (very dark urine or no urine, sunken eyes, dizziness)  · Trouble breathing or noisy breathing  · Muffled voice  · New rash  · Child appears to be getting sicker  Date Last Reviewed: 4/13/2015  © 7348-9501 [employer]. [address] [place] [id number]. All rights reserved. This information is not intended as a substitute for professional medical care. Always follow your healthcare professional's instructions.

## 2018-09-06 LAB — BACTERIA THROAT CULT: NORMAL

## 2018-09-07 ENCOUNTER — PATIENT MESSAGE (OUTPATIENT)
Dept: INTERNAL MEDICINE | Facility: CLINIC | Age: 33
End: 2018-09-07

## 2018-09-07 NOTE — TELEPHONE ENCOUNTER
Called pt to obtain more information on her symptoms to determine best course of action.   No answer on number provided in chart. Left message on VM - awaiting callback

## 2018-09-10 RX ORDER — CLINDAMYCIN HYDROCHLORIDE 150 MG/1
150 CAPSULE ORAL 2 TIMES DAILY
Qty: 14 CAPSULE | Refills: 0 | Status: SHIPPED | OUTPATIENT
Start: 2018-09-10 | End: 2019-02-14 | Stop reason: ALTCHOICE

## 2018-09-10 NOTE — TELEPHONE ENCOUNTER
Hx of immunodef. Breast feeding  Cleocin rx sent in- take only if not improving  Breast feeding recs reviewed

## 2019-02-14 ENCOUNTER — OFFICE VISIT (OUTPATIENT)
Dept: URGENT CARE | Facility: CLINIC | Age: 34
End: 2019-02-14
Payer: COMMERCIAL

## 2019-02-14 VITALS
OXYGEN SATURATION: 98 % | TEMPERATURE: 98 F | DIASTOLIC BLOOD PRESSURE: 70 MMHG | SYSTOLIC BLOOD PRESSURE: 110 MMHG | RESPIRATION RATE: 18 BRPM | HEIGHT: 64 IN | WEIGHT: 110 LBS | HEART RATE: 80 BPM | BODY MASS INDEX: 18.78 KG/M2

## 2019-02-14 DIAGNOSIS — J02.9 PHARYNGITIS, UNSPECIFIED ETIOLOGY: Primary | ICD-10-CM

## 2019-02-14 LAB
CTP QC/QA: YES
CTP QC/QA: YES
FLUAV AG NPH QL: NEGATIVE
FLUBV AG NPH QL: NEGATIVE
S PYO RRNA THROAT QL PROBE: NEGATIVE

## 2019-02-14 PROCEDURE — 99214 PR OFFICE/OUTPT VISIT, EST, LEVL IV, 30-39 MIN: ICD-10-PCS | Mod: S$GLB,,, | Performed by: FAMILY MEDICINE

## 2019-02-14 PROCEDURE — 99000 SPECIMEN HANDLING OFFICE-LAB: CPT | Mod: S$GLB,,, | Performed by: FAMILY MEDICINE

## 2019-02-14 PROCEDURE — 3008F PR BODY MASS INDEX (BMI) DOCUMENTED: ICD-10-PCS | Mod: CPTII,S$GLB,, | Performed by: FAMILY MEDICINE

## 2019-02-14 PROCEDURE — 87880 STREP A ASSAY W/OPTIC: CPT | Mod: QW,S$GLB,, | Performed by: FAMILY MEDICINE

## 2019-02-14 PROCEDURE — 87804 POCT INFLUENZA A/B: ICD-10-PCS | Mod: 59,QW,S$GLB, | Performed by: FAMILY MEDICINE

## 2019-02-14 PROCEDURE — 99000 PR SPECIMEN HANDLING,DR OFF->LAB: ICD-10-PCS | Mod: S$GLB,,, | Performed by: FAMILY MEDICINE

## 2019-02-14 PROCEDURE — 87880 POCT RAPID STREP A: ICD-10-PCS | Mod: QW,S$GLB,, | Performed by: FAMILY MEDICINE

## 2019-02-14 PROCEDURE — 87804 INFLUENZA ASSAY W/OPTIC: CPT | Mod: QW,S$GLB,, | Performed by: FAMILY MEDICINE

## 2019-02-14 PROCEDURE — 3008F BODY MASS INDEX DOCD: CPT | Mod: CPTII,S$GLB,, | Performed by: FAMILY MEDICINE

## 2019-02-14 PROCEDURE — 99214 OFFICE O/P EST MOD 30 MIN: CPT | Mod: S$GLB,,, | Performed by: FAMILY MEDICINE

## 2019-02-14 NOTE — PROGRESS NOTES
"Subjective:       Patient ID: Karen Purdy is a 33 y.o. female.    Vitals:  height is 5' 4" (1.626 m) and weight is 49.9 kg (110 lb). Her tympanic temperature is 97.6 °F (36.4 °C). Her blood pressure is 110/70 and her pulse is 80. Her respiration is 18 and oxygen saturation is 98%.     Chief Complaint: Sore Throat    Patient presents with c/o sore throat for 4 days. Notes pain when swallowing at night. Sore throat is paired with productive cough. Patient with body aches. Patient did get a flu shot this season. Advil is not helping. Pt wants flu and strep test, she is breastfeeding. Son recently with strep/      Sore Throat    This is a new problem. The current episode started in the past 7 days. The problem has been unchanged. Neither side of throat is experiencing more pain than the other. There has been no fever. Associated symptoms include congestion. Pertinent negatives include no coughing, ear pain, shortness of breath, stridor or vomiting. She has had no exposure to strep or mono. She has tried NSAIDs for the symptoms. The treatment provided no relief.       Constitution: Negative for chills, sweating, fatigue and fever.   HENT: Positive for congestion, sinus pain, sinus pressure, sore throat and voice change. Negative for ear pain.    Neck: Negative for painful lymph nodes.   Eyes: Negative for eye redness.   Respiratory: Positive for sputum production. Negative for chest tightness, cough, bloody sputum, COPD, shortness of breath, stridor, wheezing and asthma.    Gastrointestinal: Positive for nausea. Negative for vomiting.   Musculoskeletal: Positive for muscle ache.   Skin: Negative for rash.   Allergic/Immunologic: Negative for seasonal allergies and asthma.   Hematologic/Lymphatic: Negative for swollen lymph nodes.       Objective:      Physical Exam   Constitutional: She is oriented to person, place, and time. She appears well-developed and well-nourished. She is cooperative.  Non-toxic " appearance. She does not appear ill. No distress.   HENT:   Head: Normocephalic and atraumatic.   Right Ear: Hearing, tympanic membrane, external ear and ear canal normal.   Left Ear: Hearing, tympanic membrane, external ear and ear canal normal.   Nose: Mucosal edema present. No rhinorrhea or nasal deformity. No epistaxis. Right sinus exhibits no maxillary sinus tenderness and no frontal sinus tenderness. Left sinus exhibits no maxillary sinus tenderness and no frontal sinus tenderness.   Mouth/Throat: Uvula is midline, oropharynx is clear and moist and mucous membranes are normal. No trismus in the jaw. Normal dentition. No uvula swelling. No posterior oropharyngeal erythema. Tonsils are 2+ on the right. Tonsils are 2+ on the left. Tonsillar exudate.   Eyes: Conjunctivae and lids are normal. No scleral icterus.   Sclera clear bilat   Neck: Trachea normal, full passive range of motion without pain and phonation normal. Neck supple.   Cardiovascular: Normal rate, regular rhythm, normal heart sounds, intact distal pulses and normal pulses.   Pulmonary/Chest: Effort normal and breath sounds normal. No respiratory distress. She has no decreased breath sounds. She has no wheezes. She has no rhonchi. She has no rales.   Abdominal: Soft. Normal appearance and bowel sounds are normal. She exhibits no distension. There is no tenderness.   Musculoskeletal: Normal range of motion. She exhibits no edema or deformity.   Lymphadenopathy:        Head (right side): No submandibular adenopathy present.     She has cervical adenopathy.   Neurological: She is alert and oriented to person, place, and time. She exhibits normal muscle tone. Coordination normal.   Skin: Skin is warm, dry and intact. She is not diaphoretic. No pallor.   Psychiatric: She has a normal mood and affect. Her speech is normal and behavior is normal. Judgment and thought content normal. Cognition and memory are normal.   Nursing note and vitals reviewed.       Results for orders placed or performed in visit on 02/14/19   POCT Influenza A/B   Result Value Ref Range    Rapid Influenza A Ag Negative Negative    Rapid Influenza B Ag Negative Negative     Acceptable Yes    POCT rapid strep A   Result Value Ref Range    Rapid Strep A Screen Negative Negative     Acceptable Yes        Assessment:       1. Pharyngitis, unspecified etiology        Plan:         Pharyngitis, unspecified etiology  -     POCT Influenza A/B  -     POCT rapid strep A  -     Strep A culture, throat          Patient Instructions   PLEASE READ YOUR DISCHARGE INSTRUCTIONS ENTIRELY AS IT CONTAINS IMPORTANT INFORMATION.      Sore throat recommendations: Warm fluids, warm salt water gargles, throat lozenges, tea, honey, soup, rest, hydration.    Tylenol and ibuprofen    A culture of your throat was sent. You will be contacted once it results in about 3 days and appropriate action will be taken.     Please return or see your primary care doctor if you develop new or worsening symptoms.     Please arrange follow up with your primary medical clinic as soon as possible. You must understand that you've received an Urgent Care treatment only and that you may be released before all of your medical problems are known or treated. You, the patient, will arrange for follow up as instructed. If your symptoms worsen or fail to improve you should go to the Emergency Room.    Pharyngitis (Sore Throat), Report Pending    Pharyngitis (sore throat) is often due to a virus. It can also be caused by the streptococcus, or strep, bacterium, often called strep throat. Both viral and strep infections can cause throat pain that is worse when swallowing, aching all over with headache, and fever. Both types of infections are contagious. They may be spread by coughing, kissing, or touching others after touching your mouth or nose.  A test has been done to find out whether you (or your child, if your child  is the patient) have strep throat. Call this facility or your healthcare provider if you were not given your test results. If the test is positive for strep infection, you will need to take antibiotic medicines. A prescription can be called into your pharmacy at that time. If the test is negative, you probably have a viral pharyngitis. This does not need to be treated with antibiotics. Until you receive the results of the strep test, you should stay home from work. If your child is being tested, he or she should stay home from school.  Home care  · Rest at home. Drink plenty of fluids so you won't get dehydrated.  · If the test is positive for strep, don't go to work or school for the first 2 days of taking the antibiotics. After this time, you will not be contagious. You can then return to work or school if you are feeling better.   · Take the antibiotic medicine for the full 10 days, even if you feel better. This is very important to make sure the infection is treated. It is also important to prevent drug-resistant germs from developing. If you were given an antibiotic shot, you won't need more antibiotics.  · For children: Use acetaminophen for fever, fussiness, or discomfort. In infants older than 6 months of age, you may use ibuprofen instead of acetaminophen. Talk with your child's healthcare provider before giving these medicines if your child has chronic liver or kidney disease or ever had a stomach ulcer or GI bleeding. Never give aspirin to a child under 18 years of age who is ill with a fever. It may cause severe liver damage.  · For adults: Use acetaminophen or ibuprofen to control pain or fever, unless another medicine was prescribed for this. Talk with your healthcare provider before taking these medicines if you have chronic liver or kidney disease or ever had a stomach ulcer or GI bleeding.  · Use throat lozenges or numbing throat sprays to help reduce pain. Gargling with warm salt water will also  help reduce throat pain. For this, dissolve 1/2 teaspoon of salt in 1 glass of warm water. To help soothe a sore throat, children can sip on juice or a popsicle. Children 5 years and older can also suck on a lollipop or hard candy.  · Don't eat salty or spicy foods. These can irritate the throat.  Follow-up care  Follow up with your healthcare provider or our staff if you don't get better over the next week.  When to seek medical advice  Call your healthcare provider right away if any of these occur:  · Fever as directed by your healthcare provider. For children, seek care if:  ¨ Your child is of any age and has repeated fevers above 104°F (40°C).  ¨ Your child is younger than 2 years of age and has a fever of 100.4°F (38°C) that continues for more than 1 day.  ¨ Your child is 2 years old or older and has a fever of 100.4°F (38°C) that continues for more than 3 days.  · New or worsening ear pain, sinus pain, or headache  · Painful lumps in the back of neck  · Stiff neck  · Lymph nodes are getting larger  · Inability to swallow liquids, excessive drooling, or inability to open mouth wide due to throat pain  · Signs of dehydration (very dark urine or no urine, sunken eyes, dizziness)  · Trouble breathing or noisy breathing  · Muffled voice  · New rash  · Child appears to be getting sicker  Date Last Reviewed: 4/13/2015  © 9888-7571 [employer]. [address] [place] [address], [place] [address]. All rights reserved. This information is not intended as a substitute for professional medical care. Always follow your healthcare professional's instructions.

## 2019-02-14 NOTE — PATIENT INSTRUCTIONS
PLEASE READ YOUR DISCHARGE INSTRUCTIONS ENTIRELY AS IT CONTAINS IMPORTANT INFORMATION.      Sore throat recommendations: Warm fluids, warm salt water gargles, throat lozenges, tea, honey, soup, rest, hydration.    Tylenol and ibuprofen    A culture of your throat was sent. You will be contacted once it results in about 3 days and appropriate action will be taken.     Please return or see your primary care doctor if you develop new or worsening symptoms.     Please arrange follow up with your primary medical clinic as soon as possible. You must understand that you've received an Urgent Care treatment only and that you may be released before all of your medical problems are known or treated. You, the patient, will arrange for follow up as instructed. If your symptoms worsen or fail to improve you should go to the Emergency Room.    Pharyngitis (Sore Throat), Report Pending    Pharyngitis (sore throat) is often due to a virus. It can also be caused by the streptococcus, or strep, bacterium, often called strep throat. Both viral and strep infections can cause throat pain that is worse when swallowing, aching all over with headache, and fever. Both types of infections are contagious. They may be spread by coughing, kissing, or touching others after touching your mouth or nose.  A test has been done to find out whether you (or your child, if your child is the patient) have strep throat. Call this facility or your healthcare provider if you were not given your test results. If the test is positive for strep infection, you will need to take antibiotic medicines. A prescription can be called into your pharmacy at that time. If the test is negative, you probably have a viral pharyngitis. This does not need to be treated with antibiotics. Until you receive the results of the strep test, you should stay home from work. If your child is being tested, he or she should stay home from school.  Home care  · Rest at home. Drink  plenty of fluids so you won't get dehydrated.  · If the test is positive for strep, don't go to work or school for the first 2 days of taking the antibiotics. After this time, you will not be contagious. You can then return to work or school if you are feeling better.   · Take the antibiotic medicine for the full 10 days, even if you feel better. This is very important to make sure the infection is treated. It is also important to prevent drug-resistant germs from developing. If you were given an antibiotic shot, you won't need more antibiotics.  · For children: Use acetaminophen for fever, fussiness, or discomfort. In infants older than 6 months of age, you may use ibuprofen instead of acetaminophen. Talk with your child's healthcare provider before giving these medicines if your child has chronic liver or kidney disease or ever had a stomach ulcer or GI bleeding. Never give aspirin to a child under 18 years of age who is ill with a fever. It may cause severe liver damage.  · For adults: Use acetaminophen or ibuprofen to control pain or fever, unless another medicine was prescribed for this. Talk with your healthcare provider before taking these medicines if you have chronic liver or kidney disease or ever had a stomach ulcer or GI bleeding.  · Use throat lozenges or numbing throat sprays to help reduce pain. Gargling with warm salt water will also help reduce throat pain. For this, dissolve 1/2 teaspoon of salt in 1 glass of warm water. To help soothe a sore throat, children can sip on juice or a popsicle. Children 5 years and older can also suck on a lollipop or hard candy.  · Don't eat salty or spicy foods. These can irritate the throat.  Follow-up care  Follow up with your healthcare provider or our staff if you don't get better over the next week.  When to seek medical advice  Call your healthcare provider right away if any of these occur:  · Fever as directed by your healthcare provider. For children, seek  care if:  ¨ Your child is of any age and has repeated fevers above 104°F (40°C).  ¨ Your child is younger than 2 years of age and has a fever of 100.4°F (38°C) that continues for more than 1 day.  ¨ Your child is 2 years old or older and has a fever of 100.4°F (38°C) that continues for more than 3 days.  · New or worsening ear pain, sinus pain, or headache  · Painful lumps in the back of neck  · Stiff neck  · Lymph nodes are getting larger  · Inability to swallow liquids, excessive drooling, or inability to open mouth wide due to throat pain  · Signs of dehydration (very dark urine or no urine, sunken eyes, dizziness)  · Trouble breathing or noisy breathing  · Muffled voice  · New rash  · Child appears to be getting sicker  Date Last Reviewed: 4/13/2015  © 6870-1380 [employer]. [address] [place] [address], [place] [address]. All rights reserved. This information is not intended as a substitute for professional medical care. Always follow your healthcare professional's instructions.

## 2019-02-17 LAB — S PYO THROAT QL CULT: NEGATIVE

## 2019-02-19 ENCOUNTER — TELEPHONE (OUTPATIENT)
Dept: URGENT CARE | Facility: CLINIC | Age: 34
End: 2019-02-19

## 2019-02-19 NOTE — TELEPHONE ENCOUNTER
----- Message from Derek Buckner NP sent at 2/19/2019  9:16 AM CST -----  Okay to call pt with result: strep culture negative, follow up with pcp if still having symptoms

## 2019-02-22 NOTE — TELEPHONE ENCOUNTER
Negative strep throat cx results given to patient. No questions at time of call. [facility]

## 2019-03-10 ENCOUNTER — PATIENT MESSAGE (OUTPATIENT)
Dept: INTERNAL MEDICINE | Facility: CLINIC | Age: 34
End: 2019-03-10

## 2019-03-11 ENCOUNTER — PATIENT MESSAGE (OUTPATIENT)
Dept: INTERNAL MEDICINE | Facility: CLINIC | Age: 34
End: 2019-03-11

## 2019-03-11 RX ORDER — AZITHROMYCIN 250 MG/1
TABLET, FILM COATED ORAL
Qty: 6 TABLET | Refills: 0 | Status: SHIPPED | OUTPATIENT
Start: 2019-03-11 | End: 2019-10-11

## 2019-04-10 ENCOUNTER — OFFICE VISIT (OUTPATIENT)
Dept: SPINE | Facility: CLINIC | Age: 34
End: 2019-04-10
Payer: COMMERCIAL

## 2019-04-10 VITALS
DIASTOLIC BLOOD PRESSURE: 68 MMHG | HEIGHT: 64 IN | TEMPERATURE: 98 F | RESPIRATION RATE: 18 BRPM | WEIGHT: 112.44 LBS | BODY MASS INDEX: 19.2 KG/M2 | HEART RATE: 59 BPM | SYSTOLIC BLOOD PRESSURE: 100 MMHG

## 2019-04-10 DIAGNOSIS — M54.2 NECK PAIN: Primary | ICD-10-CM

## 2019-04-10 DIAGNOSIS — M79.18 MYOFASCIAL PAIN: ICD-10-CM

## 2019-04-10 PROCEDURE — 20553 PR INJECT TRIGGER POINTS, > 3: ICD-10-PCS | Mod: S$GLB,,, | Performed by: ANESTHESIOLOGY

## 2019-04-10 PROCEDURE — 99999 PR PBB SHADOW E&M-EST. PATIENT-LVL IV: CPT | Mod: PBBFAC,,, | Performed by: ANESTHESIOLOGY

## 2019-04-10 PROCEDURE — 99999 PR PBB SHADOW E&M-EST. PATIENT-LVL IV: ICD-10-PCS | Mod: PBBFAC,,, | Performed by: ANESTHESIOLOGY

## 2019-04-10 PROCEDURE — 99203 PR OFFICE/OUTPT VISIT, NEW, LEVL III, 30-44 MIN: ICD-10-PCS | Mod: 25,S$GLB,, | Performed by: ANESTHESIOLOGY

## 2019-04-10 PROCEDURE — 99203 OFFICE O/P NEW LOW 30 MIN: CPT | Mod: 25,S$GLB,, | Performed by: ANESTHESIOLOGY

## 2019-04-10 PROCEDURE — 3008F PR BODY MASS INDEX (BMI) DOCUMENTED: ICD-10-PCS | Mod: CPTII,S$GLB,, | Performed by: ANESTHESIOLOGY

## 2019-04-10 PROCEDURE — 3008F BODY MASS INDEX DOCD: CPT | Mod: CPTII,S$GLB,, | Performed by: ANESTHESIOLOGY

## 2019-04-10 PROCEDURE — 20553 NJX 1/MLT TRIGGER POINTS 3/>: CPT | Mod: S$GLB,,, | Performed by: ANESTHESIOLOGY

## 2019-04-10 RX ORDER — DICLOFENAC SODIUM 10 MG/G
2 GEL TOPICAL 4 TIMES DAILY
Qty: 1 TUBE | Refills: 2 | Status: SHIPPED | OUTPATIENT
Start: 2019-04-10 | End: 2019-10-11

## 2019-04-10 NOTE — PROGRESS NOTES
Chronic Pain - New Consult    Referring Physician: No ref. provider found    Chief Complaint:   Chief Complaint   Patient presents with    Neck Pain     radiates to right shoulder         SUBJECTIVE:    Karen Purdy presents to the clinic for the evaluation of neck pain. The pain started 2 months ago following unknown and symptoms have been worsening.The pain is located in the neck area and radiates to the right shoulder.  The pain is described as aching, shooting, stabbing, throbbing and tight band and is rated as 9/10. The pain is rated with a score of  2/10 on the BEST day and a score of 9/10 on the WORST day.  Symptoms interfere with daily activity and sleeping. The pain is exacerbated by Sitting, Laying, Bending, Touching, Night Time, Morning and Lifting.  The pain is mitigated by heat and medications. She reports spending 0 hours per day reclining. The patient reports 3-4 hours of uninterrupted sleep per night.    Pt reports muscle tightness around her R shoulder. She is carrying her infant carrier on that arm and has been feeling the tightness worsening. Describes a sharp pain w/o radiation alleviated by stretching.     Patient denies night fever/night sweats, urinary incontinence, bowel incontinence, significant weight loss, significant motor weakness and loss of sensations.    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     Unspecified vitamin D deficiency      Past Surgical History:    Procedure Laterality Date    KNEE SURGERY Right     torn meniscus     Social History     Socioeconomic History    Marital status:      Spouse name: Not on file    Number of children: Not on file    Years of education: Not on file    Highest education level: Not on file   Occupational History    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     Employer: [employer]   Social Needs    Financial resource strain: Not on file    Food insecurity:     Worry: Not on file     Inability: Not on file    Transportation needs:     Medical: Not on file     Non-medical: Not on file   Tobacco Use    Smoking status: Former Smoker    Smokeless tobacco: Never Used   Substance and Sexual Activity    Alcohol use: No     Comment: socially    Drug use: No    Sexual activity: Yes     Partners: Male     Birth control/protection: None   Lifestyle    Physical activity:     Days per week: Not on file     Minutes per session: Not on file    Stress: Not on file   Relationships    Social connections:     Talks on phone: Not on file     Gets together: Not on file     Attends Nondenominational service: Not on file     Active member of club or organization: Not on file     Attends meetings of clubs or organizations: Not on file     Relationship status: Not on file   Other Topics Concern    Not on file   Social History Narrative    Not on file     Family History   Problem Relation Age of Onset    Cancer Maternal Grandfather         lung    Dementia Paternal Grandmother     Hyperlipidemia Mother     Hypertension Mother     Hypertension Father     Hyperlipidemia Father     Breast cancer Neg Hx     Colon cancer Neg Hx     Ovarian cancer Neg Hx        Review of patient's allergies indicates:   Allergen Reactions    Ceclor [cefaclor] Anaphylaxis, Swelling and Rash    Pcn [penicillins] Anaphylaxis, Swelling and Rash       Current Outpatient Medications   Medication Sig    PRENATAL VIT CALC,IRON,FOLIC (PRENATAL VITAMIN ORAL) Take by  "mouth.    azithromycin (ZITHROMAX Z-ARLETH) 250 MG tablet Two po once then one po daily    diclofenac sodium (VOLTAREN) 1 % Gel Apply 2 g topically 4 (four) times daily.     No current facility-administered medications for this visit.        REVIEW OF SYSTEMS:    GENERAL:  No weight loss, malaise or fevers. Breastfeeding mother.  HEENT:  Negative for frequent or significant headaches.  NECK:  Negative for lumps, goiter, pain and significant neck swelling.  RESPIRATORY:  Negative for cough, wheezing or shortness of breath.  CARDIOVASCULAR:  Negative for chest pain, leg swelling or palpitations.  GI:  Negative for abdominal discomfort, blood in stools or black stools or change in bowel habits.  MUSCULOSKELETAL:  See HPI.  SKIN:  Negative for lesions, rash, and itching.  PSYCH:  Negative for sleep disturbance, mood disorder and recent psychosocial stressors.  HEMATOLOGY/LYMPHOLOGY:  Negative for prolonged bleeding, bruising easily or swollen nodes.  NEURO:   No history of headaches, syncope, paralysis, seizures or tremors.  All other reviewed and negative other than HPI.    OBJECTIVE:    /68   Pulse (!) 59   Temp 98.1 °F (36.7 °C) (Oral)   Resp 18   Ht 5' 4" (1.626 m)   Wt 51 kg (112 lb 7 oz)   BMI 19.30 kg/m²     PHYSICAL EXAMINATION:    General appearance: Well appearing, in no acute distress, alert and oriented x3.  Psych:  Mood and affect appropriate.  Skin: Skin color, texture, turgor normal, no rashes or lesions, in both upper and lower body.  Head/face:  Normocephalic, atraumatic. No palpable lymph nodes.  Neck: No pain to palpation over the cervical paraspinous muscles. Spurling Negative. No pain with neck flexion, extension, or lateral flexion.   Cor: RRR  Pulm: CTA  GI:  Soft and non-tender.  Back: Straight leg raising in the sitting and supine positions is negative to radicular pain. No pain to palpation over the spine or costovertebral angles. Normal range of motion without pain " reproduction.  Extremities: Peripheral joint ROM is full and pain free without obvious instability or laxity in all four extremities. No deformities, edema, or skin discoloration. Good capillary refill.  Musculoskeletal: Shoulder, hip, sacroiliac and knee provocative maneuvers are negative. Bilateral upper and lower extremity strength is normal and symmetric.  No atrophy or tone abnormalities are noted. Muscle tightness appreciated in her R suprascapular and trapezius.   Neuro: Bilateral upper and lower extremity coordination and muscle stretch reflexes are physiologic and symmetric.  Plantar response are downgoing. No loss of sensation is noted.  Gait: normal.    ASSESSMENT: 33 y.o. year old female with R shoulder pain, consistent with myofascial tightness.     1. Neck pain  Ambulatory consult to Physical Therapy   2. Myofascial pain  Ambulatory consult to Physical Therapy         PLAN:     - I have stressed the importance of physical activity and a home exercise plan to help with pain and improve health.  - Referral to Physical therapy for shoulder and hip stabilization, core strengthening, and a home exercise program.  - Patient can continue with medications for now since they are providing benefits, using them appropriately, and without side effects.  - Trigger point injection to R and L shoulder.   - Ordered Voltaren gel 1% to apply over the painful area as needed.  - RTC 4 weeks if needed  - Counseled patient regarding the importance of activity modification and physical therapy.    The above plan and management options were discussed at length with patient. Patient is in agreement with the above and verbalized understanding. It will be communicated with the referring physician via electronic record, fax, or mail.      RAY Patel-3  04/11/2019         I have reviewed and concur with the resident's history, physical, assessment, and plan.  I have personally interviewed and examined the patient at  bedside.  See below addendum for my evaluation and additional findings.  33-year-old female went right shoulder pain consistent med myofascial pain. On exam there is tenderness to palpation or diarrhea right trapezoid and thoracic paraspinal muscles.  We will refer her to physical therapy, start Voltaren gel 1% apply over the painful area as needed and performed trigger point injection today.      Issa Chong MD            Patient Name: Karen Purdy  MRN: [id number]    INFORMED CONSENT: The procedure, risks, benefits and options were discussed with patient. There are no contraindications to the procedure. The patient expressed understanding and agreed to proceed. The personnel performing the procedure was discussed. I verify that I personally obtained Karen's consent prior to the start of the procedure and the signed consent can be found on the patient's chart.    Procedure Date: 04/11/2019    Anesthesia: Topical    Pre Procedure diagnosis:   1. Neck pain    2. Myofascial pain        Post-Procedure diagnosis: same      Sedation: None    PROCEDURE: TRIGGER POINT INJECTION  The patient was placed in a seated position. The site of pain and procedure were confirmed with the patient prior to starting the procedure. After performing time out. The patient's  trigger points were identified and marked at right trapezoid and thoracic paraspinal muscles and left thoracic paraspinal muscle. The skin was prepped with chlorhexidine three times.   After performing time out A 27-gauge 1.5 inch  needle was advanced through the skin and subcutaneous tissues.  Aspiration for blood, air and CSF was negative.  A total of 5 ml of Bupivacaine 0.25% and 40 mg Kenalog  was injected at all trigger point.  No complications were evident. No specimens collected.    Blood Loss: Nill  Specimen: None    Issa Chong MD

## 2019-05-07 ENCOUNTER — TELEPHONE (OUTPATIENT)
Dept: PAIN MEDICINE | Facility: CLINIC | Age: 34
End: 2019-05-07

## 2019-05-07 NOTE — TELEPHONE ENCOUNTER
"Staff contacted the patient to confirm her 5/8/19 3:30 pm  appointment with Dr. Chong in back and spine and review IPM. Patient can contact our office at [phone] to reschedule or cancel if needed.    Patient states," Can I cancel because my  went out of town and I so not have anyone to keep my children."    Staff asked if the patient would like to reschedule now.     Patient states," No, I will give you all a call back to schedule.     Staff verbalized understanding and informed the patient that her appointment is now cancelled.     Patient verbalized understanding and expressed thanks.    "

## 2019-05-20 ENCOUNTER — OFFICE VISIT (OUTPATIENT)
Dept: OBSTETRICS AND GYNECOLOGY | Facility: CLINIC | Age: 34
End: 2019-05-20
Attending: OBSTETRICS & GYNECOLOGY
Payer: COMMERCIAL

## 2019-05-20 VITALS
BODY MASS INDEX: 18.93 KG/M2 | WEIGHT: 110.88 LBS | SYSTOLIC BLOOD PRESSURE: 94 MMHG | HEIGHT: 64 IN | DIASTOLIC BLOOD PRESSURE: 70 MMHG

## 2019-05-20 DIAGNOSIS — N92.6 IRREGULAR MENSES: Primary | ICD-10-CM

## 2019-05-20 PROCEDURE — 99395 PREV VISIT EST AGE 18-39: CPT | Mod: S$GLB,,, | Performed by: OBSTETRICS & GYNECOLOGY

## 2019-05-20 PROCEDURE — 99999 PR PBB SHADOW E&M-EST. PATIENT-LVL II: CPT | Mod: PBBFAC,,, | Performed by: OBSTETRICS & GYNECOLOGY

## 2019-05-20 PROCEDURE — 99395 PR PREVENTIVE VISIT,EST,18-39: ICD-10-PCS | Mod: S$GLB,,, | Performed by: OBSTETRICS & GYNECOLOGY

## 2019-05-20 PROCEDURE — 99999 PR PBB SHADOW E&M-EST. PATIENT-LVL II: ICD-10-PCS | Mod: PBBFAC,,, | Performed by: OBSTETRICS & GYNECOLOGY

## 2019-05-20 NOTE — PROGRESS NOTES
CC: Well woman exam    Karen Purdy is a 33 y.o. female  presents for well woman exam.  LMP: Patient's last menstrual period was 2019..  Reports that she has been having intermenstrual spotting x 5 weeks.  Patient is currently breastfeeding, still nursing regularly throughout the day.  Over the past 5 weeks has started having more spotting and started having more bleeding yesterday.        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     Employer: [employer]   Social Needs    Financial resource strain: Not on file    Food insecurity:     Worry: Not on file     Inability: Not on file    Transportation needs:     Medical: Not on file     Non-medical: Not on file   Tobacco Use    Smoking status: Former Smoker    Smokeless tobacco: Never Used   Substance and Sexual Activity    Alcohol use: Yes     Comment: occ    Drug use: No    Sexual activity: Yes     Partners: Male     Birth control/protection: None   Lifestyle    Physical activity:     Days per week: Not on file     Minutes per session: Not on file    Stress: Not on file   Relationships    Social connections:     Talks on phone: Not on file     Gets together: Not on file     Attends Congregational service: Not on file     Active member of club or organization: Not on file     Attends meetings of clubs or organizations: Not on file     Relationship status: Not on  "file   Other Topics Concern    Not on file   Social History Narrative    Not on file     Family History   Problem Relation Age of Onset    Cancer Maternal Grandfather         lung    Dementia Paternal Grandmother     Hyperlipidemia Mother     Hypertension Mother     Hypertension Father     Hyperlipidemia Father     Breast cancer Neg Hx     Colon cancer Neg Hx     Ovarian cancer Neg Hx      OB History        3    Para   2    Term   2       0    AB   1    Living   2       SAB   0    TAB   1    Ectopic   0    Multiple   0    Live Births   2                 BP 94/70   Ht 5' 4" (1.626 m)   Wt 50.3 kg (110 lb 14.3 oz)   LMP 2019   Breastfeeding? Yes   BMI 19.03 kg/m²       ROS:  GENERAL: Denies weight gain or weight loss. Feeling well overall.   SKIN: Denies rash or lesions.   HEAD: Denies head injury or headache.   NODES: Denies enlarged lymph nodes.   CHEST: Denies chest pain or shortness of breath.   CARDIOVASCULAR: Denies palpitations or left sided chest pain.   ABDOMEN: No abdominal pain, constipation, diarrhea, nausea, vomiting or rectal bleeding.   URINARY: No frequency, dysuria, hematuria, or burning on urination.  REPRODUCTIVE: See HPI.   BREASTS: The patient performs breast self-examination and denies pain, lumps, or nipple discharge.   HEMATOLOGIC: No easy bruisability or excessive bleeding.   MUSCULOSKELETAL: Denies joint pain or swelling.   NEUROLOGIC: Denies syncope or weakness.   PSYCHIATRIC: Denies depression, anxiety or mood swings.    PHYSICAL EXAM:  APPEARANCE: Well nourished, well developed, in no acute distress.  AFFECT: WNL, alert and oriented x 3  SKIN: No acne or hirsutism  NECK: Neck symmetric without masses or thyromegaly  NODES: No inguinal, cervical, axillary, or femoral lymph node enlargement  CHEST: Good respiratory effect  ABDOMEN: Soft.  No tenderness or masses.  No hepatosplenomegaly.  No hernias.  BREASTS: Symmetrical, no skin changes or visible " lesions.  No palpable masses, nipple discharge bilaterally.  PELVIC: Normal external genitalia without lesions.  Normal hair distribution.  Adequate perineal body, normal urethral meatus.  Vagina moist and well rugated without lesions or discharge.  Cervix pink, without lesions, discharge or tenderness.  No significant cystocele or rectocele.  Bimanual exam shows uterus to be normal size, regular, mobile and nontender.  Adnexa without masses or tenderness.    EXTREMITIES: No edema.    Well woman exam with routine gynecological exam  -     Liquid-based pap smear, screening  -     HPV High Risk Genotypes, PCR            Patient was counseled today on A.C.S. Pap guidelines and recommendations for yearly pelvic exams, mammograms and monthly self breast exams; to see her PCP for other health maintenance.     No follow-ups on file.

## 2019-09-06 ENCOUNTER — PATIENT MESSAGE (OUTPATIENT)
Dept: INTERNAL MEDICINE | Facility: CLINIC | Age: 34
End: 2019-09-06

## 2019-10-11 ENCOUNTER — OFFICE VISIT (OUTPATIENT)
Dept: INTERNAL MEDICINE | Facility: CLINIC | Age: 34
End: 2019-10-11
Payer: COMMERCIAL

## 2019-10-11 ENCOUNTER — LAB VISIT (OUTPATIENT)
Dept: LAB | Facility: OTHER | Age: 34
End: 2019-10-11
Attending: INTERNAL MEDICINE
Payer: COMMERCIAL

## 2019-10-11 VITALS
HEIGHT: 64 IN | HEART RATE: 75 BPM | TEMPERATURE: 98 F | DIASTOLIC BLOOD PRESSURE: 74 MMHG | BODY MASS INDEX: 19.96 KG/M2 | SYSTOLIC BLOOD PRESSURE: 96 MMHG | WEIGHT: 116.88 LBS | OXYGEN SATURATION: 98 %

## 2019-10-11 DIAGNOSIS — J00 ACUTE NASOPHARYNGITIS: ICD-10-CM

## 2019-10-11 DIAGNOSIS — J00 ACUTE NASOPHARYNGITIS: Primary | ICD-10-CM

## 2019-10-11 DIAGNOSIS — D80.9 IMMUNODEFICIENCY WITH PREDOMINANTLY ANTIBODY DEFECTS: ICD-10-CM

## 2019-10-11 LAB
BASOPHILS # BLD AUTO: 0.06 K/UL (ref 0–0.2)
BASOPHILS NFR BLD: 0.5 % (ref 0–1.9)
CTP QC/QA: YES
DIFFERENTIAL METHOD: ABNORMAL
EOSINOPHIL # BLD AUTO: 0.2 K/UL (ref 0–0.5)
EOSINOPHIL NFR BLD: 1.5 % (ref 0–8)
ERYTHROCYTE [DISTWIDTH] IN BLOOD BY AUTOMATED COUNT: 12.4 % (ref 11.5–14.5)
HCT VFR BLD AUTO: 42.1 % (ref 37–48.5)
HETEROPH AB SERPL QL IA: NEGATIVE
HGB BLD-MCNC: 13.6 G/DL (ref 12–16)
IMM GRANULOCYTES # BLD AUTO: 0.04 K/UL (ref 0–0.04)
IMM GRANULOCYTES NFR BLD AUTO: 0.4 % (ref 0–0.5)
LYMPHOCYTES # BLD AUTO: 2.1 K/UL (ref 1–4.8)
LYMPHOCYTES NFR BLD: 18.5 % (ref 18–48)
MCH RBC QN AUTO: 27.8 PG (ref 27–31)
MCHC RBC AUTO-ENTMCNC: 32.3 G/DL (ref 32–36)
MCV RBC AUTO: 86 FL (ref 82–98)
MONOCYTES # BLD AUTO: 0.8 K/UL (ref 0.3–1)
MONOCYTES NFR BLD: 6.7 % (ref 4–15)
NEUTROPHILS # BLD AUTO: 8.1 K/UL (ref 1.8–7.7)
NEUTROPHILS NFR BLD: 72.4 % (ref 38–73)
NRBC BLD-RTO: 0 /100 WBC
PLATELET # BLD AUTO: 323 K/UL (ref 150–350)
PMV BLD AUTO: 10.8 FL (ref 9.2–12.9)
RBC # BLD AUTO: 4.89 M/UL (ref 4–5.4)
S PYO RRNA THROAT QL PROBE: NEGATIVE
WBC # BLD AUTO: 11.24 K/UL (ref 3.9–12.7)

## 2019-10-11 PROCEDURE — 87880 POCT RAPID STREP A: ICD-10-PCS | Mod: QW,S$GLB,, | Performed by: INTERNAL MEDICINE

## 2019-10-11 PROCEDURE — 3008F BODY MASS INDEX DOCD: CPT | Mod: CPTII,S$GLB,, | Performed by: INTERNAL MEDICINE

## 2019-10-11 PROCEDURE — 99215 OFFICE O/P EST HI 40 MIN: CPT | Mod: S$GLB,,, | Performed by: INTERNAL MEDICINE

## 2019-10-11 PROCEDURE — 3008F PR BODY MASS INDEX (BMI) DOCUMENTED: ICD-10-PCS | Mod: CPTII,S$GLB,, | Performed by: INTERNAL MEDICINE

## 2019-10-11 PROCEDURE — 99999 PR PBB SHADOW E&M-EST. PATIENT-LVL III: CPT | Mod: PBBFAC,,, | Performed by: INTERNAL MEDICINE

## 2019-10-11 PROCEDURE — 86308 HETEROPHILE ANTIBODY SCREEN: CPT

## 2019-10-11 PROCEDURE — 87081 CULTURE SCREEN ONLY: CPT

## 2019-10-11 PROCEDURE — 87880 STREP A ASSAY W/OPTIC: CPT | Mod: QW,S$GLB,, | Performed by: INTERNAL MEDICINE

## 2019-10-11 PROCEDURE — 99999 PR PBB SHADOW E&M-EST. PATIENT-LVL III: ICD-10-PCS | Mod: PBBFAC,,, | Performed by: INTERNAL MEDICINE

## 2019-10-11 PROCEDURE — 99215 PR OFFICE/OUTPT VISIT, EST, LEVL V, 40-54 MIN: ICD-10-PCS | Mod: S$GLB,,, | Performed by: INTERNAL MEDICINE

## 2019-10-11 PROCEDURE — 36415 COLL VENOUS BLD VENIPUNCTURE: CPT

## 2019-10-11 PROCEDURE — 85025 COMPLETE CBC W/AUTO DIFF WBC: CPT

## 2019-10-11 NOTE — PATIENT INSTRUCTIONS
Sandra Cruz,     We are always striving for excellence. Should you receive a patient experience survey electronically or by mail, we would appreciate if you would take a few moments to give us your feedback. These surveys let us know our strengths as well as areas of opportunity for improvement to better serve you.    Thank you for your time,  Ioana Banda MA    I recommend plenty of rest and hydration.  Tylenol and NSAIDs, such as ibuprofen, Motrin, naproxen can be helpful for sore throat, headache, ear pain, muscle and joint pain, sneezing, fatigue.  Chloroseptic spray for sore throat.  Over-the-counter antihistamines (Allegra, Claritin, Zyrtec) for runny nose and decongestants (Sudafed) can also be helpful.  Nasal saline once to twice a day.  Hand washing can help prevent the spread of respiratory illnesses, especially from younger children.

## 2019-10-11 NOTE — PROGRESS NOTES
Subjective:       Patient ID: Karen Purdy is a 33 y.o. 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    Chief Complaint: Adenopathy (swollen neck. noticed swelling on the back of neck about 4 days ago and yesterday anterior neck swell. sob at night)     History was obtained from the patient and supplemented through chart review  She is a patient of my colleague, Dr. Garcia.  Was last seen  for annual exam.    HPI    LAD:  4 days ago with b/l posterior neck LAD that resolved the next day, but then developed b/l upper cervical LAD at the anterior aspect of her neck which has improved. + hoarseness 2 nights ago.  Painful to swallow.  Woke up at night with SOB; felt uncomfortable while dreaming.  No wheezing or history of asthma. +Postnasal drip at night.  No rhinorrhea, congestion.  Slight NP cough before going to bed last night.  No body aches.  Temp 99.5 4 days ago.  B/l ears with itchy/irritation, no drainage.  Kids are sick; had croup.  No recent travel.     Immunodeficiency:    History of recurrent bacterial infections, strep, EBV.  Doesn't mount adequate response to vaccines.  Seen by immunology at [facility] and offered IVIG, but she declined.    Review of Systems   Constitutional: Negative for fever and unexpected weight change.   HENT: Positive for postnasal drip and sore throat. Negative for congestion and rhinorrhea.    Eyes: Negative for redness and itching.   Respiratory: Positive for cough and shortness of breath. Negative for wheezing.    Cardiovascular: Negative for chest pain and palpitations.   Gastrointestinal: Negative for abdominal pain and diarrhea.   Genitourinary: Negative for dysuria and hematuria.   Musculoskeletal: Negative for gait problem and joint swelling.   Skin: Negative for color change and rash.    Neurological: Negative for dizziness and light-headedness.   Hematological: Negative for adenopathy.   Psychiatric/Behavioral: Negative for confusion. The patient is not nervous/anxious.          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     Unspecified vitamin D deficiency      Past Surgical History:   Procedure Laterality Date    KNEE SURGERY Right     torn meniscus     Family History   Problem Relation Age of Onset    Cancer Maternal Grandfather         lung    Dementia Paternal Grandmother     Hyperlipidemia Mother     Hypertension Mother     Hypertension Father     Hyperlipidemia Father     Breast cancer Neg Hx     Colon cancer Neg Hx     Ovarian cancer Neg Hx      Social History     Socioeconomic History    Marital status:      Spouse name: Not on file    Number of children: Not on file    Years of education: Not on file    Highest education level: Not on file   Occupational History    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     Employer: [employer]   Social Needs    Financial resource strain: Not on file    Food insecurity:     Worry: Not on file     Inability: Not on file    Transportation needs:     Medical: Not on file     Non-medical: Not on file   Tobacco Use    Smoking status: Former Smoker    Smokeless tobacco: Never Used   Substance and Sexual Activity    Alcohol use: Yes     Comment: occ    Drug use: No    Sexual activity: Yes     Partners: Male     Birth control/protection: None   Lifestyle    Physical activity:     Days per week: Not on file     Minutes per session: Not on file    Stress: Not on file   Relationships    Social connections:     Talks on phone: Not on file     Gets together: Not on file     Attends Confucianist service: Not on file     Active member of club or organization: Not on file  "    Attends meetings of clubs or organizations: Not on file     Relationship status: Not on file   Other Topics Concern    Not on file   Social History Narrative    Not on file     Objective:      Vitals:    10/11/19 1330   BP: 96/74   BP Location: Right arm   Patient Position: Sitting   BP Method: Medium (Manual)   Pulse: 75   Temp: 97.9 °F (36.6 °C)   SpO2: 98%   Weight: 53 kg (116 lb 13.5 oz)   Height: 5' 4" (1.626 m)      Physical Exam   Constitutional: She appears well-developed and well-nourished. No distress.   HENT:   Head: Normocephalic and atraumatic.   Right Ear: Ear canal normal. No drainage.   Left Ear: Tympanic membrane and ear canal normal. No drainage.   Nose: Mucosal edema present. Right sinus exhibits maxillary sinus tenderness and frontal sinus tenderness. Left sinus exhibits maxillary sinus tenderness and frontal sinus tenderness.   Mouth/Throat: Uvula is midline and mucous membranes are normal. Posterior oropharyngeal erythema present. No oropharyngeal exudate or posterior oropharyngeal edema.   Right ear drum partially occluded by cerumen, but no impaction.   Eyes: Pupils are equal, round, and reactive to light. EOM are normal. Right eye exhibits no discharge. Left eye exhibits no discharge. Right conjunctiva is not injected. Left conjunctiva is not injected. No scleral icterus.   Neck: Neck supple. No tracheal deviation present. No thyromegaly present.   Cardiovascular: Normal rate, regular rhythm, normal heart sounds and intact distal pulses.   No murmur heard.  Pulmonary/Chest: Effort normal and breath sounds normal. No stridor. No respiratory distress. She has no wheezes.   No egophony, dullness to percussion.  Good air movement in all lung fields.  Speaking in complete sentences.   Abdominal: Soft. Bowel sounds are normal. She exhibits no distension. There is no tenderness.   Musculoskeletal: She exhibits no edema or deformity.   Lymphadenopathy:        Head (right side): Submandibular " adenopathy present. No posterior auricular and no occipital adenopathy present.        Head (left side): Submandibular adenopathy present. No posterior auricular and no occipital adenopathy present.     She has no cervical adenopathy.   NTTP bilateral submandibular LAD, soft, mobile   Neurological: She is alert. Gait normal.   Skin: Skin is warm and dry. She is not diaphoretic. No erythema.   Psychiatric: She has a normal mood and affect. Her behavior is normal.         Lab Results   Component Value Date    WBC 14.42 (H) 06/02/2018    HGB 11.5 (L) 06/02/2018    HCT 34.9 (L) 06/02/2018     06/02/2018    CHOL 201 (H) 05/29/2013    TRIG 92 05/29/2013    HDL 59 05/29/2013    ALT 16 05/29/2013    AST 21 05/29/2013     05/29/2013    K 4.6 05/29/2013     05/29/2013    CREATININE 1.0 05/29/2013    BUN 16 05/29/2013    CO2 25 05/29/2013    TSH 2.008 05/29/2013     POCT Strep Neg.    The ASCVD Risk score (Hudson LIZZ Jr., et al., 2013) failed to calculate for the following reasons:    The 2013 ASCVD risk score is only valid for ages 40 to 79    (Imaging have been independently reviewed)  Pelvic ultrasound 2015 without abnormality.    Assessment:       1. Acute nasopharyngitis    2. Immunodeficiency with predominantly antibody defects          Plan:       Karen was seen today for adenopathy.    Diagnoses and all orders for this visit:    Acute nasopharyngitis  Comments:  Centor 2. Strep neg. Throat Cx. Check EBV, but tx is supportive. Rec PO antihistamine, rest, hydration, NSAIDs for sore throat. ED precautions for compression.  Orders:  -     HETEROPHILE AB SCREEN; Future  -     POCT rapid strep A  -     Strep A culture, throat  -     CBC auto differential; Future    Immunodeficiency with predominantly antibody defects  Comments:  Recurrent bacterial infections.  W/u as above.  Follow up with immunology at [facility].         Patient is breast feeding.  Checked Lactmed; antihistamine is expressed in low levels  in breast milk and is considered safe for the baby.    Side effects of medication(s) were discussed in detail and patient voiced understanding.  Patient will call back for any issues or complications.     RTC in 1 month(s) or sooner PRN for annual exam with PCP, Dr. Garcia.

## 2019-10-13 LAB — BACTERIA THROAT CULT: NORMAL

## 2019-11-11 ENCOUNTER — OFFICE VISIT (OUTPATIENT)
Dept: INTERNAL MEDICINE | Facility: CLINIC | Age: 34
End: 2019-11-11
Attending: INTERNAL MEDICINE
Payer: COMMERCIAL

## 2019-11-11 VITALS
BODY MASS INDEX: 19.84 KG/M2 | HEART RATE: 82 BPM | DIASTOLIC BLOOD PRESSURE: 56 MMHG | TEMPERATURE: 98 F | HEIGHT: 64 IN | OXYGEN SATURATION: 100 % | WEIGHT: 116.19 LBS | SYSTOLIC BLOOD PRESSURE: 100 MMHG

## 2019-11-11 DIAGNOSIS — D80.6 SPECIFIC ANTIBODY DEFICIENCY WITH NORMAL IMMUNOGLOBULIN CONCENTRATION AND NORMAL NUMBER OF B CELLS: ICD-10-CM

## 2019-11-11 DIAGNOSIS — Z00.00 ANNUAL PHYSICAL EXAM: Primary | ICD-10-CM

## 2019-11-11 PROCEDURE — 90471 PNEUMOCOCCAL POLYSACCHARIDE VACCINE 23-VALENT =>2YO SQ IM: ICD-10-PCS | Mod: S$GLB,,, | Performed by: INTERNAL MEDICINE

## 2019-11-11 PROCEDURE — 99999 PR PBB SHADOW E&M-EST. PATIENT-LVL III: CPT | Mod: PBBFAC,,, | Performed by: INTERNAL MEDICINE

## 2019-11-11 PROCEDURE — 90732 PPSV23 VACC 2 YRS+ SUBQ/IM: CPT | Mod: S$GLB,,, | Performed by: INTERNAL MEDICINE

## 2019-11-11 PROCEDURE — 99999 PR PBB SHADOW E&M-EST. PATIENT-LVL III: ICD-10-PCS | Mod: PBBFAC,,, | Performed by: INTERNAL MEDICINE

## 2019-11-11 PROCEDURE — 99395 PR PREVENTIVE VISIT,EST,18-39: ICD-10-PCS | Mod: 25,S$GLB,, | Performed by: INTERNAL MEDICINE

## 2019-11-11 PROCEDURE — 99395 PREV VISIT EST AGE 18-39: CPT | Mod: 25,S$GLB,, | Performed by: INTERNAL MEDICINE

## 2019-11-11 PROCEDURE — 90732 PNEUMOCOCCAL POLYSACCHARIDE VACCINE 23-VALENT =>2YO SQ IM: ICD-10-PCS | Mod: S$GLB,,, | Performed by: INTERNAL MEDICINE

## 2019-11-11 PROCEDURE — 90471 IMMUNIZATION ADMIN: CPT | Mod: S$GLB,,, | Performed by: INTERNAL MEDICINE

## 2019-11-11 NOTE — PROGRESS NOTES
"Subjective:       Patient ID: Karen Purdy is a 34 y.o. female.    Chief Complaint: Annual Exam     Karen Purdy is a 34 y.o.  female who presents for Annual Exam  .  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  female who presents for Annual Exam  She has a history of immunodeficiency which had been followed by Dr Ward at [facility] in the past. She was to have IVIb infusions but insurance did not cover it. She since has , had two children, now 3 and 1 1/2.  She has been avoiding etOH and following a healthier lifestyle and had been doing well. States she has had "fevers" twice over the last couple of months. Most recent fever was last month and associated with URI.  States in the past that she has not mounted fevers.      Health Maintenance       Date Due Completion Date    Pneumococcal Vaccine (Highest Risk) (1 of 3 - PCV13) 10/18/2003 ---    Pap Smear with HPV Cotest 06/23/2019 6/23/2016    Override on 7/27/2013: Done    Influenza Vaccine (1) 09/01/2019 11/9/2015    TETANUS VACCINE 04/05/2028 4/5/2018          Review of Systems   Constitutional: Positive for fatigue. Negative for chills and fever.   HENT: Negative for rhinorrhea and sore throat.    Respiratory: Negative for cough and shortness of breath.    Cardiovascular: Negative for chest pain and palpitations.   Gastrointestinal: Negative for nausea and vomiting.   Genitourinary: Negative for dysuria and hematuria.   Musculoskeletal: Negative for arthralgias and back pain.   Skin: Negative for color change and rash.   Neurological: Negative for weakness and numbness.   Psychiatric/Behavioral: Positive for sleep disturbance (toddler wakes her " "up). Negative for agitation and dysphoric mood.         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     Unspecified vitamin D deficiency        Past Surgical History:   Procedure Laterality Date    KNEE SURGERY Right     torn meniscus       Family History   Problem Relation Age of Onset    Cancer Maternal Grandfather         lung    Dementia Paternal Grandmother     Hyperlipidemia Mother     Hypertension Mother     Hypertension Father     Hyperlipidemia Father     Breast cancer Neg Hx     Colon cancer Neg Hx     Ovarian cancer Neg Hx        Social History     Tobacco Use    Smoking status: Former Smoker     Packs/day: 0.10     Years: 2.00     Pack years: 0.20    Smokeless tobacco: Never Used   Substance Use Topics    Alcohol use: Yes     Comment: occ - 1/mo    Drug use: No             Objective:   Blood pressure (!) 100/56, pulse 82, temperature 97.8 °F (36.6 °C), temperature source Oral, height 5' 4" (1.626 m), weight 52.7 kg (116 lb 2.9 oz), SpO2 100 %, currently breastfeeding.     Physical Exam   Constitutional: She is oriented to person, place, and time. She appears well-developed and well-nourished.   HENT:   Head: Normocephalic and atraumatic.   Eyes: Right eye exhibits no discharge. Left eye exhibits no discharge.   Neck: Carotid bruit is not present. No thyromegaly present.   Cardiovascular: Normal rate and normal heart sounds. Exam reveals no gallop and no friction rub.   No murmur heard.  Pulmonary/Chest: Effort normal and breath sounds normal. She has no wheezes. She has no rales.   Abdominal: Soft. Bowel sounds are normal. She exhibits no distension. There is no tenderness.   Musculoskeletal: She exhibits no edema or tenderness.   Lymphadenopathy:     She has no cervical adenopathy.        Right: No supraclavicular " adenopathy present.        Left: No supraclavicular adenopathy present.   Neurological: She is alert and oriented to person, place, and time. She has normal reflexes.   Skin: Skin is warm and dry.   Psychiatric: She has a normal mood and affect. Her behavior is normal.       Prior labs reviewed  Assessment/Plan:        Karen was seen today for annual exam.    Diagnoses and all orders for this visit:    Annual physical exam  Recommend daily sunscreen, cardiovascular exercise min 30 min 5 days per week. Seatbelts routinely.  Recommend monthly self breast exams and annual mammogram.  Also screening  pap with reflex HPV q 3 years or as recommended by gyn provider.    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

## 2019-11-11 NOTE — PROGRESS NOTES
"Patient was given vaccine information sheet for the [id number] (pneumococcal polyvalent) vaccine. The area of injection was palpated using the acromion process as a landmark. This area was cleaned with alcohol. Using a 25g 1" safety needle, 0.5mL of the vaccine was placed into the left muscle. The injection site was dressed with a bandage. Patient experienced no complications and was discharged in stable condition. Pneumovax 23 (pneumococcal polyvalent) vaccine Lot: [id number] Exp: 03/06/21  "

## 2020-02-10 DIAGNOSIS — Z20.828 EXPOSURE TO INFLUENZA: Primary | ICD-10-CM

## 2020-02-10 RX ORDER — OSELTAMIVIR PHOSPHATE 30 MG/1
30 CAPSULE ORAL DAILY
Qty: 10 CAPSULE | Refills: 0 | Status: SHIPPED | OUTPATIENT
Start: 2020-02-10 | End: 2020-02-20

## 2020-02-10 NOTE — TELEPHONE ENCOUNTER
----- Message from Gustavo Mcconnell, Patient Care Assistant sent at 2/10/2020 12:04 PM CST -----  Contact: WILLIE CALVIN [[id number]]  Name of Who is Calling: WILLIE CALVIN [[id number]]    What is the request in detail:Requesting a call back in regards of getting Rx Tamiflu stating  Both of her kids have the Flu.  Please contact to further discuss and advise      Can the clinic reply by [facility]: No    [facility] Back [facility]   [phone]

## 2020-02-10 NOTE — TELEPHONE ENCOUNTER
Rx has been sent to her pharmacy.  Please clarify with the patient that, unless she has been feeling sick herself, the preventive dose of this medication is Tamiflu 1 tablet daily for 10 days.  She will need to monitor for onset of nausea, headache, or dizziness on this medication.  Thanks.

## 2020-02-10 NOTE — TELEPHONE ENCOUNTER
Spoke to MsGina Rajwinder and informed her that Rx has been sent to her pharmacy.  Please clarify with the patient that, unless she has been feeling sick herself, the preventive dose of this medication is Tamiflu 1 tablet daily for 10 days.  She will need to monitor for onset of nausea, headache, or dizziness on this medication.  Patient states understanding with no further questions.

## 2020-03-13 ENCOUNTER — PATIENT MESSAGE (OUTPATIENT)
Dept: INTERNAL MEDICINE | Facility: CLINIC | Age: 35
End: 2020-03-13

## 2020-04-06 ENCOUNTER — PATIENT MESSAGE (OUTPATIENT)
Dept: INTERNAL MEDICINE | Facility: CLINIC | Age: 35
End: 2020-04-06

## 2020-04-07 ENCOUNTER — OFFICE VISIT (OUTPATIENT)
Dept: INTERNAL MEDICINE | Facility: CLINIC | Age: 35
End: 2020-04-07
Payer: COMMERCIAL

## 2020-04-07 DIAGNOSIS — J02.9 PHARYNGITIS, UNSPECIFIED ETIOLOGY: Primary | ICD-10-CM

## 2020-04-07 PROCEDURE — 99213 OFFICE O/P EST LOW 20 MIN: CPT | Mod: 95,,, | Performed by: INTERNAL MEDICINE

## 2020-04-07 PROCEDURE — 99213 PR OFFICE/OUTPT VISIT, EST, LEVL III, 20-29 MIN: ICD-10-PCS | Mod: 95,,, | Performed by: INTERNAL MEDICINE

## 2020-04-07 RX ORDER — AZITHROMYCIN 250 MG/1
TABLET, FILM COATED ORAL
Qty: 6 TABLET | Refills: 0 | Status: SHIPPED | OUTPATIENT
Start: 2020-04-07 | End: 2020-04-12

## 2020-04-07 NOTE — PROGRESS NOTES
Subjective:       Patient ID: Karen Purdy is a 34 y.o. female.    Chief Complaint: Sore Throat    The patient location is: home  The chief complaint leading to consultation is: sore throat  Visit type: Virtual visit with synchronous audio and video  Total time spent with patient: 10 mins  Each patient to whom he or she provides medical services by telemedicine is:  (1) informed of the relationship between the physician and patient and the respective role of any other health care provider with respect to management of the patient; and (2) notified that he or she may decline to receive medical services by telemedicine and may withdraw from such care at any time.    Notes: Pt opted for telemed visit due to covid-19    Pt c/o sore throat for 4 weeks. Also c/o pain on L throat and swollen nodule. No fever. No nasal congestion/rhinorrhea/cough. She also c/o bilat ear fullness. No cp/sob/wheezing. She says sore throats are common but was more concerned with tender nodule near midline of upper throat. No dysphagia.     Review of Systems   Constitutional: Negative for fever.   HENT: Positive for sore throat. Negative for ear pain and rhinorrhea.    Respiratory: Negative for cough and shortness of breath.    Cardiovascular: Negative for chest pain.       Objective:      Physical Exam   Constitutional: She is oriented to person, place, and time. She appears well-developed and well-nourished.   HENT:   No apparent exudate in throat and minimal erythema but lighting was not great    Pulmonary/Chest: Effort normal. No respiratory distress.   Neurological: She is alert and oriented to person, place, and time.   Psychiatric: She has a normal mood and affect. Her behavior is normal. Judgment and thought content normal.       Assessment:       1. Pharyngitis, unspecified etiology        Plan:       1. Will trial allegra for a few days in case this is allergy related but if not improving, she will start zpak  2. otc meds  prn--proper use d/w pt  3. RTC prompts d/w pt

## 2020-05-01 ENCOUNTER — PATIENT MESSAGE (OUTPATIENT)
Dept: INTERNAL MEDICINE | Facility: CLINIC | Age: 35
End: 2020-05-01

## 2020-09-03 ENCOUNTER — PATIENT OUTREACH (OUTPATIENT)
Dept: ADMINISTRATIVE | Facility: HOSPITAL | Age: 35
End: 2020-09-03

## 2020-09-03 ENCOUNTER — PATIENT MESSAGE (OUTPATIENT)
Dept: INTERNAL MEDICINE | Facility: CLINIC | Age: 35
End: 2020-09-03

## 2020-09-03 DIAGNOSIS — Z00.00 ANNUAL PHYSICAL EXAM: Primary | ICD-10-CM

## 2020-09-03 DIAGNOSIS — Z12.4 SCREENING FOR CERVICAL CANCER: Primary | ICD-10-CM

## 2020-09-25 ENCOUNTER — TELEPHONE (OUTPATIENT)
Dept: OBSTETRICS AND GYNECOLOGY | Facility: CLINIC | Age: 35
End: 2020-09-25

## 2020-09-25 ENCOUNTER — PATIENT MESSAGE (OUTPATIENT)
Dept: OBSTETRICS AND GYNECOLOGY | Facility: CLINIC | Age: 35
End: 2020-09-25

## 2020-09-25 DIAGNOSIS — Z13.29 SCREENING FOR THYROID DISORDER: Primary | ICD-10-CM

## 2020-09-28 ENCOUNTER — LAB VISIT (OUTPATIENT)
Dept: LAB | Facility: OTHER | Age: 35
End: 2020-09-28
Attending: OBSTETRICS & GYNECOLOGY
Payer: COMMERCIAL

## 2020-09-28 ENCOUNTER — PATIENT OUTREACH (OUTPATIENT)
Dept: ADMINISTRATIVE | Facility: OTHER | Age: 35
End: 2020-09-28

## 2020-09-28 DIAGNOSIS — Z13.29 SCREENING FOR THYROID DISORDER: ICD-10-CM

## 2020-09-28 LAB
T4 FREE SERPL-MCNC: 0.93 NG/DL (ref 0.71–1.51)
TSH SERPL DL<=0.005 MIU/L-ACNC: 1.26 UIU/ML (ref 0.4–4)

## 2020-09-28 PROCEDURE — 84443 ASSAY THYROID STIM HORMONE: CPT

## 2020-09-28 PROCEDURE — 84439 ASSAY OF FREE THYROXINE: CPT

## 2020-09-28 PROCEDURE — 36415 COLL VENOUS BLD VENIPUNCTURE: CPT

## 2020-09-29 ENCOUNTER — OFFICE VISIT (OUTPATIENT)
Dept: OBSTETRICS AND GYNECOLOGY | Facility: CLINIC | Age: 35
End: 2020-09-29
Attending: OBSTETRICS & GYNECOLOGY
Payer: COMMERCIAL

## 2020-09-29 VITALS
HEIGHT: 64 IN | SYSTOLIC BLOOD PRESSURE: 116 MMHG | WEIGHT: 125 LBS | BODY MASS INDEX: 21.34 KG/M2 | DIASTOLIC BLOOD PRESSURE: 74 MMHG

## 2020-09-29 DIAGNOSIS — Z01.419 WELL WOMAN EXAM WITH ROUTINE GYNECOLOGICAL EXAM: Primary | ICD-10-CM

## 2020-09-29 PROCEDURE — 87624 HPV HI-RISK TYP POOLED RSLT: CPT

## 2020-09-29 PROCEDURE — 88141 PR  CYTOPATH CERV/VAG INTERPRET: ICD-10-PCS | Mod: ,,, | Performed by: PATHOLOGY

## 2020-09-29 PROCEDURE — 88141 CYTOPATH C/V INTERPRET: CPT | Mod: ,,, | Performed by: PATHOLOGY

## 2020-09-29 PROCEDURE — 99395 PR PREVENTIVE VISIT,EST,18-39: ICD-10-PCS | Mod: S$GLB,,, | Performed by: OBSTETRICS & GYNECOLOGY

## 2020-09-29 PROCEDURE — 88175 CYTOPATH C/V AUTO FLUID REDO: CPT | Performed by: PATHOLOGY

## 2020-09-29 PROCEDURE — 99999 PR PBB SHADOW E&M-EST. PATIENT-LVL II: ICD-10-PCS | Mod: PBBFAC,,, | Performed by: OBSTETRICS & GYNECOLOGY

## 2020-09-29 PROCEDURE — 99395 PREV VISIT EST AGE 18-39: CPT | Mod: S$GLB,,, | Performed by: OBSTETRICS & GYNECOLOGY

## 2020-09-29 PROCEDURE — 99999 PR PBB SHADOW E&M-EST. PATIENT-LVL II: CPT | Mod: PBBFAC,,, | Performed by: OBSTETRICS & GYNECOLOGY

## 2020-09-29 NOTE — PROGRESS NOTES
CC: Well woman exam    Karen Purdy is a 34 y.o. female  presents for well woman exam.  LMP: Patient's last menstrual period was 09/15/2020..  Reports some hot flashes at night only, when she hears kids wake.  Headaches starting with period. No current contraception, has had an IUD in the past. Discussed progesterone supplements prior to menses.  Periods are regular.  Reports weight is stable though higher than prior to pregnancy.    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   Social Needs    Financial resource strain: Not very hard    Food insecurity     Worry: Never true     Inability: Never true    Transportation needs     Medical: No     Non-medical: No   Tobacco Use    Smoking status: Former Smoker     Packs/day: 0.10     Years: 2.00     Pack years: 0.20    Smokeless tobacco: Never Used   Substance and Sexual Activity    Alcohol use: Yes     Comment: occ - 1/mo    Drug use: No    Sexual activity: Yes     Partners: Male     Birth control/protection: None, Coitus interruptus   Lifestyle    Physical activity     Days per week: 1 day     Minutes per session: 60 min    Stress: To some extent   Relationships    Social connections     Talks on phone: More than three times a week     Gets together: More than three times a week     Attends [employer] service: Never     Active member of club or organization: No     Attends meetings of clubs or  "organizations: Never     Relationship status:    Other Topics Concern    Not on file   Social History Narrative    Not on file     Family History   Problem Relation Age of Onset    Cancer Maternal Grandfather         lung    Dementia Paternal Grandmother     Hyperlipidemia Mother     Hypertension Mother     Hypertension Father     Hyperlipidemia Father     Breast cancer Neg Hx     Colon cancer Neg Hx     Ovarian cancer Neg Hx      OB History        3    Para   2    Term   2       0    AB   1    Living   2       SAB   0    TAB   1    Ectopic   0    Multiple   0    Live Births   2                 /74   Ht 5' 4" (1.626 m)   Wt 56.7 kg (125 lb)   LMP 09/15/2020   BMI 21.46 kg/m²       ROS:  GENERAL: Denies weight gain or weight loss. Feeling well overall.   SKIN: Denies rash or lesions.   HEAD: Denies head injury or headache.   NODES: Denies enlarged lymph nodes.   CHEST: Denies chest pain or shortness of breath.   CARDIOVASCULAR: Denies palpitations or left sided chest pain.   ABDOMEN: No abdominal pain, constipation, diarrhea, nausea, vomiting or rectal bleeding.   URINARY: No frequency, dysuria, hematuria, or burning on urination.  REPRODUCTIVE: See HPI.   BREASTS: The patient performs breast self-examination and denies pain, lumps, or nipple discharge.   HEMATOLOGIC: No easy bruisability or excessive bleeding.   MUSCULOSKELETAL: Denies joint pain or swelling.   NEUROLOGIC: Denies syncope or weakness.   PSYCHIATRIC: Denies depression, anxiety or mood swings.    PHYSICAL EXAM:  APPEARANCE: Well nourished, well developed, in no acute distress.  AFFECT: WNL, alert and oriented x 3  SKIN: No acne or hirsutism  NECK: Neck symmetric without masses or thyromegaly  NODES: No inguinal, cervical, axillary, or femoral lymph node enlargement  CHEST: Good respiratory effect  ABDOMEN: Soft.  No tenderness or masses.  No hepatosplenomegaly.  No hernias.  BREASTS: Symmetrical, no skin " changes or visible lesions.  No palpable masses, nipple discharge bilaterally.  PELVIC: Normal external genitalia without lesions.  Normal hair distribution.  Adequate perineal body, normal urethral meatus.  Vagina moist and well rugated without lesions or discharge.  Cervix pink, without lesions, discharge or tenderness.  No significant cystocele or rectocele.  Bimanual exam shows uterus to be normal size, regular, mobile and nontender.  Adnexa without masses or tenderness.    EXTREMITIES: No edema.    Well woman exam with routine gynecological exam  -     Liquid-Based Pap Smear, Screening  -     HPV High Risk Genotypes, PCR      Will consider IUD or progesterone if headaches continue or appear to be related to menses.        Patient was counseled today on A.C.S. Pap guidelines and recommendations for yearly pelvic exams, mammograms and monthly self breast exams; to see her PCP for other health maintenance.     No follow-ups on file.

## 2020-10-02 LAB
HPV HR 12 DNA SPEC QL NAA+PROBE: NEGATIVE
HPV16 AG SPEC QL: NEGATIVE
HPV18 DNA SPEC QL NAA+PROBE: NEGATIVE

## 2020-10-20 LAB
FINAL PATHOLOGIC DIAGNOSIS: NORMAL
Lab: NORMAL

## 2020-10-21 ENCOUNTER — PATIENT MESSAGE (OUTPATIENT)
Dept: PAIN MEDICINE | Facility: CLINIC | Age: 35
End: 2020-10-21

## 2020-10-22 ENCOUNTER — OFFICE VISIT (OUTPATIENT)
Dept: PAIN MEDICINE | Facility: CLINIC | Age: 35
End: 2020-10-22
Attending: ANESTHESIOLOGY
Payer: COMMERCIAL

## 2020-10-22 VITALS
BODY MASS INDEX: 21.27 KG/M2 | TEMPERATURE: 98 F | SYSTOLIC BLOOD PRESSURE: 104 MMHG | HEIGHT: 64 IN | HEART RATE: 72 BPM | WEIGHT: 124.56 LBS | DIASTOLIC BLOOD PRESSURE: 70 MMHG

## 2020-10-22 DIAGNOSIS — M54.12 RADICULOPATHY, CERVICAL REGION: ICD-10-CM

## 2020-10-22 DIAGNOSIS — G89.4 CHRONIC PAIN DISORDER: ICD-10-CM

## 2020-10-22 DIAGNOSIS — M79.18 MYOFASCIAL PAIN: Primary | ICD-10-CM

## 2020-10-22 DIAGNOSIS — G44.209 TENSION HEADACHE: ICD-10-CM

## 2020-10-22 PROCEDURE — 99214 PR OFFICE/OUTPT VISIT, EST, LEVL IV, 30-39 MIN: ICD-10-PCS | Mod: 25,S$GLB,, | Performed by: ANESTHESIOLOGY

## 2020-10-22 PROCEDURE — 20552 PR INJECT TRIGGER POINT, 1 OR 2: ICD-10-PCS | Mod: S$GLB,,, | Performed by: ANESTHESIOLOGY

## 2020-10-22 PROCEDURE — 3008F PR BODY MASS INDEX (BMI) DOCUMENTED: ICD-10-PCS | Mod: CPTII,S$GLB,, | Performed by: ANESTHESIOLOGY

## 2020-10-22 PROCEDURE — 99999 PR PBB SHADOW E&M-EST. PATIENT-LVL IV: ICD-10-PCS | Mod: PBBFAC,,, | Performed by: ANESTHESIOLOGY

## 2020-10-22 PROCEDURE — 99214 OFFICE O/P EST MOD 30 MIN: CPT | Mod: 25,S$GLB,, | Performed by: ANESTHESIOLOGY

## 2020-10-22 PROCEDURE — 3008F BODY MASS INDEX DOCD: CPT | Mod: CPTII,S$GLB,, | Performed by: ANESTHESIOLOGY

## 2020-10-22 PROCEDURE — 99999 PR PBB SHADOW E&M-EST. PATIENT-LVL IV: CPT | Mod: PBBFAC,,, | Performed by: ANESTHESIOLOGY

## 2020-10-22 PROCEDURE — 20552 NJX 1/MLT TRIGGER POINT 1/2: CPT | Mod: S$GLB,,, | Performed by: ANESTHESIOLOGY

## 2020-10-22 RX ORDER — MELOXICAM 15 MG/1
15 TABLET ORAL DAILY
Qty: 30 TABLET | Refills: 1 | Status: SHIPPED | OUTPATIENT
Start: 2020-10-22 | End: 2021-01-12 | Stop reason: SDUPTHER

## 2020-10-22 RX ORDER — METHOCARBAMOL 500 MG/1
500 TABLET, FILM COATED ORAL 4 TIMES DAILY
Qty: 40 TABLET | Refills: 1 | Status: SHIPPED | OUTPATIENT
Start: 2020-10-22 | End: 2020-11-01

## 2020-10-22 NOTE — PROGRESS NOTES
Chronic patient Established Note (Follow up visit)      SUBJECTIVE:    Interval history 10/22/2020  Karen Purdy presents to the clinic for a follow-up appointment for neck pain. Since the last visit, Karen Purdy states the pain has been worsening.  She had good relief after the TPI done at the last visit which lasted for a few months, but pain has returned since then and has been increasing in intensity.  Pain is located in the neck area and extends to the suprascapular areas bilaterally worse on the right side.  She also reports that she has been having headaches almost every day that last for a few hours each day.  Headaches described as a bilateral tightness.  Pain and headache are somewhat relieved by taking Advil upto 3 times a day.  Patient does not report any radicular symptoms, however she does state that she has noticed intemittent numbness in her last 2 fingers of the right hand over the last couple of months. Current pain intensity is 5/10.    Initial visit 04/10/2019  Karen Purdy presents to the clinic for the evaluation of neck pain. The pain started 2 months ago following unknown and symptoms have been worsening.The pain is located in the neck area and radiates to the right shoulder.  The pain is described as aching, shooting, stabbing, throbbing and tight band and is rated as 9/10. The pain is rated with a score of  2/10 on the BEST day and a score of 9/10 on the WORST day.  Symptoms interfere with daily activity and sleeping. The pain is exacerbated by Sitting, Laying, Bending, Touching, Night Time, Morning and Lifting.  The pain is mitigated by heat and medications. She reports spending 0 hours per day reclining. The patient reports 3-4 hours of uninterrupted sleep per night.     Pt reports muscle tightness around her R shoulder. She is carrying her infant carrier on that arm and has been feeling the tightness worsening. Describes a sharp pain w/o radiation alleviated  by stretching.      Patient denies night fever/night sweats, urinary incontinence, bowel incontinence, significant weight loss, significant motor weakness and loss of sensations.      Pain Disability Index Review:  Last 3 PDI Scores 10/22/2020 4/10/2019   Pain Disability Index (PDI) 13 28       Pain Medications:  Advil  Robaxin    Opioid Contract: no     report:  Reviewed and consistent with medication use as prescribed.    Pain Procedures:   N/A    Physical Therapy/Home Exercise: no    Imaging:   N/A    Allergies:   Review of patient's allergies indicates:   Allergen Reactions    Ceclor [cefaclor] Anaphylaxis, Swelling and Rash    Pcn [penicillins] Anaphylaxis, Swelling and Rash       Current Medications:   Current Outpatient Medications   Medication Sig Dispense Refill    PRENATAL VIT CALC,IRON,FOLIC (PRENATAL VITAMIN ORAL) Take by mouth.      meloxicam (MOBIC) 15 MG tablet Take 1 tablet (15 mg total) by mouth once daily. 30 tablet 1    methocarbamoL (ROBAXIN) 500 MG Tab Take 1 tablet (500 mg total) by mouth 4 (four) times daily. for 10 days 40 tablet 1     No current facility-administered medications for this visit.        REVIEW OF SYSTEMS:    GENERAL:  No weight loss, malaise or fevers.  HEENT:  Negative for frequent or significant headaches.  NECK:  Negative for lumps, goiter and significant neck swelling. + neck pain  RESPIRATORY:  Negative for cough, wheezing or shortness of breath.  CARDIOVASCULAR:  Negative for chest pain, leg swelling or palpitations.  GI:  Negative for abdominal discomfort, blood in stools or black stools or change in bowel habits.  MUSCULOSKELETAL:  See HPI.  SKIN:  Negative for lesions, rash, and itching.  PSYCH:  Negative for mood disorder and recent psychosocial stressors. +sleep disturbance  HEMATOLOGY/LYMPHOLOGY:  Negative for prolonged bleeding, bruising easily or swollen nodes.  NEURO:   No history of headaches, syncope, paralysis, seizures or tremors.  All other reviewed  and negative other than HPI.    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     Unspecified vitamin D deficiency        Past Surgical History:  Past Surgical History:   Procedure Laterality Date    KNEE SURGERY Right     torn meniscus       Family History:  Family History   Problem Relation Age of Onset    Cancer Maternal Grandfather         lung    Dementia Paternal Grandmother     Hyperlipidemia Mother     Hypertension Mother     Hypertension Father     Hyperlipidemia Father     Breast cancer Neg Hx     Colon cancer Neg Hx     Ovarian cancer Neg Hx        Social History:  Social History     Socioeconomic History    Marital status:      Spouse name: Mark    Number of children: 2    Years of education: Not on file    Highest education level: Not on file   Occupational History    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   Social Needs    Financial resource strain: Not very hard    Food insecurity     Worry: Never true     Inability: Never true    Transportation needs     Medical: No     Non-medical: No   Tobacco Use    Smoking status: Former Smoker     Packs/day: 0.10     Years: 2.00     Pack years: 0.20    Smokeless tobacco: Never Used   Substance and Sexual Activity    Alcohol use: Yes     Comment: occ - 1/mo    Drug use: No    Sexual activity: Yes     Partners: Male     Birth control/protection: None, Coitus interruptus   Lifestyle    Physical activity     Days per week: 1 day     Minutes per session: 60 min    Stress: To some extent   Relationships    Social connections     Talks on phone: More than three times a week     Gets together: More than three times a week     Attends Adventism service: Never     Active member of club or organization: No     Attends meetings of clubs or organizations: Never      "Relationship status:    Other Topics Concern    Not on file   Social History Narrative    Not on file       OBJECTIVE:    /70   Pulse 72   Temp 98.2 °F (36.8 °C) (Oral)   Ht 5' 4" (1.626 m)   Wt 56.5 kg (124 lb 9 oz)   BMI 21.38 kg/m²     PHYSICAL EXAMINATION:    General appearance: Well appearing, in no acute distress, alert and oriented x3.  Psych:  Mood and affect appropriate.  Skin: Skin color, texture, turgor normal, no rashes or lesions, in both upper and lower body.  Head/face:  Atraumatic, normocephalic. No palpable lymph nodes  Neck: Pain to palpation over the cervical paraspinous muscles, suprascapular and trapezius, right more than left. Spurling Negative. Pain with neck lateral flexion on contralateral side. .  Cor: RRR  Pulm: CTA  GI: Abdomen soft and non-tender.  Back: Straight leg raising in the sitting and supine positions is negative to radicular pain. No pain to palpation over the spine or costovertebral angles. Normal range of motion without pain reproduction.  Extremities: Peripheral joint ROM is full and pain free without obvious instability or laxity in all four extremities. No deformities, edema, or skin discoloration. Good capillary refill.  Musculoskeletal: Shoulder, hip, sacroiliac and knee provocative maneuvers are negative. Bilateral upper and lower extremity strength is normal and symmetric.  No atrophy or tone abnormalities are noted.  Neuro: Bilateral upper and lower extremity coordination and muscle stretch reflexes are physiologic and symmetric.  Plantar response are downgoing. No loss of sensation is noted.  Gait: Normal.    ASSESSMENT: 35 y.o. year old female with neck pain and headaches, consistent with     1. Myofascial pain     2. Radiculopathy, cervical region  MRI Cervical Spine Without Contrast    Ambulatory referral/consult to Physical/Occupational Therapy   3. Tension headache  Ambulatory referral/consult to Neurology         PLAN:     - I have stressed " the importance of physical activity and a home exercise plan to help with pain and improve health.  - performed trigger point injections to bilateral paracervical trapezius and suprascapular areas in the office today  - referral to physical therapy for evaluation and treatment of myofascial pain  - prescribed Mobic 15 mg once daily only as needed and Robaxin 500 mg as needed  - Encouraged the patient to avoid taking NSAIDs on a daily basis to avoid medication induced headache.  - Ordered MRI of the cervical Spine to evaluate whether cervical radiculopathy is the source of pain, especially considering the finger numbness that the patient reports.  -  also refer patient to Neurology for evaluation and treatment of headaches  - RTC in 2-3 weeks  - Counseled patient regarding the importance of activity modification, constant sleeping habits and physical therapy.    The above plan and management options were discussed at length with patient. Patient is in agreement with the above and verbalized understanding.    Jaciel Lopez  10/22/2020     I have reviewed and concur with the resident's history, physical, assessment, and plan.  I have personally interviewed and examined the patient at bedside.  See below addendum for my evaluation and additional findings. 35 year old female with chronic neck and shoulder pain consistent with myofascial pain. She also reports daily headaches likely consistent with tension headache. We will refer her to headache neurology for evaluation and treatment.  She also reports upper extremity 4th and 5th fingers numbness and tingling.  Will order cervical spine MRI to further evaluate the etiology for neck pain.  We will perform trigger point injection today and follow up with her in 2-3 weeks.    Issa Chong MD      Patient Name: Karen Purdy  MRN: [id number]    INFORMED CONSENT: The procedure, risks, benefits and options were discussed with patient. There are no contraindications  to the procedure. The patient expressed understanding and agreed to proceed. The personnel performing the procedure was discussed. I verify that I personally obtained Karen's consent prior to the start of the procedure and the signed consent can be found on the patient's chart.    Procedure Date: 10/22/2020    Anesthesia: Topical    Pre Procedure diagnosis:   1. Myofascial pain    2. Radiculopathy, cervical region    3. Tension headache    4. Chronic pain disorder        Post-Procedure diagnosis: same      Sedation: None    PROCEDURE: TRIGGER POINT INJECTION  The patient was placed in a seated position and time out was perfomed. The site of pain and procedure were confirmed with the patient prior to starting the procedure. After performing time out. The patient's  trigger points were identified and marked at bilateral cervical and trapezoid paraspinal muscles. The skin was prepped with chlorhexidine three times.   After performing time out A 27-gauge 1.5 inch  needle was advanced through the skin and subcutaneous tissues.  Aspiration for blood, air and CSF was negative.  A total of 9 ml of Bupivacaine 0.25% and 8 mg Decadron was injected at all trigger point.  No complications were evident. No specimens collected.    Blood Loss: Nill  Specimen: None    Issa Chong MD    I have reviewed the notes, assessments, and/or procedures performed by fellow, I concur with her/his documentation of Karen Purdy.    Issa Chong MD

## 2020-11-05 ENCOUNTER — CLINICAL SUPPORT (OUTPATIENT)
Dept: REHABILITATION | Facility: OTHER | Age: 35
End: 2020-11-05
Payer: COMMERCIAL

## 2020-11-05 ENCOUNTER — PATIENT MESSAGE (OUTPATIENT)
Dept: PAIN MEDICINE | Facility: CLINIC | Age: 35
End: 2020-11-05

## 2020-11-05 DIAGNOSIS — M54.2 NECK PAIN: ICD-10-CM

## 2020-11-05 DIAGNOSIS — M54.12 RADICULOPATHY, CERVICAL REGION: ICD-10-CM

## 2020-11-05 DIAGNOSIS — R53.1 WEAKNESS: ICD-10-CM

## 2020-11-05 DIAGNOSIS — G44.209 TENSION HEADACHE: Primary | ICD-10-CM

## 2020-11-05 DIAGNOSIS — M54.12 CERVICAL RADICULAR PAIN: ICD-10-CM

## 2020-11-05 DIAGNOSIS — R29.3 POSTURE IMBALANCE: ICD-10-CM

## 2020-11-05 PROCEDURE — 97162 PT EVAL MOD COMPLEX 30 MIN: CPT | Mod: PN | Performed by: PHYSICAL THERAPIST

## 2020-11-05 NOTE — PLAN OF CARE
[facility]  Physical Therapy Initial Evaluation    Name: Karen Purdy  Clinic Number: [id number]    Therapy Diagnosis:   Encounter Diagnoses   Name Primary?    Radiculopathy, cervical region     Neck pain     Weakness     Cervical radicular pain     Posture imbalance      Physician: Jaciel Lopez MD    Physician Orders: PT Eval and Treat   Medical Diagnosis from Referral: M54.12 (ICD-10-CM) - Radiculopathy, cervical region   Evaluation Date: 11/5/2020  Authorization Period Expiration: 10/22/2021 (eval only)  Plan of Care Expiration: 1/29/2021  Visit # / Visits authorized: 1/ 1 (eval only)    Time In: 0915  Time Out: 0955  Total Billable Time: 40 minutes    Precautions: Standard    Subjective   Date of onset: a little over a year  History of current condition - Sandra Cruz reports: chronic history of hip instability (reports PT to correct SI rotation). Has two young children, and with carrying them and breastfeeding started having a lot of tension and pain to neck and shoulders as well as frequent Has. Reports she feels her core and hip strength is poor, and when she tries to exercise she has to stop due to shoulder pain and HA. Reports numbness and pain that radiates down R UE to ring and pinky fingers.        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    Karen has a current medication list which includes the following prescription(s): meloxicam and prenatal vit calc,iron,folic.    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 Some difficulty with , but able to perform     Pain:  Current 2/10, worst 7/10, best 0/10   Location: neck, into B shoulders, radiates R UE to 4th and 5th digits  Description: some Aching, Burning and Sharp  Aggravating Factors: abdominal exercises, prolonged forward bending, carrying kids for long periods, difficulty sleeping  Easing Factors: warm bed, rest    Pts goals: be able to exercise to develop core strength. "I just want to correct the problem overall. I just want to be able to take care of myself."    Objective     Observation: pt is alert and oriented, good historian    Posture: rounded shoulders, slightly decreased cervical lordosis    Cervical Range of Motion:    Degrees Pain   Flexion WFL Causes dizziness     Extension WFL      Right Rotation WFL Tightness L SCM     Left Rotation WFL Tightness R SCM, pinch right CSP     Right Side Bending WFL Tight L UT   Left Side Bending WFL Tight R UT      Shoulder Range of Motion: grossly WFL      Upper Extremity Strength  (R) UE  (L) UE    Shoulder flexion: 5/5 Shoulder flexion: 5/5   Shoulder Abduction: 5/5 Shoulder abduction: 5/5   Shoulder ER 5/5 Shoulder ER 5/5   Shoulder IR 5/5 Shoulder IR 5/5   Elbow flexion: 5/5 Elbow flexion: 5/5   Elbow extension: 5/5 Elbow extension: 5/5   Wrist flexion: 5/5 Wrist flexion: 5/5   Wrist extension: 5/5 Wrist extension: 5/5    5/5 : 5/5   Lower Trap 4-/5 Lower Trap 3+/5   Upper Trap 4+/5 UpperTrap 4+/5   Rhomboids 4-/5 Rhomboids 4-/5         Special " Tests:  Distraction relieves   Compression relieves   Spurlings -   Sharp-Manoj -   Transverse ligament test + (c/o click/pressure in throat)   Lateral Flexion Alar Ligament -       Joint Mobility: stiffness noted with lateral gliding      Palpation: guarding and tenderness to B suboccipitals, CSP PVM, UT, LS, rhomboids      Sensation: grossly intact to light touch B UE    Flexibility: tightness noted to B pec major/minor. Report of tightness to B SCM, UT, LS          CMS Impairment/Limitation/Restriction for FOTO Neck Survey    Therapist reviewed FOTO scores for Karen Purdy on 11/5/2020.   FOTO documents entered into [employer] - see Media section.    Limitation Score: 44%    Goal: 33%         TREATMENT   Treatment Time In: 0950  Treatment Time Out: 0955  Total Treatment time separate from Evaluation: 5 minutes    Sandra Cruz received therapeutic exercises to develop flexibility for 5 minutes including:  Reviewed ulnar nerve glides for HEP        Home Exercises and Patient Education Provided    Education provided:   - Therapy rationale and plan of care    Written Home Exercises Provided: yes.  Exercises were reviewed and Sandra Cruz was able to demonstrate them prior to the end of the session.  Sandra Cruz demonstrated good  understanding of the education provided.     See EMR under Patient Instructions for exercises provided 11/5/2020.    Assessment   Karen is a 35 y.o. female referred to outpatient Physical Therapy with a medical diagnosis of M54.12 (ICD-10-CM) - Radiculopathy, cervical region. Pt presents with chronic pain to CSP with report of radicular pain to R UE in ulnar distribution. Weakness to periscapular mm B. Red flag findings with positive transverse ligament testing. Pt is scheduled to have MRI and follow-up MD visits prior to initiating PT treatment.     Pt prognosis is Good.   Pt will benefit from skilled outpatient Physical Therapy to address the deficits stated above and in the chart  below, provide pt/family education, and to maximize pt's level of independence.     Plan of care discussed with patient: Yes  Pt's spiritual, cultural and educational needs considered and patient is agreeable to the plan of care and goals as stated below:     Anticipated Barriers for therapy: red flag findings for transverse ligament laxity    Medical Necessity is demonstrated by the following  History  Co-morbidities and personal factors that may impact the plan of care Co-morbidities:   anxiety, difficulty sleeping and prior knee surgery, hx SI dysfunction    Personal Factors:   coping style  lifestyle     moderate   Examination  Body Structures and Functions, activity limitations and participation restrictions that may impact the plan of care Body Regions:   neck  back  lower extremities  upper extremities  trunk    Body Systems:    gross symmetry  ROM  strength  transfers  transitions  motor control  motor learning    Participation Restrictions:   Lifting, carrying, childcare, exercise, sleeping    Activity limitations:   Learning and applying knowledge  no deficits    General Tasks and Commands  no deficits    Communication  no deficits    Mobility  lifting and carrying objects  driving (bike, car, motorcycle)    Self care  no deficits    Domestic Life  shopping  cooking  doing house work (cleaning house, washing dishes, laundry)  assisting others    Interactions/Relationships  no deficits    Life Areas  no deficits    Community and Social Life  community life  recreation and leisure         high   Clinical Presentation unstable clinical presentation with unpredictable characteristics high   Decision Making/ Complexity Score: moderate     Goals:  Short Term Goals (4 Weeks):   1. Pt will report 20% reduction in pain of the cervical spine and RUE for ease with childcare  2. PT will demonstrate 1/3 MMT improvement in periscapular strength for ease with upright posture    Long Term Goals (12 Weeks):   1. Pt will  report being independent with HEP for maintenance of improvements gained during therapy sessions  2. PT will report 50% reduction of pain of the neck and RUE for ease with return to exercise  3. Pt will demonstrate full BUE and periscapular strength without the provocation of pain for ease with childcare.  4. Pt will demonstrate appropriate upright posture without external cueing for ease with driving.     Plan   Plan of care Certification: 11/5/2020 to 1/29/2021.    Outpatient Physical Therapy 2 times weekly for 12 weeks to include the following interventions: Manual Therapy, Moist Heat/ Ice, Neuromuscular Re-ed, Patient Education, Therapeutic Exercise and Dry Needling.     Shona Marte, PT

## 2020-11-06 ENCOUNTER — HOSPITAL ENCOUNTER (OUTPATIENT)
Dept: RADIOLOGY | Facility: OTHER | Age: 35
Discharge: HOME | End: 2020-11-06
Attending: ANESTHESIOLOGY
Payer: COMMERCIAL

## 2020-11-06 DIAGNOSIS — M54.12 RADICULOPATHY, CERVICAL REGION: ICD-10-CM

## 2020-11-06 PROCEDURE — 72141 MRI CERVICAL SPINE WITHOUT CONTRAST: ICD-10-PCS | Mod: 26,,, | Performed by: RADIOLOGY

## 2020-11-06 PROCEDURE — 72141 MRI NECK SPINE W/O DYE: CPT | Mod: 26,,, | Performed by: RADIOLOGY

## 2020-11-06 PROCEDURE — 72141 MRI NECK SPINE W/O DYE: CPT | Mod: TC

## 2020-11-10 ENCOUNTER — CLINICAL SUPPORT (OUTPATIENT)
Dept: REHABILITATION | Facility: OTHER | Age: 35
End: 2020-11-10
Payer: COMMERCIAL

## 2020-11-10 DIAGNOSIS — M54.12 CERVICAL RADICULAR PAIN: ICD-10-CM

## 2020-11-10 DIAGNOSIS — R53.1 WEAKNESS: ICD-10-CM

## 2020-11-10 DIAGNOSIS — M54.2 NECK PAIN: ICD-10-CM

## 2020-11-10 DIAGNOSIS — R29.3 POSTURE IMBALANCE: ICD-10-CM

## 2020-11-10 PROCEDURE — 97140 MANUAL THERAPY 1/> REGIONS: CPT | Mod: PN

## 2020-11-10 PROCEDURE — 97110 THERAPEUTIC EXERCISES: CPT | Mod: PN

## 2020-11-10 NOTE — PROGRESS NOTES
"  Physical Therapy Treatment Note     Name: Karen Cruz Rajwinder  Clinic Number: [id number]    Therapy Diagnosis:   Encounter Diagnoses   Name Primary?    Neck pain     Weakness     Cervical radicular pain     Posture imbalance      Physician: Jaciel Lopez MD    Visit Date: 11/10/2020    Physician Orders: PT Eval and Treat   Medical Diagnosis from Referral: M54.12 (ICD-10-CM) - Radiculopathy, cervical region   Evaluation Date: 11/5/2020  Authorization Period Expiration: 10/22/2021 (eval only)  Plan of Care Expiration: 1/29/2021  Visit # / Visits authorized: 1/ 1 (eval only)     Time In: 10:30  Time Out: 11:15  Total Billable Time: 45 minutes     Precautions: Standard    Subjective     Pt reports: neck pain feels the same. Woke up with a headache this morning but says it went away with drinking coffee. Says HEP does not help reduce c/o intermittent cramping in the R hand.  She was compliant with home exercise program.  Response to previous treatment: fair  Functional change: none    Pain: 3/10  Location: neck, into B shoulders, radiates R UE to 4th and 5th digits    Objective     11/6/2020: MRI to cervical spine: Mild cervical spondylosis as described in detail above at each disc level with findings most pronounced at C4-C5.  No malalignment or cervical spinal cord signal abnormality.    Sandra Cruz received therapeutic exercises to develop strength, endurance, ROM, flexibility, posture and core stabilization for 35 minutes including:  Ulnar nerve glides x 15  Supine Chin tucks x 20 x 3" holds  TA activation x 10 x 10" holds  Seated scap retractions 10 x 10"  Seated UT stretch 3 x 10"  Seated LS stretch 3 x 10"      Sandra Cruz received the following manual therapy techniques: Joint mobilizations, Myofacial release, Soft tissue Mobilization were applied to the: CSP for 10 minutes, including:  C2-7 US GI-II  Suboccipital release with/without C/R  Manual UT/LS/pec stretch - iván      Home Exercises Provided and " Patient Education Provided     Education provided:   - updated HEP, heavy edu on posture    Written Home Exercises Provided: Patient instructed to cont prior HEP.  Exercises were reviewed and Sandra Cruz was able to demonstrate them prior to the end of the session.  Sandra Cruz demonstrated good  understanding of the education provided.     See EMR under Patient Instructions for exercises provided prior visit, 11/10/2020.    Assessment     Good return technique with HEP indicating good compliance at home. New exercises added to promote postural awareness and neutral spine. Good tolerance but noted difficulty maintaining for long periods of time. Noted mild dizziness with stretching right levator scap, which resolved quickly after getting out of stretch. Continue progression of strengthening and postural endurance next visit.  Sandra Cruz is progressing well towards her goals.   Pt prognosis is Good.     Pt will continue to benefit from skilled outpatient physical therapy to address the deficits listed in the problem list box on initial evaluation, provide pt/family education and to maximize pt's level of independence in the home and community environment.     Pt's spiritual, cultural and educational needs considered and pt agreeable to plan of care and goals.     Anticipated barriers to physical therapy: red flag findings for transverse ligament laxity    Goals:   Short Term Goals (4 Weeks):   1. Pt will report 20% reduction in pain of the cervical spine and RUE for ease with childcare  2. PT will demonstrate 1/3 MMT improvement in periscapular strength for ease with upright posture     Long Term Goals (12 Weeks):   1. Pt will report being independent with HEP for maintenance of improvements gained during therapy sessions  2. PT will report 50% reduction of pain of the neck and RUE for ease with return to exercise  3. Pt will demonstrate full BUE and periscapular strength without the provocation of pain for ease  with childcare.  4. Pt will demonstrate appropriate upright posture without external cueing for ease with driving.     Plan     Continue with POC per PT goals.    Yuli Borges, PT

## 2020-11-13 ENCOUNTER — PATIENT OUTREACH (OUTPATIENT)
Dept: ADMINISTRATIVE | Facility: OTHER | Age: 35
End: 2020-11-13

## 2020-11-13 ENCOUNTER — PATIENT MESSAGE (OUTPATIENT)
Dept: PAIN MEDICINE | Facility: CLINIC | Age: 35
End: 2020-11-13

## 2020-11-13 NOTE — PROGRESS NOTES
Health Maintenance Due   Topic Date Due    Pneumococcal Vaccine (Highest Risk) (2 of 3 - PCV13) 11/11/2020     Updates were requested from care everywhere.  Chart was reviewed for overdue Proactive [facility] Encounters ([facility]) topics (CRS, Breast Cancer Screening, Eye exam)  Health Maintenance has been updated.  LINKS immunization registry triggered.  Immunizations were reconciled.

## 2020-11-16 ENCOUNTER — OFFICE VISIT (OUTPATIENT)
Dept: PAIN MEDICINE | Facility: CLINIC | Age: 35
End: 2020-11-16
Payer: COMMERCIAL

## 2020-11-16 ENCOUNTER — PATIENT MESSAGE (OUTPATIENT)
Dept: PAIN MEDICINE | Facility: CLINIC | Age: 35
End: 2020-11-16

## 2020-11-16 ENCOUNTER — OFFICE VISIT (OUTPATIENT)
Dept: INTERNAL MEDICINE | Facility: CLINIC | Age: 35
End: 2020-11-16
Attending: INTERNAL MEDICINE
Payer: COMMERCIAL

## 2020-11-16 VITALS
SYSTOLIC BLOOD PRESSURE: 120 MMHG | HEART RATE: 66 BPM | OXYGEN SATURATION: 100 % | DIASTOLIC BLOOD PRESSURE: 78 MMHG | BODY MASS INDEX: 20.89 KG/M2 | WEIGHT: 122.38 LBS | HEIGHT: 64 IN

## 2020-11-16 VITALS
HEIGHT: 64 IN | HEART RATE: 65 BPM | SYSTOLIC BLOOD PRESSURE: 105 MMHG | OXYGEN SATURATION: 100 % | DIASTOLIC BLOOD PRESSURE: 69 MMHG | WEIGHT: 121.25 LBS | TEMPERATURE: 98 F | BODY MASS INDEX: 20.7 KG/M2 | RESPIRATION RATE: 18 BRPM

## 2020-11-16 DIAGNOSIS — G89.4 CHRONIC PAIN DISORDER: ICD-10-CM

## 2020-11-16 DIAGNOSIS — M79.18 MYOFASCIAL PAIN: ICD-10-CM

## 2020-11-16 DIAGNOSIS — M54.2 NECK PAIN: Primary | ICD-10-CM

## 2020-11-16 DIAGNOSIS — G44.89 OTHER HEADACHE SYNDROME: ICD-10-CM

## 2020-11-16 DIAGNOSIS — Z00.00 ANNUAL PHYSICAL EXAM: Primary | ICD-10-CM

## 2020-11-16 PROCEDURE — 3008F PR BODY MASS INDEX (BMI) DOCUMENTED: ICD-10-PCS | Mod: CPTII,S$GLB,, | Performed by: INTERNAL MEDICINE

## 2020-11-16 PROCEDURE — 99213 PR OFFICE/OUTPT VISIT, EST, LEVL III, 20-29 MIN: ICD-10-PCS | Mod: S$GLB,,, | Performed by: ANESTHESIOLOGY

## 2020-11-16 PROCEDURE — 99999 PR PBB SHADOW E&M-EST. PATIENT-LVL III: CPT | Mod: PBBFAC,,, | Performed by: INTERNAL MEDICINE

## 2020-11-16 PROCEDURE — 99395 PR PREVENTIVE VISIT,EST,18-39: ICD-10-PCS | Mod: S$GLB,,, | Performed by: INTERNAL MEDICINE

## 2020-11-16 PROCEDURE — 99999 PR PBB SHADOW E&M-EST. PATIENT-LVL III: ICD-10-PCS | Mod: PBBFAC,,, | Performed by: ANESTHESIOLOGY

## 2020-11-16 PROCEDURE — 3008F BODY MASS INDEX DOCD: CPT | Mod: CPTII,S$GLB,, | Performed by: ANESTHESIOLOGY

## 2020-11-16 PROCEDURE — 99395 PREV VISIT EST AGE 18-39: CPT | Mod: S$GLB,,, | Performed by: INTERNAL MEDICINE

## 2020-11-16 PROCEDURE — 1126F AMNT PAIN NOTED NONE PRSNT: CPT | Mod: S$GLB,,, | Performed by: ANESTHESIOLOGY

## 2020-11-16 PROCEDURE — 1126F PR PAIN SEVERITY QUANTIFIED, NO PAIN PRESENT: ICD-10-PCS | Mod: S$GLB,,, | Performed by: ANESTHESIOLOGY

## 2020-11-16 PROCEDURE — 99213 OFFICE O/P EST LOW 20 MIN: CPT | Mod: S$GLB,,, | Performed by: ANESTHESIOLOGY

## 2020-11-16 PROCEDURE — 3008F BODY MASS INDEX DOCD: CPT | Mod: CPTII,S$GLB,, | Performed by: INTERNAL MEDICINE

## 2020-11-16 PROCEDURE — 1126F PR PAIN SEVERITY QUANTIFIED, NO PAIN PRESENT: ICD-10-PCS | Mod: S$GLB,,, | Performed by: INTERNAL MEDICINE

## 2020-11-16 PROCEDURE — 1126F AMNT PAIN NOTED NONE PRSNT: CPT | Mod: S$GLB,,, | Performed by: INTERNAL MEDICINE

## 2020-11-16 PROCEDURE — 99999 PR PBB SHADOW E&M-EST. PATIENT-LVL III: CPT | Mod: PBBFAC,,, | Performed by: ANESTHESIOLOGY

## 2020-11-16 PROCEDURE — 3008F PR BODY MASS INDEX (BMI) DOCUMENTED: ICD-10-PCS | Mod: CPTII,S$GLB,, | Performed by: ANESTHESIOLOGY

## 2020-11-16 PROCEDURE — 99999 PR PBB SHADOW E&M-EST. PATIENT-LVL III: ICD-10-PCS | Mod: PBBFAC,,, | Performed by: INTERNAL MEDICINE

## 2020-11-16 NOTE — PROGRESS NOTES
Subjective:       Patient ID: Karen Purdy is a 35 y.o. female.    Chief Complaint: Annual Exam     Karen Purdy is a 35 y.o.  female who presents for Annual Exam  .  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  female who presents for Annual Exam  .  Reports several episodes of syncope in her 20s.  Reports head trauma with the first episode. She had several syncopal episodes but this has not reoccurred.    She reports occasionally noticed a foul smell that her family denies being present.  She also has occasional headaches which start in the back of her neck. Has been improving with PT for neck pain.    Hx of immunodeficiency. No recent infections.     Health Maintenance       Date Due Completion Date    Pneumococcal Vaccine (Highest Risk) (2 of 3 - PCV13) 11/11/2020 11/11/2019    Cervical Cancer Screening 09/29/2023 9/29/2020    Override on 7/27/2013: Done    TETANUS VACCINE 04/05/2028 4/5/2018          Review of Systems   Constitutional: Negative for chills and fever.   HENT: Negative for rhinorrhea and sore throat.    Respiratory: Negative for cough and shortness of breath.    Cardiovascular: Negative for chest pain and palpitations.   Gastrointestinal: Negative for nausea and vomiting.   Genitourinary: Negative for dysuria and hematuria.   Musculoskeletal: Negative for arthralgias and back pain.   Skin: Negative for color change and rash.   Neurological: Positive for headaches. Negative for dizziness, syncope, weakness and numbness.   Psychiatric/Behavioral: Negative for agitation and dysphoric mood.         Past Medical History:   Diagnosis Date    Anxiety     Cystic fibrosis carrier     Pneumonia     frequent bouts    Specific antibody deficiency with normal  "immunoglobulin concentration and normal number of B cells     Unspecified vitamin D deficiency        Past Surgical History:   Procedure Laterality Date    KNEE SURGERY Right     torn meniscus       Family History   Problem Relation Age of Onset    Cancer Maternal Grandfather         lung    Dementia Paternal Grandmother     Hyperlipidemia Mother     Hypertension Mother     Hypertension Father     Hyperlipidemia Father     Breast cancer Neg Hx     Colon cancer Neg Hx     Ovarian cancer Neg Hx        Social History     Tobacco Use    Smoking status: Former Smoker     Packs/day: 0.10     Years: 2.00     Pack years: 0.20    Smokeless tobacco: Never Used   Substance Use Topics    Alcohol use: Yes     Comment: occ - 1/mo    Drug use: No             Objective:   Blood pressure 120/78, pulse 66, height 5' 4" (1.626 m), weight 55.5 kg (122 lb 5.7 oz), SpO2 100 %, currently breastfeeding.     Physical Exam  Constitutional:       General: She is not in acute distress.     Appearance: She is well-developed.   HENT:      Head: Normocephalic and atraumatic.      Right Ear: Tympanic membrane and external ear normal.      Left Ear: Tympanic membrane and external ear normal.      Mouth/Throat:      Pharynx: No oropharyngeal exudate.   Eyes:      General:         Right eye: No discharge.         Left eye: No discharge.      Conjunctiva/sclera: Conjunctivae normal.   Neck:      Thyroid: No thyromegaly.   Cardiovascular:      Heart sounds: Normal heart sounds. No murmur. No friction rub. No gallop.    Pulmonary:      Effort: Pulmonary effort is normal.      Breath sounds: Normal breath sounds. No wheezing or rales.   Abdominal:      General: Bowel sounds are normal. There is no distension.      Palpations: Abdomen is soft.      Tenderness: There is no abdominal tenderness.   Musculoskeletal:         General: No tenderness.   Lymphadenopathy:      Cervical: No cervical adenopathy.   Skin:     General: Skin is warm and " dry.   Neurological:      Mental Status: She is alert and oriented to person, place, and time.   Psychiatric:         Thought Content: Thought content normal.         Prior labs reviewed  Assessment/Plan:        Karen was seen today for annual exam.    Diagnoses and all orders for this visit:    Annual physical exam  Recommend daily sunscreen, cardiovascular exercise min 30 min 5 days per week. Seatbelts routinely. Masks over mouth and nose when in contact with others outside of your immediate household. Wash hands and surfaces frequently to avoid contact with viruses.  Recommend monthly self breast exams and annual mammogram OVER AGE 40 or as recommended.  Also screening  pap with reflex HPV q 3 years or as recommended by gyn provider.    Other headache syndrome  Has apt with neuro        Medication List with Changes/Refills   Current Medications    MELOXICAM (MOBIC) 15 MG TABLET    Take 1 tablet (15 mg total) by mouth once daily.    PRENATAL VIT CALC,IRON,FOLIC (PRENATAL VITAMIN ORAL)    Take by mouth.

## 2020-11-16 NOTE — PROGRESS NOTES
Chronic patient Established Note (Follow up visit)          Interval History 11/16/2020  Mrs. Purdy presents to the clinic today for a follow up appointment for her neck pain. She reports that her neck pain has substantially improved. She credits both the TPI as well as physical therapy. She states that her pain is currently a 0/10. She does still endorse having occasional headaches. However, her headache frequency and severity have also improved significantly. She now states she may be has a headache once or twice a week which she can manage with just over the counter tylenol. She has no radicular symptoms.      Interval history 10/22/2020  Karen Purdy presents to the clinic for a follow-up appointment for neck pain. Since the last visit, Karen Purdy states the pain has been worsening.  She had good relief after the TPI done at the last visit which lasted for a few months, but pain has returned since then and has been increasing in intensity.  Pain is located in the neck area and extends to the suprascapular areas bilaterally worse on the right side.  She also reports that she has been having headaches almost every day that last for a few hours each day.  Headaches described as a bilateral tightness.  Pain and headache are somewhat relieved by taking Advil upto 3 times a day.  Patient does not report any radicular symptoms, however she does state that she has noticed intemittent numbness in her last 2 fingers of the right hand over the last couple of months. Current pain intensity is 5/10.     Initial visit 04/10/2019  Karen Purdy presents to the clinic for the evaluation of neck pain. The pain started 2 months ago following unknown and symptoms have been worsening.The pain is located in the neck area and radiates to the right shoulder.  The pain is described as aching, shooting, stabbing, throbbing and tight band and is rated as 9/10. The pain is rated with a score of  2/10 on the  BEST day and a score of 9/10 on the WORST day.  Symptoms interfere with daily activity and sleeping. The pain is exacerbated by Sitting, Laying, Bending, Touching, Night Time, Morning and Lifting.  The pain is mitigated by heat and medications. She reports spending 0 hours per day reclining. The patient reports 3-4 hours of uninterrupted sleep per night.     Pt reports muscle tightness around her R shoulder. She is carrying her infant carrier on that arm and has been feeling the tightness worsening. Describes a sharp pain w/o radiation alleviated by stretching.      Patient denies night fever/night sweats, urinary incontinence, bowel incontinence, significant weight loss, significant motor weakness and loss of sensations.        Pain Disability Index Review:  Last 3 PDI Scores 10/22/2020 4/10/2019   Pain Disability Index (PDI) 13 28       Pain Medications:    Over the counter tylenol.    Opioid Contract: no     report:  Reviewed and consistent with medication use as prescribed.    Pain Procedures: TPI injections.     Physical Therapy/Home Exercise: yes    Imaging:   Narrative & Impression     EXAMINATION:  MRI CERVICAL SPINE WITHOUT CONTRAST     CLINICAL HISTORY:  Cervical radiculopathy;.  Radiculopathy, cervical region     TECHNIQUE:  Multiplanar, multisequence MR images of the cervical spine were acquired without the administration of contrast.     COMPARISON:  None     FINDINGS:  Cervical spine alignment is intact.     Signal in the cervical spinal cord appears homogeneous.  Posterior fossa structures are unremarkable as imaged.     Craniocervical junction is intact.     Marrow signal is homogeneous in the vertebrae.     C2-C3: There is no dorsal disc abnormality.  Left uncovertebral joint and facet hypertrophy contributes to minor left neural foraminal stenosis.     C3-C4: Is no dorsal disc abnormality.  There is no canal or foraminal stenosis.  Mild facet hypertrophy noted.     C4-C5: There is a small dorsal  disc protrusion.  There is mild effacement of the thecal sac and canal stenosis without cord compression deformity.  Uncovertebral joint hypertrophy is noted without foraminal stenosis bilaterally.     C5-C6: There is no dorsal disc abnormality or stenosis.  Mild facet hypertrophy bilaterally noted.     C6-C7: There is a small central disc protrusion and effacement of the thecal sac.  There is no significant canal stenosis or cord compression deformity.  There is uncovertebral joint and facet hypertrophy and mild left neural foraminal stenosis.     C7-T1: Normal.     Visualized paraspinous structures and soft tissues are unremarkable as imaged     Impression:     Mild cervical spondylosis as described in detail above at each disc level with findings most pronounced at C4-C5.     No malalignment or cervical spinal cord signal abnormality.        Electronically signed by: Brit Pillai  Date:                                            11/06/2020  Time:                                           13:         Allergies:   Review of patient's allergies indicates:   Allergen Reactions    Ceclor [cefaclor] Anaphylaxis, Swelling and Rash    Pcn [penicillins] Anaphylaxis, Swelling and Rash       Current Medications:   Current Outpatient Medications   Medication Sig Dispense Refill    PRENATAL VIT CALC,IRON,FOLIC (PRENATAL VITAMIN ORAL) Take by mouth.      meloxicam (MOBIC) 15 MG tablet Take 1 tablet (15 mg total) by mouth once daily. (Patient not taking: Reported on 11/16/2020) 30 tablet 1     No current facility-administered medications for this visit.        REVIEW OF SYSTEMS:    GENERAL:  No weight loss, malaise or fevers.  HEENT:  Negative for frequent or significant headaches.  NECK:  Negative for lumps, goiter and significant neck swelling. + neck pain  RESPIRATORY:  Negative for cough, wheezing or shortness of breath.  CARDIOVASCULAR:  Negative for chest pain, leg swelling or palpitations.  GI:  Negative for  abdominal discomfort, blood in stools or black stools or change in bowel habits.  MUSCULOSKELETAL:  See HPI.  SKIN:  Negative for lesions, rash, and itching.  PSYCH:  Negative for mood disorder and recent psychosocial stressors.  HEMATOLOGY/LYMPHOLOGY:  Negative for prolonged bleeding, bruising easily or swollen nodes.  NEURO:   No history of syncope, paralysis, seizures or tremors. Sleeping well. Positive for headaches.    All other reviewed and negative other than HPI.    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     Unspecified vitamin D deficiency        Past Surgical History:  Past Surgical History:   Procedure Laterality Date    KNEE SURGERY Right     torn meniscus       Family History:  Family History   Problem Relation Age of Onset    Cancer Maternal Grandfather         lung    Dementia Paternal Grandmother     Hyperlipidemia Mother     Hypertension Mother     Hypertension Father     Hyperlipidemia Father     Breast cancer Neg Hx     Colon cancer Neg Hx     Ovarian cancer Neg Hx        Social History:  Social History     Socioeconomic History    Marital status:      Spouse name: Mark    Number of children: 2    Years of education: Not on file    Highest education level: Not on file   Occupational History    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   Social Needs    Financial resource strain: Not very hard    Food insecurity     Worry: Never true     Inability: Never true    Transportation needs     Medical: No     Non-medical: No   Tobacco Use    Smoking status: Former Smoker     Packs/day: 0.10     Years: 2.00     Pack years: 0.20    Smokeless tobacco: Never Used   Substance and Sexual Activity    Alcohol use: Yes     Comment: occ - 1/mo    Drug use: No    Sexual activity: Yes     Partners: Male      "Birth control/protection: None, Coitus interruptus   Lifestyle    Physical activity     Days per week: 1 day     Minutes per session: 60 min    Stress: To some extent   Relationships    Social connections     Talks on phone: More than three times a week     Gets together: More than three times a week     Attends Shinto service: Never     Active member of club or organization: No     Attends meetings of clubs or organizations: Never     Relationship status:    Other Topics Concern    Not on file   Social History Narrative    Not on file       OBJECTIVE:    Temp 98.2 °F (36.8 °C)   Resp 18   Ht 5' 4" (1.626 m)   Wt 55 kg (121 lb 4.1 oz)   SpO2 100%   BMI 20.81 kg/m²     PHYSICAL EXAMINATION:    General appearance: Well appearing, in no acute distress, alert and oriented x3.  Psych:  Mood and affect appropriate.  Skin: Skin color, texture, turgor normal, no rashes or lesions, in both upper and lower body.  Head/face:  Atraumatic, normocephalic. No palpable lymph nodes  Neck: Minimal pain to palpation over the cervical paraspinous muscles, suprascapular and trapezius, right more than left. Spurling Negative. No pain with neck flection.   Cor: RRR  Pulm: CTA  GI: Abdomen soft and non-tender.  Back: Straight leg raising in the sitting and supine positions is negative to radicular pain. No pain to palpation over the spine or costovertebral angles. Normal range of motion without pain reproduction.  Extremities: Peripheral joint ROM is full and pain free without obvious instability or laxity in all four extremities. No deformities, edema, or skin discoloration. Good capillary refill.  Musculoskeletal: Shoulder, hip, sacroiliac and knee provocative maneuvers are negative. Bilateral upper and lower extremity strength is normal and symmetric.  No atrophy or tone abnormalities are noted.  Neuro: Bilateral upper and lower extremity coordination and muscle stretch reflexes are physiologic and symmetric.  " Plantar response are downgoing. No loss of sensation is noted.  Gait: Normal.    ASSESSMENT: 35 y.o. year old female with neck pain, consistent with myofascial pain     1. Neck pain     2. Chronic pain disorder     3. Myofascial pain           PLAN:     - I have stressed the importance of physical activity and a home exercise plan to help with pain and improve health.  - complete physical therapy for  treatment of myofascial pain.  - RTC as needed.   - Counseled patient regarding the importance of activity modification, constant sleeping habits and physical therapy.     The above plan and management options were discussed at length with patient. Patient is in agreement with the above and verbalized understanding.    Laz Guerra  11/16/2020     I have reviewed and concur with the resident's history, physical, assessment, and plan.  I have personally interviewed and examined the patient at bedside.  See below addendum for my evaluation and additional findings.  35-year-old female with chronic neck pain consistent with myofascial pain.  Patient is here today for follow-up status post trigger point injection with almost complete resolution of pain.  She does not take any medications at this point and her pain is well controlled. Cervical spine MRI results were reviewed with patient today.  Encouraged her to continue physical therapy and will follow up with her as needed.  If her pain returns will consider repeat trigger point injection in the future.    Issa Chong MD

## 2020-11-16 NOTE — PROGRESS NOTES
Chronic patient Established Note (Follow up visit)          Interval History 11/16/2020  Mrs. Purdy presents to the clinic today for a follow up appointment for her neck pain. She reports that her neck pain has substantially improved. She credits both the TPI as well as physical therapy. She states that her pain is currently a 0/10. She does still endorse having occasional headaches. However, her headache frequency and severity have also improved significantly. She now states she may be has a headache once or twice a week which she can manage with just over the counter tylenol. She has no radicular symptoms.      Interval history 10/22/2020  Karen Purdy presents to the clinic for a follow-up appointment for neck pain. Since the last visit, Karen Purdy states the pain has been worsening.  She had good relief after the TPI done at the last visit which lasted for a few months, but pain has returned since then and has been increasing in intensity.  Pain is located in the neck area and extends to the suprascapular areas bilaterally worse on the right side.  She also reports that she has been having headaches almost every day that last for a few hours each day.  Headaches described as a bilateral tightness.  Pain and headache are somewhat relieved by taking Advil upto 3 times a day.  Patient does not report any radicular symptoms, however she does state that she has noticed intemittent numbness in her last 2 fingers of the right hand over the last couple of months. Current pain intensity is 5/10.     Initial visit 04/10/2019  Karen Purdy presents to the clinic for the evaluation of neck pain. The pain started 2 months ago following unknown and symptoms have been worsening.The pain is located in the neck area and radiates to the right shoulder.  The pain is described as aching, shooting, stabbing, throbbing and tight band and is rated as 9/10. The pain is rated with a score of  2/10 on the  BEST day and a score of 9/10 on the WORST day.  Symptoms interfere with daily activity and sleeping. The pain is exacerbated by Sitting, Laying, Bending, Touching, Night Time, Morning and Lifting.  The pain is mitigated by heat and medications. She reports spending 0 hours per day reclining. The patient reports 3-4 hours of uninterrupted sleep per night.     Pt reports muscle tightness around her R shoulder. She is carrying her infant carrier on that arm and has been feeling the tightness worsening. Describes a sharp pain w/o radiation alleviated by stretching.      Patient denies night fever/night sweats, urinary incontinence, bowel incontinence, significant weight loss, significant motor weakness and loss of sensations.        Pain Disability Index Review:  Last 3 PDI Scores 10/22/2020 4/10/2019   Pain Disability Index (PDI) 13 28       Pain Medications:    Over the counter tylenol.    Opioid Contract: no     report:  Reviewed and consistent with medication use as prescribed.    Pain Procedures: TPI injections.     Physical Therapy/Home Exercise: yes    Imaging:   Narrative & Impression     EXAMINATION:  MRI CERVICAL SPINE WITHOUT CONTRAST     CLINICAL HISTORY:  Cervical radiculopathy;.  Radiculopathy, cervical region     TECHNIQUE:  Multiplanar, multisequence MR images of the cervical spine were acquired without the administration of contrast.     COMPARISON:  None     FINDINGS:  Cervical spine alignment is intact.     Signal in the cervical spinal cord appears homogeneous.  Posterior fossa structures are unremarkable as imaged.     Craniocervical junction is intact.     Marrow signal is homogeneous in the vertebrae.     C2-C3: There is no dorsal disc abnormality.  Left uncovertebral joint and facet hypertrophy contributes to minor left neural foraminal stenosis.     C3-C4: Is no dorsal disc abnormality.  There is no canal or foraminal stenosis.  Mild facet hypertrophy noted.     C4-C5: There is a small dorsal  disc protrusion.  There is mild effacement of the thecal sac and canal stenosis without cord compression deformity.  Uncovertebral joint hypertrophy is noted without foraminal stenosis bilaterally.     C5-C6: There is no dorsal disc abnormality or stenosis.  Mild facet hypertrophy bilaterally noted.     C6-C7: There is a small central disc protrusion and effacement of the thecal sac.  There is no significant canal stenosis or cord compression deformity.  There is uncovertebral joint and facet hypertrophy and mild left neural foraminal stenosis.     C7-T1: Normal.     Visualized paraspinous structures and soft tissues are unremarkable as imaged     Impression:     Mild cervical spondylosis as described in detail above at each disc level with findings most pronounced at C4-C5.     No malalignment or cervical spinal cord signal abnormality.        Electronically signed by: Brit Pillai  Date:                                            11/06/2020  Time:                                           13:         Allergies:   Review of patient's allergies indicates:   Allergen Reactions    Ceclor [cefaclor] Anaphylaxis, Swelling and Rash    Pcn [penicillins] Anaphylaxis, Swelling and Rash       Current Medications:   Current Outpatient Medications   Medication Sig Dispense Refill    PRENATAL VIT CALC,IRON,FOLIC (PRENATAL VITAMIN ORAL) Take by mouth.      meloxicam (MOBIC) 15 MG tablet Take 1 tablet (15 mg total) by mouth once daily. (Patient not taking: Reported on 11/16/2020) 30 tablet 1     No current facility-administered medications for this visit.        REVIEW OF SYSTEMS:    GENERAL:  No weight loss, malaise or fevers.  HEENT:  Negative for frequent or significant headaches.  NECK:  Negative for lumps, goiter and significant neck swelling. + neck pain  RESPIRATORY:  Negative for cough, wheezing or shortness of breath.  CARDIOVASCULAR:  Negative for chest pain, leg swelling or palpitations.  GI:  Negative for  abdominal discomfort, blood in stools or black stools or change in bowel habits.  MUSCULOSKELETAL:  See HPI.  SKIN:  Negative for lesions, rash, and itching.  PSYCH:  Negative for mood disorder and recent psychosocial stressors.  HEMATOLOGY/LYMPHOLOGY:  Negative for prolonged bleeding, bruising easily or swollen nodes.  NEURO:   No history of syncope, paralysis, seizures or tremors. Sleeping well. Positive for headaches.    All other reviewed and negative other than HPI.    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     Unspecified vitamin D deficiency        Past Surgical History:  Past Surgical History:   Procedure Laterality Date    KNEE SURGERY Right     torn meniscus       Family History:  Family History   Problem Relation Age of Onset    Cancer Maternal Grandfather         lung    Dementia Paternal Grandmother     Hyperlipidemia Mother     Hypertension Mother     Hypertension Father     Hyperlipidemia Father     Breast cancer Neg Hx     Colon cancer Neg Hx     Ovarian cancer Neg Hx        Social History:  Social History     Socioeconomic History    Marital status:      Spouse name: Mark    Number of children: 2    Years of education: Not on file    Highest education level: Not on file   Occupational History    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   Social Needs    Financial resource strain: Not very hard    Food insecurity     Worry: Never true     Inability: Never true    Transportation needs     Medical: No     Non-medical: No   Tobacco Use    Smoking status: Former Smoker     Packs/day: 0.10     Years: 2.00     Pack years: 0.20    Smokeless tobacco: Never Used   Substance and Sexual Activity    Alcohol use: Yes     Comment: occ - 1/mo    Drug use: No    Sexual activity: Yes     Partners: Male      "Birth control/protection: None, Coitus interruptus   Lifestyle    Physical activity     Days per week: 1 day     Minutes per session: 60 min    Stress: To some extent   Relationships    Social connections     Talks on phone: More than three times a week     Gets together: More than three times a week     Attends L[employer] service: Never     Active member of club or organization: No     Attends meetings of clubs or organizations: Never     Relationship status:    Other Topics Concern    Not on file   Social History Narrative    Not on file       OBJECTIVE:    Temp 98.2 °F (36.8 °C)   Resp 18   Ht 5' 4" (1.626 m)   Wt 55 kg (121 lb 4.1 oz)   SpO2 100%   BMI 20.81 kg/m²     PHYSICAL EXAMINATION:    General appearance: Well appearing, in no acute distress, alert and oriented x3.  Psych:  Mood and affect appropriate.  Skin: Skin color, texture, turgor normal, no rashes or lesions, in both upper and lower body.  Head/face:  Atraumatic, normocephalic. No palpable lymph nodes  Neck: Minimal pain to palpation over the cervical paraspinous muscles, suprascapular and trapezius, right more than left. Spurling Negative. No pain with neck flection.   Cor: RRR  Pulm: CTA  GI: Abdomen soft and non-tender.  Back: Straight leg raising in the sitting and supine positions is negative to radicular pain. No pain to palpation over the spine or costovertebral angles. Normal range of motion without pain reproduction.  Extremities: Peripheral joint ROM is full and pain free without obvious instability or laxity in all four extremities. No deformities, edema, or skin discoloration. Good capillary refill.  Musculoskeletal: Shoulder, hip, sacroiliac and knee provocative maneuvers are negative. Bilateral upper and lower extremity strength is normal and symmetric.  No atrophy or tone abnormalities are noted.  Neuro: Bilateral upper and lower extremity coordination and muscle stretch reflexes are physiologic and symmetric.  " Plantar response are downgoing. No loss of sensation is noted.  Gait: Normal.    ASSESSMENT: 35 y.o. year old female with neck pain, consistent with myofascial pain     1. Neck pain     2. Chronic pain disorder     3. Myofascial pain           PLAN:     - I have stressed the importance of physical activity and a home exercise plan to help with pain and improve health.  - complete physical therapy for  treatment of myofascial pain.  - RTC as needed.   - Counseled patient regarding the importance of activity modification, constant sleeping habits and physical therapy.     The above plan and management options were discussed at length with patient. Patient is in agreement with the above and verbalized understanding.    Laz Guerra  11/16/2020     I have reviewed and concur with the resident's history, physical, assessment, and plan.  I have personally interviewed and examined the patient at bedside.  See below addendum for my evaluation and additional findings.  35-year-old female with chronic neck pain consistent with myofascial pain.  Patient is here today for follow-up status post trigger point injection with almost complete resolution of pain.  She does not take any medications at this point and her pain is well controlled.  Cervical spine MRI results were reviewed with patient today.  Encouraged her to continue physical therapy and will follow up with her as needed.  If her pain returns will consider repeat trigger point injection in the future.    Issa Chong MD

## 2020-11-18 ENCOUNTER — PATIENT MESSAGE (OUTPATIENT)
Dept: INTERNAL MEDICINE | Facility: CLINIC | Age: 35
End: 2020-11-18

## 2020-11-18 DIAGNOSIS — R51.9 NONINTRACTABLE HEADACHE, UNSPECIFIED CHRONICITY PATTERN, UNSPECIFIED HEADACHE TYPE: Primary | ICD-10-CM

## 2020-11-18 DIAGNOSIS — Z03.818 ENCOUNTER FOR OBSERVATION FOR SUSPECTED EXPOSURE TO OTHER BIOLOGICAL AGENTS RULED OUT: ICD-10-CM

## 2020-11-18 DIAGNOSIS — R51.9 HEAD ACHE: ICD-10-CM

## 2020-11-20 ENCOUNTER — CLINICAL SUPPORT (OUTPATIENT)
Dept: REHABILITATION | Facility: OTHER | Age: 35
End: 2020-11-20
Payer: COMMERCIAL

## 2020-11-20 DIAGNOSIS — M54.2 NECK PAIN: ICD-10-CM

## 2020-11-20 DIAGNOSIS — R53.1 WEAKNESS: ICD-10-CM

## 2020-11-20 DIAGNOSIS — M54.12 CERVICAL RADICULAR PAIN: ICD-10-CM

## 2020-11-20 DIAGNOSIS — R29.3 POSTURE IMBALANCE: ICD-10-CM

## 2020-11-20 PROCEDURE — 97140 MANUAL THERAPY 1/> REGIONS: CPT | Mod: PN

## 2020-11-20 PROCEDURE — 97110 THERAPEUTIC EXERCISES: CPT | Mod: PN

## 2020-11-20 NOTE — PROGRESS NOTES
"  Physical Therapy Treatment Note     Name: Karen Cruz Rajwinder  Clinic Number: [id number]    Therapy Diagnosis:   Encounter Diagnoses   Name Primary?    Neck pain     Weakness     Cervical radicular pain     Posture imbalance      Physician: Jaciel Lopez MD    Visit Date: 11/20/2020    Physician Orders: PT Eval and Treat   Medical Diagnosis from Referral: M54.12 (ICD-10-CM) - Radiculopathy, cervical region   Evaluation Date: 11/5/2020  Authorization Period Expiration: 10/22/2021 (eval only)  Plan of Care Expiration: 1/29/2021  Visit # / Visits authorized: 3/ 1 (eval only)     Time In: 9:45  Time Out: 10:40  Total Billable Time: 45 minutes     Precautions: Standard    Subjective     Pt reports: doing well until this past Monday, where she had a flare up of headache and neck pain. Reduced to a current tightness in the base of the skull and behind the eyes.  She was compliant with home exercise program.  Response to previous treatment: fair  Functional change: none    Pain: 3/10  Location: neck, into B shoulders, radiates R UE to 4th and 5th digits    Objective     11/6/2020: MRI to cervical spine: Mild cervical spondylosis as described in detail above at each disc level with findings most pronounced at C4-C5.  No malalignment or cervical spinal cord signal abnormality.    Snadra Cruz received therapeutic exercises to develop strength, endurance, ROM, flexibility, posture and core stabilization for 25 minutes including:  Ulnar nerve glides x 15  Supine Chin tucks x 20 x 3" holds  TA activation x 10 x 10" holds  Seated scap retractions 10 x 10"  Seated UT stretch 3 x 10"  Seated LS stretch 3 x 10"  +C2 self SNAG 5 x 5"      Sandra Cruz received the following manual therapy techniques: Joint mobilizations, Myofacial release, Soft tissue Mobilization were applied to the: CSP for 20 minutes, including:  C2-7 US GI-II  Suboccipital release with/without C/R  Manual UT/LS/pec stretch - iván  20 min x dry needling - " see note by Yuli Mcduffie, PT    Modalities: 10 min x MHP at end of session    Home Exercises Provided and Patient Education Provided     Education provided:   - updated HEP, heavy edu on posture    Written Home Exercises Provided: Patient instructed to cont prior HEP.  Exercises were reviewed and Sandra Cruz was able to demonstrate them prior to the end of the session.  Sandra Cruz demonstrated good  understanding of the education provided.     See EMR under Patient Instructions for exercises provided prior visit, 11/10/2020.    Assessment     Good tolerance with therex today with noted improvement in neural tension, as seen with hand closer to head with nerve glides. Added self SNAGs at C2 for headache relief at home. Good return technique. Initiated dry needling today to reduce c/o neck and head pain.   Continue progression of strengthening and postural endurance next visit.    Sandra Cruz is progressing well towards her goals.   Pt prognosis is Good.     Pt will continue to benefit from skilled outpatient physical therapy to address the deficits listed in the problem list box on initial evaluation, provide pt/family education and to maximize pt's level of independence in the home and community environment.     Pt's spiritual, cultural and educational needs considered and pt agreeable to plan of care and goals.     Anticipated barriers to physical therapy: red flag findings for transverse ligament laxity    Goals:   Short Term Goals (4 Weeks):   1. Pt will report 20% reduction in pain of the cervical spine and RUE for ease with childcare  2. PT will demonstrate 1/3 MMT improvement in periscapular strength for ease with upright posture     Long Term Goals (12 Weeks):   1. Pt will report being independent with HEP for maintenance of improvements gained during therapy sessions  2. PT will report 50% reduction of pain of the neck and RUE for ease with return to exercise  3. Pt will demonstrate full BUE and  periscapular strength without the provocation of pain for ease with childcare.  4. Pt will demonstrate appropriate upright posture without external cueing for ease with driving.     Plan     Continue with POC per PT goals.    Yuli Borges, PT

## 2020-11-20 NOTE — PROGRESS NOTES
Dry Needling Daily Note     Patient ID: Karen Purdy is a 35 y.o. female.  Diagnosis:   1. Neck pain     2. Weakness     3. Cervical radicular pain     4. Posture imbalance       Date:  11/20/2020    Total Time:  20 min    Subjective:     Pt reports: tension in the base of the skull, bilateral neck, and into the shoulders.  Pain Scale: Karen rates pain on a scale of 0-10 to be 3 currently.    Objective:     Pt signed written consent to dry needling Rx.  Pt gave verbal consent for DN.   Pt rec'd dry needling to neck, shoulders WENDIE with 1 in needles with no adverse effects.    Homeostatic points:  1. Deep Radial  2. Greater Auricular  3. Spinal Accessory  4. Saphenous  5. Deep Fibular  6. Tibial  7. Greater Occipital  8. Suprascapular ( infraspinatus)  9. Lateral Antebrachial Cutaneous  10. Sural  11. Lateral Popliteal  12. Superficial Radial  13. Dorsal Scapular  14. Superior Cluneal  15. Posterior Cutaneous L 2  16. Inferior Gluteal  17. Lateral Pectoral  18. Ilitotibal  19. Infraorbital  20. Spinous process T7  21. Posterior cutaneous  T6  22. Posterior cutaneous L 5  23. Supraorbital  24. Common fibular    Paravertebral Points:  T1-2    Symptomatic Points:   UT, LS, lateral occiput    Assessment:     Patient demonstrated appropriate response to FDN. Presents with increased tone/tenderness to L>R occipitals and upper traps. Winding technique used today with appropriate training effect. Noted improved myofascial mobility and reduced c/o pain at end of session.    Patient Education/Response:     Education provided re:   Purpose, benefits, and potential side effects of dry needling.   Educated pt to use heat following treatment sessions to reduce c/o pain or soreness and to improve circulation to needled sites.   Encouraged pt to continue with HEP to maintain flexibility, ROM, and functional mobility.  Karen verbalized good understanding of education     Plans and Goals:     Monitor response to FDN.  Continue with FDN in POC as tolerated.     Yuli Borges, PT  11/20/2020

## 2020-11-23 ENCOUNTER — PATIENT MESSAGE (OUTPATIENT)
Dept: PAIN MEDICINE | Facility: CLINIC | Age: 35
End: 2020-11-23

## 2020-11-30 ENCOUNTER — PATIENT MESSAGE (OUTPATIENT)
Dept: INTERNAL MEDICINE | Facility: CLINIC | Age: 35
End: 2020-11-30

## 2020-11-30 NOTE — PROGRESS NOTES
"  Physical Therapy Treatment Note     [place] [facility]  [place]inic Number: [id number]    Therapy Diagnosis:   Encounter Diagnoses   Name Primary?    Neck pain Yes    Weakness     Cervical radicular pain     Posture imbalance      Physician: Jaciel Lopez MD    Visit Date: 12/1/2020    Physician Orders: PT Eval and Treat   Medical Diagnosis from Referral: M54.12 (ICD-10-CM) - Radiculopathy, cervical region   Evaluation Date: 11/5/2020  Authorization Period Expiration: 12/31/2020  Plan of Care Expiration: 1/29/2021  Visit # / Visits authorized: 4/ 20     Time In: 0920  Time Out: 1000  Total Billable Time: 40 minutes     Precautions: Standard    Subjective     Pt reports: overall feeling some improvement. She reports she'd had a HA for 10 days, got covid test to be sure (came back negative). She says HA has improved, but she still has a lot of tightness to neck and has been clenching her teeth significant at night. Reports tenderness and tightness to jaw.   She was compliant with home exercise program.  Response to previous treatment: fair  Functional change: none    Pain: 3/10  Location: neck, into B shoulders, radiates R UE to 4th and 5th digits    Objective     11/6/2020: MRI to cervical spine: Mild cervical spondylosis as described in detail above at each disc level with findings most pronounced at C4-C5.  No malalignment or cervical spinal cord signal abnormality.    Sandra Cruz received therapeutic exercises to develop strength, endurance, ROM, flexibility, posture and core stabilization for 10 minutes including:  Education regarding bruxism with HA  Ulnar nerve glides x 15  Supine Chin tucks x 20 x 3" holds  TA activation x 10 x 10" holds  Seated scap retractions 10 x 10"  Seated UT stretch 3 x 10"  Seated LS stretch 3 x 10"  +C2 self SNAG 5 x 5"      Sandra Cruz received the following manual therapy techniques: Joint mobilizations, Myofacial release, Soft tissue Mobilization were applied to the: " CSP for 30 minutes, including:  C2-7 US GI-II  Suboccipital release with/without C/R  Manual UT/LS/pec stretch - iván     30 min x Application of TDN: Pt educated on benefits and potential side effects of dry needling. Educated pt on benefits, precautions, side effects following TDN. Educated pt to use heat following treatment sessions if pt is experiencing pain or soreness. Pt verbalized good understanding of education.  Pt signed written consent to dry needling. Pt gave verbal consent for DN    Pt received dry needling to the below listed muscles using 15, 30, and 40 mm needles.  In prone: B suboccipitals (GB20, GV16), UT, LS  In supine: B pterygoids, masseter, temporalis  Winding performed every 5 minutes during treatment.      Modalities: 00 min x MHP at end of session    Home Exercises Provided and Patient Education Provided     Education provided:   - updated HEP, heavy edu on posture  - educated regarding role of bruxism with neck pain and headaches. advised to contact her dentist regarding  to alleviate clenching.     Written Home Exercises Provided: yes.  Exercises were reviewed and Sandra Cruz was able to demonstrate them prior to the end of the session.  Sandra Cruz demonstrated good  understanding of the education provided.     See EMR under Patient Instructions for exercises provided 11/5/2020, 11/10/2020.    Assessment     Good tolerance to treatment today. Pt with continued complaints of tightness to neck and shoulders, as well as cervicogenic Has. She reports frequent clenching at night due to stress, and increased tone is noted to B masseters and pterygoids. Good soft tissue response to dry needling evident by increased grasp with unilateral winding at all insertion points, particularly R LS and B pterygoids. Winding performed every 5 minutes during treatment. No adverse effects following treatment.    Sandra Cruz is progressing well towards her goals.   Pt prognosis is Good.     Pt will  continue to benefit from skilled outpatient physical therapy to address the deficits listed in the problem list box on initial evaluation, provide pt/family education and to maximize pt's level of independence in the home and community environment.     Pt's spiritual, cultural and educational needs considered and pt agreeable to plan of care and goals.     Anticipated barriers to physical therapy: red flag findings for transverse ligament laxity    Goals:   Short Term Goals (4 Weeks):   1. Pt will report 20% reduction in pain of the cervical spine and RUE for ease with childcare  2. PT will demonstrate 1/3 MMT improvement in periscapular strength for ease with upright posture     Long Term Goals (12 Weeks):   1. Pt will report being independent with HEP for maintenance of improvements gained during therapy sessions  2. PT will report 50% reduction of pain of the neck and RUE for ease with return to exercise  3. Pt will demonstrate full BUE and periscapular strength without the provocation of pain for ease with childcare.  4. Pt will demonstrate appropriate upright posture without external cueing for ease with driving.     Plan     Continue with POC per PT goals with focus on postural strengthening. Continue dry needling as needed.     Shona Marte, PT

## 2020-12-01 ENCOUNTER — CLINICAL SUPPORT (OUTPATIENT)
Dept: REHABILITATION | Facility: OTHER | Age: 35
End: 2020-12-01
Payer: COMMERCIAL

## 2020-12-01 DIAGNOSIS — M54.12 CERVICAL RADICULAR PAIN: ICD-10-CM

## 2020-12-01 DIAGNOSIS — R29.3 POSTURE IMBALANCE: ICD-10-CM

## 2020-12-01 DIAGNOSIS — R53.1 WEAKNESS: ICD-10-CM

## 2020-12-01 DIAGNOSIS — M54.2 NECK PAIN: Primary | ICD-10-CM

## 2020-12-01 PROCEDURE — 97110 THERAPEUTIC EXERCISES: CPT | Mod: PN | Performed by: PHYSICAL THERAPIST

## 2020-12-01 PROCEDURE — 97140 MANUAL THERAPY 1/> REGIONS: CPT | Mod: PN | Performed by: PHYSICAL THERAPIST

## 2020-12-03 ENCOUNTER — CLINICAL SUPPORT (OUTPATIENT)
Dept: REHABILITATION | Facility: OTHER | Age: 35
End: 2020-12-03
Payer: COMMERCIAL

## 2020-12-03 DIAGNOSIS — R29.3 POSTURE IMBALANCE: ICD-10-CM

## 2020-12-03 DIAGNOSIS — R53.1 WEAKNESS: ICD-10-CM

## 2020-12-03 DIAGNOSIS — M54.12 CERVICAL RADICULAR PAIN: ICD-10-CM

## 2020-12-03 DIAGNOSIS — M54.2 NECK PAIN: Primary | ICD-10-CM

## 2020-12-03 PROCEDURE — 97110 THERAPEUTIC EXERCISES: CPT | Mod: PN | Performed by: PHYSICAL THERAPIST

## 2020-12-03 NOTE — PROGRESS NOTES
"  Physical Therapy Treatment Note     Name: Karen Cruz Rajwinder  Clinic Number: [id number]    Therapy Diagnosis:   Encounter Diagnoses   Name Primary?    Neck pain Yes    Weakness     Cervical radicular pain     Posture imbalance      Physician: Jaciel Lopez MD    Visit Date: 12/3/2020    Physician Orders: PT Eval and Treat   Medical Diagnosis from Referral: M54.12 (ICD-10-CM) - Radiculopathy, cervical region   Evaluation Date: 11/5/2020  Authorization Period Expiration: 12/31/2020  Plan of Care Expiration: 1/29/2021  Visit # / Visits authorized: 5/ 20     Time In: 0955  Time Out: 1030  Total Billable Time: 35 minutes     Precautions: Standard    Subjective     Pt reports: feeling tight and sore, mild HA today. She says tingling has been a little better, and she's been keeping up with her exercises.   She was compliant with home exercise program.  Response to previous treatment: soreness for a few days  Functional change: none    Pain: 3/10  Location: neck, into B shoulders, radiates R UE to 4th and 5th digits    Objective     11/6/2020: MRI to cervical spine: Mild cervical spondylosis as described in detail above at each disc level with findings most pronounced at C4-C5.  No malalignment or cervical spinal cord signal abnormality.    Sandra Cruz received therapeutic exercises to develop strength, endurance, ROM, flexibility, posture and core stabilization for 35 minutes including:    Ulnar nerve glides x 15  Chin tucks x 20 x 3" holds  TA activation x 10 x 10" holds  Seated scap retractions 10 x 10"  Seated UT stretch 3 x 10"  Seated LS stretch 3 x 10"  C2 self SNAG 5 x 5"  +Still point inducer x 3 min  +1/2 foam pec stretch x 2 min  +1/2 foam supine punch, horiz abd/add, flex/ext 20x  +1/2 foam flasher 20x  +Prone scap squeeze 10" x 10  +Prone T's 3" x 10        Sandra Cruz received the following manual therapy techniques: Joint mobilizations, Myofacial release, Soft tissue Mobilization were applied to " the: CSP for 00 minutes, including:  C2-7 US GI-II  Suboccipital release with/without C/R  Manual UT/LS/pec stretch - iván     30 min x Application of TDN: Pt educated on benefits and potential side effects of dry needling. Educated pt on benefits, precautions, side effects following TDN. Educated pt to use heat following treatment sessions if pt is experiencing pain or soreness. Pt verbalized good understanding of education.  Pt signed written consent to dry needling. Pt gave verbal consent for DN    Pt received dry needling to the below listed muscles using 15, 30, and 40 mm needles.  In prone: B suboccipitals (GB20, GV16), UT, LS  In supine: B pterygoids, masseter, temporalis  Winding performed every 5 minutes during treatment.      Modalities: 00 min x MHP at end of session    Home Exercises Provided and Patient Education Provided     Education provided:   - updated HEP, heavy edu on posture  - educated regarding role of bruxism with neck pain and headaches. advised to contact her dentist regarding  to alleviate clenching.     Written Home Exercises Provided: yes.  Exercises were reviewed and Sandra Cruz was able to demonstrate them prior to the end of the session.  Sandra Cruz demonstrated good  understanding of the education provided.     See EMR under Patient Instructions for exercises provided 11/5/2020, 11/10/2020.    Assessment     Manual tx held to all pt recovery time (continues with min soreness). Good tolerance to treatment today. Reports resolution of HA at end of session. Verbal cuing to limit rib flaring with scapular exercises with good return demonstration. Mild fatigue reported at end of session.     Sandra Cruz is progressing well towards her goals.   Pt prognosis is Good.     Pt will continue to benefit from skilled outpatient physical therapy to address the deficits listed in the problem list box on initial evaluation, provide pt/family education and to maximize pt's level of  independence in the home and community environment.     Pt's spiritual, cultural and educational needs considered and pt agreeable to plan of care and goals.     Anticipated barriers to physical therapy: red flag findings for transverse ligament laxity    Goals:   Short Term Goals (4 Weeks):   1. Pt will report 20% reduction in pain of the cervical spine and RUE for ease with childcare. Progressing, not met   2. PT will demonstrate 1/3 MMT improvement in periscapular strength for ease with upright posture. Progressing, not met      Long Term Goals (12 Weeks):   1. Pt will report being independent with HEP for maintenance of improvements gained during therapy sessions. Progressing, not met   2. PT will report 50% reduction of pain of the neck and RUE for ease with return to exercise. Progressing, not met   3. Pt will demonstrate full BUE and periscapular strength without the provocation of pain for ease with childcare. Progressing, not met   4. Pt will demonstrate appropriate upright posture without external cueing for ease with driving. Progressing, not met     Plan     Continue with POC per PT goals with focus on postural strengthening. Continue dry needling as needed.     Shona Marte, PT

## 2020-12-09 ENCOUNTER — OFFICE VISIT (OUTPATIENT)
Dept: PAIN MEDICINE | Facility: CLINIC | Age: 35
End: 2020-12-09
Payer: COMMERCIAL

## 2020-12-09 ENCOUNTER — PATIENT MESSAGE (OUTPATIENT)
Dept: PAIN MEDICINE | Facility: CLINIC | Age: 35
End: 2020-12-09

## 2020-12-09 VITALS
HEIGHT: 64 IN | BODY MASS INDEX: 20.7 KG/M2 | OXYGEN SATURATION: 100 % | WEIGHT: 121.25 LBS | TEMPERATURE: 97 F | RESPIRATION RATE: 18 BRPM | SYSTOLIC BLOOD PRESSURE: 110 MMHG | DIASTOLIC BLOOD PRESSURE: 71 MMHG | HEART RATE: 67 BPM

## 2020-12-09 DIAGNOSIS — M79.18 MYOFASCIAL PAIN: ICD-10-CM

## 2020-12-09 DIAGNOSIS — G89.4 CHRONIC PAIN DISORDER: Primary | ICD-10-CM

## 2020-12-09 PROCEDURE — 99999 PR PBB SHADOW E&M-EST. PATIENT-LVL III: CPT | Mod: PBBFAC,,, | Performed by: ANESTHESIOLOGY

## 2020-12-09 PROCEDURE — 1125F PR PAIN SEVERITY QUANTIFIED, PAIN PRESENT: ICD-10-PCS | Mod: S$GLB,,, | Performed by: ANESTHESIOLOGY

## 2020-12-09 PROCEDURE — 3008F PR BODY MASS INDEX (BMI) DOCUMENTED: ICD-10-PCS | Mod: CPTII,S$GLB,, | Performed by: ANESTHESIOLOGY

## 2020-12-09 PROCEDURE — 1125F AMNT PAIN NOTED PAIN PRSNT: CPT | Mod: S$GLB,,, | Performed by: ANESTHESIOLOGY

## 2020-12-09 PROCEDURE — 99212 PR OFFICE/OUTPT VISIT, EST, LEVL II, 10-19 MIN: ICD-10-PCS | Mod: 25,S$GLB,, | Performed by: ANESTHESIOLOGY

## 2020-12-09 PROCEDURE — 20552 NJX 1/MLT TRIGGER POINT 1/2: CPT | Mod: S$GLB,,, | Performed by: ANESTHESIOLOGY

## 2020-12-09 PROCEDURE — 99212 OFFICE O/P EST SF 10 MIN: CPT | Mod: 25,S$GLB,, | Performed by: ANESTHESIOLOGY

## 2020-12-09 PROCEDURE — 3008F BODY MASS INDEX DOCD: CPT | Mod: CPTII,S$GLB,, | Performed by: ANESTHESIOLOGY

## 2020-12-09 PROCEDURE — 20552 PR INJECT TRIGGER POINT, 1 OR 2: ICD-10-PCS | Mod: S$GLB,,, | Performed by: ANESTHESIOLOGY

## 2020-12-09 PROCEDURE — 99999 PR PBB SHADOW E&M-EST. PATIENT-LVL III: ICD-10-PCS | Mod: PBBFAC,,, | Performed by: ANESTHESIOLOGY

## 2020-12-09 NOTE — PROGRESS NOTES
Chronic patient Established Note (Follow up visit)      Interval History 12/9/2020:    Karen Purdy presents to the clinic for a follow-up appointment for michael and shoulder pain. Since the last visit, Karen Purdy states the pain has been worsening. Current pain intensity is 7/10.    Interval History 11/16/2020  Mrs. Purdy presents to the clinic today for a follow up appointment for her neck pain. She reports that her neck pain has substantially improved. She credits both the TPI as well as physical therapy. She states that her pain is currently a 0/10. She does still endorse having occasional headaches. However, her headache frequency and severity have also improved significantly. She now states she may be has a headache once or twice a week which she can manage with just over the counter tylenol. She has no radicular symptoms.       Interval history 10/22/2020  Karen Purdy presents to the clinic for a follow-up appointment for neck pain. Since the last visit, Karen Purdy states the pain has been worsening.  She had good relief after the TPI done at the last visit which lasted for a few months, but pain has returned since then and has been increasing in intensity.  Pain is located in the neck area and extends to the suprascapular areas bilaterally worse on the right side.  She also reports that she has been having headaches almost every day that last for a few hours each day.  Headaches described as a bilateral tightness.  Pain and headache are somewhat relieved by taking Advil upto 3 times a day.  Patient does not report any radicular symptoms, however she does state that she has noticed intemittent numbness in her last 2 fingers of the right hand over the last couple of months. Current pain intensity is 5/10.     Initial visit 04/10/2019  Karen Purdy presents to the clinic for the evaluation of neck pain. The pain started 2 months ago following unknown and symptoms  have been worsening.The pain is located in the neck area and radiates to the right shoulder.  The pain is described as aching, shooting, stabbing, throbbing and tight band and is rated as 9/10. The pain is rated with a score of  2/10 on the BEST day and a score of 9/10 on the WORST day.  Symptoms interfere with daily activity and sleeping. The pain is exacerbated by Sitting, Laying, Bending, Touching, Night Time, Morning and Lifting.  The pain is mitigated by heat and medications. She reports spending 0 hours per day reclining. The patient reports 3-4 hours of uninterrupted sleep per night.     Pt reports muscle tightness around her R shoulder. She is carrying her infant carrier on that arm and has been feeling the tightness worsening. Describes a sharp pain w/o radiation alleviated by stretching.      Patient denies night fever/night sweats, urinary incontinence, bowel incontinence, significant weight loss, significant motor weakness and loss of sensations.    Pain Disability Index Review:  Last 3 PDI Scores 12/9/2020 10/22/2020 4/10/2019   Pain Disability Index (PDI) 14 13 28       Pain Medications:     Over the counter tylenol.     Opioid Contract: no      report:  Reviewed and consistent with medication use as prescribed.     Pain Procedures: TPI injections.      Physical Therapy/Home Exercise: yes    Imaging:  Narrative & Impression     EXAMINATION:  MRI CERVICAL SPINE WITHOUT CONTRAST     CLINICAL HISTORY:  Cervical radiculopathy;.  Radiculopathy, cervical region     TECHNIQUE:  Multiplanar, multisequence MR images of the cervical spine were acquired without the administration of contrast.     COMPARISON:  None     FINDINGS:  Cervical spine alignment is intact.     Signal in the cervical spinal cord appears homogeneous.  Posterior fossa structures are unremarkable as imaged.     Craniocervical junction is intact.     Marrow signal is homogeneous in the vertebrae.     C2-C3: There is no dorsal disc  abnormality.  Left uncovertebral joint and facet hypertrophy contributes to minor left neural foraminal stenosis.     C3-C4: Is no dorsal disc abnormality.  There is no canal or foraminal stenosis.  Mild facet hypertrophy noted.     C4-C5: There is a small dorsal disc protrusion.  There is mild effacement of the thecal sac and canal stenosis without cord compression deformity.  Uncovertebral joint hypertrophy is noted without foraminal stenosis bilaterally.     C5-C6: There is no dorsal disc abnormality or stenosis.  Mild facet hypertrophy bilaterally noted.     C6-C7: There is a small central disc protrusion and effacement of the thecal sac.  There is no significant canal stenosis or cord compression deformity.  There is uncovertebral joint and facet hypertrophy and mild left neural foraminal stenosis.     C7-T1: Normal.     Visualized paraspinous structures and soft tissues are unremarkable as imaged     Impression:     Mild cervical spondylosis as described in detail above at each disc level with findings most pronounced at C4-C5.     No malalignment or cervical spinal cord signal abnormality.        Electronically signed by: Brit Pillai  Date:                                            11/06/2020  Time:                                           13:45         Allergies:   Review of patient's allergies indicates:   Allergen Reactions    Ceclor [cefaclor] Anaphylaxis, Swelling and Rash    Pcn [penicillins] Anaphylaxis, Swelling and Rash       Current Medications:   Current Outpatient Medications   Medication Sig Dispense Refill    meloxicam (MOBIC) 15 MG tablet Take 1 tablet (15 mg total) by mouth once daily. (Patient not taking: Reported on 11/16/2020) 30 tablet 1    PRENATAL VIT CALC,IRON,FOLIC (PRENATAL VITAMIN ORAL) Take by mouth.       No current facility-administered medications for this visit.        REVIEW OF SYSTEMS:    GENERAL:  No weight loss, malaise or fevers.  HEENT:  Negative for frequent or  significant headaches.  NECK:  Negative for lumps, goiter and significant neck swelling. + neck pain  RESPIRATORY:  Negative for cough, wheezing or shortness of breath.  CARDIOVASCULAR:  Negative for chest pain, leg swelling or palpitations.  GI:  Negative for abdominal discomfort, blood in stools or black stools or change in bowel habits.  MUSCULOSKELETAL:  See HPI.  SKIN:  Negative for lesions, rash, and itching.  PSYCH:  Negative for mood disorder and recent psychosocial stressors.  HEMATOLOGY/LYMPHOLOGY:  Negative for prolonged bleeding, bruising easily or swollen nodes.  NEURO:   No history of syncope, paralysis, seizures or tremors. Sleeping well. Positive for headaches.    All other reviewed and negative other than HPI.    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     Unspecified vitamin D deficiency        Past Surgical History:  Past Surgical History:   Procedure Laterality Date    KNEE SURGERY Right     torn meniscus       Family History:  Family History   Problem Relation Age of Onset    Cancer Maternal Grandfather         lung    Dementia Paternal Grandmother     Hyperlipidemia Mother     Hypertension Mother     Hypertension Father     Hyperlipidemia Father     Breast cancer Neg Hx     Colon cancer Neg Hx     Ovarian cancer Neg Hx        Social History:  Social History     Socioeconomic History    Marital status:      Spouse name: Mark    Number of children: 2    Years of education: Not on file    Highest education level: Not on file   Occupational History    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   Social Needs    Financial resource strain: Not very hard    Food insecurity     Worry: Never true     Inability: Never true    Transportation needs     Medical: No     Non-medical: No   Tobacco Use     "Smoking status: Former Smoker     Packs/day: 0.10     Years: 2.00     Pack years: 0.20    Smokeless tobacco: Never Used   Substance and Sexual Activity    Alcohol use: Yes     Comment: occ - 1/mo    Drug use: No    Sexual activity: Yes     Partners: Male     Birth control/protection: None, Coitus interruptus   Lifestyle    Physical activity     Days per week: 1 day     Minutes per session: 60 min    Stress: To some extent   Relationships    Social connections     Talks on phone: More than three times a week     Gets together: More than three times a week     Attends [employer] service: Never     Active member of club or organization: No     Attends meetings of clubs or organizations: Never     Relationship status:    Other Topics Concern    Not on file   Social History Narrative    Not on file       OBJECTIVE:    /71   Pulse 67   Temp 97.2 °F (36.2 °C)   Resp 18   Ht 5' 4" (1.626 m)   Wt 55 kg (121 lb 4.1 oz)   SpO2 100%   BMI 20.81 kg/m²     PHYSICAL EXAMINATION:    General appearance: Well appearing, in no acute distress, alert and oriented x3.  Psych:  Mood and affect appropriate.  Skin: Skin color, texture, turgor normal, no rashes or lesions, in both upper and lower body.  Head/face:  Atraumatic, normocephalic. No palpable lymph nodes  Neck: Minimal pain to palpation over the cervical paraspinous muscles, suprascapular and trapezius, right more than left. Spurling Negative. No pain with neck flexion.   Cor: RRR  Pulm: CTA  GI: Abdomen soft and non-tender.  Back: Straight leg raising in the sitting and supine positions is negative to radicular pain. No pain to palpation over the spine or costovertebral angles. Normal range of motion without pain reproduction.  Extremities: Peripheral joint ROM is full and pain free without obvious instability or laxity in all four extremities. No deformities, edema, or skin discoloration. Good capillary refill.  Musculoskeletal: Shoulder, hip, " sacroiliac and knee provocative maneuvers are negative. Bilateral upper and lower extremity strength is normal and symmetric.  No atrophy or tone abnormalities are noted.  Neuro: Bilateral upper and lower extremity coordination and muscle stretch reflexes are physiologic and symmetric.  Plantar response are downgoing. No loss of sensation is noted.  Gait: Normal.    ASSESSMENT: 35 y.o. year old female with chronic neck and shoulder pain consistent with myofascial pain.  Patient continues to attend physical therapy and reports significant improvement in pain with physical therapy and dry needling.  Will perform trigger point injection today.  Will follow up with her in 4 weeks.      1. Chronic pain disorder     2. Myofascial pain           PLAN:     - I have stressed the importance of physical activity and a home exercise plan to help with pain and improve health.  - TPI today.  - RTC as needed  - Counseled patient regarding the importance of activity modification and physical therapy.    The above plan and management options were discussed at length with patient. Patient is in agreement with the above and verbalized understanding.    Issa Castillo  12/09/2020      Patient Name: Karen Purdy  MRN: [id number]    INFORMED CONSENT: The procedure, risks, benefits and options were discussed with patient. There are no contraindications to the procedure. The patient expressed understanding and agreed to proceed. The personnel performing the procedure was discussed. I verify that I personally obtained Karen's consent prior to the start of the procedure and the signed consent can be found on the patient's chart.    Procedure Date: 12/09/2020    Anesthesia: Topical    Pre Procedure diagnosis:   1. Chronic pain disorder    2. Myofascial pain        Post-Procedure diagnosis: same      Sedation: None    PROCEDURE: TRIGGER POINT INJECTION  The patient was placed in a seated position and time out was perfomed. The site  of pain and procedure were confirmed with the patient prior to starting the procedure. After performing time out. The patient's  trigger points were identified and marked at bilateral cervical paraspinal and trapezoid muscles. The skin was prepped with chlorhexidine three times.   After performing time out A 27-gauge 1.5 inch  needle was advanced through the skin and subcutaneous tissues.  Aspiration for blood, air and CSF was negative.  A total of 10 ml of Bupivacaine 0.25% and 10 mg Decadron was injected at all trigger point.  No complications were evident. No specimens collected.    Blood Loss: Nill  Specimen: None    Issa Chong MD

## 2020-12-09 NOTE — PROGRESS NOTES
"  Physical Therapy Treatment Note     Name: Karen Cruz Rajwinder  Clinic Number: [id number]    Therapy Diagnosis:   Encounter Diagnoses   Name Primary?    Neck pain Yes    Weakness     Cervical radicular pain     Posture imbalance      Physician: Jaciel Lopez MD    Visit Date: 12/10/2020    Physician Orders: PT Eval and Treat   Medical Diagnosis from Referral: M54.12 (ICD-10-CM) - Radiculopathy, cervical region   Evaluation Date: 11/5/2020  Authorization Period Expiration: 12/31/2020  Plan of Care Expiration: 1/29/2021  Visit # / Visits authorized: 6/ 20     Time In: 0915 (pt arrived late)  Time Out: 0945  Total Billable Time: 30 minutes     Precautions: Standard    Subjective     Pt reports: feeling a little better today. Had cervical injections yesterday, reports MD told her she was fine to continue with dry needling. Continues with tightness and clicking to jaw.   She was compliant with home exercise program.  Response to previous treatment: soreness for a few days  Functional change: none    Pain: 3/10  Location: neck, into B shoulders, radiates R UE to 4th and 5th digits    Objective     11/6/2020: MRI to cervical spine: Mild cervical spondylosis as described in detail above at each disc level with findings most pronounced at C4-C5.  No malalignment or cervical spinal cord signal abnormality.    Sandra Cruz received therapeutic exercises to develop strength, endurance, ROM, flexibility, posture and core stabilization for 10 minutes including:  Exercises in bold performed today:     Review of new therex for HEP  Ulnar nerve glides x 15  Chin tucks x 20 x 3" holds  TA activation x 10 x 10" holds  Seated scap retractions 10 x 10"  Seated UT stretch 3 x 10"  Seated LS stretch 3 x 10"  C2 self SNAG 5 x 5"  Still point inducer x 3 min  1/2 foam pec stretch x 2 min  1/2 foam supine punch, horiz abd/add, flex/ext 20x  1/2 foam flasher 20x  Prone scap squeeze 10" x 10  Prone T's 3" x 10        Sandra Cruz " received the following manual therapy techniques: Joint mobilizations, Myofacial release, Soft tissue Mobilization were applied to the: CSP for 00 minutes, including:  C2-7 US GI-II  Suboccipital release with/without C/R  Manual UT/LS/pec stretch - iván     20 min x Application of TDN: Pt educated on benefits and potential side effects of dry needling. Educated pt on benefits, precautions, side effects following TDN. Educated pt to use heat following treatment sessions if pt is experiencing pain or soreness. Pt verbalized good understanding of education.  Pt signed written consent to dry needling. Pt gave verbal consent for DN    Pt received dry needling to the below listed muscles using 15 and 40 mm needles.    In supine: B pterygoids, masseter, temporalis  Winding performed every 5 minutes during treatment.      Modalities: 00 min x MHP at end of session    Home Exercises Provided and Patient Education Provided     Education provided:   - updated HEP, heavy edu on posture  - educated regarding role of bruxism with neck pain and headaches. advised to contact her dentist regarding  to alleviate clenching.     Written Home Exercises Provided: yes.  Exercises were reviewed and Sandra Cruz was able to demonstrate them prior to the end of the session.  Sandra Cruz demonstrated good  understanding of the education provided.     See EMR under Patient Instructions for exercises provided 11/5/2020, 11/10/2020.    Assessment     Limited progression of therex as pt arrived late for session. Good soft tissue response to dry needling, particularly at B pterygoids. Added new scapular strengthening for HEP.       Sandra Cruz is progressing well towards her goals.   Pt prognosis is Good.     Pt will continue to benefit from skilled outpatient physical therapy to address the deficits listed in the problem list box on initial evaluation, provide pt/family education and to maximize pt's level of independence in the home and  community environment.     Pt's spiritual, cultural and educational needs considered and pt agreeable to plan of care and goals.     Anticipated barriers to physical therapy: red flag findings for transverse ligament laxity    Goals:   Short Term Goals (4 Weeks):   1. Pt will report 20% reduction in pain of the cervical spine and RUE for ease with childcare. Progressing, not met   2. PT will demonstrate 1/3 MMT improvement in periscapular strength for ease with upright posture. Progressing, not met      Long Term Goals (12 Weeks):   1. Pt will report being independent with HEP for maintenance of improvements gained during therapy sessions. Progressing, not met   2. PT will report 50% reduction of pain of the neck and RUE for ease with return to exercise. Progressing, not met   3. Pt will demonstrate full BUE and periscapular strength without the provocation of pain for ease with childcare. Progressing, not met   4. Pt will demonstrate appropriate upright posture without external cueing for ease with driving. Progressing, not met     Plan     Continue with POC per PT goals with focus on postural strengthening. Continue dry needling as needed.     Shona Marte, PT

## 2020-12-10 ENCOUNTER — CLINICAL SUPPORT (OUTPATIENT)
Dept: REHABILITATION | Facility: OTHER | Age: 35
End: 2020-12-10
Payer: COMMERCIAL

## 2020-12-10 DIAGNOSIS — R53.1 WEAKNESS: ICD-10-CM

## 2020-12-10 DIAGNOSIS — M54.2 NECK PAIN: Primary | ICD-10-CM

## 2020-12-10 DIAGNOSIS — M54.12 CERVICAL RADICULAR PAIN: ICD-10-CM

## 2020-12-10 DIAGNOSIS — R29.3 POSTURE IMBALANCE: ICD-10-CM

## 2020-12-10 PROCEDURE — 97140 MANUAL THERAPY 1/> REGIONS: CPT | Mod: PN | Performed by: PHYSICAL THERAPIST

## 2020-12-10 PROCEDURE — 97110 THERAPEUTIC EXERCISES: CPT | Mod: PN | Performed by: PHYSICAL THERAPIST

## 2020-12-15 ENCOUNTER — CLINICAL SUPPORT (OUTPATIENT)
Dept: REHABILITATION | Facility: OTHER | Age: 35
End: 2020-12-15
Payer: COMMERCIAL

## 2020-12-15 DIAGNOSIS — R29.3 POSTURE IMBALANCE: ICD-10-CM

## 2020-12-15 DIAGNOSIS — M54.2 NECK PAIN: Primary | ICD-10-CM

## 2020-12-15 DIAGNOSIS — M54.12 CERVICAL RADICULAR PAIN: ICD-10-CM

## 2020-12-15 DIAGNOSIS — R53.1 WEAKNESS: ICD-10-CM

## 2020-12-15 PROCEDURE — 97110 THERAPEUTIC EXERCISES: CPT | Mod: PN | Performed by: PHYSICAL THERAPIST

## 2020-12-15 NOTE — PROGRESS NOTES
"  Physical Therapy Treatment Note     Name: [facility] [place]  Clinic Number: [id number]    Therapy Diagnosis:   Encounter Diagnoses   Name Primary?    Neck pain Yes    Weakness     Cervical radicular pain     Posture imbalance      Physician: Jaciel Lopez MD    Visit Date: 12/15/2020    Physician Orders: PT Eval and Treat   Medical Diagnosis from Referral: M54.12 (ICD-10-CM) - Radiculopathy, cervical region   Evaluation Date: 11/5/2020  Authorization Period Expiration: 12/31/2020  Plan of Care Expiration: 1/29/2021  Visit # / Visits authorized: 7/ 20     Time In: 0905  Time Out: 0945  Total Billable Time: 40 minutes     Precautions: Standard    Subjective     Pt reports: jaw is feeling tight. Had a headache yesterday, but better today.    She was compliant with home exercise program.  Response to previous treatment: soreness for a few days  Functional change: none    Pain: 3/10  Location: neck, into B shoulders, radiates R UE to 4th and 5th digits    Objective     11/6/2020: MRI to cervical spine: Mild cervical spondylosis as described in detail above at each disc level with findings most pronounced at C4-C5.  No malalignment or cervical spinal cord signal abnormality.    Sandra Cruz received therapeutic exercises to develop strength, endurance, ROM, flexibility, posture and core stabilization for 40 minutes including:  Exercises in bold performed today:     Ulnar nerve glides x 15  Chin tucks x 20 x 3" holds  TA activation x 10 x 10" holds  Seated scap retractions 10 x 10"  Seated UT stretch 3 x 10"  Seated LS stretch 3 x 10"  C2 self SNAG 5 x 5"  Still point inducer x 3 min  1/2 foam pec stretch x 2 min  1/2 foam supine punch, horiz abd/add, flex/ext 20x  1/2 foam flasher 20x ytb  Prone scap squeeze 10" x 10  Prone Y/T/A's 3" x 10  +Shoulder extension with TrA 5" x 20        Sandra Cruz received the following manual therapy techniques: Joint mobilizations, Myofacial release, Soft tissue Mobilization " were applied to the: CSP for 00 minutes, including:  C2-7 US GI-II  Suboccipital release with/without C/R  Manual UT/LS/pec stretch - iván     00 min x Application of TDN: Pt educated on benefits and potential side effects of dry needling. Educated pt on benefits, precautions, side effects following TDN. Educated pt to use heat following treatment sessions if pt is experiencing pain or soreness. Pt verbalized good understanding of education.  Pt signed written consent to dry needling. Pt gave verbal consent for DN    Pt received dry needling to the below listed muscles using 15 and 40 mm needles.    In supine: B pterygoids, masseter, temporalis  Winding performed every 5 minutes during treatment.      Modalities: 00 min x MHP at end of session    Home Exercises Provided and Patient Education Provided     Education provided:   - updated HEP, heavy edu on posture  - educated regarding role of bruxism with neck pain and headaches. advised to contact her dentist regarding  to alleviate clenching.     Written Home Exercises Provided: yes.  Exercises were reviewed and Sandra Cruz was able to demonstrate them prior to the end of the session.  Sandra Cruz demonstrated good  understanding of the education provided.     See EMR under Patient Instructions for exercises provided 11/5/2020, 11/10/2020.    Assessment     Good tolerance to therex today. Verbal cuing to decrease thoracic extension with good training effect noted.       Sandra Cruz is progressing well towards her goals.   Pt prognosis is Good.     Pt will continue to benefit from skilled outpatient physical therapy to address the deficits listed in the problem list box on initial evaluation, provide pt/family education and to maximize pt's level of independence in the home and community environment.     Pt's spiritual, cultural and educational needs considered and pt agreeable to plan of care and goals.     Anticipated barriers to physical therapy: red  flag findings for transverse ligament laxity    Goals:   Short Term Goals (4 Weeks):   1. Pt will report 20% reduction in pain of the cervical spine and RUE for ease with childcare. Progressing, not met   2. PT will demonstrate 1/3 MMT improvement in periscapular strength for ease with upright posture. Progressing, not met      Long Term Goals (12 Weeks):   1. Pt will report being independent with HEP for maintenance of improvements gained during therapy sessions. Progressing, not met   2. PT will report 50% reduction of pain of the neck and RUE for ease with return to exercise. Progressing, not met   3. Pt will demonstrate full BUE and periscapular strength without the provocation of pain for ease with childcare. Progressing, not met   4. Pt will demonstrate appropriate upright posture without external cueing for ease with driving. Progressing, not met     Plan     Continue with POC per PT goals with focus on postural strengthening. Continue dry needling as needed.     Shona Marte, PT

## 2020-12-17 ENCOUNTER — CLINICAL SUPPORT (OUTPATIENT)
Dept: REHABILITATION | Facility: OTHER | Age: 35
End: 2020-12-17
Payer: COMMERCIAL

## 2020-12-17 DIAGNOSIS — R53.1 WEAKNESS: ICD-10-CM

## 2020-12-17 DIAGNOSIS — R29.3 POSTURE IMBALANCE: ICD-10-CM

## 2020-12-17 DIAGNOSIS — M54.12 CERVICAL RADICULAR PAIN: ICD-10-CM

## 2020-12-17 DIAGNOSIS — M54.2 NECK PAIN: Primary | ICD-10-CM

## 2020-12-17 PROCEDURE — 97140 MANUAL THERAPY 1/> REGIONS: CPT | Mod: PN | Performed by: PHYSICAL THERAPIST

## 2020-12-17 NOTE — PROGRESS NOTES
"  Physical Therapy Treatment Note     Name: [facility] [place]  Clinic Number: [id number]    Therapy Diagnosis:   Encounter Diagnoses   Name Primary?    Neck pain Yes    Weakness     Cervical radicular pain     Posture imbalance      Physician: Jaciel Lopez MD    Visit Date: 12/17/2020    Physician Orders: PT Eval and Treat   Medical Diagnosis from Referral: M54.12 (ICD-10-CM) - Radiculopathy, cervical region   Evaluation Date: 11/5/2020  Authorization Period Expiration: 12/31/2020  Plan of Care Expiration: 1/29/2021  Visit # / Visits authorized: 8/ 20     Time In: 0915  Time Out: 0950  Total Billable Time: 35 minutes     Precautions: Standard    Subjective     Pt reports: jaw is feeling very tight today, but no HA recently.    She was compliant with home exercise program.  Response to previous treatment: soreness for a few days  Functional change: none    Pain: 3/10  Location: neck, into B shoulders, radiates R UE to 4th and 5th digits    Objective     11/6/2020: MRI to cervical spine: Mild cervical spondylosis as described in detail above at each disc level with findings most pronounced at C4-C5.  No malalignment or cervical spinal cord signal abnormality.    Sandra Cruz received therapeutic exercises to develop strength, endurance, ROM, flexibility, posture and core stabilization for 00 minutes including:  Exercises in bold performed today:     Ulnar nerve glides x 15  Chin tucks x 20 x 3" holds  TA activation x 10 x 10" holds  Seated scap retractions 10 x 10"  Seated UT stretch 3 x 10"  Seated LS stretch 3 x 10"  C2 self SNAG 5 x 5"  Still point inducer x 3 min  1/2 foam pec stretch x 2 min  1/2 foam supine punch, horiz abd/add, flex/ext 20x  1/2 foam flasher 20x ytb  Prone scap squeeze 10" x 10  Prone Y/T/A's 3" x 10  Shoulder extension with TrA 5" x 20        Sandra Cruz received the following manual therapy techniques: Joint mobilizations, Myofacial release, Soft tissue Mobilization were applied to " the: CSP for 25 minutes, including:  C2-7 US GI-II  Suboccipital release with/without C/R  Manual UT/LS/pec stretch - iván     25 min x Application of TDN: Pt educated on benefits and potential side effects of dry needling. Educated pt on benefits, precautions, side effects following TDN. Educated pt to use heat following treatment sessions if pt is experiencing pain or soreness. Pt verbalized good understanding of education.  Pt signed written consent to dry needling. Pt gave verbal consent for DN    Pt received dry needling to the below listed muscles using 15 and 40 mm needles.    In supine: B pterygoids, masseter, temporalis  Winding performed every 5 minutes during treatment.      Modalities: 10 min x MHP at end of session    Home Exercises Provided and Patient Education Provided     Education provided:   - updated HEP, heavy edu on posture  - educated regarding role of bruxism with neck pain and headaches. advised to contact her dentist regarding  to alleviate clenching.     Written Home Exercises Provided: yes.  Exercises were reviewed and Sandra Cruz was able to demonstrate them prior to the end of the session.  Sandra Cruz demonstrated good  understanding of the education provided.     See EMR under Patient Instructions for exercises provided 11/5/2020, 11/10/2020.    Assessment     Dry needling performed today to address continued jaw tightness. Significant soft tissue restrictions noted particularly at pterygoids (R>L). Moist heat provided at end of session to reduce soreness.        Sandra Cruz is progressing well towards her goals.   Pt prognosis is Good.     Pt will continue to benefit from skilled outpatient physical therapy to address the deficits listed in the problem list box on initial evaluation, provide pt/family education and to maximize pt's level of independence in the home and community environment.     Pt's spiritual, cultural and educational needs considered and pt agreeable to  plan of care and goals.     Anticipated barriers to physical therapy: red flag findings for transverse ligament laxity    Goals:   Short Term Goals (4 Weeks):   1. Pt will report 20% reduction in pain of the cervical spine and RUE for ease with childcare. Progressing, not met   2. PT will demonstrate 1/3 MMT improvement in periscapular strength for ease with upright posture. Progressing, not met      Long Term Goals (12 Weeks):   1. Pt will report being independent with HEP for maintenance of improvements gained during therapy sessions. Progressing, not met   2. PT will report 50% reduction of pain of the neck and RUE for ease with return to exercise. Progressing, not met   3. Pt will demonstrate full BUE and periscapular strength without the provocation of pain for ease with childcare. Progressing, not met   4. Pt will demonstrate appropriate upright posture without external cueing for ease with driving. Progressing, not met     Plan     Continue with POC per PT goals with focus on postural strengthening. Continue dry needling as needed.     Shona Marte, PT

## 2020-12-18 ENCOUNTER — PATIENT MESSAGE (OUTPATIENT)
Dept: REHABILITATION | Facility: OTHER | Age: 35
End: 2020-12-18

## 2020-12-28 ENCOUNTER — PATIENT MESSAGE (OUTPATIENT)
Dept: REHABILITATION | Facility: OTHER | Age: 35
End: 2020-12-28

## 2021-01-12 ENCOUNTER — OFFICE VISIT (OUTPATIENT)
Dept: PAIN MEDICINE | Facility: CLINIC | Age: 36
End: 2021-01-12
Payer: COMMERCIAL

## 2021-01-12 VITALS
SYSTOLIC BLOOD PRESSURE: 140 MMHG | RESPIRATION RATE: 18 BRPM | WEIGHT: 122.81 LBS | BODY MASS INDEX: 20.97 KG/M2 | DIASTOLIC BLOOD PRESSURE: 66 MMHG | HEART RATE: 80 BPM | OXYGEN SATURATION: 100 % | TEMPERATURE: 97 F | HEIGHT: 64 IN

## 2021-01-12 DIAGNOSIS — M79.18 MYOFASCIAL PAIN SYNDROME: Primary | ICD-10-CM

## 2021-01-12 DIAGNOSIS — G89.4 CHRONIC PAIN DISORDER: ICD-10-CM

## 2021-01-12 PROCEDURE — 99999 PR PBB SHADOW E&M-EST. PATIENT-LVL III: ICD-10-PCS | Mod: PBBFAC,,, | Performed by: ANESTHESIOLOGY

## 2021-01-12 PROCEDURE — 99999 PR PBB SHADOW E&M-EST. PATIENT-LVL III: CPT | Mod: PBBFAC,,, | Performed by: ANESTHESIOLOGY

## 2021-01-12 PROCEDURE — 1126F PR PAIN SEVERITY QUANTIFIED, NO PAIN PRESENT: ICD-10-PCS | Mod: S$GLB,,, | Performed by: ANESTHESIOLOGY

## 2021-01-12 PROCEDURE — 1126F AMNT PAIN NOTED NONE PRSNT: CPT | Mod: S$GLB,,, | Performed by: ANESTHESIOLOGY

## 2021-01-12 PROCEDURE — 3008F PR BODY MASS INDEX (BMI) DOCUMENTED: ICD-10-PCS | Mod: CPTII,S$GLB,, | Performed by: ANESTHESIOLOGY

## 2021-01-12 PROCEDURE — 99213 OFFICE O/P EST LOW 20 MIN: CPT | Mod: S$GLB,,, | Performed by: ANESTHESIOLOGY

## 2021-01-12 PROCEDURE — 3008F BODY MASS INDEX DOCD: CPT | Mod: CPTII,S$GLB,, | Performed by: ANESTHESIOLOGY

## 2021-01-12 PROCEDURE — 99213 PR OFFICE/OUTPT VISIT, EST, LEVL III, 20-29 MIN: ICD-10-PCS | Mod: S$GLB,,, | Performed by: ANESTHESIOLOGY

## 2021-01-12 RX ORDER — MELOXICAM 15 MG/1
15 TABLET ORAL DAILY
Qty: 30 TABLET | Refills: 1 | Status: SHIPPED | OUTPATIENT
Start: 2021-01-12 | End: 2021-04-12

## 2021-01-12 RX ORDER — CYCLOBENZAPRINE HCL 5 MG
5 TABLET ORAL 2 TIMES DAILY PRN
Qty: 20 TABLET | Refills: 0 | Status: SHIPPED | OUTPATIENT
Start: 2021-01-12 | End: 2021-01-22

## 2021-02-03 ENCOUNTER — PATIENT MESSAGE (OUTPATIENT)
Dept: PAIN MEDICINE | Facility: CLINIC | Age: 36
End: 2021-02-03

## 2021-02-04 ENCOUNTER — PATIENT MESSAGE (OUTPATIENT)
Dept: PAIN MEDICINE | Facility: CLINIC | Age: 36
End: 2021-02-04

## 2021-02-12 ENCOUNTER — PATIENT MESSAGE (OUTPATIENT)
Dept: NEUROLOGY | Facility: CLINIC | Age: 36
End: 2021-02-12

## 2021-02-12 ENCOUNTER — OFFICE VISIT (OUTPATIENT)
Dept: NEUROLOGY | Facility: CLINIC | Age: 36
End: 2021-02-12
Payer: COMMERCIAL

## 2021-02-12 DIAGNOSIS — M54.81 BILATERAL OCCIPITAL NEURALGIA: ICD-10-CM

## 2021-02-12 DIAGNOSIS — G44.40 MEDICATION OVERUSE HEADACHE: ICD-10-CM

## 2021-02-12 DIAGNOSIS — R51.9 NONINTRACTABLE HEADACHE, UNSPECIFIED CHRONICITY PATTERN, UNSPECIFIED HEADACHE TYPE: ICD-10-CM

## 2021-02-12 DIAGNOSIS — G43.009 MIGRAINE WITHOUT AURA AND WITHOUT STATUS MIGRAINOSUS, NOT INTRACTABLE: Primary | ICD-10-CM

## 2021-02-12 DIAGNOSIS — M79.18 MYOFASCIAL PAIN: ICD-10-CM

## 2021-02-12 PROCEDURE — 99205 OFFICE O/P NEW HI 60 MIN: CPT | Mod: 95,,, | Performed by: PHYSICIAN ASSISTANT

## 2021-02-12 PROCEDURE — 99205 PR OFFICE/OUTPT VISIT, NEW, LEVL V, 60-74 MIN: ICD-10-PCS | Mod: 95,,, | Performed by: PHYSICIAN ASSISTANT

## 2021-02-12 RX ORDER — NARATRIPTAN 1 MG/1
TABLET ORAL
Qty: 12 TABLET | Refills: 2 | Status: SHIPPED | OUTPATIENT
Start: 2021-02-12 | End: 2022-02-23 | Stop reason: SDUPTHER

## 2021-03-04 ENCOUNTER — PATIENT MESSAGE (OUTPATIENT)
Dept: NEUROLOGY | Facility: CLINIC | Age: 36
End: 2021-03-04

## 2021-04-09 ENCOUNTER — OFFICE VISIT (OUTPATIENT)
Dept: PAIN MEDICINE | Facility: CLINIC | Age: 36
End: 2021-04-09
Payer: COMMERCIAL

## 2021-04-09 VITALS
DIASTOLIC BLOOD PRESSURE: 64 MMHG | SYSTOLIC BLOOD PRESSURE: 109 MMHG | BODY MASS INDEX: 20.97 KG/M2 | RESPIRATION RATE: 18 BRPM | WEIGHT: 122.81 LBS | HEART RATE: 62 BPM | HEIGHT: 64 IN

## 2021-04-09 DIAGNOSIS — M54.12 CERVICAL RADICULAR PAIN: Primary | ICD-10-CM

## 2021-04-09 DIAGNOSIS — M54.2 NECK PAIN: ICD-10-CM

## 2021-04-09 DIAGNOSIS — M79.18 MYOFASCIAL PAIN SYNDROME: ICD-10-CM

## 2021-04-09 PROCEDURE — 3008F BODY MASS INDEX DOCD: CPT | Mod: CPTII,S$GLB,, | Performed by: NURSE PRACTITIONER

## 2021-04-09 PROCEDURE — 1125F AMNT PAIN NOTED PAIN PRSNT: CPT | Mod: S$GLB,,, | Performed by: NURSE PRACTITIONER

## 2021-04-09 PROCEDURE — 99213 OFFICE O/P EST LOW 20 MIN: CPT | Mod: 25,S$GLB,, | Performed by: NURSE PRACTITIONER

## 2021-04-09 PROCEDURE — 3008F PR BODY MASS INDEX (BMI) DOCUMENTED: ICD-10-PCS | Mod: CPTII,S$GLB,, | Performed by: NURSE PRACTITIONER

## 2021-04-09 PROCEDURE — 1125F PR PAIN SEVERITY QUANTIFIED, PAIN PRESENT: ICD-10-PCS | Mod: S$GLB,,, | Performed by: NURSE PRACTITIONER

## 2021-04-09 PROCEDURE — 20553 NJX 1/MLT TRIGGER POINTS 3/>: CPT | Mod: S$GLB,,, | Performed by: NURSE PRACTITIONER

## 2021-04-09 PROCEDURE — 99999 PR PBB SHADOW E&M-EST. PATIENT-LVL III: ICD-10-PCS | Mod: PBBFAC,,, | Performed by: NURSE PRACTITIONER

## 2021-04-09 PROCEDURE — 99213 PR OFFICE/OUTPT VISIT, EST, LEVL III, 20-29 MIN: ICD-10-PCS | Mod: 25,S$GLB,, | Performed by: NURSE PRACTITIONER

## 2021-04-09 PROCEDURE — 20553 PR INJECT TRIGGER POINTS, > 3: ICD-10-PCS | Mod: S$GLB,,, | Performed by: NURSE PRACTITIONER

## 2021-04-09 PROCEDURE — 99999 PR PBB SHADOW E&M-EST. PATIENT-LVL III: CPT | Mod: PBBFAC,,, | Performed by: NURSE PRACTITIONER

## 2021-04-09 RX ORDER — METHOCARBAMOL 500 MG/1
500 TABLET, FILM COATED ORAL 4 TIMES DAILY
Qty: 120 TABLET | Refills: 2 | Status: SHIPPED | OUTPATIENT
Start: 2021-04-09 | End: 2021-05-09

## 2021-04-09 RX ADMIN — BETAMETHASONE SODIUM PHOSPHATE AND BETAMETHASONE ACETATE 6 MG: 3; 3 INJECTION, SUSPENSION INTRA-ARTICULAR; INTRALESIONAL; INTRAMUSCULAR; SOFT TISSUE at 03:04

## 2021-04-09 RX ADMIN — BUPIVACAINE HYDROCHLORIDE 22.5 MG: 2.5 INJECTION, SOLUTION EPIDURAL; INFILTRATION; INTRACAUDAL at 03:04

## 2021-04-12 RX ORDER — BETAMETHASONE SODIUM PHOSPHATE AND BETAMETHASONE ACETATE 3; 3 MG/ML; MG/ML
6 INJECTION, SUSPENSION INTRA-ARTICULAR; INTRALESIONAL; INTRAMUSCULAR; SOFT TISSUE
Status: COMPLETED | OUTPATIENT
Start: 2021-04-09 | End: 2021-04-09

## 2021-04-12 RX ORDER — BUPIVACAINE HYDROCHLORIDE 2.5 MG/ML
9 INJECTION, SOLUTION EPIDURAL; INFILTRATION; INTRACAUDAL
Status: COMPLETED | OUTPATIENT
Start: 2021-04-09 | End: 2021-04-09

## 2021-04-20 ENCOUNTER — OFFICE VISIT (OUTPATIENT)
Dept: SPINE | Facility: CLINIC | Age: 36
End: 2021-04-20
Attending: PHYSICAL MEDICINE & REHABILITATION
Payer: COMMERCIAL

## 2021-04-20 ENCOUNTER — TELEPHONE (OUTPATIENT)
Dept: PAIN MEDICINE | Facility: CLINIC | Age: 36
End: 2021-04-20

## 2021-04-20 VITALS
WEIGHT: 122.81 LBS | BODY MASS INDEX: 20.97 KG/M2 | HEART RATE: 64 BPM | SYSTOLIC BLOOD PRESSURE: 120 MMHG | DIASTOLIC BLOOD PRESSURE: 58 MMHG | HEIGHT: 64 IN

## 2021-04-20 DIAGNOSIS — M79.18 MYOFASCIAL PAIN SYNDROME: ICD-10-CM

## 2021-04-20 DIAGNOSIS — G24.3 ISOLATED CERVICAL DYSTONIA: ICD-10-CM

## 2021-04-20 DIAGNOSIS — G89.4 CHRONIC PAIN DISORDER: Primary | ICD-10-CM

## 2021-04-20 PROCEDURE — 1125F AMNT PAIN NOTED PAIN PRSNT: CPT | Mod: S$GLB,,, | Performed by: PHYSICAL MEDICINE & REHABILITATION

## 2021-04-20 PROCEDURE — 99999 PR PBB SHADOW E&M-EST. PATIENT-LVL III: CPT | Mod: PBBFAC,,, | Performed by: PHYSICAL MEDICINE & REHABILITATION

## 2021-04-20 PROCEDURE — 99204 OFFICE O/P NEW MOD 45 MIN: CPT | Mod: S$GLB,,, | Performed by: PHYSICAL MEDICINE & REHABILITATION

## 2021-04-20 PROCEDURE — 3008F PR BODY MASS INDEX (BMI) DOCUMENTED: ICD-10-PCS | Mod: CPTII,S$GLB,, | Performed by: PHYSICAL MEDICINE & REHABILITATION

## 2021-04-20 PROCEDURE — 1125F PR PAIN SEVERITY QUANTIFIED, PAIN PRESENT: ICD-10-PCS | Mod: S$GLB,,, | Performed by: PHYSICAL MEDICINE & REHABILITATION

## 2021-04-20 PROCEDURE — 99204 PR OFFICE/OUTPT VISIT, NEW, LEVL IV, 45-59 MIN: ICD-10-PCS | Mod: S$GLB,,, | Performed by: PHYSICAL MEDICINE & REHABILITATION

## 2021-04-20 PROCEDURE — 99999 PR PBB SHADOW E&M-EST. PATIENT-LVL III: ICD-10-PCS | Mod: PBBFAC,,, | Performed by: PHYSICAL MEDICINE & REHABILITATION

## 2021-04-20 PROCEDURE — 3008F BODY MASS INDEX DOCD: CPT | Mod: CPTII,S$GLB,, | Performed by: PHYSICAL MEDICINE & REHABILITATION

## 2021-04-20 RX ORDER — LISDEXAMFETAMINE DIMESYLATE 10 MG/1
1 CAPSULE ORAL DAILY
COMMUNITY
Start: 2021-03-26 | End: 2022-04-26

## 2021-04-21 ENCOUNTER — OFFICE VISIT (OUTPATIENT)
Dept: PAIN MEDICINE | Facility: CLINIC | Age: 36
End: 2021-04-21
Attending: ANESTHESIOLOGY
Payer: COMMERCIAL

## 2021-04-21 ENCOUNTER — OFFICE VISIT (OUTPATIENT)
Dept: PAIN MEDICINE | Facility: CLINIC | Age: 36
End: 2021-04-21
Payer: COMMERCIAL

## 2021-04-21 ENCOUNTER — TELEPHONE (OUTPATIENT)
Dept: PAIN MEDICINE | Facility: CLINIC | Age: 36
End: 2021-04-21

## 2021-04-21 VITALS
HEART RATE: 68 BPM | DIASTOLIC BLOOD PRESSURE: 76 MMHG | TEMPERATURE: 97 F | SYSTOLIC BLOOD PRESSURE: 123 MMHG | BODY MASS INDEX: 19.81 KG/M2 | WEIGHT: 116 LBS | RESPIRATION RATE: 18 BRPM | HEIGHT: 64 IN

## 2021-04-21 VITALS — RESPIRATION RATE: 18 BRPM | WEIGHT: 115.94 LBS | BODY MASS INDEX: 19.79 KG/M2 | HEIGHT: 64 IN

## 2021-04-21 DIAGNOSIS — M62.838 MUSCLE SPASM: ICD-10-CM

## 2021-04-21 DIAGNOSIS — G24.3 ISOLATED CERVICAL DYSTONIA: ICD-10-CM

## 2021-04-21 DIAGNOSIS — M79.18 MYOFASCIAL PAIN SYNDROME: ICD-10-CM

## 2021-04-21 DIAGNOSIS — G54.0 THORACIC OUTLET SYNDROME: Primary | ICD-10-CM

## 2021-04-21 DIAGNOSIS — M47.812 CERVICAL SPONDYLOSIS: ICD-10-CM

## 2021-04-21 DIAGNOSIS — G24.3 ISOLATED CERVICAL DYSTONIA: Primary | ICD-10-CM

## 2021-04-21 DIAGNOSIS — G44.86 CERVICOGENIC HEADACHE: ICD-10-CM

## 2021-04-21 PROCEDURE — 99999 PR PBB SHADOW E&M-EST. PATIENT-LVL III: CPT | Mod: PBBFAC,,, | Performed by: NURSE PRACTITIONER

## 2021-04-21 PROCEDURE — 1125F AMNT PAIN NOTED PAIN PRSNT: CPT | Mod: S$GLB,,, | Performed by: NURSE PRACTITIONER

## 2021-04-21 PROCEDURE — 3008F PR BODY MASS INDEX (BMI) DOCUMENTED: ICD-10-PCS | Mod: CPTII,S$GLB,, | Performed by: NURSE PRACTITIONER

## 2021-04-21 PROCEDURE — 1125F PR PAIN SEVERITY QUANTIFIED, PAIN PRESENT: ICD-10-PCS | Mod: S$GLB,,, | Performed by: ANESTHESIOLOGY

## 2021-04-21 PROCEDURE — 1125F PR PAIN SEVERITY QUANTIFIED, PAIN PRESENT: ICD-10-PCS | Mod: S$GLB,,, | Performed by: NURSE PRACTITIONER

## 2021-04-21 PROCEDURE — 3008F PR BODY MASS INDEX (BMI) DOCUMENTED: ICD-10-PCS | Mod: CPTII,S$GLB,, | Performed by: ANESTHESIOLOGY

## 2021-04-21 PROCEDURE — 1125F AMNT PAIN NOTED PAIN PRSNT: CPT | Mod: S$GLB,,, | Performed by: ANESTHESIOLOGY

## 2021-04-21 PROCEDURE — 99213 OFFICE O/P EST LOW 20 MIN: CPT | Mod: S$GLB,,, | Performed by: NURSE PRACTITIONER

## 2021-04-21 PROCEDURE — 99215 PR OFFICE/OUTPT VISIT, EST, LEVL V, 40-54 MIN: ICD-10-PCS | Mod: S$GLB,,, | Performed by: ANESTHESIOLOGY

## 2021-04-21 PROCEDURE — 99999 PR PBB SHADOW E&M-EST. PATIENT-LVL III: ICD-10-PCS | Mod: PBBFAC,,, | Performed by: ANESTHESIOLOGY

## 2021-04-21 PROCEDURE — 99215 OFFICE O/P EST HI 40 MIN: CPT | Mod: S$GLB,,, | Performed by: ANESTHESIOLOGY

## 2021-04-21 PROCEDURE — 99213 PR OFFICE/OUTPT VISIT, EST, LEVL III, 20-29 MIN: ICD-10-PCS | Mod: S$GLB,,, | Performed by: NURSE PRACTITIONER

## 2021-04-21 PROCEDURE — 3008F BODY MASS INDEX DOCD: CPT | Mod: CPTII,S$GLB,, | Performed by: NURSE PRACTITIONER

## 2021-04-21 PROCEDURE — 3008F BODY MASS INDEX DOCD: CPT | Mod: CPTII,S$GLB,, | Performed by: ANESTHESIOLOGY

## 2021-04-21 PROCEDURE — 99999 PR PBB SHADOW E&M-EST. PATIENT-LVL III: ICD-10-PCS | Mod: PBBFAC,,, | Performed by: NURSE PRACTITIONER

## 2021-04-21 PROCEDURE — 99999 PR PBB SHADOW E&M-EST. PATIENT-LVL III: CPT | Mod: PBBFAC,,, | Performed by: ANESTHESIOLOGY

## 2021-05-03 ENCOUNTER — TELEPHONE (OUTPATIENT)
Dept: INTERNAL MEDICINE | Facility: CLINIC | Age: 36
End: 2021-05-03

## 2021-05-03 ENCOUNTER — OFFICE VISIT (OUTPATIENT)
Dept: INTERNAL MEDICINE | Facility: CLINIC | Age: 36
End: 2021-05-03
Attending: INTERNAL MEDICINE
Payer: COMMERCIAL

## 2021-05-03 DIAGNOSIS — R53.83 FATIGUE, UNSPECIFIED TYPE: Primary | ICD-10-CM

## 2021-05-03 DIAGNOSIS — G24.3 CERVICAL DYSTONIA: ICD-10-CM

## 2021-05-03 DIAGNOSIS — M62.838 MUSCLE SPASM: ICD-10-CM

## 2021-05-03 PROCEDURE — 99213 PR OFFICE/OUTPT VISIT, EST, LEVL III, 20-29 MIN: ICD-10-PCS | Mod: 95,,, | Performed by: INTERNAL MEDICINE

## 2021-05-03 PROCEDURE — 99213 OFFICE O/P EST LOW 20 MIN: CPT | Mod: 95,,, | Performed by: INTERNAL MEDICINE

## 2021-05-04 ENCOUNTER — LAB VISIT (OUTPATIENT)
Dept: LAB | Facility: OTHER | Age: 36
End: 2021-05-04
Attending: INTERNAL MEDICINE
Payer: COMMERCIAL

## 2021-05-04 ENCOUNTER — PATIENT MESSAGE (OUTPATIENT)
Dept: INTERNAL MEDICINE | Facility: CLINIC | Age: 36
End: 2021-05-04

## 2021-05-04 DIAGNOSIS — R53.83 FATIGUE, UNSPECIFIED TYPE: ICD-10-CM

## 2021-05-04 DIAGNOSIS — Z00.00 ANNUAL PHYSICAL EXAM: ICD-10-CM

## 2021-05-04 LAB
BASOPHILS # BLD AUTO: 0.05 K/UL (ref 0–0.2)
BASOPHILS NFR BLD: 0.6 % (ref 0–1.9)
CHOLEST SERPL-MCNC: 209 MG/DL (ref 120–199)
CHOLEST/HDLC SERPL: 3.7 {RATIO} (ref 2–5)
DIFFERENTIAL METHOD: NORMAL
EOSINOPHIL # BLD AUTO: 0.1 K/UL (ref 0–0.5)
EOSINOPHIL NFR BLD: 1.3 % (ref 0–8)
ERYTHROCYTE [DISTWIDTH] IN BLOOD BY AUTOMATED COUNT: 12.7 % (ref 11.5–14.5)
HCT VFR BLD AUTO: 42.4 % (ref 37–48.5)
HDLC SERPL-MCNC: 56 MG/DL (ref 40–75)
HDLC SERPL: 26.8 % (ref 20–50)
HGB BLD-MCNC: 13.6 G/DL (ref 12–16)
IMM GRANULOCYTES # BLD AUTO: 0.02 K/UL (ref 0–0.04)
IMM GRANULOCYTES NFR BLD AUTO: 0.3 % (ref 0–0.5)
LDLC SERPL CALC-MCNC: 138.6 MG/DL (ref 63–159)
LYMPHOCYTES # BLD AUTO: 2.2 K/UL (ref 1–4.8)
LYMPHOCYTES NFR BLD: 28.3 % (ref 18–48)
MCH RBC QN AUTO: 27.8 PG (ref 27–31)
MCHC RBC AUTO-ENTMCNC: 32.1 G/DL (ref 32–36)
MCV RBC AUTO: 87 FL (ref 82–98)
MONOCYTES # BLD AUTO: 0.5 K/UL (ref 0.3–1)
MONOCYTES NFR BLD: 5.9 % (ref 4–15)
NEUTROPHILS # BLD AUTO: 4.9 K/UL (ref 1.8–7.7)
NEUTROPHILS NFR BLD: 63.6 % (ref 38–73)
NONHDLC SERPL-MCNC: 153 MG/DL
NRBC BLD-RTO: 0 /100 WBC
PLATELET # BLD AUTO: 329 K/UL (ref 150–450)
PMV BLD AUTO: 10.4 FL (ref 9.2–12.9)
RBC # BLD AUTO: 4.89 M/UL (ref 4–5.4)
TRIGL SERPL-MCNC: 72 MG/DL (ref 30–150)
WBC # BLD AUTO: 7.75 K/UL (ref 3.9–12.7)

## 2021-05-04 PROCEDURE — 36415 COLL VENOUS BLD VENIPUNCTURE: CPT | Performed by: INTERNAL MEDICINE

## 2021-05-04 PROCEDURE — 85025 COMPLETE CBC W/AUTO DIFF WBC: CPT | Performed by: INTERNAL MEDICINE

## 2021-05-04 PROCEDURE — 80061 LIPID PANEL: CPT | Performed by: INTERNAL MEDICINE

## 2021-05-05 ENCOUNTER — HOSPITAL ENCOUNTER (OUTPATIENT)
Dept: CARDIOLOGY | Facility: HOSPITAL | Age: 36
Discharge: HOME | End: 2021-05-05
Attending: ANESTHESIOLOGY
Payer: COMMERCIAL

## 2021-05-05 DIAGNOSIS — G54.0 THORACIC OUTLET SYNDROME: ICD-10-CM

## 2021-05-05 LAB
LEFT BRACHIAL BP: 108 MMHG
LEFT RADIAL BP CV US: 109 MMHG
LEFT ULNAR BP CV US: 99 MMHG
RIGHT BRACHIAL BP CV US: 108 MMHG
RIGHT RADIAL BP CV US: 93 MMHG
RIGHT ULNAR BP CV US: 97 MMHG

## 2021-05-05 PROCEDURE — 93923 UPR/LXTR ART STDY 3+ LVLS: CPT

## 2021-05-05 PROCEDURE — 93923 CV US WRIST BRACHIAL INDICES EXTENDED (CUPID ONLY): ICD-10-PCS | Mod: 26,,, | Performed by: INTERNAL MEDICINE

## 2021-05-05 PROCEDURE — 93923 UPR/LXTR ART STDY 3+ LVLS: CPT | Mod: 26,,, | Performed by: INTERNAL MEDICINE

## 2021-05-07 ENCOUNTER — OFFICE VISIT (OUTPATIENT)
Dept: NEUROLOGY | Facility: CLINIC | Age: 36
End: 2021-05-07
Payer: COMMERCIAL

## 2021-05-07 ENCOUNTER — TELEPHONE (OUTPATIENT)
Dept: NEUROLOGY | Facility: CLINIC | Age: 36
End: 2021-05-07

## 2021-05-07 DIAGNOSIS — R20.2 PARESTHESIAS: ICD-10-CM

## 2021-05-07 DIAGNOSIS — R51.9 NONINTRACTABLE HEADACHE, UNSPECIFIED CHRONICITY PATTERN, UNSPECIFIED HEADACHE TYPE: Primary | ICD-10-CM

## 2021-05-07 PROCEDURE — 99214 PR OFFICE/OUTPT VISIT, EST, LEVL IV, 30-39 MIN: ICD-10-PCS | Mod: 95,,, | Performed by: PHYSICIAN ASSISTANT

## 2021-05-07 PROCEDURE — 99214 OFFICE O/P EST MOD 30 MIN: CPT | Mod: 95,,, | Performed by: PHYSICIAN ASSISTANT

## 2021-05-11 ENCOUNTER — OFFICE VISIT (OUTPATIENT)
Dept: PAIN MEDICINE | Facility: CLINIC | Age: 36
End: 2021-05-11
Attending: ANESTHESIOLOGY
Payer: COMMERCIAL

## 2021-05-11 VITALS
TEMPERATURE: 99 F | WEIGHT: 116.88 LBS | DIASTOLIC BLOOD PRESSURE: 71 MMHG | HEART RATE: 62 BPM | BODY MASS INDEX: 19.96 KG/M2 | SYSTOLIC BLOOD PRESSURE: 108 MMHG | HEIGHT: 64 IN | RESPIRATION RATE: 18 BRPM | OXYGEN SATURATION: 100 %

## 2021-05-11 DIAGNOSIS — G24.3 CERVICAL DYSTONIA: ICD-10-CM

## 2021-05-11 DIAGNOSIS — M62.838 MUSCLE SPASM: Primary | ICD-10-CM

## 2021-05-11 PROCEDURE — 3008F PR BODY MASS INDEX (BMI) DOCUMENTED: ICD-10-PCS | Mod: CPTII,S$GLB,, | Performed by: ANESTHESIOLOGY

## 2021-05-11 PROCEDURE — 99999 PR PBB SHADOW E&M-EST. PATIENT-LVL III: CPT | Mod: PBBFAC,,, | Performed by: ANESTHESIOLOGY

## 2021-05-11 PROCEDURE — 99999 PR PBB SHADOW E&M-EST. PATIENT-LVL III: ICD-10-PCS | Mod: PBBFAC,,, | Performed by: ANESTHESIOLOGY

## 2021-05-11 PROCEDURE — 99499 UNLISTED E&M SERVICE: CPT | Mod: S$GLB,,, | Performed by: ANESTHESIOLOGY

## 2021-05-11 PROCEDURE — 1125F PR PAIN SEVERITY QUANTIFIED, PAIN PRESENT: ICD-10-PCS | Mod: S$GLB,,, | Performed by: ANESTHESIOLOGY

## 2021-05-11 PROCEDURE — 1125F AMNT PAIN NOTED PAIN PRSNT: CPT | Mod: S$GLB,,, | Performed by: ANESTHESIOLOGY

## 2021-05-11 PROCEDURE — 3008F BODY MASS INDEX DOCD: CPT | Mod: CPTII,S$GLB,, | Performed by: ANESTHESIOLOGY

## 2021-05-11 PROCEDURE — 99499 NO LOS: ICD-10-PCS | Mod: S$GLB,,, | Performed by: ANESTHESIOLOGY

## 2021-05-13 ENCOUNTER — OFFICE VISIT (OUTPATIENT)
Dept: NEUROLOGY | Facility: CLINIC | Age: 36
End: 2021-05-13
Payer: COMMERCIAL

## 2021-05-13 ENCOUNTER — PATIENT MESSAGE (OUTPATIENT)
Dept: SPINE | Facility: CLINIC | Age: 36
End: 2021-05-13

## 2021-05-13 VITALS
DIASTOLIC BLOOD PRESSURE: 78 MMHG | HEART RATE: 76 BPM | HEIGHT: 64 IN | BODY MASS INDEX: 19.84 KG/M2 | SYSTOLIC BLOOD PRESSURE: 118 MMHG | WEIGHT: 116.19 LBS

## 2021-05-13 DIAGNOSIS — R43.1 OLFACTORY AURA: ICD-10-CM

## 2021-05-13 DIAGNOSIS — M79.18 MYOFASCIAL PAIN: ICD-10-CM

## 2021-05-13 DIAGNOSIS — R55 POSTURAL DIZZINESS WITH PRESYNCOPE: ICD-10-CM

## 2021-05-13 DIAGNOSIS — M79.18 MYOFASCIAL PAIN SYNDROME: ICD-10-CM

## 2021-05-13 DIAGNOSIS — R42 POSTURAL DIZZINESS WITH PRESYNCOPE: ICD-10-CM

## 2021-05-13 DIAGNOSIS — G43.009 MIGRAINE WITHOUT AURA AND WITHOUT STATUS MIGRAINOSUS, NOT INTRACTABLE: Primary | ICD-10-CM

## 2021-05-13 DIAGNOSIS — R20.2 PARESTHESIAS: ICD-10-CM

## 2021-05-13 PROCEDURE — 99999 PR PBB SHADOW E&M-EST. PATIENT-LVL IV: CPT | Mod: PBBFAC,,, | Performed by: PSYCHIATRY & NEUROLOGY

## 2021-05-13 PROCEDURE — 99999 PR PBB SHADOW E&M-EST. PATIENT-LVL IV: ICD-10-PCS | Mod: PBBFAC,,, | Performed by: PSYCHIATRY & NEUROLOGY

## 2021-05-13 PROCEDURE — 99214 PR OFFICE/OUTPT VISIT, EST, LEVL IV, 30-39 MIN: ICD-10-PCS | Mod: S$GLB,,, | Performed by: PSYCHIATRY & NEUROLOGY

## 2021-05-13 PROCEDURE — 1125F PR PAIN SEVERITY QUANTIFIED, PAIN PRESENT: ICD-10-PCS | Mod: S$GLB,,, | Performed by: PSYCHIATRY & NEUROLOGY

## 2021-05-13 PROCEDURE — 99214 OFFICE O/P EST MOD 30 MIN: CPT | Mod: S$GLB,,, | Performed by: PSYCHIATRY & NEUROLOGY

## 2021-05-13 PROCEDURE — 1125F AMNT PAIN NOTED PAIN PRSNT: CPT | Mod: S$GLB,,, | Performed by: PSYCHIATRY & NEUROLOGY

## 2021-05-13 PROCEDURE — 3008F BODY MASS INDEX DOCD: CPT | Mod: CPTII,S$GLB,, | Performed by: PSYCHIATRY & NEUROLOGY

## 2021-05-13 PROCEDURE — 3008F PR BODY MASS INDEX (BMI) DOCUMENTED: ICD-10-PCS | Mod: CPTII,S$GLB,, | Performed by: PSYCHIATRY & NEUROLOGY

## 2021-05-13 RX ORDER — TIZANIDINE 2 MG/1
2 TABLET ORAL NIGHTLY
Qty: 30 TABLET | Refills: 3 | Status: SHIPPED | OUTPATIENT
Start: 2021-05-13 | End: 2021-07-21

## 2021-05-14 ENCOUNTER — PATIENT MESSAGE (OUTPATIENT)
Dept: SPINE | Facility: CLINIC | Age: 36
End: 2021-05-14

## 2021-05-14 RX ORDER — METHYLPREDNISOLONE 4 MG/1
TABLET ORAL
Qty: 1 PACKAGE | Refills: 0 | Status: SHIPPED | OUTPATIENT
Start: 2021-05-14 | End: 2021-06-04

## 2021-05-18 ENCOUNTER — PATIENT MESSAGE (OUTPATIENT)
Dept: NEUROLOGY | Facility: CLINIC | Age: 36
End: 2021-05-18

## 2021-05-18 ENCOUNTER — TELEPHONE (OUTPATIENT)
Dept: NEUROLOGY | Facility: CLINIC | Age: 36
End: 2021-05-18

## 2021-05-18 ENCOUNTER — PATIENT MESSAGE (OUTPATIENT)
Dept: SPINE | Facility: CLINIC | Age: 36
End: 2021-05-18

## 2021-05-18 NOTE — TELEPHONE ENCOUNTER
----- Message from Gavino Johnson sent at 5/18/2021  4:50 PM CDT -----  Contact: Self  Pt is requesting a call back, please call.      Contact Info [phone] (ivkm)

## 2021-05-26 ENCOUNTER — OFFICE VISIT (OUTPATIENT)
Dept: OBSTETRICS AND GYNECOLOGY | Facility: CLINIC | Age: 36
End: 2021-05-26
Attending: OBSTETRICS & GYNECOLOGY
Payer: COMMERCIAL

## 2021-05-26 ENCOUNTER — PATIENT MESSAGE (OUTPATIENT)
Dept: NEUROLOGY | Facility: CLINIC | Age: 36
End: 2021-05-26

## 2021-05-26 ENCOUNTER — HOSPITAL ENCOUNTER (OUTPATIENT)
Dept: RADIOLOGY | Facility: OTHER | Age: 36
Discharge: HOME | End: 2021-05-26
Attending: PSYCHIATRY & NEUROLOGY
Payer: COMMERCIAL

## 2021-05-26 VITALS
HEIGHT: 64 IN | WEIGHT: 116.19 LBS | SYSTOLIC BLOOD PRESSURE: 108 MMHG | BODY MASS INDEX: 19.84 KG/M2 | DIASTOLIC BLOOD PRESSURE: 66 MMHG

## 2021-05-26 DIAGNOSIS — R43.1 OLFACTORY AURA: ICD-10-CM

## 2021-05-26 DIAGNOSIS — M54.2 NECK PAIN: ICD-10-CM

## 2021-05-26 DIAGNOSIS — Z97.5 CONTRACEPTION, DEVICE INTRAUTERINE: ICD-10-CM

## 2021-05-26 DIAGNOSIS — Z01.419 WELL WOMAN EXAM WITH ROUTINE GYNECOLOGICAL EXAM: Primary | ICD-10-CM

## 2021-05-26 PROCEDURE — 70551 MRI BRAIN STEM W/O DYE: CPT | Mod: 26,,, | Performed by: RADIOLOGY

## 2021-05-26 PROCEDURE — 99395 PR PREVENTIVE VISIT,EST,18-39: ICD-10-PCS | Mod: S$GLB,,, | Performed by: OBSTETRICS & GYNECOLOGY

## 2021-05-26 PROCEDURE — 99395 PREV VISIT EST AGE 18-39: CPT | Mod: S$GLB,,, | Performed by: OBSTETRICS & GYNECOLOGY

## 2021-05-26 PROCEDURE — 70551 MRI BRAIN STEM W/O DYE: CPT | Mod: TC

## 2021-05-26 PROCEDURE — 3008F PR BODY MASS INDEX (BMI) DOCUMENTED: ICD-10-PCS | Mod: CPTII,S$GLB,, | Performed by: OBSTETRICS & GYNECOLOGY

## 2021-05-26 PROCEDURE — 70551 MRI BRAIN WITHOUT CONTRAST: ICD-10-PCS | Mod: 26,,, | Performed by: RADIOLOGY

## 2021-05-26 PROCEDURE — 1125F PR PAIN SEVERITY QUANTIFIED, PAIN PRESENT: ICD-10-PCS | Mod: S$GLB,,, | Performed by: OBSTETRICS & GYNECOLOGY

## 2021-05-26 PROCEDURE — 3008F BODY MASS INDEX DOCD: CPT | Mod: CPTII,S$GLB,, | Performed by: OBSTETRICS & GYNECOLOGY

## 2021-05-26 PROCEDURE — 1125F AMNT PAIN NOTED PAIN PRSNT: CPT | Mod: S$GLB,,, | Performed by: OBSTETRICS & GYNECOLOGY

## 2021-05-26 PROCEDURE — 99999 PR PBB SHADOW E&M-EST. PATIENT-LVL III: ICD-10-PCS | Mod: PBBFAC,,, | Performed by: OBSTETRICS & GYNECOLOGY

## 2021-05-26 PROCEDURE — 99999 PR PBB SHADOW E&M-EST. PATIENT-LVL III: CPT | Mod: PBBFAC,,, | Performed by: OBSTETRICS & GYNECOLOGY

## 2021-05-27 ENCOUNTER — TELEPHONE (OUTPATIENT)
Dept: SPORTS MEDICINE | Facility: CLINIC | Age: 36
End: 2021-05-27

## 2021-05-27 ENCOUNTER — PATIENT MESSAGE (OUTPATIENT)
Dept: SPORTS MEDICINE | Facility: CLINIC | Age: 36
End: 2021-05-27

## 2021-05-27 ENCOUNTER — PATIENT MESSAGE (OUTPATIENT)
Dept: ORTHOPEDICS | Facility: CLINIC | Age: 36
End: 2021-05-27

## 2021-05-28 ENCOUNTER — PATIENT MESSAGE (OUTPATIENT)
Dept: SPORTS MEDICINE | Facility: CLINIC | Age: 36
End: 2021-05-28

## 2021-06-07 ENCOUNTER — OFFICE VISIT (OUTPATIENT)
Dept: SPORTS MEDICINE | Facility: CLINIC | Age: 36
End: 2021-06-07
Attending: OBSTETRICS & GYNECOLOGY
Payer: COMMERCIAL

## 2021-06-07 VITALS
HEIGHT: 64 IN | BODY MASS INDEX: 19.81 KG/M2 | DIASTOLIC BLOOD PRESSURE: 70 MMHG | WEIGHT: 116 LBS | SYSTOLIC BLOOD PRESSURE: 104 MMHG

## 2021-06-07 DIAGNOSIS — M99.02 SOMATIC DYSFUNCTION OF THORACIC REGION: ICD-10-CM

## 2021-06-07 DIAGNOSIS — M99.00 SOMATIC DYSFUNCTION OF HEAD REGION: ICD-10-CM

## 2021-06-07 DIAGNOSIS — M99.03 SOMATIC DYSFUNCTION OF LUMBAR REGION: ICD-10-CM

## 2021-06-07 DIAGNOSIS — G43.109 MIGRAINE WITH AURA AND WITHOUT STATUS MIGRAINOSUS, NOT INTRACTABLE: ICD-10-CM

## 2021-06-07 DIAGNOSIS — M26.609 TMJ (TEMPOROMANDIBULAR JOINT DISORDER): ICD-10-CM

## 2021-06-07 DIAGNOSIS — M99.07 UPPER EXTREMITY SOMATIC DYSFUNCTION: ICD-10-CM

## 2021-06-07 DIAGNOSIS — G44.229 CHRONIC TENSION-TYPE HEADACHE, NOT INTRACTABLE: ICD-10-CM

## 2021-06-07 DIAGNOSIS — M54.2 CERVICALGIA: Primary | ICD-10-CM

## 2021-06-07 DIAGNOSIS — M99.05 SOMATIC DYSFUNCTION OF PELVIC REGION: ICD-10-CM

## 2021-06-07 DIAGNOSIS — M99.09 SOMATIC DYSFUNCTION OF ABDOMINAL REGION: ICD-10-CM

## 2021-06-07 DIAGNOSIS — M99.04 SACRAL REGION SOMATIC DYSFUNCTION: ICD-10-CM

## 2021-06-07 DIAGNOSIS — M99.08 SOMATIC DYSFUNCTION OF RIB CAGE REGION: ICD-10-CM

## 2021-06-07 DIAGNOSIS — M79.10 MYALGIA: ICD-10-CM

## 2021-06-07 DIAGNOSIS — M54.9 UPPER BACK PAIN ON RIGHT SIDE: ICD-10-CM

## 2021-06-07 DIAGNOSIS — M99.01 CERVICAL (NECK) REGION SOMATIC DYSFUNCTION: ICD-10-CM

## 2021-06-07 PROCEDURE — 99999 PR PBB SHADOW E&M-EST. PATIENT-LVL III: ICD-10-PCS | Mod: PBBFAC,,, | Performed by: NEUROMUSCULOSKELETAL MEDICINE & OMM

## 2021-06-07 PROCEDURE — 97110 THERAPEUTIC EXERCISES: CPT | Mod: S$GLB,,, | Performed by: NEUROMUSCULOSKELETAL MEDICINE & OMM

## 2021-06-07 PROCEDURE — 3008F BODY MASS INDEX DOCD: CPT | Mod: CPTII,S$GLB,, | Performed by: NEUROMUSCULOSKELETAL MEDICINE & OMM

## 2021-06-07 PROCEDURE — 97110 PR THERAPEUTIC EXERCISES: ICD-10-PCS | Mod: S$GLB,,, | Performed by: NEUROMUSCULOSKELETAL MEDICINE & OMM

## 2021-06-07 PROCEDURE — 98929 OSTEOPATH MANJ 9-10 REGIONS: CPT | Mod: S$GLB,,, | Performed by: NEUROMUSCULOSKELETAL MEDICINE & OMM

## 2021-06-07 PROCEDURE — 99204 PR OFFICE/OUTPT VISIT, NEW, LEVL IV, 45-59 MIN: ICD-10-PCS | Mod: 25,S$GLB,, | Performed by: NEUROMUSCULOSKELETAL MEDICINE & OMM

## 2021-06-07 PROCEDURE — 99999 PR PBB SHADOW E&M-EST. PATIENT-LVL III: CPT | Mod: PBBFAC,,, | Performed by: NEUROMUSCULOSKELETAL MEDICINE & OMM

## 2021-06-07 PROCEDURE — 3008F PR BODY MASS INDEX (BMI) DOCUMENTED: ICD-10-PCS | Mod: CPTII,S$GLB,, | Performed by: NEUROMUSCULOSKELETAL MEDICINE & OMM

## 2021-06-07 PROCEDURE — 99204 OFFICE O/P NEW MOD 45 MIN: CPT | Mod: 25,S$GLB,, | Performed by: NEUROMUSCULOSKELETAL MEDICINE & OMM

## 2021-06-07 PROCEDURE — 98929 PR OSTEOPATHIC MANIP,9-10 BODY REGN: ICD-10-PCS | Mod: S$GLB,,, | Performed by: NEUROMUSCULOSKELETAL MEDICINE & OMM

## 2021-06-11 ENCOUNTER — PROCEDURE VISIT (OUTPATIENT)
Dept: OBSTETRICS AND GYNECOLOGY | Facility: CLINIC | Age: 36
End: 2021-06-11
Attending: OBSTETRICS & GYNECOLOGY
Payer: COMMERCIAL

## 2021-06-11 VITALS
SYSTOLIC BLOOD PRESSURE: 130 MMHG | HEIGHT: 64 IN | WEIGHT: 116.88 LBS | BODY MASS INDEX: 19.96 KG/M2 | DIASTOLIC BLOOD PRESSURE: 76 MMHG

## 2021-06-11 DIAGNOSIS — Z30.430 ENCOUNTER FOR IUD INSERTION: Primary | ICD-10-CM

## 2021-06-11 LAB
B-HCG UR QL: NEGATIVE
CTP QC/QA: YES

## 2021-06-11 PROCEDURE — 58300 INSERTION OF INTRAUTERINE DEVICE: ICD-10-PCS | Mod: S$GLB,,, | Performed by: OBSTETRICS & GYNECOLOGY

## 2021-06-11 PROCEDURE — 81025 URINE PREGNANCY TEST: CPT | Mod: S$GLB,,, | Performed by: OBSTETRICS & GYNECOLOGY

## 2021-06-11 PROCEDURE — 58300 INSERT INTRAUTERINE DEVICE: CPT | Mod: S$GLB,,, | Performed by: OBSTETRICS & GYNECOLOGY

## 2021-06-11 PROCEDURE — 81025 POCT URINE PREGNANCY: ICD-10-PCS | Mod: S$GLB,,, | Performed by: OBSTETRICS & GYNECOLOGY

## 2021-06-14 ENCOUNTER — PROCEDURE VISIT (OUTPATIENT)
Dept: NEUROLOGY | Facility: CLINIC | Age: 36
End: 2021-06-14
Payer: COMMERCIAL

## 2021-06-14 DIAGNOSIS — R20.2 PARESTHESIAS: ICD-10-CM

## 2021-06-14 PROCEDURE — 95886 PR EMG COMPLETE, W/ NERVE CONDUCTION STUDIES, 5+ MUSCLES: ICD-10-PCS | Mod: S$GLB,,, | Performed by: PSYCHIATRY & NEUROLOGY

## 2021-06-14 PROCEDURE — 95913 NRV CNDJ TEST 13/> STUDIES: CPT | Mod: S$GLB,,, | Performed by: PSYCHIATRY & NEUROLOGY

## 2021-06-14 PROCEDURE — 95886 MUSC TEST DONE W/N TEST COMP: CPT | Mod: S$GLB,,, | Performed by: PSYCHIATRY & NEUROLOGY

## 2021-06-14 PROCEDURE — 95913 PR NERVE CONDUCTION STUDY; 13 OR MORE STUDIES: ICD-10-PCS | Mod: S$GLB,,, | Performed by: PSYCHIATRY & NEUROLOGY

## 2021-06-15 ENCOUNTER — OFFICE VISIT (OUTPATIENT)
Dept: PAIN MEDICINE | Facility: CLINIC | Age: 36
End: 2021-06-15
Attending: ANESTHESIOLOGY
Payer: COMMERCIAL

## 2021-06-15 ENCOUNTER — TELEPHONE (OUTPATIENT)
Dept: PAIN MEDICINE | Facility: CLINIC | Age: 36
End: 2021-06-15

## 2021-06-15 VITALS
HEIGHT: 64 IN | RESPIRATION RATE: 18 BRPM | SYSTOLIC BLOOD PRESSURE: 106 MMHG | WEIGHT: 115.94 LBS | HEART RATE: 66 BPM | BODY MASS INDEX: 19.79 KG/M2 | DIASTOLIC BLOOD PRESSURE: 66 MMHG

## 2021-06-15 DIAGNOSIS — M62.838 MUSCLE SPASM: ICD-10-CM

## 2021-06-15 DIAGNOSIS — G24.3 CERVICAL DYSTONIA: Primary | ICD-10-CM

## 2021-06-15 PROCEDURE — 99999 PR PBB SHADOW E&M-EST. PATIENT-LVL III: CPT | Mod: PBBFAC,,, | Performed by: ANESTHESIOLOGY

## 2021-06-15 PROCEDURE — 1126F PR PAIN SEVERITY QUANTIFIED, NO PAIN PRESENT: ICD-10-PCS | Mod: S$GLB,,, | Performed by: ANESTHESIOLOGY

## 2021-06-15 PROCEDURE — 99999 PR PBB SHADOW E&M-EST. PATIENT-LVL III: ICD-10-PCS | Mod: PBBFAC,,, | Performed by: ANESTHESIOLOGY

## 2021-06-15 PROCEDURE — 1126F AMNT PAIN NOTED NONE PRSNT: CPT | Mod: S$GLB,,, | Performed by: ANESTHESIOLOGY

## 2021-06-15 PROCEDURE — 99213 OFFICE O/P EST LOW 20 MIN: CPT | Mod: S$GLB,,, | Performed by: ANESTHESIOLOGY

## 2021-06-15 PROCEDURE — 3008F BODY MASS INDEX DOCD: CPT | Mod: CPTII,S$GLB,, | Performed by: ANESTHESIOLOGY

## 2021-06-15 PROCEDURE — 3008F PR BODY MASS INDEX (BMI) DOCUMENTED: ICD-10-PCS | Mod: CPTII,S$GLB,, | Performed by: ANESTHESIOLOGY

## 2021-06-15 PROCEDURE — 99213 PR OFFICE/OUTPT VISIT, EST, LEVL III, 20-29 MIN: ICD-10-PCS | Mod: S$GLB,,, | Performed by: ANESTHESIOLOGY

## 2021-06-18 ENCOUNTER — TELEPHONE (OUTPATIENT)
Dept: SPORTS MEDICINE | Facility: CLINIC | Age: 36
End: 2021-06-18

## 2021-07-19 ENCOUNTER — PATIENT MESSAGE (OUTPATIENT)
Dept: SPORTS MEDICINE | Facility: CLINIC | Age: 36
End: 2021-07-19

## 2021-07-23 ENCOUNTER — PATIENT MESSAGE (OUTPATIENT)
Dept: OBSTETRICS AND GYNECOLOGY | Facility: CLINIC | Age: 36
End: 2021-07-23

## 2021-08-08 ENCOUNTER — PATIENT MESSAGE (OUTPATIENT)
Dept: SPORTS MEDICINE | Facility: CLINIC | Age: 36
End: 2021-08-08

## 2021-08-10 ENCOUNTER — PATIENT OUTREACH (OUTPATIENT)
Dept: ADMINISTRATIVE | Facility: OTHER | Age: 36
End: 2021-08-10

## 2021-08-10 ENCOUNTER — TELEPHONE (OUTPATIENT)
Dept: SPORTS MEDICINE | Facility: CLINIC | Age: 36
End: 2021-08-10

## 2021-08-11 ENCOUNTER — OFFICE VISIT (OUTPATIENT)
Dept: OBSTETRICS AND GYNECOLOGY | Facility: CLINIC | Age: 36
End: 2021-08-11
Attending: OBSTETRICS & GYNECOLOGY
Payer: COMMERCIAL

## 2021-08-11 VITALS
DIASTOLIC BLOOD PRESSURE: 64 MMHG | BODY MASS INDEX: 19.64 KG/M2 | SYSTOLIC BLOOD PRESSURE: 102 MMHG | HEIGHT: 64 IN | WEIGHT: 115.06 LBS

## 2021-08-11 DIAGNOSIS — Z30.431 IUD CHECK UP: Primary | ICD-10-CM

## 2021-08-11 PROCEDURE — 3078F DIAST BP <80 MM HG: CPT | Mod: CPTII,S$GLB,, | Performed by: OBSTETRICS & GYNECOLOGY

## 2021-08-11 PROCEDURE — 1159F PR MEDICATION LIST DOCUMENTED IN MEDICAL RECORD: ICD-10-PCS | Mod: CPTII,S$GLB,, | Performed by: OBSTETRICS & GYNECOLOGY

## 2021-08-11 PROCEDURE — 3078F PR MOST RECENT DIASTOLIC BLOOD PRESSURE < 80 MM HG: ICD-10-PCS | Mod: CPTII,S$GLB,, | Performed by: OBSTETRICS & GYNECOLOGY

## 2021-08-11 PROCEDURE — 3044F HG A1C LEVEL LT 7.0%: CPT | Mod: CPTII,S$GLB,, | Performed by: OBSTETRICS & GYNECOLOGY

## 2021-08-11 PROCEDURE — 3044F PR MOST RECENT HEMOGLOBIN A1C LEVEL <7.0%: ICD-10-PCS | Mod: CPTII,S$GLB,, | Performed by: OBSTETRICS & GYNECOLOGY

## 2021-08-11 PROCEDURE — 3008F PR BODY MASS INDEX (BMI) DOCUMENTED: ICD-10-PCS | Mod: CPTII,S$GLB,, | Performed by: OBSTETRICS & GYNECOLOGY

## 2021-08-11 PROCEDURE — 99999 PR PBB SHADOW E&M-EST. PATIENT-LVL II: CPT | Mod: PBBFAC,,, | Performed by: OBSTETRICS & GYNECOLOGY

## 2021-08-11 PROCEDURE — 3074F SYST BP LT 130 MM HG: CPT | Mod: CPTII,S$GLB,, | Performed by: OBSTETRICS & GYNECOLOGY

## 2021-08-11 PROCEDURE — 99213 PR OFFICE/OUTPT VISIT, EST, LEVL III, 20-29 MIN: ICD-10-PCS | Mod: S$GLB,,, | Performed by: OBSTETRICS & GYNECOLOGY

## 2021-08-11 PROCEDURE — 99213 OFFICE O/P EST LOW 20 MIN: CPT | Mod: S$GLB,,, | Performed by: OBSTETRICS & GYNECOLOGY

## 2021-08-11 PROCEDURE — 3074F PR MOST RECENT SYSTOLIC BLOOD PRESSURE < 130 MM HG: ICD-10-PCS | Mod: CPTII,S$GLB,, | Performed by: OBSTETRICS & GYNECOLOGY

## 2021-08-11 PROCEDURE — 99999 PR PBB SHADOW E&M-EST. PATIENT-LVL II: ICD-10-PCS | Mod: PBBFAC,,, | Performed by: OBSTETRICS & GYNECOLOGY

## 2021-08-11 PROCEDURE — 1159F MED LIST DOCD IN RCRD: CPT | Mod: CPTII,S$GLB,, | Performed by: OBSTETRICS & GYNECOLOGY

## 2021-08-11 PROCEDURE — 3008F BODY MASS INDEX DOCD: CPT | Mod: CPTII,S$GLB,, | Performed by: OBSTETRICS & GYNECOLOGY

## 2021-08-13 ENCOUNTER — TELEPHONE (OUTPATIENT)
Dept: SPORTS MEDICINE | Facility: CLINIC | Age: 36
End: 2021-08-13

## 2021-08-17 ENCOUNTER — TELEPHONE (OUTPATIENT)
Dept: PAIN MEDICINE | Facility: CLINIC | Age: 36
End: 2021-08-17

## 2021-08-18 ENCOUNTER — TELEPHONE (OUTPATIENT)
Dept: PAIN MEDICINE | Facility: CLINIC | Age: 36
End: 2021-08-18

## 2021-08-18 ENCOUNTER — OFFICE VISIT (OUTPATIENT)
Dept: PAIN MEDICINE | Facility: CLINIC | Age: 36
End: 2021-08-18
Attending: ANESTHESIOLOGY
Payer: COMMERCIAL

## 2021-08-18 VITALS
RESPIRATION RATE: 17 BRPM | WEIGHT: 115.06 LBS | BODY MASS INDEX: 19.64 KG/M2 | HEART RATE: 61 BPM | SYSTOLIC BLOOD PRESSURE: 131 MMHG | DIASTOLIC BLOOD PRESSURE: 75 MMHG | HEIGHT: 64 IN

## 2021-08-18 DIAGNOSIS — G24.3 CERVICAL DYSTONIA: Primary | ICD-10-CM

## 2021-08-18 DIAGNOSIS — M47.22 OSTEOARTHRITIS OF SPINE WITH RADICULOPATHY, CERVICAL REGION: Primary | ICD-10-CM

## 2021-08-18 DIAGNOSIS — M54.12 CERVICAL RADICULOPATHY: ICD-10-CM

## 2021-08-18 PROCEDURE — 99213 OFFICE O/P EST LOW 20 MIN: CPT | Mod: S$GLB,,, | Performed by: ANESTHESIOLOGY

## 2021-08-18 PROCEDURE — 3008F BODY MASS INDEX DOCD: CPT | Mod: CPTII,S$GLB,, | Performed by: ANESTHESIOLOGY

## 2021-08-18 PROCEDURE — 1125F PR PAIN SEVERITY QUANTIFIED, PAIN PRESENT: ICD-10-PCS | Mod: CPTII,S$GLB,, | Performed by: ANESTHESIOLOGY

## 2021-08-18 PROCEDURE — 1160F RVW MEDS BY RX/DR IN RCRD: CPT | Mod: CPTII,S$GLB,, | Performed by: ANESTHESIOLOGY

## 2021-08-18 PROCEDURE — 99999 PR PBB SHADOW E&M-EST. PATIENT-LVL III: ICD-10-PCS | Mod: PBBFAC,,, | Performed by: ANESTHESIOLOGY

## 2021-08-18 PROCEDURE — 3044F PR MOST RECENT HEMOGLOBIN A1C LEVEL <7.0%: ICD-10-PCS | Mod: CPTII,S$GLB,, | Performed by: ANESTHESIOLOGY

## 2021-08-18 PROCEDURE — 3078F DIAST BP <80 MM HG: CPT | Mod: CPTII,S$GLB,, | Performed by: ANESTHESIOLOGY

## 2021-08-18 PROCEDURE — 3044F HG A1C LEVEL LT 7.0%: CPT | Mod: CPTII,S$GLB,, | Performed by: ANESTHESIOLOGY

## 2021-08-18 PROCEDURE — 99213 PR OFFICE/OUTPT VISIT, EST, LEVL III, 20-29 MIN: ICD-10-PCS | Mod: S$GLB,,, | Performed by: ANESTHESIOLOGY

## 2021-08-18 PROCEDURE — 1159F PR MEDICATION LIST DOCUMENTED IN MEDICAL RECORD: ICD-10-PCS | Mod: CPTII,S$GLB,, | Performed by: ANESTHESIOLOGY

## 2021-08-18 PROCEDURE — 3075F SYST BP GE 130 - 139MM HG: CPT | Mod: CPTII,S$GLB,, | Performed by: ANESTHESIOLOGY

## 2021-08-18 PROCEDURE — 99999 PR PBB SHADOW E&M-EST. PATIENT-LVL III: CPT | Mod: PBBFAC,,, | Performed by: ANESTHESIOLOGY

## 2021-08-18 PROCEDURE — 3078F PR MOST RECENT DIASTOLIC BLOOD PRESSURE < 80 MM HG: ICD-10-PCS | Mod: CPTII,S$GLB,, | Performed by: ANESTHESIOLOGY

## 2021-08-18 PROCEDURE — 3075F PR MOST RECENT SYSTOLIC BLOOD PRESS GE 130-139MM HG: ICD-10-PCS | Mod: CPTII,S$GLB,, | Performed by: ANESTHESIOLOGY

## 2021-08-18 PROCEDURE — 1159F MED LIST DOCD IN RCRD: CPT | Mod: CPTII,S$GLB,, | Performed by: ANESTHESIOLOGY

## 2021-08-18 PROCEDURE — 1125F AMNT PAIN NOTED PAIN PRSNT: CPT | Mod: CPTII,S$GLB,, | Performed by: ANESTHESIOLOGY

## 2021-08-18 PROCEDURE — 1160F PR REVIEW ALL MEDS BY PRESCRIBER/CLIN PHARMACIST DOCUMENTED: ICD-10-PCS | Mod: CPTII,S$GLB,, | Performed by: ANESTHESIOLOGY

## 2021-08-18 PROCEDURE — 3008F PR BODY MASS INDEX (BMI) DOCUMENTED: ICD-10-PCS | Mod: CPTII,S$GLB,, | Performed by: ANESTHESIOLOGY

## 2021-08-20 ENCOUNTER — PATIENT MESSAGE (OUTPATIENT)
Dept: PAIN MEDICINE | Facility: OTHER | Age: 36
End: 2021-08-20

## 2021-08-23 ENCOUNTER — APPOINTMENT (OUTPATIENT)
Dept: RADIOLOGY | Facility: OTHER | Age: 36
End: 2021-08-23
Attending: NEUROMUSCULOSKELETAL MEDICINE & OMM
Payer: COMMERCIAL

## 2021-08-23 ENCOUNTER — OFFICE VISIT (OUTPATIENT)
Dept: SPORTS MEDICINE | Facility: CLINIC | Age: 36
End: 2021-08-23
Payer: COMMERCIAL

## 2021-08-23 VITALS
BODY MASS INDEX: 19.63 KG/M2 | DIASTOLIC BLOOD PRESSURE: 80 MMHG | HEIGHT: 64 IN | SYSTOLIC BLOOD PRESSURE: 124 MMHG | WEIGHT: 115 LBS

## 2021-08-23 DIAGNOSIS — M25.511 RIGHT SHOULDER PAIN: Primary | ICD-10-CM

## 2021-08-23 DIAGNOSIS — M99.01 CERVICAL (NECK) REGION SOMATIC DYSFUNCTION: ICD-10-CM

## 2021-08-23 DIAGNOSIS — M25.511 RIGHT SHOULDER PAIN: ICD-10-CM

## 2021-08-23 DIAGNOSIS — M99.08 SOMATIC DYSFUNCTION OF RIB CAGE REGION: ICD-10-CM

## 2021-08-23 DIAGNOSIS — G44.229 CHRONIC TENSION-TYPE HEADACHE, NOT INTRACTABLE: ICD-10-CM

## 2021-08-23 DIAGNOSIS — M99.00 SOMATIC DYSFUNCTION OF HEAD REGION: ICD-10-CM

## 2021-08-23 DIAGNOSIS — M54.9 UPPER BACK PAIN ON RIGHT SIDE: ICD-10-CM

## 2021-08-23 DIAGNOSIS — M54.2 CERVICALGIA: ICD-10-CM

## 2021-08-23 DIAGNOSIS — M99.02 SOMATIC DYSFUNCTION OF THORACIC REGION: ICD-10-CM

## 2021-08-23 DIAGNOSIS — G25.89 SCAPULAR DYSKINESIS: ICD-10-CM

## 2021-08-23 DIAGNOSIS — M26.609 TMJ (TEMPOROMANDIBULAR JOINT DISORDER): ICD-10-CM

## 2021-08-23 DIAGNOSIS — M99.07 UPPER EXTREMITY SOMATIC DYSFUNCTION: ICD-10-CM

## 2021-08-23 DIAGNOSIS — G43.109 MIGRAINE WITH AURA AND WITHOUT STATUS MIGRAINOSUS, NOT INTRACTABLE: ICD-10-CM

## 2021-08-23 DIAGNOSIS — M79.10 MYALGIA: ICD-10-CM

## 2021-08-23 DIAGNOSIS — M25.311 SHOULDER INSTABILITY, RIGHT: Primary | ICD-10-CM

## 2021-08-23 PROCEDURE — 98927 PR OSTEOPATHIC MANIP,5-6 BODY REGN: ICD-10-PCS | Mod: S$GLB,,, | Performed by: NEUROMUSCULOSKELETAL MEDICINE & OMM

## 2021-08-23 PROCEDURE — 73030 X-RAY EXAM OF SHOULDER: CPT | Mod: TC,RT

## 2021-08-23 PROCEDURE — 3008F BODY MASS INDEX DOCD: CPT | Mod: CPTII,S$GLB,, | Performed by: NEUROMUSCULOSKELETAL MEDICINE & OMM

## 2021-08-23 PROCEDURE — 97110 PR THERAPEUTIC EXERCISES: ICD-10-PCS | Mod: S$GLB,,, | Performed by: NEUROMUSCULOSKELETAL MEDICINE & OMM

## 2021-08-23 PROCEDURE — 1159F MED LIST DOCD IN RCRD: CPT | Mod: CPTII,S$GLB,, | Performed by: NEUROMUSCULOSKELETAL MEDICINE & OMM

## 2021-08-23 PROCEDURE — 97110 THERAPEUTIC EXERCISES: CPT | Mod: S$GLB,,, | Performed by: NEUROMUSCULOSKELETAL MEDICINE & OMM

## 2021-08-23 PROCEDURE — 99999 PR PBB SHADOW E&M-EST. PATIENT-LVL III: CPT | Mod: PBBFAC,,, | Performed by: NEUROMUSCULOSKELETAL MEDICINE & OMM

## 2021-08-23 PROCEDURE — 1159F PR MEDICATION LIST DOCUMENTED IN MEDICAL RECORD: ICD-10-PCS | Mod: CPTII,S$GLB,, | Performed by: NEUROMUSCULOSKELETAL MEDICINE & OMM

## 2021-08-23 PROCEDURE — 3079F DIAST BP 80-89 MM HG: CPT | Mod: CPTII,S$GLB,, | Performed by: NEUROMUSCULOSKELETAL MEDICINE & OMM

## 2021-08-23 PROCEDURE — 3044F PR MOST RECENT HEMOGLOBIN A1C LEVEL <7.0%: ICD-10-PCS | Mod: CPTII,S$GLB,, | Performed by: NEUROMUSCULOSKELETAL MEDICINE & OMM

## 2021-08-23 PROCEDURE — 98927 OSTEOPATH MANJ 5-6 REGIONS: CPT | Mod: S$GLB,,, | Performed by: NEUROMUSCULOSKELETAL MEDICINE & OMM

## 2021-08-23 PROCEDURE — 3079F PR MOST RECENT DIASTOLIC BLOOD PRESSURE 80-89 MM HG: ICD-10-PCS | Mod: CPTII,S$GLB,, | Performed by: NEUROMUSCULOSKELETAL MEDICINE & OMM

## 2021-08-23 PROCEDURE — 3008F PR BODY MASS INDEX (BMI) DOCUMENTED: ICD-10-PCS | Mod: CPTII,S$GLB,, | Performed by: NEUROMUSCULOSKELETAL MEDICINE & OMM

## 2021-08-23 PROCEDURE — 3044F HG A1C LEVEL LT 7.0%: CPT | Mod: CPTII,S$GLB,, | Performed by: NEUROMUSCULOSKELETAL MEDICINE & OMM

## 2021-08-23 PROCEDURE — 99215 OFFICE O/P EST HI 40 MIN: CPT | Mod: 25,S$GLB,, | Performed by: NEUROMUSCULOSKELETAL MEDICINE & OMM

## 2021-08-23 PROCEDURE — 1160F PR REVIEW ALL MEDS BY PRESCRIBER/CLIN PHARMACIST DOCUMENTED: ICD-10-PCS | Mod: CPTII,S$GLB,, | Performed by: NEUROMUSCULOSKELETAL MEDICINE & OMM

## 2021-08-23 PROCEDURE — 73030 X-RAY EXAM OF SHOULDER: CPT | Mod: 26,RT,, | Performed by: RADIOLOGY

## 2021-08-23 PROCEDURE — 3074F PR MOST RECENT SYSTOLIC BLOOD PRESSURE < 130 MM HG: ICD-10-PCS | Mod: CPTII,S$GLB,, | Performed by: NEUROMUSCULOSKELETAL MEDICINE & OMM

## 2021-08-23 PROCEDURE — 99999 PR PBB SHADOW E&M-EST. PATIENT-LVL III: ICD-10-PCS | Mod: PBBFAC,,, | Performed by: NEUROMUSCULOSKELETAL MEDICINE & OMM

## 2021-08-23 PROCEDURE — 3074F SYST BP LT 130 MM HG: CPT | Mod: CPTII,S$GLB,, | Performed by: NEUROMUSCULOSKELETAL MEDICINE & OMM

## 2021-08-23 PROCEDURE — 99215 PR OFFICE/OUTPT VISIT, EST, LEVL V, 40-54 MIN: ICD-10-PCS | Mod: 25,S$GLB,, | Performed by: NEUROMUSCULOSKELETAL MEDICINE & OMM

## 2021-08-23 PROCEDURE — 1160F RVW MEDS BY RX/DR IN RCRD: CPT | Mod: CPTII,S$GLB,, | Performed by: NEUROMUSCULOSKELETAL MEDICINE & OMM

## 2021-08-23 PROCEDURE — 73030 XR SHOULDER COMPLETE 2 OR MORE VIEWS RIGHT: ICD-10-PCS | Mod: 26,RT,, | Performed by: RADIOLOGY

## 2021-09-02 ENCOUNTER — PATIENT MESSAGE (OUTPATIENT)
Dept: NEUROLOGY | Facility: CLINIC | Age: 36
End: 2021-09-02

## 2021-09-03 ENCOUNTER — PATIENT MESSAGE (OUTPATIENT)
Dept: NEUROLOGY | Facility: CLINIC | Age: 36
End: 2021-09-03

## 2021-09-08 ENCOUNTER — PATIENT MESSAGE (OUTPATIENT)
Dept: PAIN MEDICINE | Facility: OTHER | Age: 36
End: 2021-09-08

## 2021-09-09 ENCOUNTER — TELEPHONE (OUTPATIENT)
Dept: PAIN MEDICINE | Facility: CLINIC | Age: 36
End: 2021-09-09

## 2021-09-20 ENCOUNTER — TELEPHONE (OUTPATIENT)
Dept: PAIN MEDICINE | Facility: CLINIC | Age: 36
End: 2021-09-20

## 2021-09-21 ENCOUNTER — TELEPHONE (OUTPATIENT)
Dept: PAIN MEDICINE | Facility: CLINIC | Age: 36
End: 2021-09-21

## 2021-09-22 ENCOUNTER — CLINICAL SUPPORT (OUTPATIENT)
Dept: REHABILITATION | Facility: OTHER | Age: 36
End: 2021-09-22
Payer: COMMERCIAL

## 2021-09-22 DIAGNOSIS — M54.2 NECK PAIN: ICD-10-CM

## 2021-09-22 DIAGNOSIS — M25.311 SHOULDER INSTABILITY, RIGHT: ICD-10-CM

## 2021-09-22 DIAGNOSIS — R29.3 POSTURE IMBALANCE: ICD-10-CM

## 2021-09-22 DIAGNOSIS — G25.89 SCAPULAR DYSKINESIS: ICD-10-CM

## 2021-09-22 DIAGNOSIS — M54.12 CERVICAL RADICULOPATHY: ICD-10-CM

## 2021-09-22 PROBLEM — R53.1 WEAKNESS: Status: RESOLVED | Noted: 2020-11-05 | Resolved: 2020-12-17

## 2021-09-22 PROCEDURE — 97162 PT EVAL MOD COMPLEX 30 MIN: CPT | Mod: PN | Performed by: PHYSICAL THERAPIST

## 2021-09-22 PROCEDURE — 97110 THERAPEUTIC EXERCISES: CPT | Mod: PN | Performed by: PHYSICAL THERAPIST

## 2021-09-24 ENCOUNTER — TELEPHONE (OUTPATIENT)
Dept: ADMINISTRATIVE | Facility: OTHER | Age: 36
End: 2021-09-24

## 2021-10-01 ENCOUNTER — PATIENT MESSAGE (OUTPATIENT)
Dept: REHABILITATION | Facility: OTHER | Age: 36
End: 2021-10-01

## 2021-10-01 ENCOUNTER — PATIENT MESSAGE (OUTPATIENT)
Dept: PAIN MEDICINE | Facility: OTHER | Age: 36
End: 2021-10-01

## 2021-10-04 ENCOUNTER — HOSPITAL ENCOUNTER (OUTPATIENT)
Facility: OTHER | Age: 36
Discharge: HOME | End: 2021-10-04
Attending: ANESTHESIOLOGY | Admitting: ANESTHESIOLOGY
Payer: COMMERCIAL

## 2021-10-04 VITALS
HEIGHT: 64 IN | SYSTOLIC BLOOD PRESSURE: 120 MMHG | BODY MASS INDEX: 19.63 KG/M2 | WEIGHT: 115 LBS | DIASTOLIC BLOOD PRESSURE: 82 MMHG | HEART RATE: 60 BPM | TEMPERATURE: 98 F | OXYGEN SATURATION: 100 % | RESPIRATION RATE: 14 BRPM

## 2021-10-04 DIAGNOSIS — M54.12 CERVICAL RADICULOPATHY: ICD-10-CM

## 2021-10-04 DIAGNOSIS — M54.12 CERVICAL RADICULAR PAIN: Primary | ICD-10-CM

## 2021-10-04 DIAGNOSIS — G89.29 CHRONIC PAIN: ICD-10-CM

## 2021-10-04 DIAGNOSIS — M79.18 MYOFASCIAL PAIN SYNDROME: ICD-10-CM

## 2021-10-04 DIAGNOSIS — G89.4 CHRONIC PAIN DISORDER: ICD-10-CM

## 2021-10-04 DIAGNOSIS — G43.009 MIGRAINE WITHOUT AURA AND WITHOUT STATUS MIGRAINOSUS, NOT INTRACTABLE: ICD-10-CM

## 2021-10-04 DIAGNOSIS — M54.2 NECK PAIN: ICD-10-CM

## 2021-10-04 PROCEDURE — 63600175 PHARM REV CODE 636 W HCPCS: Performed by: ANESTHESIOLOGY

## 2021-10-04 PROCEDURE — 62321 PR INJ CERV/THORAC, W/GUIDANCE: ICD-10-PCS | Mod: ,,, | Performed by: ANESTHESIOLOGY

## 2021-10-04 PROCEDURE — 62321 NJX INTERLAMINAR CRV/THRC: CPT | Performed by: ANESTHESIOLOGY

## 2021-10-04 PROCEDURE — 62321 NJX INTERLAMINAR CRV/THRC: CPT | Mod: ,,, | Performed by: ANESTHESIOLOGY

## 2021-10-04 PROCEDURE — 25500020 PHARM REV CODE 255: Performed by: ANESTHESIOLOGY

## 2021-10-04 PROCEDURE — 25000003 PHARM REV CODE 250: Performed by: ANESTHESIOLOGY

## 2021-10-04 RX ORDER — MIDAZOLAM HYDROCHLORIDE 1 MG/ML
INJECTION INTRAMUSCULAR; INTRAVENOUS
Status: DISCONTINUED | OUTPATIENT
Start: 2021-10-04 | End: 2021-10-04 | Stop reason: HOSPADM

## 2021-10-04 RX ORDER — DEXAMETHASONE SODIUM PHOSPHATE 100 MG/10ML
INJECTION INTRAMUSCULAR; INTRAVENOUS
Status: DISCONTINUED | OUTPATIENT
Start: 2021-10-04 | End: 2021-10-04 | Stop reason: HOSPADM

## 2021-10-04 RX ORDER — LIDOCAINE HYDROCHLORIDE 10 MG/ML
INJECTION INFILTRATION; PERINEURAL
Status: DISCONTINUED | OUTPATIENT
Start: 2021-10-04 | End: 2021-10-04 | Stop reason: HOSPADM

## 2021-10-04 RX ORDER — LIDOCAINE HYDROCHLORIDE 10 MG/ML
INJECTION, SOLUTION EPIDURAL; INFILTRATION; INTRACAUDAL; PERINEURAL
Status: DISCONTINUED | OUTPATIENT
Start: 2021-10-04 | End: 2021-10-04 | Stop reason: HOSPADM

## 2021-10-04 RX ORDER — SODIUM CHLORIDE 9 MG/ML
INJECTION, SOLUTION INTRAVENOUS CONTINUOUS
Status: DISCONTINUED | OUTPATIENT
Start: 2021-10-04 | End: 2021-10-04 | Stop reason: HOSPADM

## 2021-10-04 RX ORDER — FENTANYL CITRATE 50 UG/ML
INJECTION, SOLUTION INTRAMUSCULAR; INTRAVENOUS
Status: DISCONTINUED | OUTPATIENT
Start: 2021-10-04 | End: 2021-10-04 | Stop reason: HOSPADM

## 2021-10-04 RX ADMIN — SODIUM CHLORIDE: 0.9 INJECTION, SOLUTION INTRAVENOUS at 02:10

## 2021-10-06 ENCOUNTER — CLINICAL SUPPORT (OUTPATIENT)
Dept: REHABILITATION | Facility: OTHER | Age: 36
End: 2021-10-06
Payer: COMMERCIAL

## 2021-10-06 DIAGNOSIS — R29.3 POSTURE IMBALANCE: ICD-10-CM

## 2021-10-06 DIAGNOSIS — M54.2 NECK PAIN: ICD-10-CM

## 2021-10-06 DIAGNOSIS — M54.12 CERVICAL RADICULOPATHY: ICD-10-CM

## 2021-10-06 PROCEDURE — 97112 NEUROMUSCULAR REEDUCATION: CPT | Mod: PN

## 2021-10-08 ENCOUNTER — CLINICAL SUPPORT (OUTPATIENT)
Dept: REHABILITATION | Facility: OTHER | Age: 36
End: 2021-10-08
Payer: COMMERCIAL

## 2021-10-08 DIAGNOSIS — M54.12 CERVICAL RADICULOPATHY: ICD-10-CM

## 2021-10-08 DIAGNOSIS — M54.2 NECK PAIN: ICD-10-CM

## 2021-10-08 DIAGNOSIS — R29.3 POSTURE IMBALANCE: ICD-10-CM

## 2021-10-08 PROCEDURE — 97012 MECHANICAL TRACTION THERAPY: CPT | Mod: PN

## 2021-10-08 PROCEDURE — 97110 THERAPEUTIC EXERCISES: CPT | Mod: PN

## 2021-10-08 PROCEDURE — 97140 MANUAL THERAPY 1/> REGIONS: CPT | Mod: PN

## 2021-10-14 ENCOUNTER — OFFICE VISIT (OUTPATIENT)
Dept: INTERNAL MEDICINE | Facility: CLINIC | Age: 36
End: 2021-10-14
Payer: COMMERCIAL

## 2021-10-14 ENCOUNTER — PATIENT MESSAGE (OUTPATIENT)
Dept: INTERNAL MEDICINE | Facility: CLINIC | Age: 36
End: 2021-10-14

## 2021-10-14 VITALS
WEIGHT: 115.75 LBS | HEART RATE: 78 BPM | OXYGEN SATURATION: 100 % | SYSTOLIC BLOOD PRESSURE: 106 MMHG | DIASTOLIC BLOOD PRESSURE: 76 MMHG | BODY MASS INDEX: 19.87 KG/M2
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
## 2021-10-14 DIAGNOSIS — S80.02XA CONTUSION OF LEFT KNEE, INITIAL ENCOUNTER: ICD-10-CM

## 2021-10-14 DIAGNOSIS — S70.12XA CONTUSION OF LEFT THIGH, INITIAL ENCOUNTER: ICD-10-CM

## 2021-10-14 DIAGNOSIS — M54.2 NECK PAIN: ICD-10-CM

## 2021-10-14 PROCEDURE — 1160F RVW MEDS BY RX/DR IN RCRD: CPT | Mod: CPTII,S$GLB,, | Performed by: PHYSICIAN ASSISTANT

## 2021-10-14 PROCEDURE — 3078F PR MOST RECENT DIASTOLIC BLOOD PRESSURE < 80 MM HG: ICD-10-PCS | Mod: CPTII,S$GLB,, | Performed by: PHYSICIAN ASSISTANT

## 2021-10-14 PROCEDURE — 1159F MED LIST DOCD IN RCRD: CPT | Mod: CPTII,S$GLB,, | Performed by: PHYSICIAN ASSISTANT

## 2021-10-14 PROCEDURE — 3008F PR BODY MASS INDEX (BMI) DOCUMENTED: ICD-10-PCS | Mod: CPTII,S$GLB,, | Performed by: PHYSICIAN ASSISTANT

## 2021-10-14 PROCEDURE — 3074F PR MOST RECENT SYSTOLIC BLOOD PRESSURE < 130 MM HG: ICD-10-PCS | Mod: CPTII,S$GLB,, | Performed by: PHYSICIAN ASSISTANT

## 2021-10-14 PROCEDURE — 99999 PR PBB SHADOW E&M-EST. PATIENT-LVL IV: ICD-10-PCS | Mod: PBBFAC,,, | Performed by: PHYSICIAN ASSISTANT

## 2021-10-14 PROCEDURE — 1160F PR REVIEW ALL MEDS BY PRESCRIBER/CLIN PHARMACIST DOCUMENTED: ICD-10-PCS | Mod: CPTII,S$GLB,, | Performed by: PHYSICIAN ASSISTANT

## 2021-10-14 PROCEDURE — 99214 OFFICE O/P EST MOD 30 MIN: CPT | Mod: S$GLB,,, | Performed by: PHYSICIAN ASSISTANT

## 2021-10-14 PROCEDURE — 99214 PR OFFICE/OUTPT VISIT, EST, LEVL IV, 30-39 MIN: ICD-10-PCS | Mod: S$GLB,,, | Performed by: PHYSICIAN ASSISTANT

## 2021-10-14 PROCEDURE — 3078F DIAST BP <80 MM HG: CPT | Mod: CPTII,S$GLB,, | Performed by: PHYSICIAN ASSISTANT

## 2021-10-14 PROCEDURE — 99999 PR PBB SHADOW E&M-EST. PATIENT-LVL IV: CPT | Mod: PBBFAC,,, | Performed by: PHYSICIAN ASSISTANT

## 2021-10-14 PROCEDURE — 3044F HG A1C LEVEL LT 7.0%: CPT | Mod: CPTII,S$GLB,, | Performed by: PHYSICIAN ASSISTANT

## 2021-10-14 PROCEDURE — 3008F BODY MASS INDEX DOCD: CPT | Mod: CPTII,S$GLB,, | Performed by: PHYSICIAN ASSISTANT

## 2021-10-14 PROCEDURE — 3074F SYST BP LT 130 MM HG: CPT | Mod: CPTII,S$GLB,, | Performed by: PHYSICIAN ASSISTANT

## 2021-10-14 PROCEDURE — 3044F PR MOST RECENT HEMOGLOBIN A1C LEVEL <7.0%: ICD-10-PCS | Mod: CPTII,S$GLB,, | Performed by: PHYSICIAN ASSISTANT

## 2021-10-14 PROCEDURE — 1159F PR MEDICATION LIST DOCUMENTED IN MEDICAL RECORD: ICD-10-PCS | Mod: CPTII,S$GLB,, | Performed by: PHYSICIAN ASSISTANT

## 2021-10-15 ENCOUNTER — HOSPITAL ENCOUNTER (OUTPATIENT)
Dept: RADIOLOGY | Facility: OTHER | Age: 36
Discharge: HOME | End: 2021-10-15
Attending: PHYSICIAN ASSISTANT
Payer: COMMERCIAL

## 2021-10-15 DIAGNOSIS — M54.2 NECK PAIN: ICD-10-CM

## 2021-10-15 PROCEDURE — 72040 X-RAY EXAM NECK SPINE 2-3 VW: CPT | Mod: 26,,, | Performed by: RADIOLOGY

## 2021-10-15 PROCEDURE — 72040 X-RAY EXAM NECK SPINE 2-3 VW: CPT | Mod: TC,FY

## 2021-10-15 PROCEDURE — 72040 XR CERVICAL SPINE 2 OR 3 VIEWS: ICD-10-PCS | Mod: 26,,, | Performed by: RADIOLOGY

## 2021-10-18 ENCOUNTER — PATIENT MESSAGE (OUTPATIENT)
Dept: SPORTS MEDICINE | Facility: CLINIC | Age: 36
End: 2021-10-18

## 2021-10-18 ENCOUNTER — CLINICAL SUPPORT (OUTPATIENT)
Dept: REHABILITATION | Facility: OTHER | Age: 36
End: 2021-10-18
Payer: COMMERCIAL

## 2021-10-18 ENCOUNTER — TELEPHONE (OUTPATIENT)
Dept: PAIN MEDICINE | Facility: CLINIC | Age: 36
End: 2021-10-18

## 2021-10-18 ENCOUNTER — APPOINTMENT (OUTPATIENT)
Dept: RADIOLOGY | Facility: OTHER | Age: 36
End: 2021-10-18
Attending: NEUROMUSCULOSKELETAL MEDICINE & OMM
Payer: COMMERCIAL

## 2021-10-18 ENCOUNTER — PATIENT MESSAGE (OUTPATIENT)
Dept: ORTHOPEDICS | Facility: CLINIC | Age: 36
End: 2021-10-18
Payer: COMMERCIAL

## 2021-10-18 ENCOUNTER — OFFICE VISIT (OUTPATIENT)
Dept: SPORTS MEDICINE | Facility: CLINIC | Age: 36
End: 2021-10-18
Payer: COMMERCIAL

## 2021-10-18 VITALS
DIASTOLIC BLOOD PRESSURE: 65 MMHG | BODY MASS INDEX: 19.63 KG/M2 | SYSTOLIC BLOOD PRESSURE: 98 MMHG | WEIGHT: 115 LBS | HEIGHT: 64 IN

## 2021-10-18 DIAGNOSIS — R29.3 POSTURE IMBALANCE: Primary | ICD-10-CM

## 2021-10-18 DIAGNOSIS — M99.04 SACRAL REGION SOMATIC DYSFUNCTION: ICD-10-CM

## 2021-10-18 DIAGNOSIS — M54.2 NECK PAIN: ICD-10-CM

## 2021-10-18 DIAGNOSIS — M25.572 ACUTE LEFT ANKLE PAIN: ICD-10-CM

## 2021-10-18 DIAGNOSIS — M25.562 ACUTE PAIN OF LEFT KNEE: ICD-10-CM

## 2021-10-18 DIAGNOSIS — M79.10 MYALGIA: ICD-10-CM

## 2021-10-18 DIAGNOSIS — S80.02XA CONTUSION OF LEFT KNEE, INITIAL ENCOUNTER: ICD-10-CM

## 2021-10-18 DIAGNOSIS — M54.12 CERVICAL RADICULOPATHY: ICD-10-CM

## 2021-10-18 DIAGNOSIS — M99.05 SOMATIC DYSFUNCTION OF PELVIC REGION: ICD-10-CM

## 2021-10-18 DIAGNOSIS — M99.03 SOMATIC DYSFUNCTION OF LUMBAR REGION: ICD-10-CM

## 2021-10-18 DIAGNOSIS — S93.409A GRADE 1 ANKLE SPRAIN: ICD-10-CM

## 2021-10-18 DIAGNOSIS — M99.06 SOMATIC DYSFUNCTION OF LOWER EXTREMITY: ICD-10-CM
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
## 2021-10-18 PROCEDURE — 99999 PR PBB SHADOW E&M-EST. PATIENT-LVL III: ICD-10-PCS | Mod: PBBFAC,,, | Performed by: NEUROMUSCULOSKELETAL MEDICINE & OMM

## 2021-10-18 PROCEDURE — 97140 MANUAL THERAPY 1/> REGIONS: CPT | Mod: PN | Performed by: PHYSICAL THERAPIST

## 2021-10-18 PROCEDURE — 98926 OSTEOPATH MANJ 3-4 REGIONS: CPT | Mod: S$GLB,,, | Performed by: NEUROMUSCULOSKELETAL MEDICINE & OMM

## 2021-10-18 PROCEDURE — 99214 PR OFFICE/OUTPT VISIT, EST, LEVL IV, 30-39 MIN: ICD-10-PCS | Mod: 25,S$GLB,, | Performed by: NEUROMUSCULOSKELETAL MEDICINE & OMM

## 2021-10-18 PROCEDURE — 73562 X-RAY EXAM OF KNEE 3: CPT | Mod: 26,RT,, | Performed by: INTERNAL MEDICINE

## 2021-10-18 PROCEDURE — 3044F PR MOST RECENT HEMOGLOBIN A1C LEVEL <7.0%: ICD-10-PCS | Mod: CPTII,S$GLB,, | Performed by: NEUROMUSCULOSKELETAL MEDICINE & OMM

## 2021-10-18 PROCEDURE — 98926 PR OSTEOPATHIC MANIP,3-4 BODY REGN: ICD-10-PCS | Mod: S$GLB,,, | Performed by: NEUROMUSCULOSKELETAL MEDICINE & OMM

## 2021-10-18 PROCEDURE — 97110 THERAPEUTIC EXERCISES: CPT | Mod: PN | Performed by: PHYSICAL THERAPIST

## 2021-10-18 PROCEDURE — 97110 PR THERAPEUTIC EXERCISES: ICD-10-PCS | Mod: GP,S$GLB,, | Performed by: NEUROMUSCULOSKELETAL MEDICINE & OMM

## 2021-10-18 PROCEDURE — 3008F BODY MASS INDEX DOCD: CPT | Mod: CPTII,S$GLB,, | Performed by: NEUROMUSCULOSKELETAL MEDICINE & OMM

## 2021-10-18 PROCEDURE — 3078F DIAST BP <80 MM HG: CPT | Mod: CPTII,S$GLB,, | Performed by: NEUROMUSCULOSKELETAL MEDICINE & OMM

## 2021-10-18 PROCEDURE — 73564 X-RAY EXAM KNEE 4 OR MORE: CPT | Mod: 26,LT,, | Performed by: INTERNAL MEDICINE

## 2021-10-18 PROCEDURE — 3078F PR MOST RECENT DIASTOLIC BLOOD PRESSURE < 80 MM HG: ICD-10-PCS | Mod: CPTII,S$GLB,, | Performed by: NEUROMUSCULOSKELETAL MEDICINE & OMM

## 2021-10-18 PROCEDURE — 73564 XR KNEE ORTHO LEFT WITH FLEXION: ICD-10-PCS | Mod: 26,LT,, | Performed by: INTERNAL MEDICINE

## 2021-10-18 PROCEDURE — 73562 XR KNEE ORTHO LEFT WITH FLEXION: ICD-10-PCS | Mod: 26,RT,, | Performed by: INTERNAL MEDICINE

## 2021-10-18 PROCEDURE — 3044F HG A1C LEVEL LT 7.0%: CPT | Mod: CPTII,S$GLB,, | Performed by: NEUROMUSCULOSKELETAL MEDICINE & OMM

## 2021-10-18 PROCEDURE — 3074F PR MOST RECENT SYSTOLIC BLOOD PRESSURE < 130 MM HG: ICD-10-PCS | Mod: CPTII,S$GLB,, | Performed by: NEUROMUSCULOSKELETAL MEDICINE & OMM

## 2021-10-18 PROCEDURE — 99214 OFFICE O/P EST MOD 30 MIN: CPT | Mod: 25,S$GLB,, | Performed by: NEUROMUSCULOSKELETAL MEDICINE & OMM

## 2021-10-18 PROCEDURE — 3008F PR BODY MASS INDEX (BMI) DOCUMENTED: ICD-10-PCS | Mod: CPTII,S$GLB,, | Performed by: NEUROMUSCULOSKELETAL MEDICINE & OMM

## 2021-10-18 PROCEDURE — 1160F RVW MEDS BY RX/DR IN RCRD: CPT | Mod: CPTII,S$GLB,, | Performed by: NEUROMUSCULOSKELETAL MEDICINE & OMM

## 2021-10-18 PROCEDURE — 1159F MED LIST DOCD IN RCRD: CPT | Mod: CPTII,S$GLB,, | Performed by: NEUROMUSCULOSKELETAL MEDICINE & OMM

## 2021-10-18 PROCEDURE — 99999 PR PBB SHADOW E&M-EST. PATIENT-LVL III: CPT | Mod: PBBFAC,,, | Performed by: NEUROMUSCULOSKELETAL MEDICINE & OMM

## 2021-10-18 PROCEDURE — 97110 THERAPEUTIC EXERCISES: CPT | Mod: GP,S$GLB,, | Performed by: NEUROMUSCULOSKELETAL MEDICINE & OMM

## 2021-10-18 PROCEDURE — 73564 X-RAY EXAM KNEE 4 OR MORE: CPT | Mod: TC,LT

## 2021-10-18 PROCEDURE — 1160F PR REVIEW ALL MEDS BY PRESCRIBER/CLIN PHARMACIST DOCUMENTED: ICD-10-PCS | Mod: CPTII,S$GLB,, | Performed by: NEUROMUSCULOSKELETAL MEDICINE & OMM

## 2021-10-18 PROCEDURE — 1159F PR MEDICATION LIST DOCUMENTED IN MEDICAL RECORD: ICD-10-PCS | Mod: CPTII,S$GLB,, | Performed by: NEUROMUSCULOSKELETAL MEDICINE & OMM

## 2021-10-18 PROCEDURE — 3074F SYST BP LT 130 MM HG: CPT | Mod: CPTII,S$GLB,, | Performed by: NEUROMUSCULOSKELETAL MEDICINE & OMM

## 2021-10-19 ENCOUNTER — OFFICE VISIT (OUTPATIENT)
Dept: PAIN MEDICINE | Facility: CLINIC | Age: 36
End: 2021-10-19
Attending: ANESTHESIOLOGY
Payer: COMMERCIAL

## 2021-10-19 VITALS
WEIGHT: 115.5 LBS | HEIGHT: 64 IN | HEART RATE: 88 BPM | DIASTOLIC BLOOD PRESSURE: 74 MMHG | RESPIRATION RATE: 19 BRPM | TEMPERATURE: 98 F | BODY MASS INDEX: 19.72 KG/M2 | SYSTOLIC BLOOD PRESSURE: 118 MMHG

## 2021-10-19 DIAGNOSIS — G24.3 CERVICAL DYSTONIA: Primary | ICD-10-CM

## 2021-10-19 DIAGNOSIS — M62.838 MUSCLE SPASM: ICD-10-CM

## 2021-10-19 DIAGNOSIS — S89.92XA INJURY OF LEFT LOWER EXTREMITY, INITIAL ENCOUNTER: ICD-10-CM

## 2021-10-19 PROCEDURE — 99999 PR PBB SHADOW E&M-EST. PATIENT-LVL III: CPT | Mod: PBBFAC,,, | Performed by: ANESTHESIOLOGY

## 2021-10-19 PROCEDURE — 3008F BODY MASS INDEX DOCD: CPT | Mod: CPTII,S$GLB,, | Performed by: ANESTHESIOLOGY

## 2021-10-19 PROCEDURE — 3074F PR MOST RECENT SYSTOLIC BLOOD PRESSURE < 130 MM HG: ICD-10-PCS | Mod: CPTII,S$GLB,, | Performed by: ANESTHESIOLOGY

## 2021-10-19 PROCEDURE — 64616 CHEMODENERV MUSC NECK DYSTON: CPT | Mod: RT,S$GLB,, | Performed by: ANESTHESIOLOGY

## 2021-10-19 PROCEDURE — 1159F PR MEDICATION LIST DOCUMENTED IN MEDICAL RECORD: ICD-10-PCS | Mod: CPTII,S$GLB,, | Performed by: ANESTHESIOLOGY

## 2021-10-19 PROCEDURE — 3008F PR BODY MASS INDEX (BMI) DOCUMENTED: ICD-10-PCS | Mod: CPTII,S$GLB,, | Performed by: ANESTHESIOLOGY

## 2021-10-19 PROCEDURE — 1160F RVW MEDS BY RX/DR IN RCRD: CPT | Mod: CPTII,S$GLB,, | Performed by: ANESTHESIOLOGY

## 2021-10-19 PROCEDURE — 95874 PR NEEDLE EMG GUIDANCE FOR CHEMODENERVATION: ICD-10-PCS | Mod: S$GLB,,, | Performed by: ANESTHESIOLOGY

## 2021-10-19 PROCEDURE — 3044F PR MOST RECENT HEMOGLOBIN A1C LEVEL <7.0%: ICD-10-PCS | Mod: CPTII,S$GLB,, | Performed by: ANESTHESIOLOGY

## 2021-10-19 PROCEDURE — 99214 PR OFFICE/OUTPT VISIT, EST, LEVL IV, 30-39 MIN: ICD-10-PCS | Mod: 25,S$GLB,, | Performed by: ANESTHESIOLOGY

## 2021-10-19 PROCEDURE — 64616 PR CHEMODENERVATION NECK MUSCLES EXC LARNYNX, UNI: ICD-10-PCS | Mod: RT,S$GLB,, | Performed by: ANESTHESIOLOGY

## 2021-10-19 PROCEDURE — 3078F DIAST BP <80 MM HG: CPT | Mod: CPTII,S$GLB,, | Performed by: ANESTHESIOLOGY

## 2021-10-19 PROCEDURE — 3044F HG A1C LEVEL LT 7.0%: CPT | Mod: CPTII,S$GLB,, | Performed by: ANESTHESIOLOGY

## 2021-10-19 PROCEDURE — 3074F SYST BP LT 130 MM HG: CPT | Mod: CPTII,S$GLB,, | Performed by: ANESTHESIOLOGY

## 2021-10-19 PROCEDURE — 95874 GUIDE NERV DESTR NEEDLE EMG: CPT | Mod: S$GLB,,, | Performed by: ANESTHESIOLOGY

## 2021-10-19 PROCEDURE — 99999 PR PBB SHADOW E&M-EST. PATIENT-LVL III: ICD-10-PCS | Mod: PBBFAC,,, | Performed by: ANESTHESIOLOGY

## 2021-10-19 PROCEDURE — 3078F PR MOST RECENT DIASTOLIC BLOOD PRESSURE < 80 MM HG: ICD-10-PCS | Mod: CPTII,S$GLB,, | Performed by: ANESTHESIOLOGY

## 2021-10-19 PROCEDURE — 99214 OFFICE O/P EST MOD 30 MIN: CPT | Mod: 25,S$GLB,, | Performed by: ANESTHESIOLOGY

## 2021-10-19 PROCEDURE — 1159F MED LIST DOCD IN RCRD: CPT | Mod: CPTII,S$GLB,, | Performed by: ANESTHESIOLOGY

## 2021-10-19 PROCEDURE — 1160F PR REVIEW ALL MEDS BY PRESCRIBER/CLIN PHARMACIST DOCUMENTED: ICD-10-PCS | Mod: CPTII,S$GLB,, | Performed by: ANESTHESIOLOGY

## 2021-10-22 ENCOUNTER — CLINICAL SUPPORT (OUTPATIENT)
Dept: REHABILITATION | Facility: OTHER | Age: 36
End: 2021-10-22
Payer: COMMERCIAL

## 2021-10-22 DIAGNOSIS — M54.12 CERVICAL RADICULOPATHY: ICD-10-CM

## 2021-10-22 DIAGNOSIS — M54.2 NECK PAIN: ICD-10-CM

## 2021-10-22 DIAGNOSIS — R29.3 POSTURE IMBALANCE: Primary | ICD-10-CM

## 2021-10-22 PROCEDURE — 97110 THERAPEUTIC EXERCISES: CPT | Mod: PN | Performed by: PHYSICAL THERAPIST

## 2021-10-22 PROCEDURE — 97012 MECHANICAL TRACTION THERAPY: CPT | Mod: PN | Performed by: PHYSICAL THERAPIST

## 2021-10-22 PROCEDURE — 97140 MANUAL THERAPY 1/> REGIONS: CPT | Mod: PN | Performed by: PHYSICAL THERAPIST

## 2021-10-25 ENCOUNTER — CLINICAL SUPPORT (OUTPATIENT)
Dept: REHABILITATION | Facility: OTHER | Age: 36
End: 2021-10-25
Payer: COMMERCIAL

## 2021-10-25 ENCOUNTER — PATIENT MESSAGE (OUTPATIENT)
Dept: PAIN MEDICINE | Facility: CLINIC | Age: 36
End: 2021-10-25
Payer: COMMERCIAL

## 2021-10-25 ENCOUNTER — PATIENT MESSAGE (OUTPATIENT)
Dept: SPORTS MEDICINE | Facility: CLINIC | Age: 36
End: 2021-10-25
Payer: COMMERCIAL

## 2021-10-25 DIAGNOSIS — M54.2 NECK PAIN: ICD-10-CM

## 2021-10-25 DIAGNOSIS — R29.3 POSTURE IMBALANCE: Primary | ICD-10-CM

## 2021-10-25 DIAGNOSIS — M54.12 CERVICAL RADICULOPATHY: ICD-10-CM

## 2021-10-25 PROCEDURE — 97140 MANUAL THERAPY 1/> REGIONS: CPT | Mod: PN,59 | Performed by: PHYSICAL THERAPIST

## 2021-10-25 PROCEDURE — 97012 MECHANICAL TRACTION THERAPY: CPT | Mod: PN | Performed by: PHYSICAL THERAPIST

## 2021-10-26 ENCOUNTER — PATIENT MESSAGE (OUTPATIENT)
Dept: PAIN MEDICINE | Facility: CLINIC | Age: 36
End: 2021-10-26
Payer: COMMERCIAL

## 2021-10-27 ENCOUNTER — OFFICE VISIT (OUTPATIENT)
Dept: PAIN MEDICINE | Facility: CLINIC | Age: 36
End: 2021-10-27
Attending: ANESTHESIOLOGY
Payer: COMMERCIAL

## 2021-10-27 VITALS
HEART RATE: 65 BPM | RESPIRATION RATE: 18 BRPM | OXYGEN SATURATION: 100 % | SYSTOLIC BLOOD PRESSURE: 132 MMHG | TEMPERATURE: 97 F | HEIGHT: 64 IN | WEIGHT: 114.63 LBS | DIASTOLIC BLOOD PRESSURE: 84 MMHG | BODY MASS INDEX: 19.57 KG/M2

## 2021-10-27 DIAGNOSIS — M79.18 MYOFASCIAL PAIN SYNDROME: ICD-10-CM

## 2021-10-27 DIAGNOSIS — M54.2 NECK PAIN: ICD-10-CM

## 2021-10-27 DIAGNOSIS — M54.12 CERVICAL RADICULAR PAIN: ICD-10-CM

## 2021-10-27 DIAGNOSIS — M25.511 PAIN IN RIGHT ACROMIOCLAVICULAR JOINT: ICD-10-CM

## 2021-10-27 DIAGNOSIS — M25.511 CHRONIC RIGHT SHOULDER PAIN: Primary | ICD-10-CM

## 2021-10-27 DIAGNOSIS — G24.3 CERVICAL DYSTONIA: ICD-10-CM

## 2021-10-27 DIAGNOSIS — M62.838 MUSCLE SPASM: ICD-10-CM

## 2021-10-27 DIAGNOSIS — G89.29 CHRONIC RIGHT SHOULDER PAIN: Primary | ICD-10-CM

## 2021-10-27 DIAGNOSIS — M75.21 BICIPITAL TENDONITIS OF RIGHT SHOULDER: ICD-10-CM

## 2021-10-27 PROCEDURE — 3079F DIAST BP 80-89 MM HG: CPT | Mod: CPTII,S$GLB,, | Performed by: ANESTHESIOLOGY

## 2021-10-27 PROCEDURE — 99999 PR PBB SHADOW E&M-EST. PATIENT-LVL III: CPT | Mod: PBBFAC,,, | Performed by: ANESTHESIOLOGY

## 2021-10-27 PROCEDURE — 20611 DRAIN/INJ JOINT/BURSA W/US: CPT | Mod: RT,S$GLB,, | Performed by: ANESTHESIOLOGY

## 2021-10-27 PROCEDURE — 3044F HG A1C LEVEL LT 7.0%: CPT | Mod: CPTII,S$GLB,, | Performed by: ANESTHESIOLOGY

## 2021-10-27 PROCEDURE — 3044F PR MOST RECENT HEMOGLOBIN A1C LEVEL <7.0%: ICD-10-PCS | Mod: CPTII,S$GLB,, | Performed by: ANESTHESIOLOGY

## 2021-10-27 PROCEDURE — 3075F PR MOST RECENT SYSTOLIC BLOOD PRESS GE 130-139MM HG: ICD-10-PCS | Mod: CPTII,S$GLB,, | Performed by: ANESTHESIOLOGY

## 2021-10-27 PROCEDURE — 20550 PR INJECT TENDON SHEATH/LIGAMENT: ICD-10-PCS | Mod: 59,RT,S$GLB, | Performed by: ANESTHESIOLOGY

## 2021-10-27 PROCEDURE — 3075F SYST BP GE 130 - 139MM HG: CPT | Mod: CPTII,S$GLB,, | Performed by: ANESTHESIOLOGY

## 2021-10-27 PROCEDURE — 99999 PR PBB SHADOW E&M-EST. PATIENT-LVL III: ICD-10-PCS | Mod: PBBFAC,,, | Performed by: ANESTHESIOLOGY

## 2021-10-27 PROCEDURE — 1159F MED LIST DOCD IN RCRD: CPT | Mod: CPTII,S$GLB,, | Performed by: ANESTHESIOLOGY

## 2021-10-27 PROCEDURE — 99214 PR OFFICE/OUTPT VISIT, EST, LEVL IV, 30-39 MIN: ICD-10-PCS | Mod: 24,25,S$GLB, | Performed by: ANESTHESIOLOGY

## 2021-10-27 PROCEDURE — 1160F RVW MEDS BY RX/DR IN RCRD: CPT | Mod: CPTII,S$GLB,, | Performed by: ANESTHESIOLOGY

## 2021-10-27 PROCEDURE — 1159F PR MEDICATION LIST DOCUMENTED IN MEDICAL RECORD: ICD-10-PCS | Mod: CPTII,S$GLB,, | Performed by: ANESTHESIOLOGY

## 2021-10-27 PROCEDURE — 1160F PR REVIEW ALL MEDS BY PRESCRIBER/CLIN PHARMACIST DOCUMENTED: ICD-10-PCS | Mod: CPTII,S$GLB,, | Performed by: ANESTHESIOLOGY

## 2021-10-27 PROCEDURE — 99214 OFFICE O/P EST MOD 30 MIN: CPT | Mod: 24,25,S$GLB, | Performed by: ANESTHESIOLOGY

## 2021-10-27 PROCEDURE — 20550 NJX 1 TENDON SHEATH/LIGAMENT: CPT | Mod: 59,RT,S$GLB, | Performed by: ANESTHESIOLOGY

## 2021-10-27 PROCEDURE — 20611 PR DRAIN/ASP/INJECT MAJOR JOINT/BURSA W/US GUIDANCE: ICD-10-PCS | Mod: RT,S$GLB,, | Performed by: ANESTHESIOLOGY

## 2021-10-27 PROCEDURE — 3008F BODY MASS INDEX DOCD: CPT | Mod: CPTII,S$GLB,, | Performed by: ANESTHESIOLOGY

## 2021-10-27 PROCEDURE — 3008F PR BODY MASS INDEX (BMI) DOCUMENTED: ICD-10-PCS | Mod: CPTII,S$GLB,, | Performed by: ANESTHESIOLOGY

## 2021-10-27 PROCEDURE — 3079F PR MOST RECENT DIASTOLIC BLOOD PRESSURE 80-89 MM HG: ICD-10-PCS | Mod: CPTII,S$GLB,, | Performed by: ANESTHESIOLOGY

## 2021-10-27 RX ORDER — TRIAMCINOLONE ACETONIDE 40 MG/ML
40 INJECTION, SUSPENSION INTRA-ARTICULAR; INTRAMUSCULAR
Status: COMPLETED | OUTPATIENT
Start: 2021-10-27 | End: 2021-10-27

## 2021-10-27 RX ADMIN — TRIAMCINOLONE ACETONIDE 40 MG: 40 INJECTION, SUSPENSION INTRA-ARTICULAR; INTRAMUSCULAR at 11:10

## 2021-10-31 ENCOUNTER — PATIENT OUTREACH (OUTPATIENT)
Dept: ADMINISTRATIVE | Facility: OTHER | Age: 36
End: 2021-10-31
Payer: COMMERCIAL

## 2021-11-01 ENCOUNTER — OFFICE VISIT (OUTPATIENT)
Dept: SPORTS MEDICINE | Facility: CLINIC | Age: 36
End: 2021-11-01
Payer: COMMERCIAL

## 2021-11-01 VITALS
SYSTOLIC BLOOD PRESSURE: 111 MMHG | BODY MASS INDEX: 19.46 KG/M2 | DIASTOLIC BLOOD PRESSURE: 66 MMHG | HEIGHT: 64 IN | WEIGHT: 114 LBS | HEART RATE: 78 BPM

## 2021-11-01 DIAGNOSIS — M25.311 SHOULDER INSTABILITY, RIGHT: Primary | ICD-10-CM

## 2021-11-01 PROCEDURE — 3074F PR MOST RECENT SYSTOLIC BLOOD PRESSURE < 130 MM HG: ICD-10-PCS | Mod: CPTII,S$GLB,, | Performed by: ORTHOPAEDIC SURGERY

## 2021-11-01 PROCEDURE — 3078F PR MOST RECENT DIASTOLIC BLOOD PRESSURE < 80 MM HG: ICD-10-PCS | Mod: CPTII,S$GLB,, | Performed by: ORTHOPAEDIC SURGERY

## 2021-11-01 PROCEDURE — 99213 OFFICE O/P EST LOW 20 MIN: CPT | Mod: S$GLB,,, | Performed by: ORTHOPAEDIC SURGERY

## 2021-11-01 PROCEDURE — 3044F HG A1C LEVEL LT 7.0%: CPT | Mod: CPTII,S$GLB,, | Performed by: ORTHOPAEDIC SURGERY

## 2021-11-01 PROCEDURE — 1159F PR MEDICATION LIST DOCUMENTED IN MEDICAL RECORD: ICD-10-PCS | Mod: CPTII,S$GLB,, | Performed by: ORTHOPAEDIC SURGERY

## 2021-11-01 PROCEDURE — 99999 PR PBB SHADOW E&M-EST. PATIENT-LVL III: ICD-10-PCS | Mod: PBBFAC,,, | Performed by: ORTHOPAEDIC SURGERY

## 2021-11-01 PROCEDURE — 3074F SYST BP LT 130 MM HG: CPT | Mod: CPTII,S$GLB,, | Performed by: ORTHOPAEDIC SURGERY

## 2021-11-01 PROCEDURE — 99213 PR OFFICE/OUTPT VISIT, EST, LEVL III, 20-29 MIN: ICD-10-PCS | Mod: S$GLB,,, | Performed by: ORTHOPAEDIC SURGERY

## 2021-11-01 PROCEDURE — 1159F MED LIST DOCD IN RCRD: CPT | Mod: CPTII,S$GLB,, | Performed by: ORTHOPAEDIC SURGERY

## 2021-11-01 PROCEDURE — 3078F DIAST BP <80 MM HG: CPT | Mod: CPTII,S$GLB,, | Performed by: ORTHOPAEDIC SURGERY

## 2021-11-01 PROCEDURE — 3044F PR MOST RECENT HEMOGLOBIN A1C LEVEL <7.0%: ICD-10-PCS | Mod: CPTII,S$GLB,, | Performed by: ORTHOPAEDIC SURGERY

## 2021-11-01 PROCEDURE — 99999 PR PBB SHADOW E&M-EST. PATIENT-LVL III: CPT | Mod: PBBFAC,,, | Performed by: ORTHOPAEDIC SURGERY

## 2021-11-01 PROCEDURE — 3008F BODY MASS INDEX DOCD: CPT | Mod: CPTII,S$GLB,, | Performed by: ORTHOPAEDIC SURGERY

## 2021-11-01 PROCEDURE — 3008F PR BODY MASS INDEX (BMI) DOCUMENTED: ICD-10-PCS | Mod: CPTII,S$GLB,, | Performed by: ORTHOPAEDIC SURGERY

## 2021-11-02 ENCOUNTER — CLINICAL SUPPORT (OUTPATIENT)
Dept: REHABILITATION | Facility: OTHER | Age: 36
End: 2021-11-02
Payer: COMMERCIAL

## 2021-11-02 DIAGNOSIS — M54.12 CERVICAL RADICULOPATHY: ICD-10-CM

## 2021-11-02 DIAGNOSIS — R29.3 POSTURE IMBALANCE: Primary | ICD-10-CM

## 2021-11-02 DIAGNOSIS — M54.2 NECK PAIN: ICD-10-CM

## 2021-11-02 PROCEDURE — 97012 MECHANICAL TRACTION THERAPY: CPT | Mod: PN | Performed by: PHYSICAL THERAPIST

## 2021-11-02 PROCEDURE — 97140 MANUAL THERAPY 1/> REGIONS: CPT | Mod: PN | Performed by: PHYSICAL THERAPIST

## 2021-11-05 ENCOUNTER — CLINICAL SUPPORT (OUTPATIENT)
Dept: REHABILITATION | Facility: OTHER | Age: 36
End: 2021-11-05
Payer: COMMERCIAL

## 2021-11-05 DIAGNOSIS — M54.2 NECK PAIN: ICD-10-CM

## 2021-11-05 DIAGNOSIS — R29.3 POSTURE IMBALANCE: Primary | ICD-10-CM

## 2021-11-05 DIAGNOSIS — M54.12 CERVICAL RADICULOPATHY: ICD-10-CM

## 2021-11-05 PROCEDURE — 97140 MANUAL THERAPY 1/> REGIONS: CPT | Mod: PN | Performed by: PHYSICAL THERAPIST

## 2021-11-05 PROCEDURE — 97012 MECHANICAL TRACTION THERAPY: CPT | Mod: PN | Performed by: PHYSICAL THERAPIST

## 2021-11-05 PROCEDURE — 97110 THERAPEUTIC EXERCISES: CPT | Mod: PN | Performed by: PHYSICAL THERAPIST

## 2021-11-08 ENCOUNTER — PATIENT MESSAGE (OUTPATIENT)
Dept: SPORTS MEDICINE | Facility: CLINIC | Age: 36
End: 2021-11-08

## 2021-11-08 ENCOUNTER — OFFICE VISIT (OUTPATIENT)
Dept: SPORTS MEDICINE | Facility: CLINIC | Age: 36
End: 2021-11-08
Payer: COMMERCIAL

## 2021-11-08 ENCOUNTER — APPOINTMENT (OUTPATIENT)
Dept: RADIOLOGY | Facility: OTHER | Age: 36
End: 2021-11-08
Attending: NEUROMUSCULOSKELETAL MEDICINE & OMM
Payer: COMMERCIAL

## 2021-11-08 VITALS
HEIGHT: 64 IN | BODY MASS INDEX: 19.46 KG/M2 | DIASTOLIC BLOOD PRESSURE: 75 MMHG | WEIGHT: 114 LBS | SYSTOLIC BLOOD PRESSURE: 104 MMHG

## 2021-11-08 DIAGNOSIS — M79.10 MYALGIA: ICD-10-CM

## 2021-11-08 DIAGNOSIS — G25.89 SCAPULAR DYSKINESIS: ICD-10-CM

## 2021-11-08 DIAGNOSIS — M75.52 SUBACROMIAL BURSITIS OF LEFT SHOULDER JOINT: Primary | ICD-10-CM

## 2021-11-08 DIAGNOSIS — M25.512 LEFT SHOULDER PAIN: Primary | ICD-10-CM

## 2021-11-08 DIAGNOSIS — M25.312 INSTABILITY OF LEFT SHOULDER JOINT: ICD-10-CM

## 2021-11-08 DIAGNOSIS — M25.512 LEFT SHOULDER PAIN: ICD-10-CM

## 2021-11-08 DIAGNOSIS — M99.07 UPPER EXTREMITY SOMATIC DYSFUNCTION: ICD-10-CM

## 2021-11-08 DIAGNOSIS — M25.311 SHOULDER INSTABILITY, RIGHT: ICD-10-CM

## 2021-11-08 DIAGNOSIS — M99.02 SOMATIC DYSFUNCTION OF THORACIC REGION: ICD-10-CM

## 2021-11-08 DIAGNOSIS — M99.08 SOMATIC DYSFUNCTION OF RIB CAGE REGION: ICD-10-CM

## 2021-11-08 PROCEDURE — 73030 XR SHOULDER COMPLETE 2 OR MORE VIEWS LEFT: ICD-10-PCS | Mod: 26,LT,, | Performed by: RADIOLOGY

## 2021-11-08 PROCEDURE — 3074F PR MOST RECENT SYSTOLIC BLOOD PRESSURE < 130 MM HG: ICD-10-PCS | Mod: CPTII,S$GLB,, | Performed by: NEUROMUSCULOSKELETAL MEDICINE & OMM

## 2021-11-08 PROCEDURE — 3078F DIAST BP <80 MM HG: CPT | Mod: CPTII,S$GLB,, | Performed by: NEUROMUSCULOSKELETAL MEDICINE & OMM

## 2021-11-08 PROCEDURE — 99214 PR OFFICE/OUTPT VISIT, EST, LEVL IV, 30-39 MIN: ICD-10-PCS | Mod: 25,S$GLB,, | Performed by: NEUROMUSCULOSKELETAL MEDICINE & OMM

## 2021-11-08 PROCEDURE — 3044F PR MOST RECENT HEMOGLOBIN A1C LEVEL <7.0%: ICD-10-PCS | Mod: CPTII,S$GLB,, | Performed by: NEUROMUSCULOSKELETAL MEDICINE & OMM

## 2021-11-08 PROCEDURE — 20611 DRAIN/INJ JOINT/BURSA W/US: CPT | Mod: LT,S$GLB,, | Performed by: NEUROMUSCULOSKELETAL MEDICINE & OMM

## 2021-11-08 PROCEDURE — 1159F MED LIST DOCD IN RCRD: CPT | Mod: CPTII,S$GLB,, | Performed by: NEUROMUSCULOSKELETAL MEDICINE & OMM

## 2021-11-08 PROCEDURE — 73030 X-RAY EXAM OF SHOULDER: CPT | Mod: 26,LT,, | Performed by: RADIOLOGY

## 2021-11-08 PROCEDURE — 98926 PR OSTEOPATHIC MANIP,3-4 BODY REGN: ICD-10-PCS | Mod: S$GLB,,, | Performed by: NEUROMUSCULOSKELETAL MEDICINE & OMM

## 2021-11-08 PROCEDURE — 1159F PR MEDICATION LIST DOCUMENTED IN MEDICAL RECORD: ICD-10-PCS | Mod: CPTII,S$GLB,, | Performed by: NEUROMUSCULOSKELETAL MEDICINE & OMM

## 2021-11-08 PROCEDURE — 98926 OSTEOPATH MANJ 3-4 REGIONS: CPT | Mod: S$GLB,,, | Performed by: NEUROMUSCULOSKELETAL MEDICINE & OMM

## 2021-11-08 PROCEDURE — 73030 X-RAY EXAM OF SHOULDER: CPT | Mod: TC,LT

## 2021-11-08 PROCEDURE — 99999 PR PBB SHADOW E&M-EST. PATIENT-LVL III: ICD-10-PCS | Mod: PBBFAC,,, | Performed by: NEUROMUSCULOSKELETAL MEDICINE & OMM

## 2021-11-08 PROCEDURE — 99999 PR PBB SHADOW E&M-EST. PATIENT-LVL III: CPT | Mod: PBBFAC,,, | Performed by: NEUROMUSCULOSKELETAL MEDICINE & OMM

## 2021-11-08 PROCEDURE — 3008F BODY MASS INDEX DOCD: CPT | Mod: CPTII,S$GLB,, | Performed by: NEUROMUSCULOSKELETAL MEDICINE & OMM

## 2021-11-08 PROCEDURE — 1160F RVW MEDS BY RX/DR IN RCRD: CPT | Mod: CPTII,S$GLB,, | Performed by: NEUROMUSCULOSKELETAL MEDICINE & OMM

## 2021-11-08 PROCEDURE — 1160F PR REVIEW ALL MEDS BY PRESCRIBER/CLIN PHARMACIST DOCUMENTED: ICD-10-PCS | Mod: CPTII,S$GLB,, | Performed by: NEUROMUSCULOSKELETAL MEDICINE & OMM

## 2021-11-08 PROCEDURE — 3008F PR BODY MASS INDEX (BMI) DOCUMENTED: ICD-10-PCS | Mod: CPTII,S$GLB,, | Performed by: NEUROMUSCULOSKELETAL MEDICINE & OMM

## 2021-11-08 PROCEDURE — 3078F PR MOST RECENT DIASTOLIC BLOOD PRESSURE < 80 MM HG: ICD-10-PCS | Mod: CPTII,S$GLB,, | Performed by: NEUROMUSCULOSKELETAL MEDICINE & OMM

## 2021-11-08 PROCEDURE — 99214 OFFICE O/P EST MOD 30 MIN: CPT | Mod: 25,S$GLB,, | Performed by: NEUROMUSCULOSKELETAL MEDICINE & OMM

## 2021-11-08 PROCEDURE — 3074F SYST BP LT 130 MM HG: CPT | Mod: CPTII,S$GLB,, | Performed by: NEUROMUSCULOSKELETAL MEDICINE & OMM

## 2021-11-08 PROCEDURE — 3044F HG A1C LEVEL LT 7.0%: CPT | Mod: CPTII,S$GLB,, | Performed by: NEUROMUSCULOSKELETAL MEDICINE & OMM

## 2021-11-08 PROCEDURE — 20611 PR DRAIN/ASP/INJECT MAJOR JOINT/BURSA W/US GUIDANCE: ICD-10-PCS | Mod: LT,S$GLB,, | Performed by: NEUROMUSCULOSKELETAL MEDICINE & OMM

## 2021-11-08 RX ORDER — TRIAMCINOLONE ACETONIDE 40 MG/ML
40 INJECTION, SUSPENSION INTRA-ARTICULAR; INTRAMUSCULAR
Status: COMPLETED | OUTPATIENT
Start: 2021-11-08 | End: 2021-11-08

## 2021-11-08 RX ADMIN — TRIAMCINOLONE ACETONIDE 40 MG: 40 INJECTION, SUSPENSION INTRA-ARTICULAR; INTRAMUSCULAR at 10:11

## 2021-11-10 ENCOUNTER — HOSPITAL ENCOUNTER (OUTPATIENT)
Dept: RADIOLOGY | Facility: HOSPITAL | Age: 36
Discharge: HOME | End: 2021-11-10
Attending: ORTHOPAEDIC SURGERY
Payer: COMMERCIAL

## 2021-11-10 DIAGNOSIS — M25.311 SHOULDER INSTABILITY, RIGHT: ICD-10-CM

## 2021-11-10 PROCEDURE — 23350 INJECTION FOR SHOULDER X-RAY: CPT | Mod: ,,, | Performed by: INTERNAL MEDICINE

## 2021-11-10 PROCEDURE — 25500020 PHARM REV CODE 255: Performed by: ORTHOPAEDIC SURGERY

## 2021-11-10 PROCEDURE — A9585 GADOBUTROL INJECTION: HCPCS | Performed by: ORTHOPAEDIC SURGERY

## 2021-11-10 PROCEDURE — 23350 INJECTION FOR SHOULDER X-RAY: CPT

## 2021-11-10 PROCEDURE — 73222 MRI ARTHROGRAM SHOULDER WITH CONTRAST RIGHT: ICD-10-PCS | Mod: 26,RT,, | Performed by: INTERNAL MEDICINE

## 2021-11-10 PROCEDURE — 73222 MRI JOINT UPR EXTREM W/DYE: CPT | Mod: TC,RT

## 2021-11-10 PROCEDURE — 73222 MRI JOINT UPR EXTREM W/DYE: CPT | Mod: 26,RT,, | Performed by: INTERNAL MEDICINE

## 2021-11-10 PROCEDURE — 25000003 PHARM REV CODE 250: Performed by: ORTHOPAEDIC SURGERY

## 2021-11-10 PROCEDURE — 23350 XR ARTHROGRAM SHOULDER RIGHT WITH MRI TO FOLLOW: ICD-10-PCS | Mod: ,,, | Performed by: INTERNAL MEDICINE

## 2021-11-10 PROCEDURE — 73040 XR ARTHROGRAM SHOULDER RIGHT WITH MRI TO FOLLOW: ICD-10-PCS | Mod: 26,RT,, | Performed by: INTERNAL MEDICINE

## 2021-11-10 PROCEDURE — 73040 CONTRAST X-RAY OF SHOULDER: CPT | Mod: 26,RT,, | Performed by: INTERNAL MEDICINE

## 2021-11-10 RX ORDER — LIDOCAINE HYDROCHLORIDE 10 MG/ML
4 INJECTION INFILTRATION; PERINEURAL ONCE
Status: COMPLETED | OUTPATIENT
Start: 2021-11-10 | End: 2021-11-10

## 2021-11-10 RX ORDER — GADOBUTROL 604.72 MG/ML
7 INJECTION INTRAVENOUS
Status: COMPLETED | OUTPATIENT
Start: 2021-11-10 | End: 2021-11-10

## 2021-11-10 RX ADMIN — GADOBUTROL 7 ML: 604.72 INJECTION INTRAVENOUS at 11:11

## 2021-11-10 RX ADMIN — LIDOCAINE HYDROCHLORIDE 4 ML: 10 INJECTION, SOLUTION INFILTRATION; PERINEURAL at 11:11

## 2021-11-10 RX ADMIN — IOHEXOL 6 ML: 300 INJECTION, SOLUTION INTRAVENOUS at 11:11

## 2021-11-12 ENCOUNTER — CLINICAL SUPPORT (OUTPATIENT)
Dept: REHABILITATION | Facility: OTHER | Age: 36
End: 2021-11-12
Payer: COMMERCIAL

## 2021-11-12 ENCOUNTER — PATIENT MESSAGE (OUTPATIENT)
Dept: SPORTS MEDICINE | Facility: CLINIC | Age: 36
End: 2021-11-12
Payer: COMMERCIAL

## 2021-11-12 ENCOUNTER — TELEPHONE (OUTPATIENT)
Dept: ORTHOPEDICS | Facility: HOSPITAL | Age: 36
End: 2021-11-12
Payer: COMMERCIAL

## 2021-11-12 DIAGNOSIS — M25.312 INSTABILITY OF LEFT SHOULDER JOINT: ICD-10-CM

## 2021-11-12 DIAGNOSIS — M54.12 CERVICAL RADICULOPATHY: ICD-10-CM

## 2021-11-12 DIAGNOSIS — R29.3 POSTURE IMBALANCE: Primary | ICD-10-CM

## 2021-11-12 DIAGNOSIS — M75.52 SUBACROMIAL BURSITIS OF LEFT SHOULDER JOINT: ICD-10-CM

## 2021-11-12 DIAGNOSIS — M54.2 NECK PAIN: ICD-10-CM

## 2021-11-12 DIAGNOSIS — M79.10 MYALGIA: ICD-10-CM

## 2021-11-12 PROCEDURE — 97110 THERAPEUTIC EXERCISES: CPT | Mod: PN | Performed by: PHYSICAL THERAPIST

## 2021-11-12 PROCEDURE — 97012 MECHANICAL TRACTION THERAPY: CPT | Mod: PN | Performed by: PHYSICAL THERAPIST

## 2021-11-15 ENCOUNTER — CLINICAL SUPPORT (OUTPATIENT)
Dept: REHABILITATION | Facility: OTHER | Age: 36
End: 2021-11-15
Payer: COMMERCIAL

## 2021-11-15 DIAGNOSIS — M54.2 NECK PAIN: ICD-10-CM

## 2021-11-15 DIAGNOSIS — R29.3 POSTURE IMBALANCE: ICD-10-CM

## 2021-11-15 DIAGNOSIS — M54.12 CERVICAL RADICULOPATHY: ICD-10-CM

## 2021-11-15 PROCEDURE — 97140 MANUAL THERAPY 1/> REGIONS: CPT | Mod: PN,CQ

## 2021-11-15 PROCEDURE — 97110 THERAPEUTIC EXERCISES: CPT | Mod: PN,CQ

## 2021-11-17 ENCOUNTER — CLINICAL SUPPORT (OUTPATIENT)
Dept: REHABILITATION | Facility: OTHER | Age: 36
End: 2021-11-17
Payer: COMMERCIAL

## 2021-11-17 DIAGNOSIS — M54.12 CERVICAL RADICULOPATHY: ICD-10-CM

## 2021-11-17 DIAGNOSIS — M54.2 NECK PAIN: ICD-10-CM

## 2021-11-17 DIAGNOSIS — R29.3 POSTURE IMBALANCE: Primary | ICD-10-CM

## 2021-11-17 PROCEDURE — 97140 MANUAL THERAPY 1/> REGIONS: CPT | Mod: PN | Performed by: PHYSICAL THERAPIST

## 2021-11-18 ENCOUNTER — PATIENT MESSAGE (OUTPATIENT)
Dept: REHABILITATION | Facility: OTHER | Age: 36
End: 2021-11-18
Payer: COMMERCIAL

## 2021-11-19 ENCOUNTER — DOCUMENTATION ONLY (OUTPATIENT)
Dept: REHABILITATION | Facility: OTHER | Age: 36
End: 2021-11-19
Payer: COMMERCIAL

## 2021-11-19 DIAGNOSIS — M54.2 NECK PAIN: ICD-10-CM

## 2021-11-19 DIAGNOSIS — R29.3 POSTURE IMBALANCE: Primary | ICD-10-CM

## 2021-11-19 DIAGNOSIS — M54.12 CERVICAL RADICULOPATHY: ICD-10-CM

## 2021-11-30 ENCOUNTER — PATIENT MESSAGE (OUTPATIENT)
Dept: PAIN MEDICINE | Facility: CLINIC | Age: 36
End: 2021-11-30
Payer: COMMERCIAL

## 2021-12-01 ENCOUNTER — OFFICE VISIT (OUTPATIENT)
Dept: PAIN MEDICINE | Facility: CLINIC | Age: 36
End: 2021-12-01
Attending: ANESTHESIOLOGY
Payer: COMMERCIAL

## 2021-12-01 ENCOUNTER — TELEPHONE (OUTPATIENT)
Dept: PAIN MEDICINE | Facility: CLINIC | Age: 36
End: 2021-12-01
Payer: COMMERCIAL

## 2021-12-01 ENCOUNTER — PATIENT OUTREACH (OUTPATIENT)
Dept: ADMINISTRATIVE | Facility: OTHER | Age: 36
End: 2021-12-01
Payer: COMMERCIAL

## 2021-12-01 ENCOUNTER — CLINICAL SUPPORT (OUTPATIENT)
Dept: REHABILITATION | Facility: OTHER | Age: 36
End: 2021-12-01
Payer: COMMERCIAL

## 2021-12-01 DIAGNOSIS — M54.2 NECK PAIN: ICD-10-CM

## 2021-12-01 DIAGNOSIS — M79.18 MYOFASCIAL PAIN SYNDROME: ICD-10-CM

## 2021-12-01 DIAGNOSIS — R29.3 POSTURE IMBALANCE: Primary | ICD-10-CM

## 2021-12-01 DIAGNOSIS — G43.119 INTRACTABLE MIGRAINE WITH AURA WITHOUT STATUS MIGRAINOSUS: Primary | ICD-10-CM

## 2021-12-01 DIAGNOSIS — S43.431D GLENOID LABRAL TEAR, RIGHT, SUBSEQUENT ENCOUNTER: ICD-10-CM

## 2021-12-01 DIAGNOSIS — M54.12 CERVICAL RADICULOPATHY: ICD-10-CM

## 2021-12-01 PROBLEM — G43.009 MIGRAINE WITHOUT AURA AND WITHOUT STATUS MIGRAINOSUS, NOT INTRACTABLE: Status: RESOLVED | Noted: 2021-05-13 | Resolved: 2021-12-01

## 2021-12-01 PROBLEM — G43.109 MIGRAINE WITH AURA: Status: ACTIVE | Noted: 2021-12-01

## 2021-12-01 PROCEDURE — 99213 PR OFFICE/OUTPT VISIT, EST, LEVL III, 20-29 MIN: ICD-10-PCS | Mod: 95,,, | Performed by: ANESTHESIOLOGY

## 2021-12-01 PROCEDURE — 97140 MANUAL THERAPY 1/> REGIONS: CPT | Mod: PN | Performed by: PHYSICAL THERAPIST

## 2021-12-01 PROCEDURE — 99213 OFFICE O/P EST LOW 20 MIN: CPT | Mod: 95,,, | Performed by: ANESTHESIOLOGY

## 2021-12-03 ENCOUNTER — TELEPHONE (OUTPATIENT)
Dept: SPORTS MEDICINE | Facility: CLINIC | Age: 36
End: 2021-12-03
Payer: COMMERCIAL

## 2021-12-03 DIAGNOSIS — M25.311 SHOULDER INSTABILITY, RIGHT: Primary | ICD-10-CM

## 2021-12-03 DIAGNOSIS — S43.431A SUPERIOR GLENOID LABRUM LESION OF RIGHT SHOULDER, INITIAL ENCOUNTER: ICD-10-CM

## 2021-12-03 DIAGNOSIS — M75.21 BICEPS TENDINITIS OF RIGHT UPPER EXTREMITY: ICD-10-CM

## 2021-12-06 ENCOUNTER — CLINICAL SUPPORT (OUTPATIENT)
Dept: REHABILITATION | Facility: OTHER | Age: 36
End: 2021-12-06
Payer: COMMERCIAL

## 2021-12-06 DIAGNOSIS — R29.3 POSTURE IMBALANCE: Primary | ICD-10-CM

## 2021-12-06 DIAGNOSIS — M54.2 NECK PAIN: ICD-10-CM

## 2021-12-06 DIAGNOSIS — M54.12 CERVICAL RADICULOPATHY: ICD-10-CM

## 2021-12-06 PROCEDURE — 97140 MANUAL THERAPY 1/> REGIONS: CPT | Mod: PN | Performed by: PHYSICAL THERAPIST

## 2021-12-06 PROCEDURE — 97110 THERAPEUTIC EXERCISES: CPT | Mod: PN | Performed by: PHYSICAL THERAPIST

## 2021-12-14 ENCOUNTER — CLINICAL SUPPORT (OUTPATIENT)
Dept: REHABILITATION | Facility: OTHER | Age: 36
End: 2021-12-14
Payer: COMMERCIAL

## 2021-12-14 DIAGNOSIS — M54.12 CERVICAL RADICULOPATHY: ICD-10-CM

## 2021-12-14 DIAGNOSIS — R29.3 POSTURE IMBALANCE: Primary | ICD-10-CM

## 2021-12-14 DIAGNOSIS — M54.2 NECK PAIN: ICD-10-CM

## 2021-12-14 PROCEDURE — 97140 MANUAL THERAPY 1/> REGIONS: CPT | Mod: PN | Performed by: PHYSICAL THERAPIST

## 2021-12-14 PROCEDURE — 97110 THERAPEUTIC EXERCISES: CPT | Mod: PN | Performed by: PHYSICAL THERAPIST

## 2021-12-16 ENCOUNTER — PATIENT MESSAGE (OUTPATIENT)
Dept: SPORTS MEDICINE | Facility: CLINIC | Age: 36
End: 2021-12-16
Payer: COMMERCIAL

## 2021-12-16 ENCOUNTER — CLINICAL SUPPORT (OUTPATIENT)
Dept: REHABILITATION | Facility: OTHER | Age: 36
End: 2021-12-16
Payer: COMMERCIAL

## 2021-12-16 ENCOUNTER — PATIENT MESSAGE (OUTPATIENT)
Dept: PREADMISSION TESTING | Facility: HOSPITAL | Age: 36
End: 2021-12-16
Payer: COMMERCIAL

## 2021-12-16 DIAGNOSIS — R29.3 POSTURE IMBALANCE: ICD-10-CM

## 2021-12-16 DIAGNOSIS — M54.2 NECK PAIN: ICD-10-CM

## 2021-12-16 DIAGNOSIS — M54.12 CERVICAL RADICULOPATHY: ICD-10-CM

## 2021-12-16 PROCEDURE — 97140 MANUAL THERAPY 1/> REGIONS: CPT | Mod: PN

## 2021-12-16 NOTE — ANESTHESIA PAT ROS NOTE
12/16/2021  Karen Purdy is a 36 y.o., female.    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

## 2021-12-22 ENCOUNTER — CLINICAL SUPPORT (OUTPATIENT)
Dept: REHABILITATION | Facility: OTHER | Age: 36
End: 2021-12-22
Payer: COMMERCIAL

## 2021-12-22 ENCOUNTER — TELEPHONE (OUTPATIENT)
Dept: SPORTS MEDICINE | Facility: CLINIC | Age: 36
End: 2021-12-22
Payer: COMMERCIAL

## 2021-12-22 DIAGNOSIS — R29.3 POSTURE IMBALANCE: Primary | ICD-10-CM

## 2021-12-22 DIAGNOSIS — M54.2 NECK PAIN: ICD-10-CM

## 2021-12-22 DIAGNOSIS — M54.12 CERVICAL RADICULOPATHY: ICD-10-CM

## 2021-12-22 PROCEDURE — 97140 MANUAL THERAPY 1/> REGIONS: CPT | Mod: PN | Performed by: PHYSICAL THERAPIST

## 2021-12-26 ENCOUNTER — PATIENT MESSAGE (OUTPATIENT)
Dept: PREADMISSION TESTING | Facility: HOSPITAL | Age: 36
End: 2021-12-26
Payer: COMMERCIAL

## 2021-12-27 ENCOUNTER — TELEPHONE (OUTPATIENT)
Dept: SPORTS MEDICINE | Facility: CLINIC | Age: 36
End: 2021-12-27
Payer: COMMERCIAL

## 2021-12-27 ENCOUNTER — OFFICE VISIT (OUTPATIENT)
Dept: SPORTS MEDICINE | Facility: CLINIC | Age: 36
End: 2021-12-27
Payer: COMMERCIAL

## 2021-12-27 VITALS
WEIGHT: 115 LBS | BODY MASS INDEX: 19.63 KG/M2 | HEIGHT: 64 IN | HEART RATE: 60 BPM | DIASTOLIC BLOOD PRESSURE: 68 MMHG | SYSTOLIC BLOOD PRESSURE: 104 MMHG

## 2021-12-27 DIAGNOSIS — M75.21 BICEPS TENDINITIS OF RIGHT UPPER EXTREMITY: ICD-10-CM

## 2021-12-27 DIAGNOSIS — G89.18 POST-OPERATIVE PAIN: ICD-10-CM

## 2021-12-27 DIAGNOSIS — G43.009 MIGRAINE WITHOUT AURA AND WITHOUT STATUS MIGRAINOSUS, NOT INTRACTABLE: ICD-10-CM

## 2021-12-27 DIAGNOSIS — M79.18 MYOFASCIAL PAIN: ICD-10-CM

## 2021-12-27 DIAGNOSIS — M25.311 SHOULDER INSTABILITY, RIGHT: Primary | ICD-10-CM

## 2021-12-27 PROCEDURE — 99999 PR PBB SHADOW E&M-EST. PATIENT-LVL III: CPT | Mod: PBBFAC,,, | Performed by: PHYSICIAN ASSISTANT

## 2021-12-27 PROCEDURE — 3044F HG A1C LEVEL LT 7.0%: CPT | Mod: CPTII,S$GLB,, | Performed by: PHYSICIAN ASSISTANT

## 2021-12-27 PROCEDURE — 3008F PR BODY MASS INDEX (BMI) DOCUMENTED: ICD-10-PCS | Mod: CPTII,S$GLB,, | Performed by: PHYSICIAN ASSISTANT

## 2021-12-27 PROCEDURE — 3078F DIAST BP <80 MM HG: CPT | Mod: CPTII,S$GLB,, | Performed by: PHYSICIAN ASSISTANT

## 2021-12-27 PROCEDURE — 3074F SYST BP LT 130 MM HG: CPT | Mod: CPTII,S$GLB,, | Performed by: PHYSICIAN ASSISTANT

## 2021-12-27 PROCEDURE — 99999 PR PBB SHADOW E&M-EST. PATIENT-LVL III: ICD-10-PCS | Mod: PBBFAC,,, | Performed by: PHYSICIAN ASSISTANT

## 2021-12-27 PROCEDURE — 1159F PR MEDICATION LIST DOCUMENTED IN MEDICAL RECORD: ICD-10-PCS | Mod: CPTII,S$GLB,, | Performed by: PHYSICIAN ASSISTANT

## 2021-12-27 PROCEDURE — 3044F PR MOST RECENT HEMOGLOBIN A1C LEVEL <7.0%: ICD-10-PCS | Mod: CPTII,S$GLB,, | Performed by: PHYSICIAN ASSISTANT

## 2021-12-27 PROCEDURE — 99499 NO LOS: ICD-10-PCS | Mod: S$GLB,,, | Performed by: PHYSICIAN ASSISTANT

## 2021-12-27 PROCEDURE — 1159F MED LIST DOCD IN RCRD: CPT | Mod: CPTII,S$GLB,, | Performed by: PHYSICIAN ASSISTANT

## 2021-12-27 PROCEDURE — 1160F PR REVIEW ALL MEDS BY PRESCRIBER/CLIN PHARMACIST DOCUMENTED: ICD-10-PCS | Mod: CPTII,S$GLB,, | Performed by: PHYSICIAN ASSISTANT

## 2021-12-27 PROCEDURE — 99499 UNLISTED E&M SERVICE: CPT | Mod: S$GLB,,, | Performed by: PHYSICIAN ASSISTANT

## 2021-12-27 PROCEDURE — 3074F PR MOST RECENT SYSTOLIC BLOOD PRESSURE < 130 MM HG: ICD-10-PCS | Mod: CPTII,S$GLB,, | Performed by: PHYSICIAN ASSISTANT

## 2021-12-27 PROCEDURE — 3078F PR MOST RECENT DIASTOLIC BLOOD PRESSURE < 80 MM HG: ICD-10-PCS | Mod: CPTII,S$GLB,, | Performed by: PHYSICIAN ASSISTANT

## 2021-12-27 PROCEDURE — 3008F BODY MASS INDEX DOCD: CPT | Mod: CPTII,S$GLB,, | Performed by: PHYSICIAN ASSISTANT

## 2021-12-27 PROCEDURE — 1160F RVW MEDS BY RX/DR IN RCRD: CPT | Mod: CPTII,S$GLB,, | Performed by: PHYSICIAN ASSISTANT

## 2021-12-27 RX ORDER — TIZANIDINE 2 MG/1
2 TABLET ORAL NIGHTLY
Qty: 30 TABLET | Refills: 1 | Status: SHIPPED | OUTPATIENT
Start: 2021-12-27 | End: 2022-04-26

## 2021-12-27 RX ORDER — OXYCODONE AND ACETAMINOPHEN 10; 325 MG/1; MG/1
1 TABLET ORAL
Qty: 21 TABLET | Refills: 0 | Status: SHIPPED | OUTPATIENT
Start: 2021-12-27 | End: 2022-05-19

## 2021-12-27 RX ORDER — PROMETHAZINE HYDROCHLORIDE 25 MG/1
25 TABLET ORAL EVERY 6 HOURS PRN
Qty: 12 TABLET | Refills: 0 | Status: SHIPPED | OUTPATIENT
Start: 2021-12-27 | End: 2022-05-19

## 2021-12-27 RX ORDER — TIZANIDINE 2 MG/1
2 TABLET ORAL NIGHTLY
Qty: 30 TABLET | Refills: 1 | Status: SHIPPED | OUTPATIENT
Start: 2021-12-27 | End: 2021-12-27 | Stop reason: CLARIF

## 2021-12-27 RX ORDER — CLINDAMYCIN PHOSPHATE 900 MG/50ML
900 INJECTION, SOLUTION INTRAVENOUS
Status: CANCELLED | OUTPATIENT
Start: 2021-12-27

## 2021-12-27 RX ORDER — PREGABALIN 75 MG/1
75 CAPSULE ORAL
Status: CANCELLED | OUTPATIENT
Start: 2021-12-27 | End: 2021-12-27

## 2021-12-27 RX ORDER — OXYCODONE HCL 10 MG/1
10 TABLET, FILM COATED, EXTENDED RELEASE ORAL
Status: CANCELLED | OUTPATIENT
Start: 2021-12-27 | End: 2021-12-27

## 2021-12-27 RX ORDER — SODIUM CHLORIDE 9 MG/ML
INJECTION, SOLUTION INTRAVENOUS CONTINUOUS
Status: CANCELLED | OUTPATIENT
Start: 2021-12-27

## 2021-12-27 RX ORDER — TRAMADOL HYDROCHLORIDE 50 MG/1
50-100 TABLET ORAL EVERY 6 HOURS PRN
Qty: 21 TABLET | Refills: 0 | Status: SHIPPED | OUTPATIENT
Start: 2021-12-27 | End: 2022-01-26 | Stop reason: SDUPTHER

## 2021-12-27 NOTE — H&P (VIEW-ONLY)
Karen Purdy  is here for a completion of her perioperative paperwork. she  Is scheduled to undergo     right              a. Shoulder arthroscopic anterior labral repair              b. Shoulder arthroscopic anterior capsulorrhaphy              c. Shoulder arthroscopic possible posterior labral repair              d. Shoulder arthroscopic possible posterior capsulorrhaphy              e. Shoulder arthroscopic partial synovectomy/debridement              f.  Shoulder arthroscopic possible biceps tenodesis (vs. open subpect tenodesis)              g. Shoulder arthroscopic possible SLAP repair on 1/4/22.      She is a healthy individual and does not need clearance for this procedure.     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     Unspecified vitamin D deficiency      PAST SURGICAL HISTORY:   Past Surgical History:   Procedure Laterality Date    EPIDURAL STEROID INJECTION N/A 10/4/2021    Procedure: INJECTION, STEROID, EPIDURAL C7/T1;  Surgeon: Cony Ahmadi MD;  Location: [facility];  Service: Pain Management;  Laterality: N/A;  9/16 l/m    KNEE SURGERY Right     torn meniscus     FAMILY HISTORY:   Family History   Problem Relation Age of Onset    Cancer Maternal Grandfather         lung    Dementia Paternal Grandmother     Hyperlipidemia Mother     Hypertension Mother     Hypertension Father     Hyperlipidemia Father     Breast cancer Neg Hx     Colon cancer Neg Hx     Ovarian cancer Neg Hx      SOCIAL HISTORY:   Social History     Socioeconomic History    Marital status:      Spouse name: Mark    Number of children: 2   Occupational History    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   Tobacco Use    Smoking status: Former Smoker     Packs/day: 0.10     Years: 2.00     Pack years: 0.20    Smokeless  "tobacco: Never Used   Substance and Sexual Activity    Alcohol use: Yes     Comment: occ - 1/mo    Drug use: No    Sexual activity: Yes     Partners: Male     Birth control/protection: None, Coitus interruptus       MEDICATIONS:   Current Outpatient Medications:     tiZANidine (ZANAFLEX) 2 MG tablet, TAKE 1 TABLET(2 MG) BY MOUTH EVERY EVENING, Disp: 30 tablet, Rfl: 3    VYVANSE 10 mg Cap, Take 1 capsule by mouth once daily., Disp: , Rfl:     naratriptan (AMERGE) 1 MG Tab, Take at onset of migraine, can repeat in 2 hrs if needed.  No more than twice per day or 3 days/wk., Disp: 12 tablet, Rfl: 2    Current Facility-Administered Medications:     levonorgestreL (MIRENA) 20 mcg/24 hours (6 yrs) 52 mg IUD 1 Intra Uterine Device, 1 Intra Uterine Device, Intrauterine, , Michelle W. Band, DO, 1 Intra Uterine Device at 06/11/21 0945  ALLERGIES:   Review of patient's allergies indicates:   Allergen Reactions    Ceclor [cefaclor] Anaphylaxis, Swelling and Rash    Pcn [penicillins] Anaphylaxis, Swelling and Rash       VITAL SIGNS: /68   Pulse 60   Ht 5' 4" (1.626 m)   Wt 52.2 kg (115 lb)   BMI 19.74 kg/m²      Risks, indications and benefits of the surgical procedure were discussed with the patient. All questions with regard to surgery, rehab, expected return to functional activities, activities of daily living and recreational endeavors were answered to her satisfaction.    It was explained to the patient that there may be an increase in surgical risks if the patient has certain co-morbidities such as but not limited to: Obesity, Cardiovascular issues (CHF, CAD, Arrhythmias), chronic pulmonary issues, previous or current neurovascular/neurological issues, previous strokes, diabetes mellitus, previous wound healing issues, previous wound or skin infections, PVD, clotting disorders, if the patient uses chronic steroids, if the patient takes or has immune compromising medications or diseases, or has previously " or currently used tobacco products.     The patient verbalized that he/she does not have any additional clotting, bleeding, or blood disorders, other than what is list in her chart on today's review.     Then a brief history and physical exam were performed.    Review of Systems   Constitution: Negative. Negative for chills, fever and night sweats.   HENT: Negative for congestion and headaches.    Eyes: Negative for blurred vision, left vision loss and right vision loss.   Cardiovascular: Negative for chest pain and syncope.   Respiratory: Negative for cough and shortness of breath.    Endocrine: Negative for polydipsia, polyphagia and polyuria.   Hematologic/Lymphatic: Negative for bleeding problem. Does not bruise/bleed easily.   Skin: Negative for dry skin, itching and rash.   Musculoskeletal: Negative for falls and muscle weakness.   Gastrointestinal: Negative for abdominal pain and bowel incontinence.   Genitourinary: Negative for bladder incontinence and nocturia.   Neurological: Negative for disturbances in coordination, loss of balance and seizures.   Psychiatric/Behavioral: Negative for depression. The patient does not have insomnia.    Allergic/Immunologic: Negative for hives and persistent infections.     PHYSICAL EXAM:  GEN: A&Ox3, WD WN NAD  HEENT: WNL  CHEST: CTAB, no W/R/R  HEART: RRR, no M/R/G  ABD: Soft, NT ND, BS x4 QUADS  MS; See Epic  NEURO: CN II-XII intact       The surgical consent was then reviewed with the patient, who agreed with all the contents of the consent form and it was signed. she was then given the surgery packet to bring with her to surgery [place] for the anesthesia portion of her perioperative paperwork (if needed)   For all physicians except for Dr. Xie, we will email and possibly fax the consent forms and booking sheets to [facility].    The patient was given the opportunity to ask questions about the surgical plan and consent form, and once no other  questions were asked, I proceeded with the pre-op appointment.    PHYSICAL THERAPY:  She was also instructed regarding physical therapy and will begin on  3 weeks post-op. She was given a copy of the original prescription to schedule. Another copy of this prescription was also faxed to [facility]. Recommended patient to do PT at [facility] but she states she cannot do it because of distance from her house with 2 kids.     POST OP CARE:instructions were reviewed including care of the wound and dressing after surgery and when she can shower. Patient was told not sleep or lay on there surgical extremity following surgery as this could cause repair damage, tissue damage, or nerve injury.    An extensive amount of time was spent on discussion of the following information based on what type of surgery the patient was having. Patient expressed understanding of the material below:    Shoulder surgery or upper extremity surgery requiring post-op sling:  It was explained to the patient that they should remove their arm from the sling approximately 6 times per day to do full elbow ROM (flexion and extension) and full supination and pronation of the elbow for approximately 5 minutes at a time to help prevent elbow stiffness, nerve pain or problems, or nerve injury. They were told to contact us if they begin having numbness and tingling of there surgical extremity that persists longer then 1 day without relief.     Extremity surgery requiring a splint:   It was explained to patient on how to properly elevated position there extremity to prevent pressure ulcers from occurring. I made sure that the patient understood that that surgical site may be numb following surgery and prevent them from feeling pressure pain that they would normal feel if a pressure injury was occurring. Pressure ulcers and there causes were discussed with the patient today.     Post-operative splint:  It was explain to the patient that they can  contact us at anytime if they feel that there is a problem with their splint or under their splint that needs evaluation. If there is concern, questions, or discomfort with the splint then they can present to either our clinic or the [facility] ED for removal, evaluation, and replacement of the splint.    CRUTCHES OR WALKER: It was explained to the patient that if they are having a lower extremity surgery that they will require either a walker or crutches to ambulate safely with after surgery. It was explained that a cane or other assistive devices are not sufficient to safely ambulate with after surgery. I explained to the patient that I will place an order for them to receive either crutches or a walker after surgery to go home with. It was explained that if they have crutches or a walker at home already, that they are REQUIRED to bring them to the hospital on the day of surgery. It was explained that if they do not have them at the hospital on the day of surgery that they WILL be provided a new pair or crutches or a walker to go home with to ensure ambulation will be safe if the patient needs to stop somewhere on the way home.      PAIN MANAGEMENT: Karen Purdy was also given a pain management regime, which includes the TENS unit given to her by marina  along with the education required for its use. She was also instructed regarding the Polar ice unit that will be in place after surgery and her postoperative pain medications.     PAIN MEDICATION:  Percocet 10/325mg 1 po q 4-6 hours prn pain  Ultram 50 mg Take 1-2 p.o. q.6 hours p.r.n. breakthrough pain,   Phenergan 25 mg one p.o. q.6 hours p.r.n. nausea and vomiting.  Tizanidine 2mg tablets 1-2x daily for muscle spasms    DVT prophylaxis was discussed with the patient today including risk factors for developing DVTs and history of DVTs. The patient was asked if any specific recommendations were given from the doctor/s that did pre-operative  surgical clearance. The patient was then given an education sheet about DVTs and PE with warning signs and symptoms of both and steps to take if they suspect either of these.    This along with the Modified Caprini risk assessment model for VTE in general surgical patients was used to determine the patient's DVT risk.     From: Angela DE DIOS, Devin DA, Melisa MADERA, et al. Prevention of VTE in nonorthopedic surgical patients: antithrombotic therapy and prevention of thrombosis, 9th ed: American College of Chest [employer]icians evidence-based clinical practical guidelines. Chest 2012; 141:e227S. Copyright © 2012. Reproduced with permission from the American College of Chest Physicians.    The below listed DVT prophylaxis regimen along with bilateral SARAH compression stockings will be used post-op. Length of treatment has been determined to be 10-42 days post-op by the above noted Caprini assessment model.     The patient was instructed to buy and take:  Aspirin 81mg QD x 4 weeks for DVT prophylaxis starting on the morning after surgery.  Patient will also use bilateral TEDs on lower extremities, SCDs during surgery, and early ambulation post-op. If the patient was previously taking 81mg baby aspirin, they were told to not take it starting 5 days prior to surgery and to restart the 81mg aspirin after surgery.       Patient was also told to buy over the counter Prilosec medication if needed and take it once daily for GI protection as long as they are taking NSAIDs or Aspirin.    Patient denies history of seizures.    Patient was asked if they were taking or using OCP or hormone replacement pills or devices. If they answered yes, then they were instructed to stop using OCPs/hormone replacement pills at this pre-operative appointment until 2 months post-op to help prevent DVT development. They understand that there are other forms of birth control that do not involve hormones. They expressed understanding that ignoring/not following  this instruction could result in a DVT which could turn into a deadly pulmonary embolism.        The patient was told that narcotic pain medications may make them drowsy and instructions were given to not sign legal documents, drive or operate heavy machinery, cars, or equipment while under the influence of narcotic medications. The patient was told and understands that narcotic pain medications should only be used as needed to control pain and that other options of pain control include TENs unit and ice packs/unit.     As there were no other questions to be asked, she was given my business card along with Ann Anderson MD business card if she has any questions or concerns prior to surgery or in the postop period.

## 2021-12-28 ENCOUNTER — CLINICAL SUPPORT (OUTPATIENT)
Dept: REHABILITATION | Facility: OTHER | Age: 36
End: 2021-12-28
Payer: COMMERCIAL

## 2021-12-28 DIAGNOSIS — M54.2 NECK PAIN: ICD-10-CM

## 2021-12-28 DIAGNOSIS — Z01.818 PRE-OP TESTING: Primary | ICD-10-CM

## 2021-12-28 DIAGNOSIS — M54.12 CERVICAL RADICULOPATHY: ICD-10-CM

## 2021-12-28 DIAGNOSIS — R29.3 POSTURE IMBALANCE: Primary | ICD-10-CM

## 2021-12-28 PROCEDURE — 97140 MANUAL THERAPY 1/> REGIONS: CPT | Mod: PN | Performed by: PHYSICAL THERAPIST

## 2021-12-29 ENCOUNTER — TELEPHONE (OUTPATIENT)
Dept: INTERNAL MEDICINE | Facility: CLINIC | Age: 36
End: 2021-12-29
Payer: COMMERCIAL

## 2021-12-29 ENCOUNTER — OFFICE VISIT (OUTPATIENT)
Dept: SPORTS MEDICINE | Facility: CLINIC | Age: 36
DRG: 201 | End: 2021-12-29
Payer: COMMERCIAL

## 2021-12-29 ENCOUNTER — HOSPITAL ENCOUNTER (OUTPATIENT)
Dept: RADIOLOGY | Facility: HOSPITAL | Age: 36
Discharge: HOME | DRG: 201 | End: 2021-12-29
Attending: NEUROMUSCULOSKELETAL MEDICINE & OMM
Payer: COMMERCIAL

## 2021-12-29 ENCOUNTER — HOSPITAL ENCOUNTER (INPATIENT)
Facility: HOSPITAL | Age: 36
LOS: 1 days | Discharge: HOME | DRG: 201 | End: 2021-12-30
Attending: EMERGENCY MEDICINE | Admitting: THORACIC SURGERY (CARDIOTHORACIC VASCULAR SURGERY)
Payer: COMMERCIAL

## 2021-12-29 ENCOUNTER — PATIENT MESSAGE (OUTPATIENT)
Dept: SURGERY | Facility: HOSPITAL | Age: 36
End: 2021-12-29
Payer: COMMERCIAL

## 2021-12-29 ENCOUNTER — TELEPHONE (OUTPATIENT)
Dept: SPORTS MEDICINE | Facility: CLINIC | Age: 36
End: 2021-12-29
Payer: COMMERCIAL

## 2021-12-29 VITALS
HEIGHT: 64 IN | DIASTOLIC BLOOD PRESSURE: 74 MMHG | BODY MASS INDEX: 19.64 KG/M2 | WEIGHT: 115.06 LBS | SYSTOLIC BLOOD PRESSURE: 108 MMHG

## 2021-12-29 DIAGNOSIS — R06.02 SHORTNESS OF BREATH: ICD-10-CM

## 2021-12-29 DIAGNOSIS — R07.81 RIB PAIN ON RIGHT SIDE: ICD-10-CM

## 2021-12-29 DIAGNOSIS — R07.9 CHEST PAIN: Primary | ICD-10-CM

## 2021-12-29 DIAGNOSIS — Z01.818 PRE-OP TESTING: Primary | ICD-10-CM

## 2021-12-29 DIAGNOSIS — R07.9 CHEST PAIN: ICD-10-CM

## 2021-12-29 DIAGNOSIS — J93.9 PNEUMOTHORAX, UNSPECIFIED TYPE: Primary | ICD-10-CM

## 2021-12-29 DIAGNOSIS — R07.1 CHEST PAIN ON BREATHING: ICD-10-CM

## 2021-12-29 DIAGNOSIS — J93.9 PNEUMOTHORAX ON RIGHT: Primary | ICD-10-CM

## 2021-12-29 LAB
ALBUMIN SERPL BCP-MCNC: 3.8 G/DL (ref 3.5–5.2)
ALP SERPL-CCNC: 50 U/L (ref 55–135)
ALT SERPL W/O P-5'-P-CCNC: 10 U/L (ref 10–44)
ANION GAP SERPL CALC-SCNC: 7 MMOL/L (ref 8–16)
AST SERPL-CCNC: 11 U/L (ref 10–40)
BASOPHILS # BLD AUTO: 0.06 K/UL (ref 0–0.2)
BASOPHILS NFR BLD: 0.7 % (ref 0–1.9)
BILIRUB SERPL-MCNC: 0.5 MG/DL (ref 0.1–1)
BUN SERPL-MCNC: 11 MG/DL (ref 6–20)
CALCIUM SERPL-MCNC: 8.7 MG/DL (ref 8.7–10.5)
CHLORIDE SERPL-SCNC: 108 MMOL/L (ref 95–110)
CO2 SERPL-SCNC: 25 MMOL/L (ref 23–29)
CREAT SERPL-MCNC: 0.8 MG/DL (ref 0.5–1.4)
CTP QC/QA: YES
DIFFERENTIAL METHOD: ABNORMAL
EOSINOPHIL # BLD AUTO: 0.2 K/UL (ref 0–0.5)
EOSINOPHIL NFR BLD: 1.7 % (ref 0–8)
ERYTHROCYTE [DISTWIDTH] IN BLOOD BY AUTOMATED COUNT: 12.4 % (ref 11.5–14.5)
EST. GFR  (AFRICAN AMERICAN): >60 ML/MIN/1.73 M^2
EST. GFR  (NON AFRICAN AMERICAN): >60 ML/MIN/1.73 M^2
GLUCOSE SERPL-MCNC: 93 MG/DL (ref 70–110)
HCT VFR BLD AUTO: 40.5 % (ref 37–48.5)
HGB BLD-MCNC: 12.9 G/DL (ref 12–16)
IMM GRANULOCYTES # BLD AUTO: 0.02 K/UL (ref 0–0.04)
IMM GRANULOCYTES NFR BLD AUTO: 0.2 % (ref 0–0.5)
LYMPHOCYTES # BLD AUTO: 1.7 K/UL (ref 1–4.8)
LYMPHOCYTES NFR BLD: 18.8 % (ref 18–48)
MCH RBC QN AUTO: 28 PG (ref 27–31)
MCHC RBC AUTO-ENTMCNC: 31.9 G/DL (ref 32–36)
MCV RBC AUTO: 88 FL (ref 82–98)
MONOCYTES # BLD AUTO: 0.7 K/UL (ref 0.3–1)
MONOCYTES NFR BLD: 7.9 % (ref 4–15)
NEUTROPHILS # BLD AUTO: 6.4 K/UL (ref 1.8–7.7)
NEUTROPHILS NFR BLD: 70.7 % (ref 38–73)
NRBC BLD-RTO: 0 /100 WBC
PLATELET # BLD AUTO: 301 K/UL (ref 150–450)
PMV BLD AUTO: 10.2 FL (ref 9.2–12.9)
POTASSIUM SERPL-SCNC: 3.6 MMOL/L (ref 3.5–5.1)
PROT SERPL-MCNC: 6.5 G/DL (ref 6–8.4)
RBC # BLD AUTO: 4.61 M/UL (ref 4–5.4)
SARS-COV-2 RDRP RESP QL NAA+PROBE: NEGATIVE
SODIUM SERPL-SCNC: 140 MMOL/L (ref 136–145)
WBC # BLD AUTO: 8.98 K/UL (ref 3.9–12.7)

## 2021-12-29 PROCEDURE — 3078F DIAST BP <80 MM HG: CPT | Mod: CPTII,S$GLB,, | Performed by: NEUROMUSCULOSKELETAL MEDICINE & OMM

## 2021-12-29 PROCEDURE — 99999 PR PBB SHADOW E&M-EST. PATIENT-LVL III: CPT | Mod: PBBFAC,,, | Performed by: NEUROMUSCULOSKELETAL MEDICINE & OMM

## 2021-12-29 PROCEDURE — 99214 PR OFFICE/OUTPT VISIT, EST, LEVL IV, 30-39 MIN: ICD-10-PCS | Mod: S$GLB,,, | Performed by: NEUROMUSCULOSKELETAL MEDICINE & OMM

## 2021-12-29 PROCEDURE — 63600175 PHARM REV CODE 636 W HCPCS: Performed by: INTERNAL MEDICINE

## 2021-12-29 PROCEDURE — 85025 COMPLETE CBC W/AUTO DIFF WBC: CPT | Performed by: INTERNAL MEDICINE

## 2021-12-29 PROCEDURE — 3078F PR MOST RECENT DIASTOLIC BLOOD PRESSURE < 80 MM HG: ICD-10-PCS | Mod: CPTII,S$GLB,, | Performed by: NEUROMUSCULOSKELETAL MEDICINE & OMM

## 2021-12-29 PROCEDURE — 80053 COMPREHEN METABOLIC PANEL: CPT | Performed by: INTERNAL MEDICINE

## 2021-12-29 PROCEDURE — 71046 XR CHEST PA AND LATERAL: ICD-10-PCS | Mod: 26,,, | Performed by: RADIOLOGY

## 2021-12-29 PROCEDURE — 1160F PR REVIEW ALL MEDS BY PRESCRIBER/CLIN PHARMACIST DOCUMENTED: ICD-10-PCS | Mod: CPTII,S$GLB,, | Performed by: NEUROMUSCULOSKELETAL MEDICINE & OMM

## 2021-12-29 PROCEDURE — 25000003 PHARM REV CODE 250: Performed by: STUDENT IN AN ORGANIZED HEALTH CARE EDUCATION/TRAINING PROGRAM

## 2021-12-29 PROCEDURE — 32551 INSERTION OF CHEST TUBE: CPT | Mod: ,,, | Performed by: EMERGENCY MEDICINE

## 2021-12-29 PROCEDURE — 71046 X-RAY EXAM CHEST 2 VIEWS: CPT | Mod: 26,,, | Performed by: RADIOLOGY

## 2021-12-29 PROCEDURE — 99285 EMERGENCY DEPT VISIT HI MDM: CPT | Mod: 25

## 2021-12-29 PROCEDURE — 25000003 PHARM REV CODE 250: Performed by: INTERNAL MEDICINE

## 2021-12-29 PROCEDURE — 63600175 PHARM REV CODE 636 W HCPCS

## 2021-12-29 PROCEDURE — 32551 PR TUBE THORACOSTOMY INCLUDES WATER SEAL: ICD-10-PCS | Mod: ,,, | Performed by: EMERGENCY MEDICINE

## 2021-12-29 PROCEDURE — 63600175 PHARM REV CODE 636 W HCPCS: Performed by: EMERGENCY MEDICINE

## 2021-12-29 PROCEDURE — 3008F PR BODY MASS INDEX (BMI) DOCUMENTED: ICD-10-PCS | Mod: CPTII,S$GLB,, | Performed by: NEUROMUSCULOSKELETAL MEDICINE & OMM

## 2021-12-29 PROCEDURE — 99285 EMERGENCY DEPT VISIT HI MDM: CPT | Mod: 25,CS,, | Performed by: EMERGENCY MEDICINE

## 2021-12-29 PROCEDURE — 1159F PR MEDICATION LIST DOCUMENTED IN MEDICAL RECORD: ICD-10-PCS | Mod: CPTII,S$GLB,, | Performed by: NEUROMUSCULOSKELETAL MEDICINE & OMM

## 2021-12-29 PROCEDURE — 96374 THER/PROPH/DIAG INJ IV PUSH: CPT

## 2021-12-29 PROCEDURE — 3044F PR MOST RECENT HEMOGLOBIN A1C LEVEL <7.0%: ICD-10-PCS | Mod: CPTII,S$GLB,, | Performed by: NEUROMUSCULOSKELETAL MEDICINE & OMM

## 2021-12-29 PROCEDURE — 3074F PR MOST RECENT SYSTOLIC BLOOD PRESSURE < 130 MM HG: ICD-10-PCS | Mod: CPTII,S$GLB,, | Performed by: NEUROMUSCULOSKELETAL MEDICINE & OMM

## 2021-12-29 PROCEDURE — 99999 PR PBB SHADOW E&M-EST. PATIENT-LVL III: ICD-10-PCS | Mod: PBBFAC,,, | Performed by: NEUROMUSCULOSKELETAL MEDICINE & OMM

## 2021-12-29 PROCEDURE — 32551 INSERTION OF CHEST TUBE: CPT

## 2021-12-29 PROCEDURE — 1159F MED LIST DOCD IN RCRD: CPT | Mod: CPTII,S$GLB,, | Performed by: NEUROMUSCULOSKELETAL MEDICINE & OMM

## 2021-12-29 PROCEDURE — 99214 OFFICE O/P EST MOD 30 MIN: CPT | Mod: S$GLB,,, | Performed by: NEUROMUSCULOSKELETAL MEDICINE & OMM

## 2021-12-29 PROCEDURE — 96375 TX/PRO/DX INJ NEW DRUG ADDON: CPT

## 2021-12-29 PROCEDURE — 3008F BODY MASS INDEX DOCD: CPT | Mod: CPTII,S$GLB,, | Performed by: NEUROMUSCULOSKELETAL MEDICINE & OMM

## 2021-12-29 PROCEDURE — 99285 PR EMERGENCY DEPT VISIT,LEVEL V: ICD-10-PCS | Mod: 25,CS,, | Performed by: EMERGENCY MEDICINE

## 2021-12-29 PROCEDURE — 1160F RVW MEDS BY RX/DR IN RCRD: CPT | Mod: CPTII,S$GLB,, | Performed by: NEUROMUSCULOSKELETAL MEDICINE & OMM

## 2021-12-29 PROCEDURE — 20600001 HC STEP DOWN PRIVATE ROOM

## 2021-12-29 PROCEDURE — U0002 COVID-19 LAB TEST NON-CDC: HCPCS | Performed by: INTERNAL MEDICINE

## 2021-12-29 PROCEDURE — 3044F HG A1C LEVEL LT 7.0%: CPT | Mod: CPTII,S$GLB,, | Performed by: NEUROMUSCULOSKELETAL MEDICINE & OMM

## 2021-12-29 PROCEDURE — 71046 X-RAY EXAM CHEST 2 VIEWS: CPT | Mod: TC,PO

## 2021-12-29 PROCEDURE — 3074F SYST BP LT 130 MM HG: CPT | Mod: CPTII,S$GLB,, | Performed by: NEUROMUSCULOSKELETAL MEDICINE & OMM

## 2021-12-29 RX ORDER — METHOCARBAMOL 500 MG/1
500 TABLET, FILM COATED ORAL 4 TIMES DAILY
COMMUNITY
End: 2022-04-26

## 2021-12-29 RX ORDER — METHOCARBAMOL 500 MG/1
500 TABLET, FILM COATED ORAL EVERY 6 HOURS PRN
Status: DISCONTINUED | OUTPATIENT
Start: 2021-12-29 | End: 2021-12-30 | Stop reason: HOSPADM

## 2021-12-29 RX ORDER — FENTANYL CITRATE 50 UG/ML
25 INJECTION, SOLUTION INTRAMUSCULAR; INTRAVENOUS
Status: COMPLETED | OUTPATIENT
Start: 2021-12-29 | End: 2021-12-29

## 2021-12-29 RX ORDER — AMOXICILLIN 250 MG
1 CAPSULE ORAL DAILY
Status: DISCONTINUED | OUTPATIENT
Start: 2021-12-30 | End: 2021-12-30 | Stop reason: HOSPADM

## 2021-12-29 RX ORDER — LIDOCAINE HYDROCHLORIDE AND EPINEPHRINE 10; 10 MG/ML; UG/ML
20 INJECTION, SOLUTION INFILTRATION; PERINEURAL ONCE
Status: COMPLETED | OUTPATIENT
Start: 2021-12-29 | End: 2021-12-29

## 2021-12-29 RX ORDER — AMOXICILLIN 250 MG
1 CAPSULE ORAL DAILY PRN
Status: DISCONTINUED | OUTPATIENT
Start: 2021-12-29 | End: 2021-12-29

## 2021-12-29 RX ORDER — LIDOCAINE 50 MG/G
2 PATCH TOPICAL
Status: DISCONTINUED | OUTPATIENT
Start: 2021-12-29 | End: 2021-12-30 | Stop reason: HOSPADM

## 2021-12-29 RX ORDER — SODIUM CHLORIDE 0.9 % (FLUSH) 0.9 %
10 SYRINGE (ML) INJECTION
Status: DISCONTINUED | OUTPATIENT
Start: 2021-12-29 | End: 2021-12-30 | Stop reason: HOSPADM

## 2021-12-29 RX ORDER — MORPHINE SULFATE 2 MG/ML
2 INJECTION, SOLUTION INTRAMUSCULAR; INTRAVENOUS
Status: COMPLETED | OUTPATIENT
Start: 2021-12-29 | End: 2021-12-29

## 2021-12-29 RX ORDER — ONDANSETRON 8 MG/1
8 TABLET, ORALLY DISINTEGRATING ORAL EVERY 8 HOURS PRN
Status: DISCONTINUED | OUTPATIENT
Start: 2021-12-29 | End: 2021-12-30 | Stop reason: HOSPADM

## 2021-12-29 RX ORDER — FENTANYL CITRATE 50 UG/ML
50 INJECTION, SOLUTION INTRAMUSCULAR; INTRAVENOUS
Status: COMPLETED | OUTPATIENT
Start: 2021-12-29 | End: 2021-12-29

## 2021-12-29 RX ORDER — ACETAMINOPHEN 500 MG
1000 TABLET ORAL EVERY 8 HOURS
Status: DISCONTINUED | OUTPATIENT
Start: 2021-12-29 | End: 2021-12-30 | Stop reason: HOSPADM

## 2021-12-29 RX ORDER — IBUPROFEN 400 MG/1
400 TABLET ORAL EVERY 6 HOURS
Status: DISCONTINUED | OUTPATIENT
Start: 2021-12-29 | End: 2021-12-30 | Stop reason: HOSPADM

## 2021-12-29 RX ORDER — OXYCODONE HYDROCHLORIDE 5 MG/1
5 TABLET ORAL EVERY 6 HOURS PRN
Status: DISCONTINUED | OUTPATIENT
Start: 2021-12-29 | End: 2021-12-30

## 2021-12-29 RX ORDER — FENTANYL CITRATE 50 UG/ML
INJECTION, SOLUTION INTRAMUSCULAR; INTRAVENOUS
Status: COMPLETED
Start: 2021-12-29 | End: 2021-12-29

## 2021-12-29 RX ADMIN — FENTANYL CITRATE 25 MCG: 50 INJECTION INTRAMUSCULAR; INTRAVENOUS at 02:12

## 2021-12-29 RX ADMIN — MORPHINE SULFATE 2 MG: 2 INJECTION, SOLUTION INTRAMUSCULAR; INTRAVENOUS at 01:12

## 2021-12-29 RX ADMIN — FENTANYL CITRATE 25 MCG: 50 INJECTION, SOLUTION INTRAMUSCULAR; INTRAVENOUS at 02:12

## 2021-12-29 RX ADMIN — ACETAMINOPHEN 1000 MG: 500 TABLET ORAL at 10:12

## 2021-12-29 RX ADMIN — IBUPROFEN 400 MG: 400 TABLET, FILM COATED ORAL at 11:12

## 2021-12-29 RX ADMIN — LIDOCAINE HYDROCHLORIDE AND EPINEPHRINE 20 ML: 10; 10 INJECTION, SOLUTION INFILTRATION; PERINEURAL at 02:12

## 2021-12-29 RX ADMIN — IBUPROFEN 400 MG: 400 TABLET, FILM COATED ORAL at 07:12

## 2021-12-29 RX ADMIN — FENTANYL CITRATE 50 MCG: 50 INJECTION INTRAMUSCULAR; INTRAVENOUS at 02:12

## 2021-12-29 RX ADMIN — LIDOCAINE 2 PATCH: 50 PATCH TOPICAL at 08:12

## 2021-12-29 RX ADMIN — METHOCARBAMOL 500 MG: 500 TABLET ORAL at 08:12

## 2021-12-29 RX ADMIN — OXYCODONE 5 MG: 5 TABLET ORAL at 07:12

## 2021-12-29 NOTE — ED NOTES
Patient  at bedside. Patient resting in stretcher with NAD noted. VSS, following commands, and speech clear and appropriate. All belongings within reach. Patient updated on plan of care and all questions addressed. Safety precautions remain in place.
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
## 2021-12-29 NOTE — ED NOTES
Karen Purdy, an 36 y.o. female presents to the ED sent from PCP for possible pneumothorax. Reports pain to back and R side. Denies fever, chest pain, abd pain.       Review of patient's allergies indicates:   Allergen Reactions    Ceclor [cefaclor] Anaphylaxis, Swelling and Rash    Pcn [penicillins] Anaphylaxis, Swelling and Rash     Chief Complaint   Patient presents with    Abnormal Chest X-ray     [facility]. Showed spontaneous pnuemo on right side. C/o back and right side pain.     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

## 2021-12-29 NOTE — CONSULTS
[facility] - Emergency Dept  General Surgery  Consult Note    Inpatient consult to General Surgery  Consult performed by: Cinthya Hunter MD  Consult ordered by: My Combs MD        Subjective:     History of Present Illness:   Karen Purdy is a 36 y.o. female who presents with shortness of breath following dry needling at a PT appointment two days ago. Began to have spasm on the right side and pain in sternum and scapula. Chest xray showed a large right sided pneumothorax. A pigtail catheter was placed in ED with resolution of pneumothorax. Otherwise, patient is afebrile and hemodynamically stable on room air.    Current Facility-Administered Medications on File Prior to Encounter   Medication    levonorgestreL (MIRENA) 20 mcg/24 hours (6 yrs) 52 mg IUD 1 Intra Uterine Device     Current Outpatient Medications on File Prior to Encounter   Medication Sig    methocarbamoL (ROBAXIN) 500 MG Tab Take 500 mg by mouth 4 (four) times daily.    naratriptan (AMERGE) 1 MG Tab Take at onset of migraine, can repeat in 2 hrs if needed.  No more than twice per day or 3 days/wk.    oxyCODONE-acetaminophen (PERCOCET)  mg per tablet Take 1 tablet by mouth every 4-6 hours as needed for pain. Take stool softener with this medication. (Patient not taking: Reported on 12/29/2021)    promethazine (PHENERGAN) 25 MG tablet Take 1 tablet (25 mg total) by mouth every 6 (six) hours as needed for Nausea. (Patient not taking: Reported on 12/29/2021)    tiZANidine (ZANAFLEX) 2 MG tablet Take 1 tablet (2 mg total) by mouth every evening.    traMADoL (ULTRAM) 50 mg tablet Take 1-2 tablets ( mg total) by mouth every 6 (six) hours as needed for Pain. (Patient not taking: Reported on 12/29/2021)    VYVANSE 10 mg Cap Take 1 capsule by mouth once daily.       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     Unspecified vitamin D deficiency      Past Surgical History:   Procedure Laterality Date    EPIDURAL STEROID INJECTION N/A 10/4/2021    Procedure: INJECTION, STEROID, EPIDURAL C7/T1;  Surgeon: Cony Ahmadi MD;  Location: [facility];  Service: Pain Management;  Laterality: N/A;  9/16 l/m    KNEE SURGERY Right     torn meniscus     Family History       Problem Relation (Age of Onset)    Cancer Maternal Grandfather    Dementia Paternal Grandmother    Hyperlipidemia Mother, Father    Hypertension Mother, Father          Tobacco Use    Smoking status: Former Smoker     Packs/day: 0.10     Years: 2.00     Pack years: 0.20    Smokeless tobacco: Never Used   Substance and Sexual Activity    Alcohol use: Yes     Comment: occ - 1/mo    Drug use: No    Sexual activity: Yes     Partners: Male     Birth control/protection: None, Coitus interruptus     Review of Systems   Constitutional: Negative for activity change, chills and fever.   Respiratory: Positive for shortness of breath. Negative for cough.    Cardiovascular: Negative for chest pain and palpitations.   Gastrointestinal: Negative for abdominal distention, abdominal pain, constipation, diarrhea, nausea and vomiting.   Musculoskeletal: Negative for arthralgias and myalgias.   Neurological: Negative for dizziness and headaches.   Psychiatric/Behavioral: Negative for agitation and confusion.     Objective:     Vital Signs (Most Recent):  Temp: 98.5 °F (36.9 °C) (12/29/21 1253)  Pulse: 90 (12/29/21 1436)  Resp: 17 (12/29/21 1443)  BP: 138/74 (12/29/21 1436)  SpO2: 97 % (12/29/21 1436) Vital Signs (24h Range):  Temp:  [98.5 °F (36.9 °C)] 98.5 °F (36.9 °C)  Pulse:  [70-90] 90  Resp:  [16-20] 17  SpO2:  [97 %-100 %] 97 %  BP: (108-138)/(74-87) 138/74      Weight: 52.2 kg (115 lb)  Body mass index is 19.74 kg/m².    No intake or output data in the 24 hours ending 12/29/21 1510    Physical Exam  Constitutional:       General: She is not in acute distress.     Appearance: She is well-developed and well-nourished.   Cardiovascular:      Rate and Rhythm: Normal rate and regular rhythm.      Comments: Chest tube in place on right, no continuous airleak  Pulmonary:      Effort: Pulmonary effort is normal. No respiratory distress.   Abdominal:      General: There is no distension.      Palpations: Abdomen is soft.   Skin:     General: Skin is warm and dry.   Neurological:      Mental Status: She is alert and oriented to person, place, and time.   Psychiatric:         Mood and Affect: Mood and affect normal.         Behavior: Behavior normal.         Significant Labs:  CBC:   Recent Labs   Lab 12/29/21  1332   WBC 8.98   RBC 4.61   HGB 12.9   HCT 40.5      MCV 88   MCH 28.0   MCHC 31.9*     CMP:   Recent Labs   Lab 12/29/21  1332   GLU 93   CALCIUM 8.7   ALBUMIN 3.8   PROT 6.5      K 3.6   CO2 25      BUN 11   CREATININE 0.8   ALKPHOS 50*   ALT 10   AST 11   BILITOT 0.5       Significant Diagnostics:  I have reviewed all pertinent imaging results/findings within the past 24 hours.    Assessment/Plan:     Chest tube in place with resolution of pneumo    - Admit to Thoracic surgery  - Chest tube to suction  - Regular diet  - PRN pain medication  - Chest xray in AM    Cinthya Hunter MD, PGY-VI  Cardiothoracic Surgery Fellow  [phone]    CTS Attending Note:    I have personally taken the history and examined this patient and agree with the resident's note as stated above. Plan chest tube, serial xrays.

## 2021-12-30 VITALS
BODY MASS INDEX: 19.74 KG/M2 | HEART RATE: 75 BPM | OXYGEN SATURATION: 99 % | SYSTOLIC BLOOD PRESSURE: 126 MMHG | TEMPERATURE: 98 F | RESPIRATION RATE: 16 BRPM | WEIGHT: 115 LBS | DIASTOLIC BLOOD PRESSURE: 76 MMHG

## 2021-12-30 PROBLEM — J93.9 PNEUMOTHORAX ON RIGHT: Status: ACTIVE | Noted: 2021-12-30

## 2021-12-30 PROCEDURE — 25000003 PHARM REV CODE 250: Performed by: STUDENT IN AN ORGANIZED HEALTH CARE EDUCATION/TRAINING PROGRAM

## 2021-12-30 PROCEDURE — 25000003 PHARM REV CODE 250: Performed by: PHYSICIAN ASSISTANT

## 2021-12-30 RX ORDER — SUMATRIPTAN 50 MG/1
50 TABLET, FILM COATED ORAL
Status: DISCONTINUED | OUTPATIENT
Start: 2021-12-30 | End: 2021-12-30 | Stop reason: HOSPADM

## 2021-12-30 RX ORDER — HYDROCODONE BITARTRATE AND ACETAMINOPHEN 5; 325 MG/1; MG/1
1 TABLET ORAL EVERY 4 HOURS PRN
Qty: 41 TABLET | Refills: 0 | Status: ON HOLD | OUTPATIENT
Start: 2021-12-30 | End: 2022-01-13 | Stop reason: HOSPADM

## 2021-12-30 RX ORDER — HYDROCODONE BITARTRATE AND ACETAMINOPHEN 10; 325 MG/1; MG/1
1 TABLET ORAL EVERY 6 HOURS PRN
Status: DISCONTINUED | OUTPATIENT
Start: 2021-12-30 | End: 2021-12-30 | Stop reason: HOSPADM

## 2021-12-30 RX ORDER — HYDROCODONE BITARTRATE AND ACETAMINOPHEN 5; 325 MG/1; MG/1
1 TABLET ORAL EVERY 6 HOURS PRN
Status: DISCONTINUED | OUTPATIENT
Start: 2021-12-30 | End: 2021-12-30 | Stop reason: HOSPADM

## 2021-12-30 RX ORDER — ONDANSETRON 8 MG/1
8 TABLET, ORALLY DISINTEGRATING ORAL EVERY 6 HOURS PRN
Qty: 14 TABLET | Refills: 0 | Status: SHIPPED | OUTPATIENT
Start: 2021-12-30 | End: 2022-05-19

## 2021-12-30 RX ORDER — METHOCARBAMOL 500 MG/1
500 TABLET, FILM COATED ORAL 4 TIMES DAILY
Qty: 40 TABLET | Refills: 0 | Status: SHIPPED | OUTPATIENT
Start: 2021-12-30 | End: 2022-01-09

## 2021-12-30 RX ADMIN — HYDROCODONE BITARTRATE AND ACETAMINOPHEN 1 TABLET: 10; 325 TABLET ORAL at 04:12

## 2021-12-30 RX ADMIN — ACETAMINOPHEN 1000 MG: 500 TABLET ORAL at 05:12

## 2021-12-30 RX ADMIN — OXYCODONE 5 MG: 5 TABLET ORAL at 07:12

## 2021-12-30 RX ADMIN — IBUPROFEN 400 MG: 400 TABLET, FILM COATED ORAL at 11:12

## 2021-12-30 RX ADMIN — OXYCODONE 5 MG: 5 TABLET ORAL at 01:12

## 2021-12-30 RX ADMIN — ACETAMINOPHEN 1000 MG: 500 TABLET ORAL at 01:12

## 2021-12-30 RX ADMIN — SUMATRIPTAN SUCCINATE 50 MG: 50 TABLET ORAL at 01:12

## 2021-12-30 RX ADMIN — METHOCARBAMOL 500 MG: 500 TABLET ORAL at 11:12

## 2021-12-30 RX ADMIN — METHOCARBAMOL 500 MG: 500 TABLET ORAL at 02:12

## 2021-12-30 RX ADMIN — IBUPROFEN 400 MG: 400 TABLET, FILM COATED ORAL at 05:12

## 2021-12-30 RX ADMIN — SENNOSIDES AND DOCUSATE SODIUM 1 TABLET: 50; 8.6 TABLET ORAL at 11:12

## 2021-12-30 NOTE — DISCHARGE SUMMARY
[facility] - [facility]iology Stepdown  Thoracic Surgery  Discharge Summary    Patient Name: Karen Purdy  MRN: [id number]  Admission Date: 12/29/2021  Hospital Length of Stay: 1 days  Discharge Date and Time: No discharge date for patient encounter.  Attending Physician: Bertrand Quach MD   Discharging Provider: GINGER Fernández  Primary Care Provider: Leah Garcia MD    HPI:   Karen Purdy is a 36 y.o. female who presents with shortness of breath following dry needling at a PT appointment two days ago. Began to have spasm on the right side and pain in sternum and scapula. Chest xray showed a large right sided pneumothorax. A pigtail catheter was placed in ED with resolution of pneumothorax. Otherwise, patient is afebrile and hemodynamically stable on room air.      * No surgery found *      Hospital Course: 12/29/21- Presented to ED with right PTX.  ED placed 14Fr pigtail. No air leak. Tube to suction.   12/30/21- Lung remains well expanded on morning CXR. Comfortable on room air. Pain moderately controlled with muscle relaxer, tylenol and ibuprofen. She does not like side effects of narcotics. We'll try Norco instead of Oxycodone. She cannot take high dose NSAIDs in anticipation of shoulder surgery next week. She developed a migraine while admitted and was given the abortive medication most similar to her home medication. There were no changes on CXR while chest tube clamped. Chest tube removed. Stable for discharge home.    She is okay from Thoracic Surgery standpoint to undergo her shoulder surgery next week. Recommend discussion with anesthesia to gauge their comfort level with intubation after pneumothorax.     Goals of Care Treatment Preferences:  Code Status: Full Code      Consults (From admission, onward)        Status Ordering Provider     Inpatient consult to General surgery  Once        Provider:  (Not yet assigned)    Completed LEOBARDO LEON          Significant Diagnostic  Studies: Radiology: X-Ray: CXR: X-Ray Chest 1 View (CXR):   Results for orders placed or performed during the hospital encounter of 12/29/21   X-Ray Chest 1 View    Narrative    EXAMINATION:  XR CHEST 1 VIEW    CLINICAL HISTORY:  Chest tube clamped;    TECHNIQUE:  Single frontal view of the chest was performed.    COMPARISON:  12/30/2021    FINDINGS:  Stable trace right apical pneumothorax, there is already a right chest tube present.  The lungs otherwise appear clear.  The heart size appears normal.  The bones appear unremarkable for the age of the patient, with mild hypertrophic and/or degenerative changes.  No significant interval change since prior exam noted.      Impression    No significant interval change since prior exam noted.      Electronically signed by: Laina Bautista  Date:    12/30/2021  Time:    13:03       Pending Diagnostic Studies:     None        Final Active Diagnoses:    Diagnosis Date Noted POA    PRINCIPAL PROBLEM:  Pneumothorax on right [J93.9] 12/30/2021 Yes      Problems Resolved During this Admission:      Discharged Condition: good    Disposition: Home or Self Care    Follow Up:   Follow-up Information     Arnol Marie MD.    Specialty: Cardiothoracic Surgery  Why: As needed  Contact information:  [address]  [facility] [address]  [phone]             Bertrand Quach MD.    Specialties: Cardiothoracic Surgery, Transplant  Why: As needed  Contact information:  [address]  [facility] [id number] [phone]                       Patient Instructions:      Diet Adult Regular     No driving until:   Order Comments: No driving while on narcotics     Notify your health care provider if you experience any of the following:  temperature >100.4     Notify your health care provider if you experience any of the following:  persistent nausea and vomiting or diarrhea     Notify your health care provider if you experience any of the following:  severe uncontrolled pain      Notify your health care provider if you experience any of the following:  redness, tenderness, or signs of infection (pain, swelling, redness, odor or green/yellow discharge around incision site)     Notify your health care provider if you experience any of the following:  difficulty breathing or increased cough     Notify your health care provider if you experience any of the following:   Order Comments: For calls during normal business hours, please call Thoracic Surgery office at [phone].   For calls during nights and weekends, please call [phone] and ask the  to connect you to the surgeon on call.     Remove dressing in 48 hours     Activity as tolerated   Order Comments: No flight travel or scuba diving for 6 weeks     Shower on day dressing removed (No bath)   Order Comments: Shower on 1/1/22     Medications:  Reconciled Home Medications:      Medication List      START taking these medications    HYDROcodone-acetaminophen 5-325 mg per tablet  Commonly known as: NORCO  Take 1 tablet by mouth every 4 (four) hours as needed for Pain.     ondansetron 8 MG Tbdl  Commonly known as: ZOFRAN-ODT  Dissolve 1 tablet (8 mg total) by mouth every 6 (six) hours as needed (nausea).        CHANGE how you take these medications    * methocarbamoL 500 MG Tab  Commonly known as: ROBAXIN  Take 1 tablet (500 mg total) by mouth 4 (four) times daily. for 10 days  What changed: You were already taking a medication with the same name, and this prescription was added. Make sure you understand how and when to take each.     * methocarbamoL 500 MG Tab  Commonly known as: ROBAXIN  Take 500 mg by mouth 4 (four) times daily.  What changed: Another medication with the same name was added. Make sure you understand how and when to take each.         * This list has 2 medication(s) that are the same as other medications prescribed for you. Read the directions carefully, and ask your doctor or other care provider to review  them with you.            CONTINUE taking these medications    naratriptan 1 MG Tab  Commonly known as: AMERGE  Take at onset of migraine, can repeat in 2 hrs if needed.  No more than twice per day or 3 days/wk.     oxyCODONE-acetaminophen  mg per tablet  Commonly known as: PERCOCET  Take 1 tablet by mouth every 4-6 hours as needed for pain. Take stool softener with this medication.     promethazine 25 MG tablet  Commonly known as: PHENERGAN  Take 1 tablet (25 mg total) by mouth every 6 (six) hours as needed for Nausea.     tiZANidine 2 MG tablet  Commonly known as: ZANAFLEX  Take 1 tablet (2 mg total) by mouth every evening.     traMADoL 50 mg tablet  Commonly known as: ULTRAM  Take 1-2 tablets ( mg total) by mouth every 6 (six) hours as needed for Pain.     VYVANSE 10 mg Cap  Generic drug: lisdexamfetamine  Take 1 capsule by mouth once daily.            GINGER Fernández  Thoracic Surgery  [facility] - Cardiology Step[facility]

## 2021-12-30 NOTE — HOSPITAL COURSE
12/29/21- Presented to ED with right PTX.  ED placed 14Fr pigtail. No air leak. Tube to suction.   12/30/21- Lung remains well expanded on morning CXR. Comfortable on room air. Pain moderately controlled with muscle relaxer, tylenol and ibuprofen. She does not like side effects of narcotics. We'll try Norco instead of Oxycodone. She cannot take high dose NSAIDs in anticipation of shoulder surgery next week. She developed a migraine while admitted and was given the abortive medication most similar to her home medication. There were no changes on CXR while chest tube clamped. Chest tube removed. Stable for discharge home.     She is okay from Thoracic Surgery standpoint to undergo her shoulder surgery next week. Recommend discussion with anesthesia to gauge their comfort level with intubation after pneumothorax.

## 2021-12-30 NOTE — ED NOTES
Patient resting in stretcher with NAD noted. VSS, following commands, and speech clear and appropriate. All belongings within reach. Patient updated on plan of care and all questions addressed. Safety precautions remain in place.

## 2021-12-30 NOTE — PLAN OF CARE
[facility] - [facility] Stepdown  Discharge Assessment    Primary Care Provider: Leah Garcia MD     Discharge Assessment (most recent)     BRIEF DISCHARGE ASSESSMENT - 12/30/21 1641        Discharge Planning    Assessment Type Discharge Planning Brief Assessment     Resource/Environmental Concerns none     Support Systems Spouse/significant other     Equipment Currently Used at Home none     Current Living Arrangements home/apartment/condo     Patient/Family Anticipates Transition to home     Patient/Family Anticipated Services at Transition none     DME Needed Upon Discharge  none     Discharge Plan A Home     Discharge Plan B Home with family                   Pt admitted 12/29/2021  1:07 PM    For Pneumothorax on right [J93.9]         PCP is Leah Garcia MD  [phone] (p)  [phone] (f)      Payor: BLUE CROSS BLUE SHIELD / Plan: BCBS ALL OUT OF STATE / Product Type: PPO /       [id number] - [place], [place] - [address] AT [address]  [address]  [facility] [address]  Phone: [phone] Fax: [phone]    [facility] #[id number] - [place], [place] - [address]  [address]  [facility] [address]  Phone: [phone] Fax: [phone]      Extended Emergency Contact Information  Primary Emergency Contact: Mark Zavala  Address: [address] [place] [address]  Home Phone: [phone]  Work Phone: [phone]  Mobile Phone: [phone]  Relation: Spouse    Future Appointments   Date Time Provider Department Center   1/3/2022 12:00 PM COVID TESTING, [facility] SPORTS MEDICINE [facility] PPCTES [place]   1/18/2022  8:30 AM Cony Ahmadi MD [facility]   1/19/2022  9:15 AM Davy Herrera III [facility] SPORTS [place]   1/25/2022  9:45 AM Shona Marte, PT [facility] [place]   2/18/2022  8:30 AM Ann Anderson MD [facility]         Julie Haase RN  Case  [facility] [phone]

## 2021-12-30 NOTE — NURSING
Pt arrived floor via stretcher. VS obtained and pt is stable. Chest tube connected to wall suction at -20. Pt c/o 9/10 right side rib pain and PRN oxy given.

## 2021-12-30 NOTE — PROGRESS NOTES
[facility] - [facility] Stepdown  Thoracic Surgery  Progress Note    Subjective:     History of Present Illness:  Karen Purdy is a 36 y.o. female who presents with shortness of breath following dry needling at a PT appointment two days ago. Began to have spasm on the right side and pain in sternum and scapula. Chest xray showed a large right sided pneumothorax. A pigtail catheter was placed in ED with resolution of pneumothorax. Otherwise, patient is afebrile and hemodynamically stable on room air.      Post-Op Info:  * No surgery found *         Interval History: NAEON. Pain moderately controlled. Comfortable on room air. Tolerating diet.     Medications:  Continuous Infusions:  Scheduled Meds:   acetaminophen  1,000 mg Oral Q8H    ibuprofen  400 mg Oral Q6H    LIDOcaine  2 patch Transdermal Q24H    senna-docusate 8.6-50 mg  1 tablet Oral Daily     PRN Meds:methocarbamoL, ondansetron, oxyCODONE, sodium chloride 0.9%     Review of patient's allergies indicates:   Allergen Reactions    Ceclor [cefaclor] Anaphylaxis, Swelling and Rash    Pcn [penicillins] Anaphylaxis, Swelling and Rash     Objective:     Vital Signs (Most Recent):  Temp: 98 °F (36.7 °C) (12/30/21 0450)  Pulse: (!) 57 (12/30/21 0527)  Resp: 17 (12/30/21 0450)  BP: 126/61 (12/30/21 0450)  SpO2: 96 % (12/30/21 0450) Vital Signs (24h Range):  Temp:  [97.9 °F (36.6 °C)-98.5 °F (36.9 °C)] 98 °F (36.7 °C)  Pulse:  [57-90] 57  Resp:  [14-20] 17  SpO2:  [96 %-100 %] 96 %  BP: (108-148)/(61-88) 126/61     Intake/Output - Last 3 Shifts       12/28 0700 12/29 0659 12/29 0700 12/30 0659 12/30 0700 12/31 0659    Urine (mL/kg/hr)  700     Stool  0     Chest Tube  0     Total Output  700     Net  -700            Urine Occurrence  1 x     Stool Occurrence  0 x           SpO2: 96 %  O2 Device (Oxygen Therapy): room air    Physical Exam  Constitutional:       Appearance: Normal appearance.   Cardiovascular:      Rate and Rhythm: Normal rate and regular  rhythm.      Pulses: Normal pulses.   Pulmonary:      Effort: Pulmonary effort is normal.      Breath sounds: Normal breath sounds. No wheezing.      Comments: Right pigtail with minimal output. No air leak.  Abdominal:      General: Bowel sounds are normal.      Palpations: Abdomen is soft.   Musculoskeletal:      Right lower leg: No edema.      Left lower leg: No edema.   Neurological:      General: No focal deficit present.      Mental Status: She is alert.   Psychiatric:         Mood and Affect: Mood normal.         Significant Labs:  None    Significant Diagnostics:  CXR: I have reviewed all pertinent results/findings within the past 24 hours    VTE Risk Mitigation (From admission, onward)         Ordered     IP VTE HIGH RISK PATIENT  Once         12/29/21 1538     Place sequential compression device  Until discontinued         12/29/21 1538              Assessment/Plan:     * Pneumothorax on right  36 year old female with history of chronic pain and migraines presents with right pneumothorax after dry needling.    - Clamp chest tube. Repeat CXR at noon.   - Remove tube if lung remains expanded.   - PO pain control  - Ok to ambulate off suction    Dispo- Home later today or tomorrow        GINGER Fernández  Thoracic Surgery  [facility] - Cardiology Stepdown

## 2021-12-30 NOTE — HPI
Karen Purdy is a 36 y.o. female who presents with shortness of breath following dry needling at a PT appointment two days ago. Began to have spasm on the right side and pain in sternum and scapula. Chest xray showed a large right sided pneumothorax. A pigtail catheter was placed in ED with resolution of pneumothorax. Otherwise, patient is afebrile and hemodynamically stable on room air.

## 2021-12-30 NOTE — PLAN OF CARE
Pt cooperative with routine care and procedures. Pt VSS. Pt up with assist due to weakness/pain. Pt pain under control with scheduled and prn pain meds. Pt remained free from falls, injury and SOB. Up to bedside commode.     Problem: Adult Inpatient Plan of Care  Goal: Plan of Care Review  12/30/2021 0415 by Sherry Seaman RN    Outcome: Ongoing, Progressing  Flowsheets (Taken 12/30/2021 0413)  Plan of Care Reviewed With:   patient   father  Goal: Patient-Specific Goal (Individualized)  12/30/2021 0415 by Sherry Seaman RN  Outcome: Ongoing, Progressing  Flowsheets (Taken 12/30/2021 0415)  Anxieties, Fears or Concerns: Being without pain control and not being able to have sholder surgery next month  Individualized Care Needs: Pain management  Patient-Specific Goals (Include Timeframe): To keep pain under control and be able to have sholder surgery next month  Goal: Absence of Hospital-Acquired Illness or Injury  12/30/2021 0415 by Sherry Seaman RN  Outcome: Ongoing, Progressing    Goal: Optimal Comfort and Wellbeing  12/30/2021 0415 by Sherry Seaman RN  Outcome: Ongoing, Progressing    Goal: Readiness for Transition of Care  12/30/2021 0415 by Sherry Seaman RN  Outcome: Ongoing, Progressing    Problem: Fall Injury Risk  Goal: Absence of Fall and Fall-Related Injury  12/30/2021 0415 by Sherry Seaman RN  Outcome: Ongoing, Progressing

## 2021-12-30 NOTE — ASSESSMENT & PLAN NOTE
36 year old female with history of chronic pain and migraines presents with right pneumothorax after dry needling.    - Clamp chest tube. Repeat CXR at noon.   - Remove tube if lung remains expanded.   - PO pain control  - Ok to ambulate off suction    Dispo- Home later today or tomorrow

## 2021-12-30 NOTE — SUBJECTIVE & OBJECTIVE
Interval History: NAEON. Pain moderately controlled. Comfortable on room air. Tolerating diet.     Medications:  Continuous Infusions:  Scheduled Meds:   acetaminophen  1,000 mg Oral Q8H    ibuprofen  400 mg Oral Q6H    LIDOcaine  2 patch Transdermal Q24H    senna-docusate 8.6-50 mg  1 tablet Oral Daily     PRN Meds:methocarbamoL, ondansetron, oxyCODONE, sodium chloride 0.9%     Review of patient's allergies indicates:   Allergen Reactions    Ceclor [cefaclor] Anaphylaxis, Swelling and Rash    Pcn [penicillins] Anaphylaxis, Swelling and Rash     Objective:     Vital Signs (Most Recent):  Temp: 98 °F (36.7 °C) (12/30/21 0450)  Pulse: (!) 57 (12/30/21 0527)  Resp: 17 (12/30/21 0450)  BP: 126/61 (12/30/21 0450)  SpO2: 96 % (12/30/21 0450) Vital Signs (24h Range):  Temp:  [97.9 °F (36.6 °C)-98.5 °F (36.9 °C)] 98 °F (36.7 °C)  Pulse:  [57-90] 57  Resp:  [14-20] 17  SpO2:  [96 %-100 %] 96 %  BP: (108-148)/(61-88) 126/61     Intake/Output - Last 3 Shifts       12/28 0700  12/29 0659 12/29 0700  12/30 0659 12/30 0700  12/31 0659    Urine (mL/kg/hr)  700     Stool  0     Chest Tube  0     Total Output  700     Net  -700            Urine Occurrence  1 x     Stool Occurrence  0 x           SpO2: 96 %  O2 Device (Oxygen Therapy): room air    Physical Exam  Constitutional:       Appearance: Normal appearance.   Cardiovascular:      Rate and Rhythm: Normal rate and regular rhythm.      Pulses: Normal pulses.   Pulmonary:      Effort: Pulmonary effort is normal.      Breath sounds: Normal breath sounds. No wheezing.      Comments: Right pigtail with minimal output. No air leak.  Abdominal:      General: Bowel sounds are normal.      Palpations: Abdomen is soft.   Musculoskeletal:      Right lower leg: No edema.      Left lower leg: No edema.   Neurological:      General: No focal deficit present.      Mental Status: She is alert.   Psychiatric:         Mood and Affect: Mood normal.         Significant  Labs:  None    Significant Diagnostics:  CXR: I have reviewed all pertinent results/findings within the past 24 hours    VTE Risk Mitigation (From admission, onward)         Ordered     IP VTE HIGH RISK PATIENT  Once         12/29/21 1538     Place sequential compression device  Until discontinued         12/29/21 1530

## 2021-12-31 ENCOUNTER — PATIENT MESSAGE (OUTPATIENT)
Dept: REHABILITATION | Facility: OTHER | Age: 36
End: 2021-12-31
Payer: COMMERCIAL

## 2021-12-31 ENCOUNTER — PATIENT MESSAGE (OUTPATIENT)
Dept: INTERNAL MEDICINE | Facility: CLINIC | Age: 36
End: 2021-12-31
Payer: COMMERCIAL

## 2021-12-31 NOTE — NURSING
Patient is ready for discharge. Patient stable alert and oriented. IV removed. Pain controlled with PRN and scheduled meds. Discussed discharge plan. Reviewed medications and side effects, appointments, and answered questions with patient and family. Pt's family picked up pt's meds from pharmacy prior to discharge.

## 2022-01-03 ENCOUNTER — PATIENT MESSAGE (OUTPATIENT)
Dept: CARDIOTHORACIC SURGERY | Facility: HOSPITAL | Age: 37
End: 2022-01-03
Payer: COMMERCIAL

## 2022-01-03 ENCOUNTER — PATIENT MESSAGE (OUTPATIENT)
Dept: PREADMISSION TESTING | Facility: HOSPITAL | Age: 37
End: 2022-01-03
Payer: COMMERCIAL

## 2022-01-03 ENCOUNTER — TELEPHONE (OUTPATIENT)
Dept: CARDIOTHORACIC SURGERY | Facility: CLINIC | Age: 37
End: 2022-01-03
Payer: COMMERCIAL

## 2022-01-03 ENCOUNTER — HOSPITAL ENCOUNTER (OUTPATIENT)
Dept: RADIOLOGY | Facility: OTHER | Age: 37
Discharge: HOME | End: 2022-01-03
Attending: PHYSICIAN ASSISTANT
Payer: COMMERCIAL

## 2022-01-03 DIAGNOSIS — J93.9 PNEUMOTHORAX ON RIGHT: ICD-10-CM

## 2022-01-03 DIAGNOSIS — J93.9 PNEUMOTHORAX ON RIGHT: Primary | ICD-10-CM

## 2022-01-03 PROCEDURE — 71046 X-RAY EXAM CHEST 2 VIEWS: CPT | Mod: 26,,, | Performed by: RADIOLOGY

## 2022-01-03 PROCEDURE — 71046 X-RAY EXAM CHEST 2 VIEWS: CPT | Mod: TC,FY

## 2022-01-03 PROCEDURE — 71046 XR CHEST PA AND LATERAL: ICD-10-PCS | Mod: 26,,, | Performed by: RADIOLOGY

## 2022-01-03 RX ORDER — NAPROXEN 500 MG/1
500 TABLET ORAL 2 TIMES DAILY WITH MEALS
Qty: 42 TABLET | Refills: 0 | Status: SHIPPED | OUTPATIENT
Start: 2022-01-03 | End: 2022-01-24

## 2022-01-03 NOTE — TELEPHONE ENCOUNTER
----- Message from Kennedi Mac sent at 1/3/2022  8:37 AM CST -----  Regarding: X ray Request  Regarding:Pts  called that pt is having chest pains today and is requesting to have [facility] done [facility].     Can patient be contacted on [employer]:Yes     Call back number:[phone] Jeremy

## 2022-01-03 NOTE — PLAN OF CARE
[facility] - Card[facility] Stepdown  Discharge Final Note    Primary Care Provider: Leah Garcia MD    Expected Discharge Date: 12/30/2021    Final Discharge Note (most recent)     Final Note - 01/03/22 1105        Final Note    Assessment Type Final Discharge Note     Anticipated Discharge Disposition Home or Self Care     Hospital Resources/Appts/Education Provided Provided patient/caregiver with written discharge plan information                 Important Message from Medicare             Contact Info     Arnol Marie MD   Specialty: Cardiothoracic Surgery    [address] [facility] [address]   Phone: [phone]       Next Steps: Follow up    Instructions: As needed    Bertrand Quach MD   Specialty: Cardiothoracic Surgery, Transplant    [address] [facility] [address]   Phone: [phone]       Next Steps: Follow up    Instructions: As needed          Pt d/c home with no needs.    Future Appointments   Date Time Provider Department Center   1/3/2022 12:00 PM COVID TESTING, [facility] SPORTS MEDICINE [facility] PPCTES [place]   1/6/2022  8:30 AM Leah Garcia MD [facility]   1/18/2022  8:30 AM Cony Ahmadi MD [facility]   1/19/2022  9:15 AM Davy Herrera III, PA-C [facility]   1/25/2022  9:45 AM Shona Marte, PT [facility] [place]   2/18/2022  8:30 AM Ann Anderson MD [facility]   5/5/2022  9:30 AM Leah Garcia MD [facility]     Julie Haase RN  Case Management [phone]

## 2022-01-03 NOTE — PLAN OF CARE
Spoke with  about pt's recent spontaneous pneumothorax 12/29, treated with a chest tube.  Ok to proceed 1/4 at [facility].

## 2022-01-04 ENCOUNTER — TELEPHONE (OUTPATIENT)
Dept: PHARMACY | Facility: CLINIC | Age: 37
End: 2022-01-04
Payer: COMMERCIAL

## 2022-01-06 ENCOUNTER — OFFICE VISIT (OUTPATIENT)
Dept: INTERNAL MEDICINE | Facility: CLINIC | Age: 37
End: 2022-01-06
Attending: INTERNAL MEDICINE
Payer: COMMERCIAL

## 2022-01-06 DIAGNOSIS — D80.9 IMMUNODEFICIENCY WITH PREDOMINANTLY ANTIBODY DEFECTS: ICD-10-CM

## 2022-01-06 DIAGNOSIS — J93.9 PNEUMOTHORAX ON RIGHT: Primary | ICD-10-CM

## 2022-01-06 PROCEDURE — 1111F DSCHRG MED/CURRENT MED MERGE: CPT | Mod: CPTII,95,, | Performed by: INTERNAL MEDICINE

## 2022-01-06 PROCEDURE — 99213 PR OFFICE/OUTPT VISIT, EST, LEVL III, 20-29 MIN: ICD-10-PCS | Mod: 95,,, | Performed by: INTERNAL MEDICINE

## 2022-01-06 PROCEDURE — 1159F PR MEDICATION LIST DOCUMENTED IN MEDICAL RECORD: ICD-10-PCS | Mod: CPTII,95,, | Performed by: INTERNAL MEDICINE

## 2022-01-06 PROCEDURE — 1159F MED LIST DOCD IN RCRD: CPT | Mod: CPTII,95,, | Performed by: INTERNAL MEDICINE

## 2022-01-06 PROCEDURE — 1160F PR REVIEW ALL MEDS BY PRESCRIBER/CLIN PHARMACIST DOCUMENTED: ICD-10-PCS | Mod: CPTII,95,, | Performed by: INTERNAL MEDICINE

## 2022-01-06 PROCEDURE — 1111F PR DISCHARGE MEDS RECONCILED W/ CURRENT OUTPATIENT MED LIST: ICD-10-PCS | Mod: CPTII,95,, | Performed by: INTERNAL MEDICINE

## 2022-01-06 PROCEDURE — 1160F RVW MEDS BY RX/DR IN RCRD: CPT | Mod: CPTII,95,, | Performed by: INTERNAL MEDICINE

## 2022-01-06 PROCEDURE — 99213 OFFICE O/P EST LOW 20 MIN: CPT | Mod: 95,,, | Performed by: INTERNAL MEDICINE

## 2022-01-06 NOTE — PROGRESS NOTES
The patient location is:  Patient Home   The chief complaint leading to consultation is:  Chest Injury (F/u pneumothorax)    Subjective:         Found to have a 50% right pneumothorax after presenting to sports med physician during episode of pain after having dry needling.  She reports she did not feel SOB but had acute right mid back pain that progressed to severe right sided chest pain. She assumed this was muscular and received a follow up visit with sports medicine who diagnosed her with the pneumothorax. She was admitted to the CV surgery team and received a chest tube on 12/29.  It was successfully removed and she was discharged on 12/30. Follow up outpatient CXR on 1/3/22  showed resolution. She has continued with some mild anterior chest pain but main issue is her continued shoulder pain for which she has planned surgery in two weeks and her migraines which have been worse lately.      She does report she may be developing a mild chest cold.  Hx of immunodeficiency and we have a low tolerance for starting antibiotics as she is more prone to bacterial infection. She is not using an incentive spirometer but is active, taking deep breaths and caring for her two young children.  Denies fever/chills no SOB.      Karen Purdy is a 36 y.o.  female who presents for Chest Injury (F/u pneumothorax)  .   Review of Systems   Constitutional: Positive for activity change. Negative for unexpected weight change.   HENT: Negative for hearing loss, rhinorrhea and trouble swallowing.    Eyes: Negative for discharge and visual disturbance.   Respiratory: Positive for chest tightness. Negative for wheezing.    Cardiovascular: Positive for chest pain. Negative for palpitations.   Gastrointestinal: Negative for blood in stool, constipation, diarrhea and vomiting.   Endocrine: Negative for polydipsia and polyuria.   Genitourinary: Negative for difficulty urinating, dysuria, hematuria and menstrual problem.    Musculoskeletal: Positive for arthralgias and neck pain. Negative for joint swelling.   Neurological: Positive for weakness and headaches.   Psychiatric/Behavioral: Positive for confusion. Negative for dysphoric mood.       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    plan repeat pneumovax after finished breast feeding         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     Unspecified vitamin D deficiency        Past Surgical History:   Procedure Laterality Date    EPIDURAL STEROID INJECTION N/A 10/4/2021    Procedure: INJECTION, STEROID, EPIDURAL C7/T1;  Surgeon: Cony Ahmadi MD;  Location: [facility];  Service: Pain Management;  Laterality: N/A;  9/16 l/m    KNEE SURGERY Right     torn meniscus       Family History   Problem Relation Age of Onset    Cancer Maternal Grandfather         lung    Dementia Paternal Grandmother     Hyperlipidemia Mother     Hypertension Mother     Hypertension Father     Hyperlipidemia Father     Breast cancer Neg Hx     Colon cancer Neg Hx     Ovarian cancer Neg Hx        Social History     Tobacco Use    Smoking status: Former Smoker     Packs/day: 0.00     Years: 2.00     Pack years: 0.00    Smokeless tobacco: Never Used   Substance Use Topics    Alcohol use: Not Currently     Alcohol/week: 1.0 standard drink     Types: 1 Standard drinks or equivalent per week     Comment: occ - 1/mo    Drug use: No       Objective:   currently breastfeeding.     Physical Exam  Constitutional:       General: She is not in acute distress.     Appearance: She is well-developed and well-nourished. She is not diaphoretic.   HENT:      Head: Normocephalic and atraumatic.       Mouth/Throat:      Comments: mildy raspiness to voice  Eyes:      General: No scleral icterus.        Right eye: No discharge.         Left eye: No discharge.   Pulmonary:      Effort: Pulmonary effort is normal. No respiratory distress.      Comments: Not coughing  Skin:     Coloration: Skin is not pale.      Findings: No erythema.   Neurological:      Mental Status: She is alert and oriented to person, place, and time.   Psychiatric:         Behavior: Behavior normal.         Thought Content: Thought content normal.           Prior labs reviewed  Assessment/Plan:        Karen was seen today for chest injury.    Diagnoses and all orders for this visit:    Pneumothorax on right  -resolved  -cxr reveiwed  - recommend continued deep breathing  - ok to proceed with surgery  - avoid strenous exercise, scuba diving, flying x two weeks    Immunodeficiency with predominantly antibody defects  If symptoms of respiratory infection develop contact clinic for covid testing and antibiotic as pt is at high risk of bacterial infection         Visit type: Virtual visit with synchronous audio and video    Total time spent with patient: 30 minutes    Each patient to whom he or she provides medical services by telemedicine is:  (1) informed of the relationship between the physician and patient and the respective role of any other health care provider with respect to management of the patient; and (2) notified that he or she may decline to receive medical services by telemedicine and may withdraw from such care at any time.  Medication List with Changes/Refills   Current Medications    HYDROCODONE-ACETAMINOPHEN (NORCO) 5-325 MG PER TABLET    Take 1 tablet by mouth every 4 (four) hours as needed for Pain.    METHOCARBAMOL (ROBAXIN) 500 MG TAB    Take 500 mg by mouth 4 (four) times daily.    METHOCARBAMOL (ROBAXIN) 500 MG TAB    Take 1 tablet (500 mg total) by mouth 4 (four) times daily. for 10 days    NAPROXEN (NAPROSYN) 500 MG  TABLET    Take 1 tablet (500 mg total) by mouth 2 (two) times daily with meals. for 21 days    NARATRIPTAN (AMERGE) 1 MG TAB    Take at onset of migraine, can repeat in 2 hrs if needed.  No more than twice per day or 3 days/wk.    ONDANSETRON (ZOFRAN-ODT) 8 MG TBDL    Dissolve 1 tablet (8 mg total) by mouth every 6 (six) hours as needed (nausea).    OXYCODONE-ACETAMINOPHEN (PERCOCET)  MG PER TABLET    Take 1 tablet by mouth every 4-6 hours as needed for pain. Take stool softener with this medication.    PROMETHAZINE (PHENERGAN) 25 MG TABLET    Take 1 tablet (25 mg total) by mouth every 6 (six) hours as needed for Nausea.    TIZANIDINE (ZANAFLEX) 2 MG TABLET    Take 1 tablet (2 mg total) by mouth every evening.    TRAMADOL (ULTRAM) 50 MG TABLET    Take 1-2 tablets ( mg total) by mouth every 6 (six) hours as needed for Pain.    VYVANSE 10 MG CAP    Take 1 capsule by mouth once daily.

## 2022-01-10 ENCOUNTER — LAB VISIT (OUTPATIENT)
Dept: PRIMARY CARE CLINIC | Facility: CLINIC | Age: 37
End: 2022-01-10
Payer: COMMERCIAL

## 2022-01-10 ENCOUNTER — PATIENT MESSAGE (OUTPATIENT)
Dept: SURGERY | Facility: HOSPITAL | Age: 37
End: 2022-01-10
Payer: COMMERCIAL

## 2022-01-10 DIAGNOSIS — Z01.818 PRE-OP TESTING: ICD-10-CM

## 2022-01-10 LAB
SARS-COV-2 RNA RESP QL NAA+PROBE: NOT DETECTED
SARS-COV-2- CYCLE NUMBER: NORMAL

## 2022-01-10 PROCEDURE — U0003 INFECTIOUS AGENT DETECTION BY NUCLEIC ACID (DNA OR RNA); SEVERE ACUTE RESPIRATORY SYNDROME CORONAVIRUS 2 (SARS-COV-2) (CORONAVIRUS DISEASE [COVID-19]), AMPLIFIED PROBE TECHNIQUE, MAKING USE OF HIGH THROUGHPUT TECHNOLOGIES AS DESCRIBED BY CMS-2020-01-R: HCPCS | Performed by: ORTHOPAEDIC SURGERY

## 2022-01-10 PROCEDURE — U0005 INFEC AGEN DETEC AMPLI PROBE: HCPCS | Performed by: ORTHOPAEDIC SURGERY

## 2022-01-11 ENCOUNTER — TELEPHONE (OUTPATIENT)
Dept: SPORTS MEDICINE | Facility: CLINIC | Age: 37
End: 2022-01-11
Payer: COMMERCIAL

## 2022-01-11 ENCOUNTER — PATIENT MESSAGE (OUTPATIENT)
Dept: SURGERY | Facility: HOSPITAL | Age: 37
End: 2022-01-11
Payer: COMMERCIAL

## 2022-01-11 DIAGNOSIS — M25.311 SHOULDER INSTABILITY, RIGHT: Primary | ICD-10-CM

## 2022-01-11 DIAGNOSIS — M75.21 BICEPS TENDINITIS OF RIGHT UPPER EXTREMITY: ICD-10-CM

## 2022-01-11 NOTE — TELEPHONE ENCOUNTER
----- Message from Weston Canas sent at 1/11/2022  9:10 AM CST -----  Regarding: Appt Access  Pt called to set up her physical therapy, requesting a call back to schedule.      [phone] (home)

## 2022-01-11 NOTE — TELEPHONE ENCOUNTER
Spoke to patient about rescheduling her PT to start after surgery. Patient wants a new PT order placed for [place] location.

## 2022-01-12 ENCOUNTER — TELEPHONE (OUTPATIENT)
Dept: SPORTS MEDICINE | Facility: CLINIC | Age: 37
End: 2022-01-12
Payer: COMMERCIAL

## 2022-01-12 ENCOUNTER — PATIENT MESSAGE (OUTPATIENT)
Dept: SPORTS MEDICINE | Facility: CLINIC | Age: 37
End: 2022-01-12
Payer: COMMERCIAL

## 2022-01-12 NOTE — TELEPHONE ENCOUNTER
----- Message from Felicia Johnson sent at 1/12/2022 12:46 PM CST -----  Regarding: procedure  Contact: pt  Pt requesting a call back in regards to upcoming procedure, have some questions and concerns.        Pt @[phone]

## 2022-01-13 ENCOUNTER — ANESTHESIA (OUTPATIENT)
Dept: SURGERY | Facility: HOSPITAL | Age: 37
End: 2022-01-13
Payer: COMMERCIAL

## 2022-01-13 ENCOUNTER — HOSPITAL ENCOUNTER (OUTPATIENT)
Facility: HOSPITAL | Age: 37
Discharge: HOME | End: 2022-01-13
Attending: ORTHOPAEDIC SURGERY | Admitting: ORTHOPAEDIC SURGERY
Payer: COMMERCIAL

## 2022-01-13 ENCOUNTER — ANESTHESIA EVENT (OUTPATIENT)
Dept: SURGERY | Facility: HOSPITAL | Age: 37
End: 2022-01-13
Payer: COMMERCIAL

## 2022-01-13 DIAGNOSIS — M25.311 SHOULDER INSTABILITY, RIGHT: ICD-10-CM

## 2022-01-13 DIAGNOSIS — M75.21 BICEPS TENDINITIS OF RIGHT UPPER EXTREMITY: ICD-10-CM

## 2022-01-13 PROCEDURE — 25000003 PHARM REV CODE 250: Performed by: STUDENT IN AN ORGANIZED HEALTH CARE EDUCATION/TRAINING PROGRAM

## 2022-01-13 PROCEDURE — 63600175 PHARM REV CODE 636 W HCPCS: Performed by: ORTHOPAEDIC SURGERY

## 2022-01-13 PROCEDURE — 27100025 HC TUBING, SET FLUID WARMER: Performed by: STUDENT IN AN ORGANIZED HEALTH CARE EDUCATION/TRAINING PROGRAM

## 2022-01-13 PROCEDURE — 71000015 HC POSTOP RECOV 1ST HR: Performed by: ORTHOPAEDIC SURGERY

## 2022-01-13 PROCEDURE — 25000003 PHARM REV CODE 250: Performed by: PHYSICIAN ASSISTANT

## 2022-01-13 PROCEDURE — 29806 SHO ARTHRS SRG CAPSULORRAPHY: CPT | Mod: 22,RT,, | Performed by: ORTHOPAEDIC SURGERY

## 2022-01-13 PROCEDURE — 63600175 PHARM REV CODE 636 W HCPCS: Performed by: NURSE ANESTHETIST, CERTIFIED REGISTERED

## 2022-01-13 PROCEDURE — D9220A PRA ANESTHESIA: Mod: ANES,,, | Performed by: STUDENT IN AN ORGANIZED HEALTH CARE EDUCATION/TRAINING PROGRAM

## 2022-01-13 PROCEDURE — 37000008 HC ANESTHESIA 1ST 15 MINUTES: Performed by: ORTHOPAEDIC SURGERY

## 2022-01-13 PROCEDURE — 25000003 PHARM REV CODE 250: Performed by: NURSE ANESTHETIST, CERTIFIED REGISTERED

## 2022-01-13 PROCEDURE — 36000710: Performed by: ORTHOPAEDIC SURGERY

## 2022-01-13 PROCEDURE — 63600175 PHARM REV CODE 636 W HCPCS: Performed by: STUDENT IN AN ORGANIZED HEALTH CARE EDUCATION/TRAINING PROGRAM

## 2022-01-13 PROCEDURE — 94761 N-INVAS EAR/PLS OXIMETRY MLT: CPT

## 2022-01-13 PROCEDURE — 27201423 OPTIME MED/SURG SUP & DEVICES STERILE SUPPLY: Performed by: ORTHOPAEDIC SURGERY

## 2022-01-13 PROCEDURE — 37000009 HC ANESTHESIA EA ADD 15 MINS: Performed by: ORTHOPAEDIC SURGERY

## 2022-01-13 PROCEDURE — 36000711: Performed by: ORTHOPAEDIC SURGERY

## 2022-01-13 PROCEDURE — 99900035 HC TECH TIME PER 15 MIN (STAT)

## 2022-01-13 PROCEDURE — 71000039 HC RECOVERY, EACH ADD'L HOUR: Performed by: ORTHOPAEDIC SURGERY

## 2022-01-13 PROCEDURE — C1713 ANCHOR/SCREW BN/BN,TIS/BN: HCPCS | Performed by: ORTHOPAEDIC SURGERY

## 2022-01-13 PROCEDURE — 71000033 HC RECOVERY, INTIAL HOUR: Performed by: ORTHOPAEDIC SURGERY

## 2022-01-13 PROCEDURE — D9220A PRA ANESTHESIA: Mod: CRNA,,, | Performed by: NURSE ANESTHETIST, CERTIFIED REGISTERED

## 2022-01-13 PROCEDURE — 29806 PR SHLDR ARTHROSCOP,SURG,CAPSULORRHAPHY: ICD-10-PCS | Mod: 22,RT,, | Performed by: ORTHOPAEDIC SURGERY

## 2022-01-13 PROCEDURE — D9220A PRA ANESTHESIA: ICD-10-PCS | Mod: CRNA,,, | Performed by: NURSE ANESTHETIST, CERTIFIED REGISTERED

## 2022-01-13 PROCEDURE — D9220A PRA ANESTHESIA: ICD-10-PCS | Mod: ANES,,, | Performed by: STUDENT IN AN ORGANIZED HEALTH CARE EDUCATION/TRAINING PROGRAM

## 2022-01-13 PROCEDURE — 25000003 PHARM REV CODE 250: Performed by: ORTHOPAEDIC SURGERY

## 2022-01-13 DEVICE — ANCHOR SUTURETAK 3MM BC: Type: IMPLANTABLE DEVICE | Site: SHOULDER | Status: FUNCTIONAL

## 2022-01-13 RX ORDER — MEPERIDINE HYDROCHLORIDE 50 MG/ML
12.5 INJECTION INTRAMUSCULAR; INTRAVENOUS; SUBCUTANEOUS ONCE AS NEEDED
Status: COMPLETED | OUTPATIENT
Start: 2022-01-13 | End: 2022-01-13

## 2022-01-13 RX ORDER — SODIUM CHLORIDE 0.9 % (FLUSH) 0.9 %
10 SYRINGE (ML) INJECTION
Status: DISCONTINUED | OUTPATIENT
Start: 2022-01-13 | End: 2022-01-13 | Stop reason: HOSPADM

## 2022-01-13 RX ORDER — EPINEPHRINE 1 MG/ML
INJECTION, SOLUTION INTRACARDIAC; INTRAMUSCULAR; INTRAVENOUS; SUBCUTANEOUS
Status: DISCONTINUED | OUTPATIENT
Start: 2022-01-13 | End: 2022-01-13 | Stop reason: HOSPADM

## 2022-01-13 RX ORDER — FENTANYL CITRATE 50 UG/ML
INJECTION, SOLUTION INTRAMUSCULAR; INTRAVENOUS
Status: DISCONTINUED | OUTPATIENT
Start: 2022-01-13 | End: 2022-01-13

## 2022-01-13 RX ORDER — ONDANSETRON 2 MG/ML
4 INJECTION INTRAMUSCULAR; INTRAVENOUS EVERY 12 HOURS PRN
Status: DISCONTINUED | OUTPATIENT
Start: 2022-01-13 | End: 2022-01-13 | Stop reason: HOSPADM

## 2022-01-13 RX ORDER — KETOROLAC TROMETHAMINE 30 MG/ML
INJECTION, SOLUTION INTRAMUSCULAR; INTRAVENOUS
Status: DISCONTINUED | OUTPATIENT
Start: 2022-01-13 | End: 2022-01-13 | Stop reason: HOSPADM

## 2022-01-13 RX ORDER — FAMOTIDINE 10 MG/ML
INJECTION INTRAVENOUS
Status: DISCONTINUED | OUTPATIENT
Start: 2022-01-13 | End: 2022-01-13

## 2022-01-13 RX ORDER — OXYCODONE HYDROCHLORIDE 5 MG/1
5 TABLET ORAL EVERY 4 HOURS PRN
Status: DISCONTINUED | OUTPATIENT
Start: 2022-01-13 | End: 2022-01-13 | Stop reason: HOSPADM

## 2022-01-13 RX ORDER — OXYCODONE HCL 10 MG/1
10 TABLET, FILM COATED, EXTENDED RELEASE ORAL
Status: COMPLETED | OUTPATIENT
Start: 2022-01-13 | End: 2022-01-13

## 2022-01-13 RX ORDER — NEOSTIGMINE METHYLSULFATE 1 MG/ML
INJECTION, SOLUTION INTRAVENOUS
Status: DISCONTINUED | OUTPATIENT
Start: 2022-01-13 | End: 2022-01-13

## 2022-01-13 RX ORDER — OXYCODONE HYDROCHLORIDE 5 MG/1
5 TABLET ORAL
Status: DISCONTINUED | OUTPATIENT
Start: 2022-01-13 | End: 2022-01-13 | Stop reason: HOSPADM

## 2022-01-13 RX ORDER — NICARDIPINE HYDROCHLORIDE 0.2 MG/ML
INJECTION INTRAVENOUS CONTINUOUS PRN
Status: DISCONTINUED | OUTPATIENT
Start: 2022-01-13 | End: 2022-01-13

## 2022-01-13 RX ORDER — ACETAMINOPHEN 325 MG/1
650 TABLET ORAL EVERY 6 HOURS
Status: DISCONTINUED | OUTPATIENT
Start: 2022-01-13 | End: 2022-01-13 | Stop reason: HOSPADM

## 2022-01-13 RX ORDER — SODIUM CHLORIDE 9 MG/ML
INJECTION, SOLUTION INTRAVENOUS CONTINUOUS
Status: DISCONTINUED | OUTPATIENT
Start: 2022-01-13 | End: 2022-01-13 | Stop reason: HOSPADM

## 2022-01-13 RX ORDER — ONDANSETRON 2 MG/ML
4 INJECTION INTRAMUSCULAR; INTRAVENOUS DAILY PRN
Status: DISCONTINUED | OUTPATIENT
Start: 2022-01-13 | End: 2022-01-13 | Stop reason: HOSPADM

## 2022-01-13 RX ORDER — MIDAZOLAM HYDROCHLORIDE 1 MG/ML
INJECTION INTRAMUSCULAR; INTRAVENOUS
Status: DISCONTINUED | OUTPATIENT
Start: 2022-01-13 | End: 2022-01-13

## 2022-01-13 RX ORDER — PROPOFOL 10 MG/ML
VIAL (ML) INTRAVENOUS
Status: DISCONTINUED | OUTPATIENT
Start: 2022-01-13 | End: 2022-01-13

## 2022-01-13 RX ORDER — PROPOFOL 10 MG/ML
VIAL (ML) INTRAVENOUS CONTINUOUS PRN
Status: DISCONTINUED | OUTPATIENT
Start: 2022-01-13 | End: 2022-01-13

## 2022-01-13 RX ORDER — SCOLOPAMINE TRANSDERMAL SYSTEM 1 MG/1
1 PATCH, EXTENDED RELEASE TRANSDERMAL
Status: DISCONTINUED | OUTPATIENT
Start: 2022-01-13 | End: 2022-01-13 | Stop reason: HOSPADM

## 2022-01-13 RX ORDER — MORPHINE SULFATE 2 MG/ML
3 INJECTION, SOLUTION INTRAMUSCULAR; INTRAVENOUS
Status: DISCONTINUED | OUTPATIENT
Start: 2022-01-13 | End: 2022-01-13 | Stop reason: HOSPADM

## 2022-01-13 RX ORDER — DEXMEDETOMIDINE HYDROCHLORIDE 100 UG/ML
INJECTION, SOLUTION INTRAVENOUS
Status: DISCONTINUED | OUTPATIENT
Start: 2022-01-13 | End: 2022-01-13

## 2022-01-13 RX ORDER — ROPIVACAINE HYDROCHLORIDE 5 MG/ML
INJECTION, SOLUTION EPIDURAL; INFILTRATION; PERINEURAL
Status: DISCONTINUED | OUTPATIENT
Start: 2022-01-13 | End: 2022-01-13 | Stop reason: HOSPADM

## 2022-01-13 RX ORDER — ONDANSETRON 2 MG/ML
INJECTION INTRAMUSCULAR; INTRAVENOUS
Status: DISCONTINUED | OUTPATIENT
Start: 2022-01-13 | End: 2022-01-13

## 2022-01-13 RX ORDER — METOCLOPRAMIDE HYDROCHLORIDE 5 MG/ML
5 INJECTION INTRAMUSCULAR; INTRAVENOUS EVERY 6 HOURS PRN
Status: DISCONTINUED | OUTPATIENT
Start: 2022-01-13 | End: 2022-01-13 | Stop reason: HOSPADM

## 2022-01-13 RX ORDER — FENTANYL CITRATE 50 UG/ML
25 INJECTION, SOLUTION INTRAMUSCULAR; INTRAVENOUS EVERY 5 MIN PRN
Status: DISCONTINUED | OUTPATIENT
Start: 2022-01-13 | End: 2022-01-13 | Stop reason: HOSPADM

## 2022-01-13 RX ORDER — CLINDAMYCIN PHOSPHATE 900 MG/50ML
900 INJECTION, SOLUTION INTRAVENOUS
Status: COMPLETED | OUTPATIENT
Start: 2022-01-13 | End: 2022-01-13

## 2022-01-13 RX ORDER — ROCURONIUM BROMIDE 10 MG/ML
INJECTION, SOLUTION INTRAVENOUS
Status: DISCONTINUED | OUTPATIENT
Start: 2022-01-13 | End: 2022-01-13

## 2022-01-13 RX ORDER — HYDROMORPHONE HYDROCHLORIDE 1 MG/ML
0.2 INJECTION, SOLUTION INTRAMUSCULAR; INTRAVENOUS; SUBCUTANEOUS EVERY 5 MIN PRN
Status: DISCONTINUED | OUTPATIENT
Start: 2022-01-13 | End: 2022-01-13 | Stop reason: HOSPADM

## 2022-01-13 RX ORDER — KETAMINE HCL IN 0.9 % NACL 50 MG/5 ML
SYRINGE (ML) INTRAVENOUS
Status: DISCONTINUED | OUTPATIENT
Start: 2022-01-13 | End: 2022-01-13

## 2022-01-13 RX ORDER — KETAMINE HYDROCHLORIDE 100 MG/ML
INJECTION, SOLUTION INTRAMUSCULAR; INTRAVENOUS
Status: DISCONTINUED | OUTPATIENT
Start: 2022-01-13 | End: 2022-01-13 | Stop reason: HOSPADM

## 2022-01-13 RX ORDER — DEXAMETHASONE SODIUM PHOSPHATE 4 MG/ML
INJECTION, SOLUTION INTRA-ARTICULAR; INTRALESIONAL; INTRAMUSCULAR; INTRAVENOUS; SOFT TISSUE
Status: DISCONTINUED | OUTPATIENT
Start: 2022-01-13 | End: 2022-01-13

## 2022-01-13 RX ORDER — PREGABALIN 75 MG/1
75 CAPSULE ORAL
Status: COMPLETED | OUTPATIENT
Start: 2022-01-13 | End: 2022-01-13

## 2022-01-13 RX ORDER — NICARDIPINE HYDROCHLORIDE 2.5 MG/ML
INJECTION INTRAVENOUS
Status: DISCONTINUED | OUTPATIENT
Start: 2022-01-13 | End: 2022-01-13

## 2022-01-13 RX ORDER — CARBOXYMETHYLCELLULOSE SODIUM 10 MG/ML
GEL OPHTHALMIC
Status: DISCONTINUED | OUTPATIENT
Start: 2022-01-13 | End: 2022-01-13

## 2022-01-13 RX ORDER — HALOPERIDOL 5 MG/ML
0.5 INJECTION INTRAMUSCULAR EVERY 10 MIN PRN
Status: DISCONTINUED | OUTPATIENT
Start: 2022-01-13 | End: 2022-01-13 | Stop reason: HOSPADM

## 2022-01-13 RX ORDER — LIDOCAINE HYDROCHLORIDE 20 MG/ML
INJECTION INTRAVENOUS
Status: DISCONTINUED | OUTPATIENT
Start: 2022-01-13 | End: 2022-01-13

## 2022-01-13 RX ADMIN — NICARDIPINE HYDROCHLORIDE 0.4 MCG: 2.5 INJECTION INTRAVENOUS at 10:01

## 2022-01-13 RX ADMIN — SODIUM CHLORIDE, SODIUM GLUCONATE, SODIUM ACETATE, POTASSIUM CHLORIDE, MAGNESIUM CHLORIDE, SODIUM PHOSPHATE, DIBASIC, AND POTASSIUM PHOSPHATE: .53; .5; .37; .037; .03; .012; .00082 INJECTION, SOLUTION INTRAVENOUS at 11:01

## 2022-01-13 RX ADMIN — PROPOFOL 140 MG: 10 INJECTION, EMULSION INTRAVENOUS at 09:01

## 2022-01-13 RX ADMIN — FAMOTIDINE 20 MG: 10 INJECTION, SOLUTION INTRAVENOUS at 08:01

## 2022-01-13 RX ADMIN — DEXMEDETOMIDINE HYDROCHLORIDE 8 MCG: 100 INJECTION, SOLUTION, CONCENTRATE INTRAVENOUS at 08:01

## 2022-01-13 RX ADMIN — OXYCODONE HYDROCHLORIDE 10 MG: 10 TABLET, FILM COATED, EXTENDED RELEASE ORAL at 08:01

## 2022-01-13 RX ADMIN — NICARDIPINE HYDROCHLORIDE 25 MG/HR: 0.2 INJECTION, SOLUTION INTRAVENOUS at 10:01

## 2022-01-13 RX ADMIN — OXYCODONE 5 MG: 5 TABLET ORAL at 11:01

## 2022-01-13 RX ADMIN — LIDOCAINE HYDROCHLORIDE 60 MG: 20 INJECTION, SOLUTION INTRAVENOUS at 08:01

## 2022-01-13 RX ADMIN — NEOSTIGMINE METHYLSULFATE 3.5 MG: 1 INJECTION INTRAVENOUS at 10:01

## 2022-01-13 RX ADMIN — DEXAMETHASONE SODIUM PHOSPHATE 8 MG: 4 INJECTION, SOLUTION INTRAMUSCULAR; INTRAVENOUS at 09:01

## 2022-01-13 RX ADMIN — PROPOFOL 60 MG: 10 INJECTION, EMULSION INTRAVENOUS at 09:01

## 2022-01-13 RX ADMIN — FENTANYL CITRATE 100 MCG: 50 INJECTION, SOLUTION INTRAMUSCULAR; INTRAVENOUS at 08:01

## 2022-01-13 RX ADMIN — DEXMEDETOMIDINE HYDROCHLORIDE 8 MCG: 100 INJECTION, SOLUTION, CONCENTRATE INTRAVENOUS at 09:01

## 2022-01-13 RX ADMIN — ONDANSETRON 4 MG: 2 INJECTION, SOLUTION INTRAMUSCULAR; INTRAVENOUS at 10:01

## 2022-01-13 RX ADMIN — CARBOXYMETHYLCELLULOSE SODIUM 4 DROP: 10 GEL OPHTHALMIC at 08:01

## 2022-01-13 RX ADMIN — PROPOFOL 150 MCG/KG/MIN: 10 INJECTION, EMULSION INTRAVENOUS at 08:01

## 2022-01-13 RX ADMIN — LIDOCAINE HYDROCHLORIDE 40 MG: 20 INJECTION, SOLUTION INTRAVENOUS at 10:01

## 2022-01-13 RX ADMIN — MEPERIDINE HYDROCHLORIDE 12.5 MG: 50 INJECTION INTRAMUSCULAR; INTRAVENOUS; SUBCUTANEOUS at 12:01

## 2022-01-13 RX ADMIN — MIDAZOLAM HYDROCHLORIDE 2 MG: 1 INJECTION, SOLUTION INTRAMUSCULAR; INTRAVENOUS at 08:01

## 2022-01-13 RX ADMIN — SODIUM CHLORIDE, SODIUM GLUCONATE, SODIUM ACETATE, POTASSIUM CHLORIDE, MAGNESIUM CHLORIDE, SODIUM PHOSPHATE, DIBASIC, AND POTASSIUM PHOSPHATE: .53; .5; .37; .037; .03; .012; .00082 INJECTION, SOLUTION INTRAVENOUS at 09:01

## 2022-01-13 RX ADMIN — SODIUM CHLORIDE: 0.9 INJECTION, SOLUTION INTRAVENOUS at 08:01

## 2022-01-13 RX ADMIN — ACETAMINOPHEN 650 MG: 325 TABLET ORAL at 11:01

## 2022-01-13 RX ADMIN — FENTANYL CITRATE 100 MCG: 50 INJECTION, SOLUTION INTRAMUSCULAR; INTRAVENOUS at 10:01

## 2022-01-13 RX ADMIN — PREGABALIN 75 MG: 75 CAPSULE ORAL at 08:01

## 2022-01-13 RX ADMIN — PROPOFOL 50 MG: 10 INJECTION, EMULSION INTRAVENOUS at 11:01

## 2022-01-13 RX ADMIN — PROPOFOL 100 MG: 10 INJECTION, EMULSION INTRAVENOUS at 10:01

## 2022-01-13 RX ADMIN — SCOPALAMINE 1 PATCH: 1 PATCH, EXTENDED RELEASE TRANSDERMAL at 09:01

## 2022-01-13 RX ADMIN — CLINDAMYCIN PHOSPHATE 900 MG: 18 INJECTION, SOLUTION INTRAVENOUS at 08:01

## 2022-01-13 RX ADMIN — Medication 20 MG: at 09:01

## 2022-01-13 RX ADMIN — GLYCOPYRROLATE 0.4 MG: 0.2 INJECTION, SOLUTION INTRAMUSCULAR; INTRAVENOUS at 10:01

## 2022-01-13 RX ADMIN — ROCURONIUM BROMIDE 30 MG: 10 INJECTION, SOLUTION INTRAVENOUS at 09:01

## 2022-01-13 NOTE — BRIEF OP NOTE
[facility] - [facility] ([facility])  Brief Operative Note    Surgery Date: 1/13/2022     Surgeon(s) and Role:     * Ann Anderson MD - Primary    Assisting Surgeon: None    Pre-op Diagnosis:  Shoulder instability, right [M25.311]  Biceps tendinitis of right upper extremity [M75.21]  Superior glenoid labrum lesion of right shoulder, initial encounter [S43.431A]    Post-op Diagnosis:  Post-Op Diagnosis Codes:     * Shoulder instability, right [M25.311]     * Biceps tendinitis of right upper extremity [M75.21]     * Superior glenoid labrum lesion of right shoulder, initial encounter [S43.431A]    Procedure(s) (LRB):  ARTHROSCOPY, SHOULDER, WITH BANKART REPAIR (Right)  CAPSULORRHAPHY (Right)  ARTHROSCOPY, SHOULDER, WITH SLAP REPAIR (Right)  DEBRIDEMENT, SHOULDER, ARTHROSCOPIC (Right)  FIXATION, TENDON (Right)    Anesthesia: General    Operative Findings: see op note    Estimated Blood Loss: * No values recorded between 1/13/2022 12:00 AM and 1/13/2022  9:27 AM *         Specimens:   Specimen (24h ago, onward)            None            Discharge Note    OUTCOME: Patient tolerated treatment/procedure well without complication and is now ready for discharge.    DISPOSITION: Home or Self Care    FINAL DIAGNOSIS: labral repair    FOLLOWUP: In clinic    DISCHARGE INSTRUCTIONS:    Discharge Procedure Orders   Diet general     Sponge bath only until clinic visit     Keep surgical extremity elevated     Ice to affected area     No driving, operating heavy equipment or signing legal documents while taking pain medication     Call MD for:  temperature >100.4     Call MD for:  persistent nausea and vomiting     Call MD for:  severe uncontrolled pain     Call MD for:  difficulty breathing, headache or visual disturbances     Call MD for:  redness, tenderness, or signs of infection (pain, swelling, redness, odor or green/yellow discharge around incision site)     Call MD for:  hives     Call MD for:  persistent dizziness or light-headedness      Call MD for:  extreme fatigue     Remove dressing in 72 hours

## 2022-01-13 NOTE — TRANSFER OF CARE
"Anesthesia Transfer of Care Note    Patient: Karen Purdy    Procedure(s) Performed: Procedure(s) (LRB):  ARTHROSCOPY, SHOULDER, WITH INSTABILITY REPAIR (Right)    Patient location: PACU    Anesthesia Type: general    Transport from OR: Transported from OR on 6-10 L/min O2 by face mask with adequate spontaneous ventilation    Post pain: adequate analgesia    Post assessment: no apparent anesthetic complications and tolerated procedure well    Post vital signs: stable    Level of consciousness: awake, alert and oriented    Nausea/Vomiting: no nausea/vomiting    Complications: none    Transfer of care protocol was followed      Last vitals:   Visit Vitals  /72   Pulse (!) 117   Temp 36.7 °C (98.1 °F) (Oral)   Resp 20   Ht 5' 4" (1.626 m)   Wt 54.4 kg (120 lb)   SpO2 100%   Breastfeeding No   BMI 20.60 kg/m²     "

## 2022-01-13 NOTE — ADDENDUM NOTE
Addendum  created 01/13/22 1259 by Neha Little MD    Order list changed, Pharmacy for encounter modified

## 2022-01-13 NOTE — OP NOTE
DATE OF PROCEDURE: 01/13/2022    SURGEON: Ann Anderson M.D.    ASSISTANT: BART Hannah M.D.,PGY 3  ASSISTANT: ENIO Herrera PA-C      PREOPERATIVE DIAGNOSES:   right  1. Shoulder anterior instability  2. Shoulder posterior instability  3. Shoulder synovitis  4. Shoulder inferior (multidirectional) instability    POSTOPERATIVE DIAGNOSES:   right  1. Shoulder anterior instability  2. Shoulder posterior instability  3. Shoulder synovitis  4. Shoulder inferior (multidirectional) instability    OPERATION:   right  1. Shoulder arthroscopic anterior capsulorrhaphy (CPT 97532) (complex, -22 modifier)  2. Shoulder arthroscopic anterior labral repair (CPT 13730)   3. Shoulder arthroscopic posterior labral repair, capsulorrhaphy, 7:00 position (CPT 57915)  4. Shoulder arthroscopic extensive debridement (anterior, posterior glenohumeral joint) (CPT 74846)  5. Shoulder manipulation under anesthesia (CPT 49594)  6. Shoulder arthroscopic lysis of adhesions (CPT 82889):    ANESTHESIA:  General with intra-op suprascapular nerve block    BLOOD LOSS: Minimal.     DRAINS: None.     TOURNIQUET TIME: None.     COMPLICATIONS: None. The patient was moved to the recovery room in stable condition with compartments soft and cap refill less than a second in all digits.     COMPLEX PROCEDURE:  Labral repair and capsulorrhaphy was complex in nature due to the complexity of the tear, thin capsule and remnant labral tissue lesion tissue and altered anatomy of this case.  There was an altered surgical field with abnormal anatomy. There was an altered surgical field due to the complex labral tear. There was abnormal anatomy. Complexity of the service was much greater than the normative procedure.There was increased time, intensity and technical difficulty of the procedure, severity of the patient's condition and mental effort required.  This was a highly complicated repair and tear pattern that required advanced arthroscopic skill in order to safely and  technically perform it. Nevertheless the repair achieved was excellent.    BRIEF INDICATION OF MEDICAL NECESSITY: The patient is a 36 y.o. year-old female who has history and physical examination findings consistent with the above. Nonoperative versus operative options were discussed. The risks and benefits were discussed with the patient. The patient acknowledged understanding and wished to proceed with operative intervention. Informed consent was obtained prior to the procedure. Reasonable expectations and potential complications were discussed and acknowledged, including but not limited to infection, bleeding, blood clots, (DVT and/or PE), nerve injury, re-tear, instability, continued pain and stiffness. They agreed and   understood and wished to proceed.     EXAMINATION UNDER ANESTHESIA OF THE right SHOULDER: Forward elevation 175 degrees, External rotation at 0-60 degrees, External rotation at 90-90 degrees, Internal rotation at 90-60 degrees. Translation testing: anterior grade 3, posterior grade 2, sulcus sign grade 2 corrects to 1 on external rotation.     EXAMINATION UNDER ANESTHESIA OF THE NONOPERATED left SHOULDER: Forward elevation 175 degrees, External rotation at 0-60 degrees, External rotation at 0-90 degrees, Internal rotation at 90-60 degrees. Translation testing: anterior grade 2, posterior grade 2, sulcus sign grade 1 corrects to 0 on external rotation.     PROCEDURE IN DETAIL:  After the correct operative site was marked by the operating surgeon. The patient was then taken to the operating room and placed supine on the operating room table, where the patient  underwent general anesthesia by the anesthesia team.  The patient was then rolled into the lateral decubitus position with the operative side up.  A well-padded axillary roll, beanbag and pillows were placed.  All pressure points were carefully padded and checked.  The upper extremities and both lower extremities were placed in comfortable  "positions and were also well-padded.  The operative upper extremity was then prepped and draped in the usual sterile fashion.    Suprascapular nerve block was performed in the standard fashion with 5cc local anesthetic.    The Spider arm positioner was implemented with balanced suspension and appropriate landmarks were noted on the skin. A posterior followed by sj-superior portals were created and systematic examination of the joint revealed the following:     There was no evidence of any significant chondral lesions to the glenoid or humeral head.     Biceps was pristine on "ramp" sign.     There is no evidence of any SLAP lesion noted to the biceps root upon probing and careful inspection.     Bankart lesion was visualized anteroinferiorly, which extended posteriorly.  Liberator knife was used to elevate the anteroinferior and posteroinferior tissues. Care was taken on liberation of this tissue. Capsule anteriorly and posteriorly was patulous in nature. Capsule was stretched somewhat. Anteroinferior portal was created just superior to the subscapularis in the appropriate more vertical angle.     Adhesions were cleared off labrum anteriorly and capsule was able to be mobilized after lysis of adhesions.     Shoulder lysis of adhesions, equivalent to the open procedure (CPT 09489): an extensive lysis of adhesions needed to be performed with lysis of adhesions in the anterior and posterior glenohumeral joint where there was extensive scarring from the chronic nature of the tearing. After the lysis of adhesions, the mobility was restored to the shoulder and capsule.    COMPLEX PROCEDURE:  Labral repair and capsulorrhaphy was complex in nature due to the complexity of the tear, thin capsule and remnant labral tissue lesion tissue and altered anatomy of this case.  There was an altered surgical field with abnormal anatomy. There was an altered surgical field due to the complex labral tear. There was abnormal anatomy. " Complexity of the service was much greater than the normative procedure.There was increased time, intensity and technical difficulty of the procedure, severity of the patient's condition and mental effort required.  This was a highly complicated repair and tear pattern that required advanced arthroscopic skill in order to safely and technically perform it. Nevertheless the repair achieved was excellent.    Cannulas were placed and a shaver was then used to roughen and debride the surface of the anterior, inferior, and posterior glenoid neck surfaces. Then 1-2 mm of articular surface was debrided for glenoid anchor placement.     There was synovitis in the shoulder anteriorly and posteriorly and was debrided anteriorly and posteriorly in the glenohumeral joint to the areas of concern.    An inferior transcutaneous 3-mm portal was created to place the inferior anchors. Care was taken to avoid any damage to the neurovascular structures, axillary nerve, below.     7 anchors were placed in total, one at the 6:30 position, 7:30, 8:30, 10:00 positions, anteriorly at 5 o'clock, 4 o'clock, and 3 o'clock positions.     These were 3.0 Arthrex Bio-Composite Suture Tack and a simple suture was placed through the labrum and capsule, and labral repair and capsulorrhaphy was performed. Care was taken to avoid the neurovascular structures and axillary nerve bundles below. Suture was then tied using modified David knots with the knots from the tissue side. All knots had good loop and knot security with repair starting posteriorly and then proceeding anteriorly.     The repair recentered the humeral head nicely on the glenoid. The shoulder was stable on translation testing.     7 anchors were used in total.     This recentered the humeral head nicely on the glenoid. The shoulder was stable to translation testing.     Local 5cc placed in to the joint along with suprascapular nerve block was performed in the standard fashion also with  5cc local anesthetic. Local was placed about portals and incision(s) after irrigation, as described below, was completed. The shoulder was then irrigated and fluid was extravasated using suction. All portals were reapproximated using inverted 4-0 Monocryl suture in the subcutaneous tissue of the portals. Mastisol and Steri-strips were placed with xeroform, 4x4s, abd pad, and Medi-pore tape.  TENS unit pads were placed which were medically necessary for pain relief.  An iceman was secured in the shoulder moss.  A sling with an abduction pillow was secured.  The patient was then moved to supine, extubated and taken to the recovery room where the patient arrived in stable condition with the compartments of the arm and forearm soft and cap refill less than a second in all digits.     POST OPERATIVE PLAN: We will follow the arthroscopic Bankart repair guidelines. We discussed with the patient's family after surgery. The patient will remain in a sling for 6 weeks. We will start PT at the 3 week anthony.    Quality of tissue: Fair    Quality of the repair: Good

## 2022-01-13 NOTE — ANESTHESIA PROCEDURE NOTES
Intubation    Date/Time: 1/13/2022 9:02 AM  Performed by: Aruna Kauffman CRNA  Authorized by: Neha Little MD     Intubation:     Induction:  Intravenous    Intubated:  Postinduction    Mask Ventilation:  Easy mask    Attempts:  1    Attempted By:  CRNA (Isauro)    Method of Intubation:  Video laryngoscopy    Blade:  Anton 3    Laryngeal View Grade: Grade I - full view of cords      Difficult Airway Encountered?: No      Complications:  None    Airway Device:  Oral endotracheal tube    Airway Device Size:  7.0    Style/Cuff Inflation:  Cuffed    Inflation Amount (mL):  5    Tube secured:  21    Secured at:  The lips    Placement Verified By:  Capnometry    Complicating Factors:  None    Findings Post-Intubation:  BS equal bilateral and atraumatic/condition of teeth unchanged

## 2022-01-13 NOTE — ANESTHESIA POSTPROCEDURE EVALUATION
Anesthesia Post Evaluation    Patient: Karen Purdy    Procedure(s) Performed: Procedure(s) (LRB):  ARTHROSCOPY, SHOULDER, WITH INSTABILITY REPAIR (Right)    Final Anesthesia Type: general      Patient location during evaluation: PACU  Patient participation: Yes- Able to Participate  Level of consciousness: awake and alert  Post-procedure vital signs: reviewed and stable  Pain management: adequate  Airway patency: patent  MICHAEL mitigation strategies: Multimodal analgesia  PONV status at discharge: No PONV  Anesthetic complications: no      Cardiovascular status: blood pressure returned to baseline and hemodynamically stable  Respiratory status: unassisted  Hydration status: euvolemic  Follow-up not needed.          Vitals Value Taken Time   /75 01/13/22 1132   Temp 37 01/13/22 1146   Pulse 86 01/13/22 1145   Resp 16 01/13/22 1145   SpO2 98 % 01/13/22 1145   Vitals shown include unvalidated device data.      No case tracking events are documented in the log.      Pain/Junior Score: Junior Score: 10 (1/13/2022 11:18 AM)

## 2022-01-13 NOTE — ANESTHESIA PREPROCEDURE EVALUATION
01/13/2022  Pre-operative evaluation for Procedure(s) (LRB):  ARTHROSCOPY, SHOULDER, WITH BANKART REPAIR (Right)  CAPSULORRHAPHY (Right)  ARTHROSCOPY, SHOULDER, WITH SLAP REPAIR (Right)  DEBRIDEMENT, SHOULDER, ARTHROSCOPIC (Right)  FIXATION, TENDON (Right)    Karen Purdy is a 36 y.o. female w/ recent R PTX after dry needling. S/p chest tube on 12/29, successfully removed and discharged on 12/30. Follow up outpatient CXR on 1/3/22  showed resolution.Here for shoulder arthroscopy.     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    Breast feeding status of mother    Chronic pain disorder    Myofascial pain syndrome    Olfactory aura    Paresthesias    Postural dizziness with presyncope    Posture imbalance    Cervical radiculopathy    Neck pain    Chronic pain    Migraine with aura    Glenoid labral tear, right, subsequent encounter    Pneumothorax on right       Review of patient's allergies indicates:   Allergen Reactions    Ceclor [cefaclor] Anaphylaxis, Swelling and Rash    Pcn [penicillins] Anaphylaxis, Swelling and Rash       No current facility-administered medications on file prior to encounter.     Current Outpatient Medications on File Prior to Encounter   Medication Sig Dispense Refill    naratriptan (AMERGE) 1 MG Tab Take at onset of migraine, can repeat in 2 hrs if needed.  No more than twice per day or 3 days/wk. 12 tablet 2    VYVANSE 10 mg Cap Take 1 capsule by mouth once daily.         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    CMP: No results for input(s): NA, K, CL, CO2, BUN, CREATININE, GLU, MG, PHOS, CALCIUM, ALBUMIN, PROT, ALKPHOS, ALT, AST, BILITOT in the last 72 hours.    INR  No results for input(s): PT, INR, PROTIME, APTT in the last 72 hours.      CXR 1/3/21:  Impression:     Compared to December 30, 2021, removal of right pleural catheter, resolution of tiny right apical pneumothorax, with no active cardiac or pulmonary findings on present study.           2D Echo:  No results found for this or any previous visit.    Anesthesia Evaluation    I have reviewed the Patient Summary Reports.    I have reviewed the Nursing Notes. I have reviewed the NPO Status.   I have reviewed the Medications.     Review of Systems      Physical Exam  General:  Well nourished    Airway/Jaw/Neck:  Airway Findings: Mouth Opening: Normal Tongue: Normal  General Airway Assessment: Adult  Mallampati: II  TM Distance: Normal, at least 6 cm  Jaw/Neck Findings:  Neck ROM: Normal ROM       Chest/Lungs:  Chest/Lungs Findings: Clear to auscultation, Normal Respiratory Rate     Heart/Vascular:  Heart Findings: Rate: Normal  Rhythm:  Regular Rhythm  Sounds: Normal        Mental Status:  Mental Status Findings:  Cooperative, Alert and Oriented         Anesthesia Plan  Type of Anesthesia, risks & benefits discussed:  Anesthesia Type:  general    Patient's Preference:   Plan Factors:          Intra-op Monitoring Plan: standard ASA monitors  Intra-op Monitoring Plan Comments:   Post Op Pain Control Plan: multimodal analgesia  Post Op Pain Control Plan Comments:     Induction:   IV  Beta Blocker:         Informed Consent: Patient understands risks and agrees with Anesthesia plan.  Questions answered. Anesthesia consent signed with patient.  ASA Score: 2     Day of Surgery Review of History & Physical:  There are no significant changes.          Ready For Surgery From Anesthesia Perspective.

## 2022-01-13 NOTE — INTERVAL H&P NOTE
The patient has been examined and the H&P has been reviewed:    Had pneumothorax s/p chest tube that has since resolved. Stable for surgery from orthopedic perspective.   I concur with the findings and no changes have occurred since H&P was written.    Surgery risks, benefits and alternative options discussed and understood by patient/family.          There are no hospital problems to display for this patient.

## 2022-01-13 NOTE — PLAN OF CARE
VSS.  Patient tolerating oral liquids without difficulty.   No apparent s&s of distress noted at this time, no complaints voiced at this time.   Discharge instructions reviewed with patient and  with good verbal feedback received.   Post op medications received pre op, pt sent home with sling  Patient ready for discharge.

## 2022-01-14 ENCOUNTER — PATIENT MESSAGE (OUTPATIENT)
Dept: SPORTS MEDICINE | Facility: CLINIC | Age: 37
End: 2022-01-14
Payer: COMMERCIAL

## 2022-01-14 VITALS
OXYGEN SATURATION: 100 % | HEART RATE: 81 BPM | DIASTOLIC BLOOD PRESSURE: 74 MMHG | SYSTOLIC BLOOD PRESSURE: 122 MMHG | TEMPERATURE: 98 F | WEIGHT: 120 LBS | RESPIRATION RATE: 11 BRPM | BODY MASS INDEX: 20.49 KG/M2 | HEIGHT: 64 IN

## 2022-01-17 ENCOUNTER — PATIENT MESSAGE (OUTPATIENT)
Dept: SPORTS MEDICINE | Facility: CLINIC | Age: 37
End: 2022-01-17
Payer: COMMERCIAL

## 2022-01-18 ENCOUNTER — TELEPHONE (OUTPATIENT)
Dept: SPORTS MEDICINE | Facility: CLINIC | Age: 37
End: 2022-01-18
Payer: COMMERCIAL

## 2022-01-19 NOTE — TELEPHONE ENCOUNTER
Spoke to her about her symptoms that she is having following her right shoulder surgery on 1/13/22. She reports that she has fainted twice over the last few days which usually occurs when she takes her sling off and looks at her arm. She reports that she has fainted for several other things in the past prior to surgery. She also reports that when she removes her sling, she notices that her hand turns a bluish color for less than 1 minute before it resumes normal color. She is concerned about all of these things.     She denies SOB, CP, Fever, numbness, tingling.     We had a long discussion about her symptoms and both agreed that her fainting is likely vasovagal syncope when she removes her sling and looks at her arm. We also agree that her hand turning blue is likely just due to immobility and being cold and nothing serious.     She was advise to laying back when removing her sling and laying still for 5 minutes then sitting up for 5 minutes prior to resuming activity.     She will watch her symptoms and reach back out if things worsen or new symptoms occur.     She was told that if she is worried about her symptoms then she should go to urgent care for evaluation.     All patients questions were answered. Patient was advised to call us with any concerns or questions.

## 2022-01-24 ENCOUNTER — PATIENT MESSAGE (OUTPATIENT)
Dept: SPORTS MEDICINE | Facility: CLINIC | Age: 37
End: 2022-01-24
Payer: COMMERCIAL

## 2022-01-26 DIAGNOSIS — G89.18 POST-OPERATIVE PAIN: ICD-10-CM

## 2022-01-26 DIAGNOSIS — M25.311 SHOULDER INSTABILITY, RIGHT: ICD-10-CM

## 2022-01-26 DIAGNOSIS — M75.21 BICEPS TENDINITIS OF RIGHT UPPER EXTREMITY: ICD-10-CM

## 2022-01-26 RX ORDER — TRAMADOL HYDROCHLORIDE 50 MG/1
50-100 TABLET ORAL EVERY 6 HOURS PRN
Qty: 21 TABLET | Refills: 0 | Status: SHIPPED | OUTPATIENT
Start: 2022-01-26 | End: 2022-02-07 | Stop reason: SDUPTHER

## 2022-01-28 ENCOUNTER — OFFICE VISIT (OUTPATIENT)
Dept: SPORTS MEDICINE | Facility: CLINIC | Age: 37
End: 2022-01-28
Payer: COMMERCIAL

## 2022-01-28 VITALS
BODY MASS INDEX: 20.69 KG/M2 | WEIGHT: 121.19 LBS | DIASTOLIC BLOOD PRESSURE: 91 MMHG | HEIGHT: 64 IN | SYSTOLIC BLOOD PRESSURE: 136 MMHG | HEART RATE: 72 BPM

## 2022-01-28 DIAGNOSIS — Z98.890 S/P SHOULDER SURGERY: Primary | ICD-10-CM

## 2022-01-28 PROCEDURE — 3008F PR BODY MASS INDEX (BMI) DOCUMENTED: ICD-10-PCS | Mod: CPTII,S$GLB,, | Performed by: PHYSICIAN ASSISTANT

## 2022-01-28 PROCEDURE — 99999 PR PBB SHADOW E&M-EST. PATIENT-LVL III: CPT | Mod: PBBFAC,,, | Performed by: PHYSICIAN ASSISTANT

## 2022-01-28 PROCEDURE — 3075F PR MOST RECENT SYSTOLIC BLOOD PRESS GE 130-139MM HG: ICD-10-PCS | Mod: CPTII,S$GLB,, | Performed by: PHYSICIAN ASSISTANT

## 2022-01-28 PROCEDURE — 3080F DIAST BP >= 90 MM HG: CPT | Mod: CPTII,S$GLB,, | Performed by: PHYSICIAN ASSISTANT

## 2022-01-28 PROCEDURE — 3008F BODY MASS INDEX DOCD: CPT | Mod: CPTII,S$GLB,, | Performed by: PHYSICIAN ASSISTANT

## 2022-01-28 PROCEDURE — 1160F PR REVIEW ALL MEDS BY PRESCRIBER/CLIN PHARMACIST DOCUMENTED: ICD-10-PCS | Mod: CPTII,S$GLB,, | Performed by: PHYSICIAN ASSISTANT

## 2022-01-28 PROCEDURE — 3075F SYST BP GE 130 - 139MM HG: CPT | Mod: CPTII,S$GLB,, | Performed by: PHYSICIAN ASSISTANT

## 2022-01-28 PROCEDURE — 3080F PR MOST RECENT DIASTOLIC BLOOD PRESSURE >= 90 MM HG: ICD-10-PCS | Mod: CPTII,S$GLB,, | Performed by: PHYSICIAN ASSISTANT

## 2022-01-28 PROCEDURE — 99024 POSTOP FOLLOW-UP VISIT: CPT | Mod: S$GLB,,, | Performed by: PHYSICIAN ASSISTANT

## 2022-01-28 PROCEDURE — 99999 PR PBB SHADOW E&M-EST. PATIENT-LVL III: ICD-10-PCS | Mod: PBBFAC,,, | Performed by: PHYSICIAN ASSISTANT

## 2022-01-28 PROCEDURE — 1160F RVW MEDS BY RX/DR IN RCRD: CPT | Mod: CPTII,S$GLB,, | Performed by: PHYSICIAN ASSISTANT

## 2022-01-28 PROCEDURE — 1159F PR MEDICATION LIST DOCUMENTED IN MEDICAL RECORD: ICD-10-PCS | Mod: CPTII,S$GLB,, | Performed by: PHYSICIAN ASSISTANT

## 2022-01-28 PROCEDURE — 1159F MED LIST DOCD IN RCRD: CPT | Mod: CPTII,S$GLB,, | Performed by: PHYSICIAN ASSISTANT

## 2022-01-28 PROCEDURE — 99024 PR POST-OP FOLLOW-UP VISIT: ICD-10-PCS | Mod: S$GLB,,, | Performed by: PHYSICIAN ASSISTANT

## 2022-01-28 NOTE — PROGRESS NOTES
Chief Complaint: Right shoulder pain    HISTORY OF PRESENT ILLNESS:   Pt is here today for post-operative followup of shoulder arthroscopy.  she is doing well.  We have reviewed patient's findings and discussed plan of care and future treatment options.      Tolerating pain well.   Wearing sling which is in place.    DATE OF PROCEDURE: 01/13/2022  SURGEON: Ann Anderson M.D.  OPERATION:   right  1. Shoulder arthroscopic anterior capsulorrhaphy (CPT 64631) (complex, -22 modifier)  2. Shoulder arthroscopic anterior labral repair (CPT 93914)   3. Shoulder arthroscopic posterior labral repair, capsulorrhaphy, 7:00 position (CPT 15717)  4. Shoulder arthroscopic extensive debridement (anterior, posterior glenohumeral joint) (CPT 72699)  5. Shoulder manipulation under anesthesia (CPT 47240)  6. Shoulder arthroscopic lysis of adhesions (CPT 45712):     ANESTHESIA:  General with intra-op suprascapular nerve block                                                                               PHYSICAL EXAMINATION:     Incision sites healed well  No evidence of any erythema, infection or induration  elbow Range of motion full   Minimal effusion  2+ Radial pulses  No swelling                                                                               ASSESSMENT:                                                                                                                                               1. Status post above, doing well.                                                                                                                               PLAN:                                                                                                                                                     1. PT to start 3 weeks post-op; wean narcotics  2. Emphasized scapular function.  3. I have discussed return to activity in detail.  4. Patient will see us back in 4 weeks.                                      5. All  questions were answered and the patient should contact us if she  has any questions or concerns in the interim.

## 2022-02-03 ENCOUNTER — CLINICAL SUPPORT (OUTPATIENT)
Dept: REHABILITATION | Facility: HOSPITAL | Age: 37
End: 2022-02-03
Attending: PHYSICIAN ASSISTANT
Payer: COMMERCIAL

## 2022-02-03 DIAGNOSIS — M25.311 SHOULDER INSTABILITY, RIGHT: ICD-10-CM

## 2022-02-03 DIAGNOSIS — M62.81 MUSCLE WEAKNESS OF RIGHT UPPER EXTREMITY: ICD-10-CM

## 2022-02-03 DIAGNOSIS — M75.21 BICEPS TENDINITIS OF RIGHT UPPER EXTREMITY: ICD-10-CM

## 2022-02-03 PROCEDURE — 97161 PT EVAL LOW COMPLEX 20 MIN: CPT | Performed by: PHYSICAL THERAPIST

## 2022-02-03 PROCEDURE — 97110 THERAPEUTIC EXERCISES: CPT | Performed by: PHYSICAL THERAPIST

## 2022-02-03 NOTE — PLAN OF CARE
[facility]  [place] [address]   Physical Therapy Initial Evaluation    Name: Karen Cruz [facility]  Clinic Number: [id number]    Therapy Diagnosis:   Encounter Diagnoses   Name Primary?    Shoulder instability, right     Biceps tendinitis of right upper extremity      Physician: Davy Herrera III, *    Physician Orders: PT Eval and Treat   Medical Diagnosis from Referral:   M25.311 (ICD-10-CM) - Shoulder instability, right M75.21 (ICD-10-CM) - Biceps tendinitis of right upper extremity     Evaluation Date: 2/3/2022  Authorization Period Expiration: pending  Plan of Care Expiration: 11/3/22  Progress Note Due: 5/3/22  Visit # / Visits authorized: 1/ 1   FOTO: Issued Visit # 2       Time In: 1300  Time Out: 1400  Total Billable Time: 60 minutes    Precautions: Standard + surgical    DATE OF PROCEDURE: 01/13/2022     SURGEON: Ann Anderson M.D.    ASSISTANT: BART Hannah M.D.,PGY 3  ASSISTANT: ENIO Herrera PA-C    OPERATION:   right  1. Shoulder arthroscopic anterior capsulorrhaphy (CPT 48218) (complex, -22 modifier)  2. Shoulder arthroscopic anterior labral repair (CPT 32895)   3. Shoulder arthroscopic posterior labral repair, capsulorrhaphy, 7:00 position (CPT 10916)  4. Shoulder arthroscopic extensive debridement (anterior, posterior glenohumeral joint) (CPT 82760)  5. Shoulder manipulation under anesthesia (CPT 24048)  6. Shoulder arthroscopic lysis of adhesions (CPT 81098):    Total of 7 a,chors posteriorly at 6:30, 7:30, 8:30, 10 and anteriorly at 5, 4, 3 o'clock     POST OPERATIVE PLAN: We will follow the arthroscopic Bankart repair guidelines. We discussed with the patient's family after surgery. The patient will remain in a sling for 6 weeks. We will start PT at the 3 week anthony.     Quality of tissue: Fair    Quality of the repair: Good      Subjective   Date of onset: 3 years ago  History of current condition - Sandra Cruz reports: she was lifting her child's car seat of the vehicle and  "while carrying the car seat, she felt her R shoulder "pop out of socket". States that it went back into socket on its own right after it "popped out" but since the initial injury it has subluxed a couple of times. 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    Karen has a current medication list which includes the following prescription(s): methocarbamol, naratriptan, ondansetron, oxycodone-acetaminophen, promethazine, tizanidine, tramadol, and vyvanse, and the following Facility-Administered Medications: levonorgestrel.    Review of patient's allergies indicates:   Allergen Reactions    Ceclor [cefaclor] Anaphylaxis, Swelling and Rash    Pcn [penicillins] Anaphylaxis, Swelling and Rash        Pain:  Current 4/10, worst 8/10, best 4/10   Location: right shoulder, pt also has pain that was present prior to surgery in R UT, medial border of scapula and up into cervical paraspinals and down into hand  Description: Aching, Throbbing, Deep, Superficial and Shooting, patient states they had some radiating pain prior to surgery that is still present  Aggravating Factors: Night Time, Extension, Flexing and Lifting  Easing Factors: relaxation, pain medication, ice and rest    Prior Therapy: States she has had therapy for her shoulder as well as her migraines with no changes in symptoms  Social History:  lives with their family, likes to workout, perform yoga, piliates and spinning  Occupation: Film , 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 Patient with forward head posture and rounded shoulders. Scope portals look clean and are healing as expected, mild bruising to  R shoulder and moderate shoulder musculature atrophy    Range of Motion:   AROM Right Left Comment   Shoulder Elevation: NT secondary to surgery 180 degrees    PROM Right Left Comment   Shoulder Flexion: NT secondary to surgery 160 degrees  motion limited by pt             request   Shoulder Abduction: NT secondary to surgery 170 degrees    Shoulder ER, 90°ABD: NT secondary to surgery 93 degrees    Shoulder IR, 90° ABD: NT secondary to surgery 75 degrees      Strength:    Right Left Comment   Shoulder flexion: NT secondary to surgery 4+/5    Shoulder Abduction: NT secondary to surgery 4/5    Shoulder ER: NT secondary to surgery 4/5    Shoulder IR: NT secondary to surgery 4+/5      Scapular Control/Dyskinesis: N/a    Special Tests: NT secondary to surgery    Palpation: TTP throughout general R shoulder      CMS Impairment/Limitation/Restriction for FOTO Survey    Therapist reviewed FOTO scores for Karen Purdy on 2/3/2022.   FOTO documents entered into [employer] - see Media section.    Limitation Score: NT%  Category: Self Care       TREATMENT   Treatment Time In: 1345  Treatment Time Out: 1400  Total Treatment time separate from Evaluation time:15    Sandra Cruz received therapeutic exercises to develop ROM and posture for 15 minutes including:  Scapular repositioning 1x15:10  AAROM elbow 2x30  AROM wrist ext/flex and radial/ulnar deviation 2x30  PROM >90deg      Education     Home Exercises Provided and Patient Education Provided       Education  provided:   - progress towards goals   - role of therapy in multi - disciplinary team, goals for therapy  No spiritual or educational barriers to learning provided    Written Home Exercises Provided: yes.  Exercises were reviewed and Sandra Cruz was able to demonstrate them prior to the end of the session.  Sandra Cruz demonstrated good  understanding of the education provided.     See EMR under Patient Instructions for exercises provided 2/3/2022.      Assessment   Karen is a 36 y.o. female referred to outpatient Physical Therapy with a medical diagnosis of weakness of R upper extremity. Pt presents with     Pain  Decreased postural awareness  Decreased ROM   Decreased strength  Functional limitation    Pt prognosis is Fair.   Pt will benefit from skilled outpatient Physical Therapy to address the deficits stated above and in the chart below, provide pt/family education, and to maximize pt's level of independence.     Plan of care discussed with patient: Yes  Pt's spiritual, cultural and educational needs considered and pt agreeable to plan of care and goals as stated below:     Anticipated Barriers for therapy: Other limb involvement, history of pain complaints    Medical Necessity is demonstrated by the following  History  Co-morbidities and personal factors that may impact the plan of care Co-morbidities:   anxiety, difficulty sleeping and history of R knee surgery    Personal Factors:   lifestyle     low   Examination  Body Structures and Functions, activity limitations and participation restrictions that may impact the plan of care Body Regions:   neck  back  upper extremities    Body Systems:    gross symmetry  ROM  strength  motor control  motor learning    Participation Restrictions:   Secondary to surgery    Activity limitations:   Mobility  lifting and carrying objects  fine hand use (grasping/picking up)  using transportation (bus, train, plane, car)    Self care  washing oneself (bathing, drying,  washing hands)  caring for body parts (brushing teeth, shaving, grooming)  toileting  dressing  eating  drinking    Domestic Life  shopping  cooking  doing house work (cleaning house, washing dishes, laundry)  assisting others    Community and Social Life  community life  recreation and leisure         moderate   Clinical Presentation evolving clinical presentation with changing clinical characteristics moderate   Decision Making/ Complexity Score: moderate     Goals   Short-Term Goals: 8 weeks  - The patient will be independent with initial home exercise program.  - The patient is independent with donning/doffing sling to protect tissue healing.  - The patient will demonstrate good unsupported sitting posture with min verbal cues for 15 minutes.  - The patient will increase ROM 15 degrees to perform bathing and hygiene, grooming, toileting and dressing with pain < 010.  - The patient will increase strength by 1.2 muscle grade  to perform bathing and hygiene, grooming, toileting and dressing with pain < 0/10.    Long-Term Goals: 36 weeks  - The patient will be independent with home exercise program and symptom management.  - The patient will increase ROM = to uninvolved shoulder  to perform work duties and recreation/leisure activities   with pain < 0/10.  - The patient will increase strength = to uninvolved shoulder  to perform work duties and recreation/leisure activities   with pain < 0/10.      Plan   Certification Period: 2/3/2022 to 11/3/2022.    Outpatient Physical Therapy 1 times weekly for 9 months to include the following interventions: patient education, Manual Therapy, Moist Heat/ Ice, Neuromuscular Re-ed, Patient Education, Self Care, Therapeutic Activities and Therapeutic Exercise.     Scot Mccarthy, SPT

## 2022-02-03 NOTE — PROGRESS NOTES
Please see Treatment section for Initial Evaluation and Plan of Care  Scot Mccarthy, SPT  2/3/2022

## 2022-02-07 ENCOUNTER — CLINICAL SUPPORT (OUTPATIENT)
Dept: REHABILITATION | Facility: HOSPITAL | Age: 37
End: 2022-02-07
Attending: PHYSICIAN ASSISTANT
Payer: COMMERCIAL

## 2022-02-07 DIAGNOSIS — G89.18 POST-OPERATIVE PAIN: ICD-10-CM

## 2022-02-07 DIAGNOSIS — M54.2 NECK PAIN: ICD-10-CM

## 2022-02-07 DIAGNOSIS — M54.12 CERVICAL RADICULOPATHY: ICD-10-CM

## 2022-02-07 DIAGNOSIS — M75.21 BICEPS TENDINITIS OF RIGHT UPPER EXTREMITY: ICD-10-CM

## 2022-02-07 DIAGNOSIS — R29.3 POSTURE IMBALANCE: ICD-10-CM

## 2022-02-07 DIAGNOSIS — M25.311 SHOULDER INSTABILITY, RIGHT: ICD-10-CM

## 2022-02-07 PROCEDURE — 97110 THERAPEUTIC EXERCISES: CPT | Performed by: PHYSICAL THERAPIST

## 2022-02-07 RX ORDER — TRAMADOL HYDROCHLORIDE 50 MG/1
50-100 TABLET ORAL EVERY 6 HOURS PRN
Qty: 21 TABLET | Refills: 0 | Status: SHIPPED | OUTPATIENT
Start: 2022-02-07 | End: 2023-01-21

## 2022-02-07 NOTE — PROGRESS NOTES
Physical Therapy Daily Treatment Note   [facility]      Visit Date: 2/7/2022    Name: Karen Purdy  Clinic Number: [id number]    Therapy Diagnosis:   Encounter Diagnoses   Name Primary?    Posture imbalance     Cervical radiculopathy     Neck pain      Physician: Davy Herrera III, *    Dr Anderson     Physician Orders: PT Eval and Treat   Medical Diagnosis from Referral:   M25.311 (ICD-10-CM) - Shoulder instability, right M75.21 (ICD-10-CM) - Biceps tendinitis of right upper extremity      Evaluation Date: 2/3/2022  Authorization Period Expiration: pending  Plan of Care Expiration: 11/3/22  Progress Note Due: 5/3/22  Visit # / Visits authorized: 2/ 1   FOTO: Issued Visit # 2        Time In: 1300  Time Out: 13:45  Total Billable Time: 30 minutes     Precautions: Standard + surgical     DATE OF PROCEDURE: 01/13/2022     SURGEON: Ann Anderson M.D.    ASSISTANT: BART Hannah M.D.,PGY 3  ASSISTANT: ENIO Herrera PA-C     OPERATION:   right  1. Shoulder arthroscopic anterior capsulorrhaphy (CPT 02552) (complex, -22 modifier)  2. Shoulder arthroscopic anterior labral repair (CPT 53794)   3. Shoulder arthroscopic posterior labral repair, capsulorrhaphy, 7:00 position (CPT 45543)  4. Shoulder arthroscopic extensive debridement (anterior, posterior glenohumeral joint) (CPT 32584)  5. Shoulder manipulation under anesthesia (CPT 03399)  6. Shoulder arthroscopic lysis of adhesions (CPT 29894):     Total of 7 a,chors posteriorly at 6:30, 7:30, 8:30, 10 and anteriorly at 5, 4, 3 o'clock      POST OPERATIVE PLAN: We will follow the arthroscopic Bankart repair guidelines. We discussed with the patient's family after surgery. The patient will remain in a sling for 6 weeks. We will start PT at the 3 week anthony.     Quality of tissue: Fair    Quality of the repair: Good    Subjective      Pt reports soreness this PM in R shoulder but willing to attempt PT as tolerated     she was compliant with home exercise program given last  session.   Response to previous treatment:good   Functional change: pt limited by healing constraints     Pain: 2/10  Location: right shoulder      Objective     PO 25    Measurements taken:  PROM flex 45 deg, abd 45 deg     Sandra Cruz received therapeutic exercises to develop strength, endurance, ROM, flexibility and posture for 30 minutes including:    St scap repositioning 1x15:05  AROM e' flex/ext 1x15  4D s' isometrics 1x10:05 e' bent for flex, abd and ext (no IR)  PROM flex, abd and ER (no IR) =to or < 90     CP to go       Home Exercises Provided and Patient Education Provided     Education provided:   - connective tissue healing model     Written Home Exercises Provided: Patient instructed to cont prior HEP.  Exercises were reviewed and Sandra Cruz was able to demonstrate them prior to the end of the session.  Sandra Cruz demonstrated good  understanding of the education provided.     See EMR under hand out  for exercises provided 2/7/2022. Add isometrics as above       Assessment     darryl Rx with increase in sxs post Rx 4/10 at rest, PROM guarded     Sandra Cruz Is progressing well towards her goals.   Pt prognosis is Guarded.     Pt will continue to benefit from skilled outpatient physical therapy to address the deficits listed in the problem list box on initial evaluation, provide pt/family education and to maximize pt's level of independence in the home and community environment.     Pt's spiritual, cultural and educational needs considered and pt agreeable to plan of care and goals.    Anticipated barriers to physical therapy: none    Goals:   Short-Term Goals: 8 weeks  - The patient will be independent with initial home exercise program.  - The patient is independent with donning/doffing sling to protect tissue healing.  - The patient will demonstrate good unsupported sitting posture with min verbal cues for 15 minutes.  - The patient will increase ROM 15 degrees to perform bathing and hygiene,  grooming, toileting and dressing with pain < 010.  - The patient will increase strength by 1.2 muscle grade  to perform bathing and hygiene, grooming, toileting and dressing with pain < 0/10.     Long-Term Goals: 36 weeks  - The patient will be independent with home exercise program and symptom management.  - The patient will increase ROM = to uninvolved shoulder  to perform work duties and recreation/leisure activities   with pain < 0/10.  - The patient will increase strength = to uninvolved shoulder  to perform work duties and recreation/leisure activities   with pain < 0/10.         Plan     Focus on protecting repair and preventing secondary frozen shoulder     Continue with established Plan of Care towards PT goals with focus on decreasing pain, increasing ROM, strength, neuromuscular control and functional status       Pipo Avelar, PT

## 2022-02-08 PROBLEM — M54.2 NECK PAIN: Status: RESOLVED | Noted: 2021-09-22 | Resolved: 2022-02-08

## 2022-02-08 PROBLEM — M62.81 MUSCLE WEAKNESS OF RIGHT UPPER EXTREMITY: Status: ACTIVE | Noted: 2022-02-08

## 2022-02-08 PROBLEM — R29.3 POSTURE IMBALANCE: Status: RESOLVED | Noted: 2021-09-22 | Resolved: 2022-02-08

## 2022-02-08 PROBLEM — M54.12 CERVICAL RADICULOPATHY: Status: RESOLVED | Noted: 2021-09-22 | Resolved: 2022-02-08

## 2022-02-14 ENCOUNTER — CLINICAL SUPPORT (OUTPATIENT)
Dept: REHABILITATION | Facility: HOSPITAL | Age: 37
End: 2022-02-14
Attending: PHYSICIAN ASSISTANT
Payer: COMMERCIAL

## 2022-02-14 DIAGNOSIS — M62.81 MUSCLE WEAKNESS OF RIGHT UPPER EXTREMITY: ICD-10-CM

## 2022-02-14 PROCEDURE — 97110 THERAPEUTIC EXERCISES: CPT | Performed by: PHYSICAL THERAPIST

## 2022-02-14 NOTE — PROGRESS NOTES
Physical Therapy Daily Treatment Note   [facility]      Visit Date: 2/14/2022    Name: Karen Purdy  Clinic Number: [id number]    Therapy Diagnosis:   Encounter Diagnosis   Name Primary?    Muscle weakness of right upper extremity      Physician: Davy Herrera III, *    Dr Anderson     Physician Orders: PT Eval and Treat   Medical Diagnosis from Referral:   M25.311 (ICD-10-CM) - Shoulder instability, right M75.21 (ICD-10-CM) - Biceps tendinitis of right upper extremity      Evaluation Date: 2/3/2022  Authorization Period Expiration: pending  Plan of Care Expiration: 11/3/22  Progress Note Due: 5/3/22  Visit # / Visits authorized: 3/ 1   FOTO: Issued Visit # 2        Time In: 1300  Time Out: 13:45  Total Billable Time: 30 minutes     Precautions: Standard + surgical     DATE OF PROCEDURE: 01/13/2022     SURGEON: Ann Anderson M.D.    ASSISTANT: BART Hannah M.D.,PGY 3  ASSISTANT: ENIO Herrera PA-C     OPERATION:   right  1. Shoulder arthroscopic anterior capsulorrhaphy (CPT 57377) (complex, -22 modifier)  2. Shoulder arthroscopic anterior labral repair (CPT 48692)   3. Shoulder arthroscopic posterior labral repair, capsulorrhaphy, 7:00 position (CPT 57576)  4. Shoulder arthroscopic extensive debridement (anterior, posterior glenohumeral joint) (CPT 95793)  5. Shoulder manipulation under anesthesia (CPT 53840)  6. Shoulder arthroscopic lysis of adhesions (CPT 97784):     Total of 7 a,chors posteriorly at 6:30, 7:30, 8:30, 10 and anteriorly at 5, 4, 3 o'clock      POST OPERATIVE PLAN: We will follow the arthroscopic Bankart repair guidelines. We discussed with the patient's family after surgery. The patient will remain in a sling for 6 weeks. We will start PT at the 3 week anthony.     Quality of tissue: Fair    Quality of the repair: Good    Subjective      Pt reports increased soreness this PM predominantly in R UT   Has been taking baths without sling on but does not feel this increases her sxs, however, did feel  increased soreness following s' isometrics on wall after LV     she was compliant with home exercise program given last session.   Response to previous treatment:good   Functional change: pt limited by healing constraints     Pain: 3/10 at rest this PM, 5/10 with above activity   Location: right shoulder      Objective     PO 32    Measurements taken:  None taken this visit     (PROM flex 45 deg, abd 45 deg)     MH x 10' pre Rx     Sandra Cruz received therapeutic exercises to develop strength, endurance, ROM, flexibility and posture for 30 minutes including:    St scap repositioning 1x15:05  PROM flex, abd and ER (no IR) =to or < 90     Pt to ice at home as needed    Home Exercises Provided and Patient Education Provided     Education provided:   - connective tissue healing model     Written Home Exercises Provided: Patient instructed to cont prior HEP.  Exercises were reviewed and Sandra Curz was able to demonstrate them prior to the end of the session.  Sandra Cruz demonstrated good  understanding of the education provided.     See EMR under hand out  for exercises provided 2/7/2022. Add isometrics as above       Assessment     darryl Rx again, with increase in sxs post Rx, pt able to decrease UT tone with positioning with table under arm while in sling and supine     Sandra Cruz Is progressing well towards her goals.   Pt prognosis is Guarded.     Pt will continue to benefit from skilled outpatient physical therapy to address the deficits listed in the problem list box on initial evaluation, provide pt/family education and to maximize pt's level of independence in the home and community environment.     Pt's spiritual, cultural and educational needs considered and pt agreeable to plan of care and goals.    Anticipated barriers to physical therapy: none    Goals:   Short-Term Goals: 8 weeks  - The patient will be independent with initial home exercise program.  - The patient is independent with donning/doffing sling  to protect tissue healing.  - The patient will demonstrate good unsupported sitting posture with min verbal cues for 15 minutes.  - The patient will increase ROM 15 degrees to perform bathing and hygiene, grooming, toileting and dressing with pain < 010.  - The patient will increase strength by 1.2 muscle grade  to perform bathing and hygiene, grooming, toileting and dressing with pain < 0/10.     Long-Term Goals: 36 weeks  - The patient will be independent with home exercise program and symptom management.  - The patient will increase ROM = to uninvolved shoulder  to perform work duties and recreation/leisure activities   with pain < 0/10.  - The patient will increase strength = to uninvolved shoulder  to perform work duties and recreation/leisure activities   with pain < 0/10.         Plan     Focus on protecting repair and preventing secondary frozen shoulder     Continue with established Plan of Care towards PT goals with focus on decreasing pain, increasing ROM, strength, neuromuscular control and functional status       Pipo Avelar, PT

## 2022-02-21 ENCOUNTER — CLINICAL SUPPORT (OUTPATIENT)
Dept: REHABILITATION | Facility: HOSPITAL | Age: 37
End: 2022-02-21
Attending: PHYSICIAN ASSISTANT
Payer: COMMERCIAL

## 2022-02-21 DIAGNOSIS — M62.81 MUSCLE WEAKNESS OF RIGHT UPPER EXTREMITY: Primary | ICD-10-CM

## 2022-02-21 PROCEDURE — 97110 THERAPEUTIC EXERCISES: CPT | Performed by: PHYSICAL THERAPIST

## 2022-02-21 NOTE — PROGRESS NOTES
Physical Therapy Daily Treatment Note   [facility]      Visit Date: 2/21/2022    Name: Karen Purdy  Clinic Number: [id number]    Therapy Diagnosis:   No diagnosis found.  Physician: Davy Herrera III, *    Dr Anderson     Physician Orders: PT Eval and Treat   Medical Diagnosis from Referral:   M25.311 (ICD-10-CM) - Shoulder instability, right M75.21 (ICD-10-CM) - Biceps tendinitis of right upper extremity      Evaluation Date: 2/3/2022  Authorization Period Expiration: pending  Plan of Care Expiration: 11/3/22  Progress Note Due: 5/3/22  Visit # / Visits authorized: 4/ 1   FOTO: Issued Visit # 2        Time In: 1300  Time Out: 13:45  Total Billable Time: 30 minutes     Precautions: Standard + surgical     DATE OF PROCEDURE: 01/13/2022     SURGEON: Ann Anderson M.D.    ASSISTANT: BART Hannah M.D.,PGY 3  ASSISTANT: ENIO Herrera PA-C     OPERATION:   right  1. Shoulder arthroscopic anterior capsulorrhaphy (CPT 25676) (complex, -22 modifier)  2. Shoulder arthroscopic anterior labral repair (CPT 92438)   3. Shoulder arthroscopic posterior labral repair, capsulorrhaphy, 7:00 position (CPT 13519)  4. Shoulder arthroscopic extensive debridement (anterior, posterior glenohumeral joint) (CPT 49128)  5. Shoulder manipulation under anesthesia (CPT 86112)  6. Shoulder arthroscopic lysis of adhesions (CPT 11517):     Total of 7 a,chors posteriorly at 6:30, 7:30, 8:30, 10 and anteriorly at 5, 4, 3 o'clock      POST OPERATIVE PLAN: We will follow the arthroscopic Bankart repair guidelines. We discussed with the patient's family after surgery. The patient will remain in a sling for 6 weeks. We will start PT at the 3 week anthony.     Quality of tissue: Fair    Quality of the repair: Good    Subjective      Pt reports     she was compliant with home exercise program given last session.   Response to previous treatment:good   Functional change: pt limited by healing constraints     Pain: 3/10 at rest this PM, 5/10 with above  activity   Location: right shoulder      Objective     PO 39    Measurements taken:  None taken this visit     (PROM flex 45 deg, abd 45 deg)     MH x 10' pre Rx     Sandra Cruz received therapeutic exercises to develop strength, endurance, ROM, flexibility and posture for 30 minutes including:    PROM flex, abd and ER (no IR) =to or < 90     Pt to ice at home as needed    Home Exercises Provided and Patient Education Provided     Education provided:   - connective tissue healing model     Written Home Exercises Provided: Patient instructed to cont prior HEP.  Exercises were reviewed and Sandra Cruz was able to demonstrate them prior to the end of the session.  Sandra Cruz demonstrated good  understanding of the education provided.     See EMR under hand out  for exercises provided 2/7/2022. Add isometrics as above       Assessment     darryl Rx again, with increase in sxs post Rx, however, PROM flex to 90 deg     Sandra Cruz Is progressing well towards her goals.   Pt prognosis is Guarded.     Pt will continue to benefit from skilled outpatient physical therapy to address the deficits listed in the problem list box on initial evaluation, provide pt/family education and to maximize pt's level of independence in the home and community environment.     Pt's spiritual, cultural and educational needs considered and pt agreeable to plan of care and goals.    Anticipated barriers to physical therapy: none    Goals:   Short-Term Goals: 8 weeks  - The patient will be independent with initial home exercise program.  - The patient is independent with donning/doffing sling to protect tissue healing.  - The patient will demonstrate good unsupported sitting posture with min verbal cues for 15 minutes.  - The patient will increase ROM 15 degrees to perform bathing and hygiene, grooming, toileting and dressing with pain < 010.  - The patient will increase strength by 1.2 muscle grade  to perform bathing and hygiene, grooming,  toileting and dressing with pain < 0/10.     Long-Term Goals: 36 weeks  - The patient will be independent with home exercise program and symptom management.  - The patient will increase ROM = to uninvolved shoulder  to perform work duties and recreation/leisure activities   with pain < 0/10.  - The patient will increase strength = to uninvolved shoulder  to perform work duties and recreation/leisure activities   with pain < 0/10.         Plan     Focus on protecting repair and preventing secondary frozen shoulder     Continue with established Plan of Care towards PT goals with focus on decreasing pain, increasing ROM, strength, neuromuscular control and functional status       Pipo Avelar, PT

## 2022-02-22 ENCOUNTER — PATIENT MESSAGE (OUTPATIENT)
Dept: NEUROLOGY | Facility: CLINIC | Age: 37
End: 2022-02-22
Payer: COMMERCIAL

## 2022-02-22 DIAGNOSIS — G43.009 MIGRAINE WITHOUT AURA AND WITHOUT STATUS MIGRAINOSUS, NOT INTRACTABLE: ICD-10-CM

## 2022-02-23 ENCOUNTER — OFFICE VISIT (OUTPATIENT)
Dept: SPORTS MEDICINE | Facility: CLINIC | Age: 37
End: 2022-02-23
Payer: COMMERCIAL

## 2022-02-23 VITALS
WEIGHT: 121 LBS | SYSTOLIC BLOOD PRESSURE: 132 MMHG | DIASTOLIC BLOOD PRESSURE: 83 MMHG | HEART RATE: 80 BPM | HEIGHT: 64 IN | BODY MASS INDEX: 20.66 KG/M2

## 2022-02-23 DIAGNOSIS — Z98.890 S/P SHOULDER SURGERY: Primary | ICD-10-CM

## 2022-02-23 PROCEDURE — 3079F PR MOST RECENT DIASTOLIC BLOOD PRESSURE 80-89 MM HG: ICD-10-PCS | Mod: CPTII,S$GLB,, | Performed by: ORTHOPAEDIC SURGERY

## 2022-02-23 PROCEDURE — 99999 PR PBB SHADOW E&M-EST. PATIENT-LVL III: ICD-10-PCS | Mod: PBBFAC,,, | Performed by: ORTHOPAEDIC SURGERY

## 2022-02-23 PROCEDURE — 3008F PR BODY MASS INDEX (BMI) DOCUMENTED: ICD-10-PCS | Mod: CPTII,S$GLB,, | Performed by: ORTHOPAEDIC SURGERY

## 2022-02-23 PROCEDURE — 99024 POSTOP FOLLOW-UP VISIT: CPT | Mod: S$GLB,,, | Performed by: ORTHOPAEDIC SURGERY

## 2022-02-23 PROCEDURE — 1159F PR MEDICATION LIST DOCUMENTED IN MEDICAL RECORD: ICD-10-PCS | Mod: CPTII,S$GLB,, | Performed by: ORTHOPAEDIC SURGERY

## 2022-02-23 PROCEDURE — 3008F BODY MASS INDEX DOCD: CPT | Mod: CPTII,S$GLB,, | Performed by: ORTHOPAEDIC SURGERY

## 2022-02-23 PROCEDURE — 3075F PR MOST RECENT SYSTOLIC BLOOD PRESS GE 130-139MM HG: ICD-10-PCS | Mod: CPTII,S$GLB,, | Performed by: ORTHOPAEDIC SURGERY

## 2022-02-23 PROCEDURE — 99999 PR PBB SHADOW E&M-EST. PATIENT-LVL III: CPT | Mod: PBBFAC,,, | Performed by: ORTHOPAEDIC SURGERY

## 2022-02-23 PROCEDURE — 99024 PR POST-OP FOLLOW-UP VISIT: ICD-10-PCS | Mod: S$GLB,,, | Performed by: ORTHOPAEDIC SURGERY

## 2022-02-23 PROCEDURE — 3075F SYST BP GE 130 - 139MM HG: CPT | Mod: CPTII,S$GLB,, | Performed by: ORTHOPAEDIC SURGERY

## 2022-02-23 PROCEDURE — 1159F MED LIST DOCD IN RCRD: CPT | Mod: CPTII,S$GLB,, | Performed by: ORTHOPAEDIC SURGERY

## 2022-02-23 PROCEDURE — 3079F DIAST BP 80-89 MM HG: CPT | Mod: CPTII,S$GLB,, | Performed by: ORTHOPAEDIC SURGERY

## 2022-02-23 NOTE — PROGRESS NOTES
Chief Complaint: Right shoulder pain    HISTORY OF PRESENT ILLNESS:   Pt is here today for post-operative followup of shoulder arthroscopy.  she is doing well.  We have reviewed patient's findings and discussed plan of care and future treatment options.      Patient has been attending physical therapy at the [facility] location, working with Pipo APONTE.    She notes that she is doing well    DATE OF PROCEDURE: 01/13/2022  SURGEON: Ann Anderson M.D.  OPERATION:   right  1. Shoulder arthroscopic anterior capsulorrhaphy (CPT 33168) (complex, -22 modifier)  2. Shoulder arthroscopic anterior labral repair (CPT 32110)   3. Shoulder arthroscopic posterior labral repair, capsulorrhaphy, 7:00 position (CPT 32163)  4. Shoulder arthroscopic extensive debridement (anterior, posterior glenohumeral joint) (CPT 72441)  5. Shoulder manipulation under anesthesia (CPT 45258)  6. Shoulder arthroscopic lysis of adhesions (CPT 45010):     7 anchors were used in total.                                                                                PHYSICAL EXAMINATION:     Incision sites healed well  No evidence of any erythema, infection or induration  elbow Range of motion full   No effusion  2+ Radial pulses  No swelling        Forward Flexion: 20  ER: 30  IR: buttock                                                                            ASSESSMENT:                                                                                                                                               1. Status post above, doing well.                                                                                                                               PLAN:                                                                                                                                                     1. Continue PT  2. Emphasized scapular function.  3. I have discussed return to activity in detail.  4. Patient will see us back in 6  weeks.                                      5. All questions were answered and the patient should contact us if she  has any questions or concerns in the interim.

## 2022-02-24 RX ORDER — NARATRIPTAN 1 MG/1
TABLET ORAL
Qty: 12 TABLET | Refills: 0 | Status: SHIPPED | OUTPATIENT
Start: 2022-02-24 | End: 2023-01-21

## 2022-02-28 ENCOUNTER — CLINICAL SUPPORT (OUTPATIENT)
Dept: REHABILITATION | Facility: HOSPITAL | Age: 37
End: 2022-02-28
Attending: PHYSICIAN ASSISTANT
Payer: COMMERCIAL

## 2022-02-28 DIAGNOSIS — M62.81 MUSCLE WEAKNESS OF RIGHT UPPER EXTREMITY: Primary | ICD-10-CM

## 2022-02-28 PROCEDURE — 97110 THERAPEUTIC EXERCISES: CPT | Performed by: PHYSICAL THERAPIST

## 2022-02-28 NOTE — PROGRESS NOTES
Physical Therapy Daily Treatment Note   [facility]      Visit Date: 2/28/2022    Name: Karen Purdy  Clinic Number: [id number]    Therapy Diagnosis:   Encounter Diagnosis   Name Primary?    Muscle weakness of right upper extremity Yes     Physician: Davy Herrera III, *    Dr Anderson     Physician Orders: PT Eval and Treat   Medical Diagnosis from Referral:   M25.311 (ICD-10-CM) - Shoulder instability, right M75.21 (ICD-10-CM) - Biceps tendinitis of right upper extremity      Evaluation Date: 2/3/2022  Authorization Period Expiration: pending  Plan of Care Expiration: 11/3/22  Progress Note Due: 5/3/22  Visit # / Visits authorized: 5/ 1   FOTO: Issued Visit # 2        Time In: 1245  Time Out: 13:30  Total Billable Time: 30 minutes     Precautions: Standard + surgical     DATE OF PROCEDURE: 01/13/2022     SURGEON: Ann Anderson M.D.    ASSISTANT: BART Hannah M.D.,PGY 3  ASSISTANT: ENIO Herrera PA-C     OPERATION:   right  1. Shoulder arthroscopic anterior capsulorrhaphy (CPT 12175) (complex, -22 modifier)  2. Shoulder arthroscopic anterior labral repair (CPT 91269)   3. Shoulder arthroscopic posterior labral repair, capsulorrhaphy, 7:00 position (CPT 78008)  4. Shoulder arthroscopic extensive debridement (anterior, posterior glenohumeral joint) (CPT 02425)  5. Shoulder manipulation under anesthesia (CPT 09364)  6. Shoulder arthroscopic lysis of adhesions (CPT 41601):     Total of 7 a,chors posteriorly at 6:30, 7:30, 8:30, 10 and anteriorly at 5, 4, 3 o'clock      POST OPERATIVE PLAN: We will follow the arthroscopic Bankart repair guidelines. We discussed with the patient's family after surgery. The patient will remain in a sling for 6 weeks. We will start PT at the 3 week anthony.     Quality of tissue: Fair    Quality of the repair: Good    Subjective      Pt reports that her pain level has gotten worse than before surgery, did walk x 4 miles in sling recently, had two other significant episodes, one where her  daughter pulled on her arm going down the stairs and one where she lifted a heavy pot of beans, but overall, pain did not get worse     she was compliant with home exercise program given last session.   Response to previous treatment:good   Functional change: pt able to lift arm less than shoulder level     Pain: NA/10 at rest this PM, NA/10 with above activity   Location: right shoulder      Objective     PO 46    Measurements taken:  AROM flex 60 deg elevation     (PROM flex 45 deg, abd 45 deg)     Sandra Cruz received therapeutic exercises to develop strength, endurance, ROM, flexibility and posture for 30 minutes including:    Counter walk backs 1x10  Table slides 1x10  scap repositioning 1x15:05   Supine RS at 45 deg abd 5 rounds submax EO   PROM 4D     CP x 10'     Home Exercises Provided and Patient Education Provided     Education provided:   - connective tissue healing model     Written Home Exercises Provided: Patient instructed to cont prior HEP.  Exercises were reviewed and Sandra Cruz was able to demonstrate them prior to the end of the session.  Sandra Cruz demonstrated good  understanding of the education provided.     See EMR under hand out  for exercises provided 2/7/2022. Add isometrics as above       Assessment     darryl Rx without an increase in sxs, PROM to 90 deg flex and abd (improved) but no change in pain c/o     Sandra Cruz Is progressing well towards her goals.   Pt prognosis is Guarded.     Pt will continue to benefit from skilled outpatient physical therapy to address the deficits listed in the problem list box on initial evaluation, provide pt/family education and to maximize pt's level of independence in the home and community environment.     Pt's spiritual, cultural and educational needs considered and pt agreeable to plan of care and goals.    Anticipated barriers to physical therapy: none    Goals:   Short-Term Goals: 8 weeks  - The patient will be independent with initial home  exercise program.  - The patient is independent with donning/doffing sling to protect tissue healing.  - The patient will demonstrate good unsupported sitting posture with min verbal cues for 15 minutes.  - The patient will increase ROM 15 degrees to perform bathing and hygiene, grooming, toileting and dressing with pain < 010.  - The patient will increase strength by 1.2 muscle grade  to perform bathing and hygiene, grooming, toileting and dressing with pain < 0/10.     Long-Term Goals: 36 weeks  - The patient will be independent with home exercise program and symptom management.  - The patient will increase ROM = to uninvolved shoulder  to perform work duties and recreation/leisure activities   with pain < 0/10.  - The patient will increase strength = to uninvolved shoulder  to perform work duties and recreation/leisure activities   with pain < 0/10.         Plan     Focus on ROM and creating dynamic stability     Continue with established Plan of Care towards PT goals with focus on decreasing pain, increasing ROM, strength, neuromuscular control and functional status       Pipo Avelar, PT

## 2022-03-07 ENCOUNTER — CLINICAL SUPPORT (OUTPATIENT)
Dept: REHABILITATION | Facility: HOSPITAL | Age: 37
End: 2022-03-07
Attending: PHYSICIAN ASSISTANT
Payer: COMMERCIAL

## 2022-03-07 DIAGNOSIS — M62.81 MUSCLE WEAKNESS OF RIGHT UPPER EXTREMITY: Primary | ICD-10-CM

## 2022-03-07 PROCEDURE — 97110 THERAPEUTIC EXERCISES: CPT | Performed by: PHYSICAL THERAPIST

## 2022-03-07 PROCEDURE — 97112 NEUROMUSCULAR REEDUCATION: CPT | Performed by: PHYSICAL THERAPIST

## 2022-03-07 NOTE — PROGRESS NOTES
Physical Therapy Daily Treatment Note   [facility]      Visit Date: 3/7/2022    Name: Karen Purdy  Clinic Number: [id number]    Therapy Diagnosis:   Chronic R shoulder pain   Physician: Davy Herrera III, *    Dr Anderson     Physician Orders: PT Eval and Treat   Medical Diagnosis from Referral:   M25.311 (ICD-10-CM) - Shoulder instability, right M75.21 (ICD-10-CM) - Biceps tendinitis of right upper extremity      Evaluation Date: 2/3/2022  Authorization Period Expiration: pending  Plan of Care Expiration: 11/3/22  Progress Note Due: 5/3/22  Visit # / Visits authorized: 6/ 1   FOTO: Issued Visit # 2        Time In: 13:00  Time Out: 13:45  Total Billable Time: 45 minutes     Precautions: Standard + surgical     DATE OF PROCEDURE: 01/13/2022     SURGEON: Ann Anderson M.D.    ASSISTANT: BART Hannah M.D.,PGY 3  ASSISTANT: ENIO Herrera PA-C     OPERATION:   right  1. Shoulder arthroscopic anterior capsulorrhaphy (CPT 76717) (complex, -22 modifier)  2. Shoulder arthroscopic anterior labral repair (CPT 31200)   3. Shoulder arthroscopic posterior labral repair, capsulorrhaphy, 7:00 position (CPT 37664)  4. Shoulder arthroscopic extensive debridement (anterior, posterior glenohumeral joint) (CPT 03798)  5. Shoulder manipulation under anesthesia (CPT 12736)  6. Shoulder arthroscopic lysis of adhesions (CPT 46167):     Total of 7 a,chors posteriorly at 6:30, 7:30, 8:30, 10 and anteriorly at 5, 4, 3 o'clock      POST OPERATIVE PLAN: We will follow the arthroscopic Bankart repair guidelines. We discussed with the patient's family after surgery. The patient will remain in a sling for 6 weeks. We will start PT at the 3 week anthony.     Quality of tissue: Fair    Quality of the repair: Good    Subjective      Pt reports continued pain/dysfunction in R shoulder /c' regions, however, notes that she has been trying to use the UE to the best of her ability     she was compliant with home exercise program given last session.    Response to previous treatment:good   Functional change: pt able to lift arm to just below shoulder level     Pain: NA/10 at rest this PM, NA/10 with above activity   Location: right shoulder      Objective     PO 53    Measurements taken:  AROM 75 deg elevation     (AROM flex 60 deg elevation)   (PROM flex 45 deg, abd 45 deg)     Patient participated in neuromuscular re-education activities to improve: Proprioception for  30 minutes. The following activities were included:     TB iso walk ER yll 3x5  TB iso walk IR yll 3x5  Alphabet FW only e' bent 3 rounds to z vball   Supine RS at 45 deg abd 3 rounds submax EO   Supine RS at 90 deg flex 3 rounds EO    Sandra Cruz received therapeutic exercises to develop strength, endurance, ROM, flexibility and posture for 15  minutes including:    PROM 4D     CP x 10'  While supine lying over 1/2 roll     Home Exercises Provided and Patient Education Provided     Education provided:   - connective tissue healing model     Written Home Exercises Provided: Patient instructed to cont prior HEP.  Exercises were reviewed and Sandra Cruz was able to demonstrate them prior to the end of the session.  Sandra Cruz demonstrated good  understanding of the education provided.     See EMR under hand out  for exercises provided 2/7/2022. Add isometrics as above       Assessment     darryl Rx without an increase in sxs, increased TE volume and intensity     Sandra Cruz Is progressing well towards her goals.   Pt prognosis is Guarded.     Pt will continue to benefit from skilled outpatient physical therapy to address the deficits listed in the problem list box on initial evaluation, provide pt/family education and to maximize pt's level of independence in the home and community environment.     Pt's spiritual, cultural and educational needs considered and pt agreeable to plan of care and goals.    Anticipated barriers to physical therapy: none    Goals:   Short-Term Goals: 8 weeks  - The  patient will be independent with initial home exercise program.  - The patient is independent with donning/doffing sling to protect tissue healing.  - The patient will demonstrate good unsupported sitting posture with min verbal cues for 15 minutes.  - The patient will increase ROM 15 degrees to perform bathing and hygiene, grooming, toileting and dressing with pain < 010.  - The patient will increase strength by 1.2 muscle grade  to perform bathing and hygiene, grooming, toileting and dressing with pain < 0/10.     Long-Term Goals: 36 weeks  - The patient will be independent with home exercise program and symptom management.  - The patient will increase ROM = to uninvolved shoulder  to perform work duties and recreation/leisure activities   with pain < 0/10.  - The patient will increase strength = to uninvolved shoulder  to perform work duties and recreation/leisure activities   with pain < 0/10.         Plan     Focus on ROM and creating dynamic stability     Continue with established Plan of Care towards PT goals with focus on decreasing pain, increasing ROM, strength, neuromuscular control and functional status       Pipo Avelar, PT

## 2022-03-09 ENCOUNTER — CLINICAL SUPPORT (OUTPATIENT)
Dept: REHABILITATION | Facility: HOSPITAL | Age: 37
End: 2022-03-09
Attending: PHYSICIAN ASSISTANT
Payer: COMMERCIAL

## 2022-03-09 DIAGNOSIS — M62.81 MUSCLE WEAKNESS OF RIGHT UPPER EXTREMITY: Primary | ICD-10-CM

## 2022-03-09 PROCEDURE — 97110 THERAPEUTIC EXERCISES: CPT | Performed by: PHYSICAL THERAPIST

## 2022-03-09 PROCEDURE — 97112 NEUROMUSCULAR REEDUCATION: CPT | Performed by: PHYSICAL THERAPIST

## 2022-03-09 NOTE — PROGRESS NOTES
Physical Therapy Daily Treatment Note   [facility]      Visit Date: 3/9/2022    Name: Karen Purdy  Clinic Number: [id number]    Therapy Diagnosis:   Chronic R shoulder pain   Physician: Davy Herrera III, *    Dr Anderson     Physician Orders: PT Eval and Treat   Medical Diagnosis from Referral:   M25.311 (ICD-10-CM) - Shoulder instability, right M75.21 (ICD-10-CM) - Biceps tendinitis of right upper extremity      Evaluation Date: 2/3/2022  Authorization Period Expiration: pending  Plan of Care Expiration: 11/3/22  Progress Note Due: 5/3/22  Visit # / Visits authorized: 7/ 1   FOTO: Issued Visit # 2        Time In: 11:15  Time Out: 12:00  Total Billable Time: 30 minutes     Precautions: Standard + surgical     DATE OF PROCEDURE: 01/13/2022     SURGEON: Ann Anderson M.D.    ASSISTANT: BART Hannah M.D.,PGY 3  ASSISTANT: ENIO Herrera PA-C     OPERATION:   right  1. Shoulder arthroscopic anterior capsulorrhaphy (CPT 88853) (complex, -22 modifier)  2. Shoulder arthroscopic anterior labral repair (CPT 11465)   3. Shoulder arthroscopic posterior labral repair, capsulorrhaphy, 7:00 position (CPT 84918)  4. Shoulder arthroscopic extensive debridement (anterior, posterior glenohumeral joint) (CPT 14426)  5. Shoulder manipulation under anesthesia (CPT 80671)  6. Shoulder arthroscopic lysis of adhesions (CPT 99837):     Total of 7 a,chors posteriorly at 6:30, 7:30, 8:30, 10 and anteriorly at 5, 4, 3 o'clock      POST OPERATIVE PLAN: We will follow the arthroscopic Bankart repair guidelines. We discussed with the patient's family after surgery. The patient will remain in a sling for 6 weeks. We will start PT at the 3 week anthony.     Quality of tissue: Fair    Quality of the repair: Good    Subjective      Pt reports no change in her R shoulder condition, notes continued pain/dysfunction but again, is trying to use the UE to the best of her ability   Notes pain in R shoulder / c' region following attempted HEP iso walk outs      she was compliant with home exercise program given last session.   Response to previous treatment:good   Functional change: pt able to lift arm to just below shoulder level     Pain: 2/10 at rest    Location: right shoulder      Objective     PO 55    Measurements taken:  None taken this visit     (AROM 75 deg elevation)   (AROM flex 60 deg elevation)   (PROM flex 45 deg, abd 45 deg)     Patient participated in neuromuscular re-education activities to improve: Proprioception for  8 minutes. The following activities were included:      Supine RS at 45 deg abd 3 rounds submax EC  Supine RS at 90-90 deg abd 3 rounds  EC   Supine RS at 90 deg flex 3 rounds EO    Sandra Cruz received therapeutic exercises to develop strength, endurance, ROM, flexibility and posture for 15  minutes including:    PROM 4D       Home Exercises Provided and Patient Education Provided     Education provided:   - connective tissue healing model     Written Home Exercises Provided: Patient instructed to cont prior HEP.  Exercises were reviewed and Sandra Cruz was able to demonstrate them prior to the end of the session.  Sandra Cruz demonstrated good  understanding of the education provided.     See EMR under hand out  for exercises provided 2/7/2022. Add isometrics as above       Assessment     darryl Rx without an increase in sxs, ROM improving but still severely limited in that pt cannot rest arm across body or reach to opp shoulder as yet     Sandra Cruz Is progressing well towards her goals.   Pt prognosis is Guarded.     Pt will continue to benefit from skilled outpatient physical therapy to address the deficits listed in the problem list box on initial evaluation, provide pt/family education and to maximize pt's level of independence in the home and community environment.     Pt's spiritual, cultural and educational needs considered and pt agreeable to plan of care and goals.    Anticipated barriers to physical therapy: none    Goals:    Short-Term Goals: 8 weeks  - The patient will be independent with initial home exercise program.  - The patient is independent with donning/doffing sling to protect tissue healing.  - The patient will demonstrate good unsupported sitting posture with min verbal cues for 15 minutes.  - The patient will increase ROM 15 degrees to perform bathing and hygiene, grooming, toileting and dressing with pain < 010.  - The patient will increase strength by 1.2 muscle grade  to perform bathing and hygiene, grooming, toileting and dressing with pain < 0/10.     Long-Term Goals: 36 weeks  - The patient will be independent with home exercise program and symptom management.  - The patient will increase ROM = to uninvolved shoulder  to perform work duties and recreation/leisure activities   with pain < 0/10.  - The patient will increase strength = to uninvolved shoulder  to perform work duties and recreation/leisure activities   with pain < 0/10.         Plan     Focus on ROM and creating dynamic stability     Continue with established Plan of Care towards PT goals with focus on decreasing pain, increasing ROM, strength, neuromuscular control and functional status       Pipo Avelar, PT
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
## 2022-03-11 ENCOUNTER — PATIENT MESSAGE (OUTPATIENT)
Dept: NEUROLOGY | Facility: CLINIC | Age: 37
End: 2022-03-11
Payer: COMMERCIAL

## 2022-03-11 ENCOUNTER — OFFICE VISIT (OUTPATIENT)
Dept: SPORTS MEDICINE | Facility: CLINIC | Age: 37
End: 2022-03-11
Payer: COMMERCIAL

## 2022-03-11 VITALS
SYSTOLIC BLOOD PRESSURE: 119 MMHG | HEART RATE: 84 BPM | WEIGHT: 110 LBS | HEIGHT: 64 IN | DIASTOLIC BLOOD PRESSURE: 85 MMHG | BODY MASS INDEX: 18.78 KG/M2

## 2022-03-11 DIAGNOSIS — M25.511 CHRONIC PERISCAPULAR PAIN ON RIGHT SIDE: ICD-10-CM

## 2022-03-11 DIAGNOSIS — M99.02 SOMATIC DYSFUNCTION OF THORACIC REGION: ICD-10-CM

## 2022-03-11 DIAGNOSIS — M99.01 CERVICAL (NECK) REGION SOMATIC DYSFUNCTION: ICD-10-CM

## 2022-03-11 DIAGNOSIS — M54.2 CERVICALGIA: Primary | ICD-10-CM

## 2022-03-11 DIAGNOSIS — M54.6 ACUTE LEFT-SIDED THORACIC BACK PAIN: ICD-10-CM

## 2022-03-11 DIAGNOSIS — M99.00 SOMATIC DYSFUNCTION OF HEAD REGION: ICD-10-CM

## 2022-03-11 DIAGNOSIS — M79.10 MYALGIA: ICD-10-CM

## 2022-03-11 DIAGNOSIS — G89.29 CHRONIC PERISCAPULAR PAIN ON RIGHT SIDE: ICD-10-CM

## 2022-03-11 DIAGNOSIS — M99.07 UPPER EXTREMITY SOMATIC DYSFUNCTION: ICD-10-CM

## 2022-03-11 DIAGNOSIS — M99.08 SOMATIC DYSFUNCTION OF RIB CAGE REGION: ICD-10-CM

## 2022-03-11 DIAGNOSIS — M47.812 CERVICAL SPONDYLOSIS: ICD-10-CM

## 2022-03-11 PROCEDURE — 3008F PR BODY MASS INDEX (BMI) DOCUMENTED: ICD-10-PCS | Mod: CPTII,S$GLB,, | Performed by: NEUROMUSCULOSKELETAL MEDICINE & OMM

## 2022-03-11 PROCEDURE — 3079F DIAST BP 80-89 MM HG: CPT | Mod: CPTII,S$GLB,, | Performed by: NEUROMUSCULOSKELETAL MEDICINE & OMM

## 2022-03-11 PROCEDURE — 1159F MED LIST DOCD IN RCRD: CPT | Mod: CPTII,S$GLB,, | Performed by: NEUROMUSCULOSKELETAL MEDICINE & OMM

## 2022-03-11 PROCEDURE — 3008F BODY MASS INDEX DOCD: CPT | Mod: CPTII,S$GLB,, | Performed by: NEUROMUSCULOSKELETAL MEDICINE & OMM

## 2022-03-11 PROCEDURE — 3074F SYST BP LT 130 MM HG: CPT | Mod: CPTII,S$GLB,, | Performed by: NEUROMUSCULOSKELETAL MEDICINE & OMM

## 2022-03-11 PROCEDURE — 99214 OFFICE O/P EST MOD 30 MIN: CPT | Mod: 24,25,S$GLB, | Performed by: NEUROMUSCULOSKELETAL MEDICINE & OMM

## 2022-03-11 PROCEDURE — 1160F PR REVIEW ALL MEDS BY PRESCRIBER/CLIN PHARMACIST DOCUMENTED: ICD-10-PCS | Mod: CPTII,S$GLB,, | Performed by: NEUROMUSCULOSKELETAL MEDICINE & OMM

## 2022-03-11 PROCEDURE — 99214 PR OFFICE/OUTPT VISIT, EST, LEVL IV, 30-39 MIN: ICD-10-PCS | Mod: 24,25,S$GLB, | Performed by: NEUROMUSCULOSKELETAL MEDICINE & OMM

## 2022-03-11 PROCEDURE — 3074F PR MOST RECENT SYSTOLIC BLOOD PRESSURE < 130 MM HG: ICD-10-PCS | Mod: CPTII,S$GLB,, | Performed by: NEUROMUSCULOSKELETAL MEDICINE & OMM

## 2022-03-11 PROCEDURE — 1159F PR MEDICATION LIST DOCUMENTED IN MEDICAL RECORD: ICD-10-PCS | Mod: CPTII,S$GLB,, | Performed by: NEUROMUSCULOSKELETAL MEDICINE & OMM

## 2022-03-11 PROCEDURE — 98927 PR OSTEOPATHIC MANIP,5-6 BODY REGN: ICD-10-PCS | Mod: S$GLB,,, | Performed by: NEUROMUSCULOSKELETAL MEDICINE & OMM

## 2022-03-11 PROCEDURE — 3079F PR MOST RECENT DIASTOLIC BLOOD PRESSURE 80-89 MM HG: ICD-10-PCS | Mod: CPTII,S$GLB,, | Performed by: NEUROMUSCULOSKELETAL MEDICINE & OMM

## 2022-03-11 PROCEDURE — 99999 PR PBB SHADOW E&M-EST. PATIENT-LVL III: ICD-10-PCS | Mod: PBBFAC,,, | Performed by: NEUROMUSCULOSKELETAL MEDICINE & OMM

## 2022-03-11 PROCEDURE — 1160F RVW MEDS BY RX/DR IN RCRD: CPT | Mod: CPTII,S$GLB,, | Performed by: NEUROMUSCULOSKELETAL MEDICINE & OMM

## 2022-03-11 PROCEDURE — 99999 PR PBB SHADOW E&M-EST. PATIENT-LVL III: CPT | Mod: PBBFAC,,, | Performed by: NEUROMUSCULOSKELETAL MEDICINE & OMM

## 2022-03-11 PROCEDURE — 98927 OSTEOPATH MANJ 5-6 REGIONS: CPT | Mod: S$GLB,,, | Performed by: NEUROMUSCULOSKELETAL MEDICINE & OMM

## 2022-03-14 ENCOUNTER — CLINICAL SUPPORT (OUTPATIENT)
Dept: REHABILITATION | Facility: HOSPITAL | Age: 37
End: 2022-03-14
Attending: PHYSICIAN ASSISTANT
Payer: COMMERCIAL

## 2022-03-14 DIAGNOSIS — M62.81 MUSCLE WEAKNESS OF RIGHT UPPER EXTREMITY: Primary | ICD-10-CM

## 2022-03-14 PROCEDURE — 97112 NEUROMUSCULAR REEDUCATION: CPT | Performed by: PHYSICAL THERAPIST

## 2022-03-14 PROCEDURE — 97110 THERAPEUTIC EXERCISES: CPT | Performed by: PHYSICAL THERAPIST

## 2022-03-14 NOTE — PROGRESS NOTES
Physical Therapy Daily Treatment Note   [facility]      Visit Date: 3/14/2022    Name: Karen Purdy  Clinic Number: [id number]    Therapy Diagnosis:   Chronic R shoulder pain   Physician: Davy Herrera III, *    Dr Anderson     Physician Orders: PT Eval and Treat   Medical Diagnosis from Referral:   M25.311 (ICD-10-CM) - Shoulder instability, right M75.21 (ICD-10-CM) - Biceps tendinitis of right upper extremity      Evaluation Date: 2/3/2022  Authorization Period Expiration: pending  Plan of Care Expiration: 11/3/22  Progress Note Due: 5/3/22  Visit # / Visits authorized: 8/ 1   FOTO: Issued Visit # 2        Time In: 13:00  Time Out: 14:00  Total Billable Time: 45 minutes     Precautions: Standard + surgical     DATE OF PROCEDURE: 01/13/2022     SURGEON: Ann Anderson M.D.    ASSISTANT: BART Hannah M.D.,PGY 3  ASSISTANT: ENIO Herrera PA-C     OPERATION:   right  1. Shoulder arthroscopic anterior capsulorrhaphy (CPT 34860) (complex, -22 modifier)  2. Shoulder arthroscopic anterior labral repair (CPT 24955)   3. Shoulder arthroscopic posterior labral repair, capsulorrhaphy, 7:00 position (CPT 37129)  4. Shoulder arthroscopic extensive debridement (anterior, posterior glenohumeral joint) (CPT 99938)  5. Shoulder manipulation under anesthesia (CPT 34959)  6. Shoulder arthroscopic lysis of adhesions (CPT 27140):     Total of 7 a,chors posteriorly at 6:30, 7:30, 8:30, 10 and anteriorly at 5, 4, 3 o'clock      POST OPERATIVE PLAN: We will follow the arthroscopic Bankart repair guidelines. We discussed with the patient's family after surgery. The patient will remain in a sling for 6 weeks. We will start PT at the 3 week anthony.     Quality of tissue: Fair    Quality of the repair: Good    Subjective      Pt reports protecting her R shoulder while moving some items over weekend, did speak to Dr. Turpin and is scheduled for MRI for c' spine     she was compliant with home exercise program given last session.   Response to  previous treatment:good   Functional change: pt able to lift arm to shoulder level     Pain: 2/10 at rest    Location: right shoulder      Objective     PO 60    Measurements taken:  AROM elevation to 90 deg, PROM s' flex 100 deg     (AROM 75 deg elevation)   (AROM flex 60 deg elevation)   (PROM flex 45 deg, abd 45 deg)     Patient participated in neuromuscular re-education activities to improve: Proprioception for  8 minutes. The following activities were included:      Supine RS at 45 deg abd 3 rounds submax EC  Supine RS at 90-90 deg abd 3 rounds  EC   Supine RS at 90 deg flex 3 rounds EO  S/L ER RS 3 rounds EO  S/L abd 90 deg 3 rounds EO    Sandra Cruz received therapeutic exercises to develop strength, endurance, ROM, flexibility and posture for 30  minutes including:    PROM 4D       Home Exercises Provided and Patient Education Provided     Education provided:   - connective tissue healing model     Written Home Exercises Provided: Patient instructed to cont prior HEP.  Exercises were reviewed and Sandra Cruz was able to demonstrate them prior to the end of the session.  Sandra Cruz demonstrated good  understanding of the education provided.     See EMR under hand out  for exercises provided 2/7/2022. Add isometrics as above       Assessment     darryl Rx without an increase in sxs, again, ROM improving but still severely limited in that pt cannot rest arm across body or reach to opp shoulder as yet     Sandra Cruz Is progressing well towards her goals.   Pt prognosis is Guarded.     Pt will continue to benefit from skilled outpatient physical therapy to address the deficits listed in the problem list box on initial evaluation, provide pt/family education and to maximize pt's level of independence in the home and community environment.     Pt's spiritual, cultural and educational needs considered and pt agreeable to plan of care and goals.    Anticipated barriers to physical therapy: none    Goals:   Short-Term  Goals: 8 weeks  - The patient will be independent with initial home exercise program.  - The patient is independent with donning/doffing sling to protect tissue healing.  - The patient will demonstrate good unsupported sitting posture with min verbal cues for 15 minutes.  - The patient will increase ROM 15 degrees to perform bathing and hygiene, grooming, toileting and dressing with pain < 010.  - The patient will increase strength by 1.2 muscle grade  to perform bathing and hygiene, grooming, toileting and dressing with pain < 0/10.     Long-Term Goals: 36 weeks  - The patient will be independent with home exercise program and symptom management.  - The patient will increase ROM = to uninvolved shoulder  to perform work duties and recreation/leisure activities   with pain < 0/10.  - The patient will increase strength = to uninvolved shoulder  to perform work duties and recreation/leisure activities   with pain < 0/10.         Plan     Focus on ROM and creating dynamic stability     Continue with established Plan of Care towards PT goals with focus on decreasing pain, increasing ROM, strength, neuromuscular control and functional status       Pipo Avelar, PT

## 2022-03-19 ENCOUNTER — HOSPITAL ENCOUNTER (OUTPATIENT)
Dept: RADIOLOGY | Facility: HOSPITAL | Age: 37
Discharge: HOME | End: 2022-03-19
Attending: NEUROMUSCULOSKELETAL MEDICINE & OMM
Payer: COMMERCIAL

## 2022-03-19 ENCOUNTER — PATIENT MESSAGE (OUTPATIENT)
Dept: NEUROLOGY | Facility: CLINIC | Age: 37
End: 2022-03-19
Payer: COMMERCIAL

## 2022-03-19 DIAGNOSIS — M47.812 CERVICAL SPONDYLOSIS: ICD-10-CM

## 2022-03-19 DIAGNOSIS — G89.29 CHRONIC PERISCAPULAR PAIN ON RIGHT SIDE: ICD-10-CM

## 2022-03-19 DIAGNOSIS — M54.2 CERVICALGIA: ICD-10-CM

## 2022-03-19 DIAGNOSIS — M25.511 CHRONIC PERISCAPULAR PAIN ON RIGHT SIDE: ICD-10-CM

## 2022-03-19 PROCEDURE — 72141 MRI NECK SPINE W/O DYE: CPT | Mod: TC

## 2022-03-19 PROCEDURE — 72141 MRI CERVICAL SPINE WITHOUT CONTRAST: ICD-10-PCS | Mod: 26,,, | Performed by: RADIOLOGY

## 2022-03-19 PROCEDURE — 72141 MRI NECK SPINE W/O DYE: CPT | Mod: 26,,, | Performed by: RADIOLOGY

## 2022-03-21 ENCOUNTER — CLINICAL SUPPORT (OUTPATIENT)
Dept: REHABILITATION | Facility: HOSPITAL | Age: 37
End: 2022-03-21
Attending: PHYSICIAN ASSISTANT
Payer: COMMERCIAL

## 2022-03-21 DIAGNOSIS — M62.81 MUSCLE WEAKNESS OF RIGHT UPPER EXTREMITY: Primary | ICD-10-CM

## 2022-03-21 PROCEDURE — 97110 THERAPEUTIC EXERCISES: CPT | Performed by: PHYSICAL THERAPIST

## 2022-03-21 PROCEDURE — 97112 NEUROMUSCULAR REEDUCATION: CPT | Performed by: PHYSICAL THERAPIST

## 2022-03-21 NOTE — PROGRESS NOTES
"  Physical Therapy Daily Treatment Note   [facility] 1      Visit Date: 3/21/2022    Name: Karen Purdy  Clinic Number: [id number]    Therapy Diagnosis:   Chronic R shoulder pain   Physician: Davy Herrera III, *    Dr Anderson     Physician Orders: PT Eval and Treat   Medical Diagnosis from Referral:   M25.311 (ICD-10-CM) - Shoulder instability, right M75.21 (ICD-10-CM) - Biceps tendinitis of right upper extremity      Evaluation Date: 2/3/2022  Authorization Period Expiration: pending  Plan of Care Expiration: 11/3/22  Progress Note Due: 5/3/22  Visit # / Visits authorized: 8/ 1   FOTO: Issued Visit # 2        Time In: 13:00  Time Out: 14:00  Total Billable Time: 45 minutes     Precautions: Standard + surgical     DATE OF PROCEDURE: 01/13/2022     SURGEON: Ann Anderson M.D.    ASSISTANT: BART Hannah M.D.,PGY 3  ASSISTANT: ENIO Herrera PA-C     OPERATION:   right  1. Shoulder arthroscopic anterior capsulorrhaphy (CPT 38216) (complex, -22 modifier)  2. Shoulder arthroscopic anterior labral repair (CPT 35490)   3. Shoulder arthroscopic posterior labral repair, capsulorrhaphy, 7:00 position (CPT 06448)  4. Shoulder arthroscopic extensive debridement (anterior, posterior glenohumeral joint) (CPT 49287)  5. Shoulder manipulation under anesthesia (CPT 30946)  6. Shoulder arthroscopic lysis of adhesions (CPT 09066):     Total of 7 a,chors posteriorly at 6:30, 7:30, 8:30, 10 and anteriorly at 5, 4, 3 o'clock      POST OPERATIVE PLAN: We will follow the arthroscopic Bankart repair guidelines. We discussed with the patient's family after surgery. The patient will remain in a sling for 6 weeks. We will start PT at the 3 week anthony.     Quality of tissue: Fair    Quality of the repair: Good    Subjective      Pt reports "popping" in R shoulder while sitting at computer and again while turning steering wheel in car    she was compliant with home exercise program given last session.   Response to previous treatment:good "   Functional change: pt able to lift arm slightly past shoulder level     Pain:   NA /10 at rest    Location: right shoulder      Objective     PO 60    Measurements taken:  None taken this visit     (AROM elevation to 90 deg, PROM s' flex 100 deg)   (AROM 75 deg elevation)   (AROM flex 60 deg elevation)   (PROM flex 45 deg, abd 45 deg)     Patient participated in neuromuscular re-education activities to improve: Proprioception for  15 minutes. The following activities were included:      TB row 2x15:05 Or  TB iso walk 3x5 ER yll  TB iso walk IR 3x5 Or     Sandra Cruz received therapeutic exercises to develop strength, endurance, ROM, flexibility and posture for 30  minutes including:    Arm prop x 7'   Extensive PROM 4D     Home Exercises Provided and Patient Education Provided     Education provided:   - connective tissue healing model     Written Home Exercises Provided: Patient instructed to cont prior HEP.  Exercises were reviewed and Sandra Cruz was able to demonstrate them prior to the end of the session.  Sandra Cruz demonstrated good  understanding of the education provided.     See EMR under hand out  for exercises provided 2/7/2022. Add isometrics as above       Assessment     darryl Rx without an increase in sxs,  Improved exercise tolerance but continues with significant LOM especially in IR and HAdd     Sandra Cruz Is progressing well towards her goals.   Pt prognosis is Guarded.     Pt will continue to benefit from skilled outpatient physical therapy to address the deficits listed in the problem list box on initial evaluation, provide pt/family education and to maximize pt's level of independence in the home and community environment.     Pt's spiritual, cultural and educational needs considered and pt agreeable to plan of care and goals.    Anticipated barriers to physical therapy: none    Goals:   Short-Term Goals: 8 weeks  - The patient will be independent with initial home exercise program.  - The  patient is independent with donning/doffing sling to protect tissue healing.  - The patient will demonstrate good unsupported sitting posture with min verbal cues for 15 minutes.  - The patient will increase ROM 15 degrees to perform bathing and hygiene, grooming, toileting and dressing with pain < 010.  - The patient will increase strength by 1.2 muscle grade  to perform bathing and hygiene, grooming, toileting and dressing with pain < 0/10.     Long-Term Goals: 36 weeks  - The patient will be independent with home exercise program and symptom management.  - The patient will increase ROM = to uninvolved shoulder  to perform work duties and recreation/leisure activities   with pain < 0/10.  - The patient will increase strength = to uninvolved shoulder  to perform work duties and recreation/leisure activities   with pain < 0/10.         Plan     Focus on ROM and creating dynamic stability     Continue with established Plan of Care towards PT goals with focus on decreasing pain, increasing ROM, strength, neuromuscular control and functional status       Pipo Avelar, PT

## 2022-03-22 ENCOUNTER — PATIENT MESSAGE (OUTPATIENT)
Dept: PAIN MEDICINE | Facility: CLINIC | Age: 37
End: 2022-03-22
Payer: COMMERCIAL

## 2022-03-22 ENCOUNTER — TELEPHONE (OUTPATIENT)
Dept: SPORTS MEDICINE | Facility: CLINIC | Age: 37
End: 2022-03-22
Payer: COMMERCIAL

## 2022-03-22 DIAGNOSIS — M47.812 CERVICAL SPONDYLOSIS: Primary | ICD-10-CM

## 2022-03-22 DIAGNOSIS — M54.2 CERVICALGIA: ICD-10-CM

## 2022-03-22 NOTE — TELEPHONE ENCOUNTER
Discussed Mri results with patient. She will contact Dr. Ahmadi to set up a follow up appointment. Requests a referral to PT at [facility] for her neck- is already doing PT for her shoulder. Referral entered. All of pt's questions answered. Pt states understanding and appreciation.     Alejandra Perry, MS, OTC  Clinical Assistant to Dr. Lina Turpin

## 2022-03-26 ENCOUNTER — PATIENT OUTREACH (OUTPATIENT)
Dept: ADMINISTRATIVE | Facility: OTHER | Age: 37
End: 2022-03-26
Payer: COMMERCIAL

## 2022-03-27 NOTE — PROGRESS NOTES
Health Maintenance Due   Topic Date Due    Pneumococcal Vaccines (Age 0-64) (2 of 4 - PCV13) 11/11/2020     Updates were requested from care everywhere.  Chart was reviewed for overdue Proactive [facility] Encounters ([facility]) topics (CRS, Breast Cancer Screening, Eye exam)  Health Maintenance has been updated.  LINKS immunization registry triggered.  Immunizations were reconciled.

## 2022-03-28 ENCOUNTER — CLINICAL SUPPORT (OUTPATIENT)
Dept: REHABILITATION | Facility: HOSPITAL | Age: 37
End: 2022-03-28
Attending: PHYSICIAN ASSISTANT
Payer: COMMERCIAL

## 2022-03-28 DIAGNOSIS — M62.81 MUSCLE WEAKNESS OF RIGHT UPPER EXTREMITY: Primary | ICD-10-CM

## 2022-03-28 PROCEDURE — 97112 NEUROMUSCULAR REEDUCATION: CPT | Performed by: PHYSICAL THERAPIST

## 2022-03-28 PROCEDURE — 97110 THERAPEUTIC EXERCISES: CPT | Performed by: PHYSICAL THERAPIST

## 2022-03-28 NOTE — PROGRESS NOTES
Physical Therapy Daily Treatment Note   yAaz 1      Visit Date: 3/28/2022    Name: Karen Purdy  Clinic Number: [id number]    Therapy Diagnosis:   Chronic R shoulder pain   Physician: Davy Herrera III, *    Dr Anderson     Physician Orders: PT Eval and Treat   Medical Diagnosis from Referral:   M25.311 (ICD-10-CM) - Shoulder instability, right M75.21 (ICD-10-CM) - Biceps tendinitis of right upper extremity      Evaluation Date: 2/3/2022  Authorization Period Expiration: pending  Plan of Care Expiration: 11/3/22  Progress Note Due: 5/3/22  Visit # / Visits authorized: 9/ 1   FOTO: Issued Visit # 2        Time In: 13:30  Time Out: 14:00  Total Billable Time: 30 minutes     Precautions: Standard + surgical     DATE OF PROCEDURE: 01/13/2022     SURGEON: Ann Anderson M.D.    ASSISTANT: BART Hannah M.D.,PGY 3  ASSISTANT: ENIO Herrera PA-C     OPERATION:   right  1. Shoulder arthroscopic anterior capsulorrhaphy (CPT 83351) (complex, -22 modifier)  2. Shoulder arthroscopic anterior labral repair (CPT 44405)   3. Shoulder arthroscopic posterior labral repair, capsulorrhaphy, 7:00 position (CPT 43342)  4. Shoulder arthroscopic extensive debridement (anterior, posterior glenohumeral joint) (CPT 51875)  5. Shoulder manipulation under anesthesia (CPT 54656)  6. Shoulder arthroscopic lysis of adhesions (CPT 60319):     Total of 7 a,chors posteriorly at 6:30, 7:30, 8:30, 10 and anteriorly at 5, 4, 3 o'clock      POST OPERATIVE PLAN: We will follow the arthroscopic Bankart repair guidelines. We discussed with the patient's family after surgery. The patient will remain in a sling for 6 weeks. We will start PT at the 3 week anthony.     Quality of tissue: Fair    Quality of the repair: Good    Subjective      Pt reports significant increase in the use of her R shoulder by necessity caring for her young children    she was compliant with home exercise program given last session.   Response to previous treatment:good   Functional  change: see subjective above     Pain:   NA /10 at rest    Location: right shoulder      Objective     PO 67    Measurements taken:   PROM s' flex 110 deg     (AROM elevation to 90 deg, PROM s' flex 100 deg)   (AROM 75 deg elevation)   (AROM flex 60 deg elevation)   (PROM flex 45 deg, abd 45 deg)     Patient participated in neuromuscular re-education activities to improve: Proprioception for  15 minutes. The following activities were included:      TB row 2x15:05 green  TB iso walk 3x5 ER yll  TB iso walk IR 3x5 Or  Alphabet FW 3xburn vball      Sandra Cruz received therapeutic exercises to develop strength, endurance, ROM, flexibility and posture for 30  minutes including:    Arm prop x 7' large red bolster   Extensive PROM 4D     Home Exercises Provided and Patient Education Provided     Education provided:   - connective tissue healing model     Written Home Exercises Provided: Patient instructed to cont prior HEP.  Exercises were reviewed and Sandra Cruz was able to demonstrate them prior to the end of the session.  Sandra Cruz demonstrated good  understanding of the education provided.     See EMR under hand out  for exercises provided prior visit  3/21/2022      Assessment     darryl Rx without an increase in sxs,  Improved active and passive ROM t/o R shoulder but with continued LOM, loss of strength and decreased dynamic stability     Sandra Cruz Is progressing well towards her goals.   Pt prognosis is Guarded.     Pt will continue to benefit from skilled outpatient physical therapy to address the deficits listed in the problem list box on initial evaluation, provide pt/family education and to maximize pt's level of independence in the home and community environment.     Pt's spiritual, cultural and educational needs considered and pt agreeable to plan of care and goals.    Anticipated barriers to physical therapy: none    Goals:   Short-Term Goals: 8 weeks  - The patient will be independent with initial home  exercise program.  - The patient is independent with donning/doffing sling to protect tissue healing.  - The patient will demonstrate good unsupported sitting posture with min verbal cues for 15 minutes.  - The patient will increase ROM 15 degrees to perform bathing and hygiene, grooming, toileting and dressing with pain < 010.  - The patient will increase strength by 1.2 muscle grade  to perform bathing and hygiene, grooming, toileting and dressing with pain < 0/10.     Long-Term Goals: 36 weeks  - The patient will be independent with home exercise program and symptom management.  - The patient will increase ROM = to uninvolved shoulder  to perform work duties and recreation/leisure activities   with pain < 0/10.  - The patient will increase strength = to uninvolved shoulder  to perform work duties and recreation/leisure activities   with pain < 0/10.         Plan     Focus on ROM and creating dynamic stability     Continue with established Plan of Care towards PT goals with focus on decreasing pain, increasing ROM, strength, neuromuscular control and functional status       Pipo Avelar, PT

## 2022-04-04 ENCOUNTER — PATIENT MESSAGE (OUTPATIENT)
Dept: SPORTS MEDICINE | Facility: CLINIC | Age: 37
End: 2022-04-04
Payer: COMMERCIAL

## 2022-04-11 ENCOUNTER — CLINICAL SUPPORT (OUTPATIENT)
Dept: REHABILITATION | Facility: HOSPITAL | Age: 37
End: 2022-04-11
Attending: PHYSICIAN ASSISTANT
Payer: COMMERCIAL

## 2022-04-11 ENCOUNTER — OFFICE VISIT (OUTPATIENT)
Dept: SPORTS MEDICINE | Facility: CLINIC | Age: 37
End: 2022-04-11
Payer: COMMERCIAL

## 2022-04-11 VITALS
HEART RATE: 78 BPM | HEIGHT: 64 IN | SYSTOLIC BLOOD PRESSURE: 128 MMHG | DIASTOLIC BLOOD PRESSURE: 89 MMHG | BODY MASS INDEX: 19.63 KG/M2 | WEIGHT: 115 LBS

## 2022-04-11 DIAGNOSIS — M62.81 MUSCLE WEAKNESS OF RIGHT UPPER EXTREMITY: Primary | ICD-10-CM

## 2022-04-11 DIAGNOSIS — M25.312 INSTABILITY OF LEFT SHOULDER JOINT: Primary | ICD-10-CM

## 2022-04-11 PROCEDURE — 99024 POSTOP FOLLOW-UP VISIT: CPT | Mod: S$GLB,,, | Performed by: ORTHOPAEDIC SURGERY

## 2022-04-11 PROCEDURE — 1159F MED LIST DOCD IN RCRD: CPT | Mod: CPTII,S$GLB,, | Performed by: ORTHOPAEDIC SURGERY

## 2022-04-11 PROCEDURE — 3079F DIAST BP 80-89 MM HG: CPT | Mod: CPTII,S$GLB,, | Performed by: ORTHOPAEDIC SURGERY

## 2022-04-11 PROCEDURE — 3008F BODY MASS INDEX DOCD: CPT | Mod: CPTII,S$GLB,, | Performed by: ORTHOPAEDIC SURGERY

## 2022-04-11 PROCEDURE — 99999 PR PBB SHADOW E&M-EST. PATIENT-LVL III: ICD-10-PCS | Mod: PBBFAC,,, | Performed by: ORTHOPAEDIC SURGERY

## 2022-04-11 PROCEDURE — 3008F PR BODY MASS INDEX (BMI) DOCUMENTED: ICD-10-PCS | Mod: CPTII,S$GLB,, | Performed by: ORTHOPAEDIC SURGERY

## 2022-04-11 PROCEDURE — 3079F PR MOST RECENT DIASTOLIC BLOOD PRESSURE 80-89 MM HG: ICD-10-PCS | Mod: CPTII,S$GLB,, | Performed by: ORTHOPAEDIC SURGERY

## 2022-04-11 PROCEDURE — 3074F PR MOST RECENT SYSTOLIC BLOOD PRESSURE < 130 MM HG: ICD-10-PCS | Mod: CPTII,S$GLB,, | Performed by: ORTHOPAEDIC SURGERY

## 2022-04-11 PROCEDURE — 1159F PR MEDICATION LIST DOCUMENTED IN MEDICAL RECORD: ICD-10-PCS | Mod: CPTII,S$GLB,, | Performed by: ORTHOPAEDIC SURGERY

## 2022-04-11 PROCEDURE — 97110 THERAPEUTIC EXERCISES: CPT | Performed by: PHYSICAL THERAPIST

## 2022-04-11 PROCEDURE — 99999 PR PBB SHADOW E&M-EST. PATIENT-LVL III: CPT | Mod: PBBFAC,,, | Performed by: ORTHOPAEDIC SURGERY

## 2022-04-11 PROCEDURE — 3074F SYST BP LT 130 MM HG: CPT | Mod: CPTII,S$GLB,, | Performed by: ORTHOPAEDIC SURGERY

## 2022-04-11 PROCEDURE — 99024 PR POST-OP FOLLOW-UP VISIT: ICD-10-PCS | Mod: S$GLB,,, | Performed by: ORTHOPAEDIC SURGERY

## 2022-04-11 RX ORDER — GABAPENTIN 100 MG/1
100 CAPSULE ORAL DAILY
COMMUNITY
Start: 2022-03-22

## 2022-04-11 RX ORDER — LISDEXAMFETAMINE DIMESYLATE 20 MG/1
CAPSULE ORAL
COMMUNITY
Start: 2022-03-12

## 2022-04-11 NOTE — PROGRESS NOTES
Physical Therapy Daily Treatment Note   Ay[facility] 1      Visit Date: 4/11/2022    Name: Karen Purdy  Clinic Number: [id number]    Therapy Diagnosis:   Chronic R shoulder pain   Physician: Davy Herrera III, *    Dr Anderson     Physician Orders: PT Eval and Treat   Medical Diagnosis from Referral:   M25.311 (ICD-10-CM) - Shoulder instability, right M75.21 (ICD-10-CM) - Biceps tendinitis of right upper extremity      Evaluation Date: 2/3/2022  Authorization Period Expiration: pending  Plan of Care Expiration: 11/3/22  Progress Note Due: 5/3/22  Visit # / Visits authorized: 10/ 1   FOTO: Issued Visit # 2        Time In: 13:30  Time Out: 14:00  Total Billable Time: 30 minutes     Precautions: Standard + surgical     DATE OF PROCEDURE: 01/13/2022     SURGEON: Ann Anderson M.D.    ASSISTANT: BART Hannah M.D.,PGY 3  ASSISTANT: ENIO Herrera PA-C     OPERATION:   right  1. Shoulder arthroscopic anterior capsulorrhaphy (CPT 75040) (complex, -22 modifier)  2. Shoulder arthroscopic anterior labral repair (CPT 25051)   3. Shoulder arthroscopic posterior labral repair, capsulorrhaphy, 7:00 position (CPT 67864)  4. Shoulder arthroscopic extensive debridement (anterior, posterior glenohumeral joint) (CPT 30651)  5. Shoulder manipulation under anesthesia (CPT 33217)  6. Shoulder arthroscopic lysis of adhesions (CPT 80081):     Total of 7 a,chors posteriorly at 6:30, 7:30, 8:30, 10 and anteriorly at 5, 4, 3 o'clock      POST OPERATIVE PLAN: We will follow the arthroscopic Bankart repair guidelines. We discussed with the patient's family after surgery. The patient will remain in a sling for 6 weeks. We will start PT at the 3 week anthony.     Quality of tissue: Fair    Quality of the repair: Good    Subjective      Pt reports that her R shoulder is keeping her up at night     she was compliant with home exercise program given last session.   Response to previous treatment:good   Functional change: see subjective above     Pain:   NA  /10 at rest    Location: right shoulder      Objective     PO 81    Measurements taken:   None taken this visit     (PROM s' flex 110 deg )  (AROM elevation to 90 deg, PROM s' flex 100 deg)   (AROM 75 deg elevation)   (AROM flex 60 deg elevation)   (PROM flex 45 deg, abd 45 deg)     Sandra Cruz received therapeutic exercises to develop strength, endurance, ROM, flexibility and posture for 30  minutes including:    Pendulum swings 1x30 4D 0#  Extensive PROM 4D     Home Exercises Provided and Patient Education Provided     Education provided:   - connective tissue healing model     Written Home Exercises Provided: Patient instructed to cont prior HEP.  Exercises were reviewed and Sandra Cruz was able to demonstrate them prior to the end of the session.  Sandra Cruz demonstrated good  understanding of the education provided.     See EMR under hand out  for exercises provided prior visit  3/21/2022      Assessment     darryl Rx without an increase in sxs,  Again, Improved active and passive ROM t/o R shoulder but with continued LOM, loss of strength and decreased dynamic stability     Sandra Cruz Is progressing well towards her goals.   Pt prognosis is Guarded.     Pt will continue to benefit from skilled outpatient physical therapy to address the deficits listed in the problem list box on initial evaluation, provide pt/family education and to maximize pt's level of independence in the home and community environment.     Pt's spiritual, cultural and educational needs considered and pt agreeable to plan of care and goals.    Anticipated barriers to physical therapy: none    Goals:   Short-Term Goals: 8 weeks  - The patient will be independent with initial home exercise program.  - The patient is independent with donning/doffing sling to protect tissue healing.  - The patient will demonstrate good unsupported sitting posture with min verbal cues for 15 minutes.  - The patient will increase ROM 15 degrees to perform bathing  and hygiene, grooming, toileting and dressing with pain < 010.  - The patient will increase strength by 1.2 muscle grade  to perform bathing and hygiene, grooming, toileting and dressing with pain < 0/10.     Long-Term Goals: 36 weeks  - The patient will be independent with home exercise program and symptom management.  - The patient will increase ROM = to uninvolved shoulder  to perform work duties and recreation/leisure activities   with pain < 0/10.  - The patient will increase strength = to uninvolved shoulder  to perform work duties and recreation/leisure activities   with pain < 0/10.         Plan     Focus on ROM and creating dynamic stability     Continue with established Plan of Care towards PT goals with focus on decreasing pain, increasing ROM, strength, neuromuscular control and functional status       Pipo Avelar, PT

## 2022-04-11 NOTE — PROGRESS NOTES
Chief Complaint: Right shoulder pain    HISTORY OF PRESENT ILLNESS:   Pt is here today for post-operative followup of shoulder arthroscopy.  she is doing well.  We have reviewed patient's findings and discussed plan of care and future treatment options.      Patient has been attending physical therapy at the [facility] location, working with Pipo APONTE.    She notes that she is doing well    SANE preop 25  SANE 25    DATE OF PROCEDURE: 01/13/2022  SURGEON: Ann Anderson M.D.  OPERATION:   right  1. Shoulder arthroscopic anterior capsulorrhaphy (CPT 56679) (complex, -22 modifier)  2. Shoulder arthroscopic anterior labral repair (CPT 11268)   3. Shoulder arthroscopic posterior labral repair, capsulorrhaphy, 7:00 position (CPT 63726)  4. Shoulder arthroscopic extensive debridement (anterior, posterior glenohumeral joint) (CPT 71465)  5. Shoulder manipulation under anesthesia (CPT 76085)  6. Shoulder arthroscopic lysis of adhesions (CPT 08440):     7 anchors were used in total.                                                                                PHYSICAL EXAMINATION:     Incision sites healed well  No evidence of any erythema, infection or induration  elbow Range of motion full   No effusion  2+ Radial pulses  No swelling        Forward Flexion: 110  ER: 30  IR: L5                                                                           ASSESSMENT:                                                                                                                                               1. Status post above, doing well.                                                                                                                               PLAN:                                                                                                                                                     1. Continue PT  2. Emphasized scapular function.  3. I have discussed return to activity in detail.  4. Patient will  see us back in 6 weeks.                                      5. All questions were answered and the patient should contact us if she  has any questions or concerns in the interim.

## 2022-04-12 ENCOUNTER — TELEPHONE (OUTPATIENT)
Dept: INTERNAL MEDICINE | Facility: CLINIC | Age: 37
End: 2022-04-12
Payer: COMMERCIAL

## 2022-04-13 ENCOUNTER — CLINICAL SUPPORT (OUTPATIENT)
Dept: REHABILITATION | Facility: HOSPITAL | Age: 37
End: 2022-04-13
Attending: PHYSICIAN ASSISTANT
Payer: COMMERCIAL

## 2022-04-13 DIAGNOSIS — M62.81 MUSCLE WEAKNESS OF RIGHT UPPER EXTREMITY: Primary | ICD-10-CM

## 2022-04-13 PROCEDURE — 97110 THERAPEUTIC EXERCISES: CPT | Performed by: PHYSICAL THERAPIST

## 2022-04-13 NOTE — PROGRESS NOTES
Physical Therapy Daily Treatment Note   [facility]      Visit Date: 4/13/2022    Name: Karen Purdy  Clinic Number: [id number]    Therapy Diagnosis:   Chronic R shoulder pain   Physician: Davy Herrera III, *    Dr Anderson     Physician Orders: PT Eval and Treat   Medical Diagnosis from Referral:   M25.311 (ICD-10-CM) - Shoulder instability, right M75.21 (ICD-10-CM) - Biceps tendinitis of right upper extremity      Evaluation Date: 2/3/2022  Authorization Period Expiration: pending  Plan of Care Expiration: 11/3/22  Progress Note Due: 5/3/22  Visit # / Visits authorized: 11/ 1   FOTO: Issued Visit # 2        Time In:  9:30  Time Out: 10:30  Total Billable Time: 45 minutes     Precautions: Standard + surgical     DATE OF PROCEDURE: 01/13/2022     SURGEON: Ann Anderson M.D.    ASSISTANT: BART Hannah M.D.,PGY 3  ASSISTANT: ENIO Herrera PA-C     OPERATION:   right  1. Shoulder arthroscopic anterior capsulorrhaphy (CPT 26380) (complex, -22 modifier)  2. Shoulder arthroscopic anterior labral repair (CPT 70376)   3. Shoulder arthroscopic posterior labral repair, capsulorrhaphy, 7:00 position (CPT 63492)  4. Shoulder arthroscopic extensive debridement (anterior, posterior glenohumeral joint) (CPT 74156)  5. Shoulder manipulation under anesthesia (CPT 25454)  6. Shoulder arthroscopic lysis of adhesions (CPT 81893):     Total of 7 a,chors posteriorly at 6:30, 7:30, 8:30, 10 and anteriorly at 5, 4, 3 o'clock      POST OPERATIVE PLAN: We will follow the arthroscopic Bankart repair guidelines. We discussed with the patient's family after surgery. The patient will remain in a sling for 6 weeks. We will start PT at the 3 week anthony.     Quality of tissue: Fair    Quality of the repair: Good    Subjective      Pt reports increased tightness in R UT with attempted AROM elevation due to tightness in R shoulder     she was compliant with home exercise program given last session.   Response to previous treatment:good   Functional  change: see subjective above     Pain:   NA /10 at rest    Location: right shoulder      Objective     PO 83    Measurements taken:   None taken this visit     (PROM s' flex 110 deg )  (AROM elevation to 90 deg, PROM s' flex 100 deg)   (AROM 75 deg elevation)   (AROM flex 60 deg elevation)   (PROM flex 45 deg, abd 45 deg)     Sandra Cruz received therapeutic exercises to develop strength, endurance, ROM, flexibility and posture for 45  minutes including:    Arm prop x 7' large blue bolster   Pendulum swings 1x30 4D 0#  Cross body stretch 3x;15  IR hang 3'x2 0#  Extensive PROM 4D     Home Exercises Provided and Patient Education Provided     Education provided:   - connective tissue healing model     Written Home Exercises Provided: Patient instructed to cont prior HEP.  Exercises were reviewed and Sandra Cruz was able to demonstrate them prior to the end of the session.  Sandra Cruz demonstrated good  understanding of the education provided.     See EMR under hand out  for exercises provided prior visit  3/21/2022      Assessment     darryl Rx without an increase in sxs,  Pt continues with significant LOM but can reach opposite shoulder and axilla    Sandra Cruz Is progressing well towards her goals.   Pt prognosis is Guarded.     Pt will continue to benefit from skilled outpatient physical therapy to address the deficits listed in the problem list box on initial evaluation, provide pt/family education and to maximize pt's level of independence in the home and community environment.     Pt's spiritual, cultural and educational needs considered and pt agreeable to plan of care and goals.    Anticipated barriers to physical therapy: none    Goals:   Short-Term Goals: 8 weeks  - The patient will be independent with initial home exercise program.  - The patient is independent with donning/doffing sling to protect tissue healing.  - The patient will demonstrate good unsupported sitting posture with min verbal cues for 15  minutes.  - The patient will increase ROM 15 degrees to perform bathing and hygiene, grooming, toileting and dressing with pain < 010.  - The patient will increase strength by 1.2 muscle grade  to perform bathing and hygiene, grooming, toileting and dressing with pain < 0/10.     Long-Term Goals: 36 weeks  - The patient will be independent with home exercise program and symptom management.  - The patient will increase ROM = to uninvolved shoulder  to perform work duties and recreation/leisure activities   with pain < 0/10.  - The patient will increase strength = to uninvolved shoulder  to perform work duties and recreation/leisure activities   with pain < 0/10.         Plan     Focus on ROM and creating dynamic stability     Continue with established Plan of Care towards PT goals with focus on decreasing pain, increasing ROM, strength, neuromuscular control and functional status       Pipo Avelar, PT

## 2022-04-19 ENCOUNTER — CLINICAL SUPPORT (OUTPATIENT)
Dept: REHABILITATION | Facility: HOSPITAL | Age: 37
End: 2022-04-19
Attending: PHYSICIAN ASSISTANT
Payer: COMMERCIAL

## 2022-04-19 DIAGNOSIS — M47.812 CERVICAL SPONDYLOSIS: ICD-10-CM

## 2022-04-19 DIAGNOSIS — M54.2 CERVICALGIA: ICD-10-CM

## 2022-04-19 DIAGNOSIS — M54.2 NECK PAIN: ICD-10-CM

## 2022-04-19 DIAGNOSIS — M62.81 MUSCLE WEAKNESS OF RIGHT UPPER EXTREMITY: Primary | ICD-10-CM

## 2022-04-19 PROCEDURE — 97112 NEUROMUSCULAR REEDUCATION: CPT | Performed by: PHYSICAL THERAPIST
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
## 2022-04-19 NOTE — PROGRESS NOTES
Physical Therapy Daily Treatment Note   [facility]      Visit Date: 4/19/2022    Name: Karen Purdy  Clinic Number: [id number]    Therapy Diagnosis:   Chronic R shoulder pain   Physician: Davy Herrera III, *    Dr Anderson     Physician Orders: PT Eval and Treat   Medical Diagnosis from Referral:   M25.311 (ICD-10-CM) - Shoulder instability, right M75.21 (ICD-10-CM) - Biceps tendinitis of right upper extremity      Evaluation Date: 2/3/2022  Authorization Period Expiration: pending  Plan of Care Expiration: 11/3/22  Progress Note Due: 5/3/22  Visit # / Visits authorized: 12/ 1   FOTO: Issued Visit # 2        Time In:  13:00  Time Out: 14:00  Total Billable Time: 45 minutes     Precautions: Standard + surgical     DATE OF PROCEDURE: 01/13/2022     SURGEON: Ann Anderson M.D.    ASSISTANT: BART Hannah M.D.,PGY 3  ASSISTANT: ENIO Herrera PAMARK     OPERATION:   right  1. Shoulder arthroscopic anterior capsulorrhaphy (CPT 86534) (complex, -22 modifier)  2. Shoulder arthroscopic anterior labral repair (CPT 65664)   3. Shoulder arthroscopic posterior labral repair, capsulorrhaphy, 7:00 position (CPT 34856)  4. Shoulder arthroscopic extensive debridement (anterior, posterior glenohumeral joint) (CPT 57090)  5. Shoulder manipulation under anesthesia (CPT 43021)  6. Shoulder arthroscopic lysis of adhesions (CPT 49223):     Total of 7 a,chors posteriorly at 6:30, 7:30, 8:30, 10 and anteriorly at 5, 4, 3 o'clock      POST OPERATIVE PLAN: We will follow the arthroscopic Bankart repair guidelines. We discussed with the patient's family after surgery. The patient will remain in a sling for 6 weeks. We will start PT at the 3 week anthony.     Quality of tissue: Fair    Quality of the repair: Good    Subjective      Pt reports no new c/o this PM in R shoulder, continues to have stiffness and pain radiating into c' region     she was compliant with home exercise program given last session.   Response to previous treatment:good    Functional change: none this visit     Pain:   NA /10 at rest    Location: right shoulder      Objective     PO 89    Measurements taken:   Able to get FIR to glut     (PROM s' flex 110 deg )  (AROM elevation to 90 deg, PROM s' flex 100 deg)   (AROM 75 deg elevation)   (AROM flex 60 deg elevation)   (PROM flex 45 deg, abd 45 deg)     Patient participated in neuromuscular re-education activities to improve: Proprioception for 45  minutes. The following activities were included:     Arm prop x 7' small  blue bolster   Extensive PROM 4D   scap repositioning 1x10:10  TB iso walk ER yll 3xburn   TB iso walk IR Or 3x5  Alphabet FW and POS 3xburn bball     Home Exercises Provided and Patient Education Provided     Education provided:   - connective tissue healing model     Written Home Exercises Provided: Patient instructed to cont prior HEP.  Exercises were reviewed and Sandra Cruz was able to demonstrate them prior to the end of the session.  Sandra Cruz demonstrated good  understanding of the education provided.     See EMR under hand out  for exercises provided prior visit  3/21/2022      Assessment     darryl Rx without an increase in sxs,  Pt able to get arm behind body with some difficulty but demonstrates improvement, continues with LOM and loss of strength affecting ADLs    Sandra Cruz Is progressing well towards her goals.   Pt prognosis is Guarded.     Pt will continue to benefit from skilled outpatient physical therapy to address the deficits listed in the problem list box on initial evaluation, provide pt/family education and to maximize pt's level of independence in the home and community environment.     Pt's spiritual, cultural and educational needs considered and pt agreeable to plan of care and goals.    Anticipated barriers to physical therapy: none    Goals:   Short-Term Goals: 8 weeks  - The patient will be independent with initial home exercise program.  - The patient is independent with  donning/doffing sling to protect tissue healing.  - The patient will demonstrate good unsupported sitting posture with min verbal cues for 15 minutes.  - The patient will increase ROM 15 degrees to perform bathing and hygiene, grooming, toileting and dressing with pain < 010.  - The patient will increase strength by 1.2 muscle grade  to perform bathing and hygiene, grooming, toileting and dressing with pain < 0/10.     Long-Term Goals: 36 weeks  - The patient will be independent with home exercise program and symptom management.  - The patient will increase ROM = to uninvolved shoulder  to perform work duties and recreation/leisure activities   with pain < 0/10.  - The patient will increase strength = to uninvolved shoulder  to perform work duties and recreation/leisure activities   with pain < 0/10.         Plan     Focus on ROM and creating dynamic stability     Continue with established Plan of Care towards PT goals with focus on decreasing pain, increasing ROM, strength, neuromuscular control and functional status       Pipo Avelar, PT

## 2022-04-21 ENCOUNTER — CLINICAL SUPPORT (OUTPATIENT)
Dept: REHABILITATION | Facility: HOSPITAL | Age: 37
End: 2022-04-21
Attending: PHYSICIAN ASSISTANT
Payer: COMMERCIAL

## 2022-04-21 ENCOUNTER — PATIENT MESSAGE (OUTPATIENT)
Dept: NEUROLOGY | Facility: CLINIC | Age: 37
End: 2022-04-21
Payer: COMMERCIAL

## 2022-04-21 ENCOUNTER — PATIENT MESSAGE (OUTPATIENT)
Dept: INTERNAL MEDICINE | Facility: CLINIC | Age: 37
End: 2022-04-21
Payer: COMMERCIAL

## 2022-04-21 ENCOUNTER — PATIENT MESSAGE (OUTPATIENT)
Dept: SPORTS MEDICINE | Facility: CLINIC | Age: 37
End: 2022-04-21
Payer: COMMERCIAL

## 2022-04-21 ENCOUNTER — TELEPHONE (OUTPATIENT)
Dept: NEUROLOGY | Facility: CLINIC | Age: 37
End: 2022-04-21
Payer: COMMERCIAL

## 2022-04-21 DIAGNOSIS — M62.81 MUSCLE WEAKNESS OF RIGHT UPPER EXTREMITY: Primary | ICD-10-CM

## 2022-04-21 PROCEDURE — 97112 NEUROMUSCULAR REEDUCATION: CPT | Performed by: PHYSICAL THERAPIST

## 2022-04-21 PROCEDURE — 97110 THERAPEUTIC EXERCISES: CPT | Performed by: PHYSICAL THERAPIST

## 2022-04-21 NOTE — TELEPHONE ENCOUNTER
----- Message from Fanny Lagos PA-C sent at 4/20/2022  2:09 PM CDT -----  Regarding: FW: Signs of VBI  Pt needs to be seen by her neurologist to r/o vertebral artery insufficiency. I believe she's seen a couple different people in our dept, maybe someone more vascular would be appropriate to further assess. Or if they prefer to advise me further on this, that is also fine. If you're not sure, just schedule an appt w/ her previous neurologist or resident clinic.  ----- Message -----  From: Alexis Mancia PT  Sent: 4/20/2022  11:24 AM CDT  To: Fanny Lagos PA-C, [place] [place] Staff  Subject: Signs of VBI                                     Maren Turpin,           I was able to evaluate Ms. Karen Cruz yesterday for neck pain. She has a wide range of complaints which makes diagnostic testing challenging; however she has specific complaints of dizziness, fainting, headaches, and visual disturbances are signs we look for with vertebral artery insufficiency. Due to these complaints I completed a maximal rotation test and  cervical torsion test, both of which were positive. Unfortunately our clinical testing in these areas is not very accurate, ut given her complaints and positive testing I think she may need further diagnostic testing to rule this out. She already has a Neuro appointment with GINGER Lagos on 4/25 so I have cc'd her on this message.    Please let me know if there is any other information I can supply,  Alexis Mancia PT, DPT  Board Certified Orthopedic Specialist

## 2022-04-21 NOTE — PROGRESS NOTES
Physical Therapy Daily Treatment Note   [facility]      Visit Date: 4/21/2022    Name: Karen Purdy  Clinic Number: [id number]    Therapy Diagnosis:   Chronic R shoulder pain   Physician: Davy Herrera III, *    Dr Anderson     Physician Orders: PT Eval and Treat   Medical Diagnosis from Referral:   M25.311 (ICD-10-CM) - Shoulder instability, right M75.21 (ICD-10-CM) - Biceps tendinitis of right upper extremity      Evaluation Date: 2/3/2022  Authorization Period Expiration: pending  Plan of Care Expiration: 11/3/22  Progress Note Due: 5/3/22  Visit # / Visits authorized: 13/ 1   FOTO: Issued Visit # 2        Time In:  14:30  Time Out: 1515  Total Billable Time: 45 minutes     Precautions: Standard + surgical     DATE OF PROCEDURE: 01/13/2022     SURGEON: Ann Anderson M.D.    ASSISTANT: BART Hannah M.D.,PGY 3  ASSISTANT: ENIO Herrera PA-C     OPERATION:   right  1. Shoulder arthroscopic anterior capsulorrhaphy (CPT 82031) (complex, -22 modifier)  2. Shoulder arthroscopic anterior labral repair (CPT 66721)   3. Shoulder arthroscopic posterior labral repair, capsulorrhaphy, 7:00 position (CPT 57183)  4. Shoulder arthroscopic extensive debridement (anterior, posterior glenohumeral joint) (CPT 58047)  5. Shoulder manipulation under anesthesia (CPT 19632)  6. Shoulder arthroscopic lysis of adhesions (CPT 04875):     Total of 7 a,chors posteriorly at 6:30, 7:30, 8:30, 10 and anteriorly at 5, 4, 3 o'clock      POST OPERATIVE PLAN: We will follow the arthroscopic Bankart repair guidelines. We discussed with the patient's family after surgery. The patient will remain in a sling for 6 weeks. We will start PT at the 3 week anthony.     Quality of tissue: Fair    Quality of the repair: Good    Subjective      Pt reports continued soreness in c' region more so than R shoulder     she was compliant with home exercise program given last session.   Response to previous treatment:good   Functional change: none this visit     Pain:    NA /10 at rest    Location: right shoulder      Objective     PO 91    Measurements taken:   None taken this visit     (Able to get FIR to glut)   (PROM s' flex 110 deg )  (AROM elevation to 90 deg, PROM s' flex 100 deg)   (AROM 75 deg elevation)   (AROM flex 60 deg elevation)   (PROM flex 45 deg, abd 45 deg)     Patient participated in neuromuscular re-education activities to improve: Proprioception for 15  minutes. The following activities were included:     scap repositioning 1x10:10  Supine RS 90 and 90-90 3 rounds EC  S/L RS 90 and ER 3 rounds EC  Quadruped tripod 5x:10 EO     Patient received  therapy to increase strength x 30' with  activities as follows:     TB row Or 2x15;05   TB s' ext Yll 2x15:05   TB ER 3x15 yll   TB IR 3x15 Or  scap dep/retraction with arm supported on CC 2x10:05       Home Exercises Provided and Patient Education Provided     Education provided:   - connective tissue healing model     Written Home Exercises Provided: Patient instructed to cont prior HEP.  Exercises were reviewed and Sandra Nancy was able to demonstrate them prior to the end of the session.  Sandra Benjamince demonstrated good  understanding of the education provided.     See EMR under hand out  for exercises provided prior visit  3/21/2022      Assessment     darryl Rx without an increase in sxs,  Pt demonstrated improved AROM in elevation in R shoulder, has continued soreness posteriorly in R scap region and c' area     Sandra Cruz Is progressing well towards her goals.   Pt prognosis is Guarded.     Pt will continue to benefit from skilled outpatient physical therapy to address the deficits listed in the problem list box on initial evaluation, provide pt/family education and to maximize pt's level of independence in the home and community environment.     Pt's spiritual, cultural and educational needs considered and pt agreeable to plan of care and goals.    Anticipated barriers to physical therapy: none    Goals:    Short-Term Goals: 8 weeks  - The patient will be independent with initial home exercise program.  - The patient is independent with donning/doffing sling to protect tissue healing.  - The patient will demonstrate good unsupported sitting posture with min verbal cues for 15 minutes.  - The patient will increase ROM 15 degrees to perform bathing and hygiene, grooming, toileting and dressing with pain < 010.  - The patient will increase strength by 1.2 muscle grade  to perform bathing and hygiene, grooming, toileting and dressing with pain < 0/10.     Long-Term Goals: 36 weeks  - The patient will be independent with home exercise program and symptom management.  - The patient will increase ROM = to uninvolved shoulder  to perform work duties and recreation/leisure activities   with pain < 0/10.  - The patient will increase strength = to uninvolved shoulder  to perform work duties and recreation/leisure activities   with pain < 0/10.         Plan     Focus on ROM and creating dynamic stability     Continue with established Plan of Care towards PT goals with focus on decreasing pain, increasing ROM, strength, neuromuscular control and functional status       Pipo Avelar, PT

## 2022-04-22 ENCOUNTER — TELEPHONE (OUTPATIENT)
Dept: NEUROLOGY | Facility: CLINIC | Age: 37
End: 2022-04-22

## 2022-04-22 NOTE — TELEPHONE ENCOUNTER
----- Message from Damaris Shetty MA sent at 4/21/2022  4:28 PM CDT -----  Regarding: FW: Appt Inquiry    ----- Message -----  From: Kennedi Mac  Sent: 4/21/2022  11:34 AM CDT  To: [facility] Staff  Subject: Appt Inquiry                                     Pt called about appt set on 6/21, pt states she will be out of town all summer and needs an appt before 6/1 please.     Can patient be contacted on [employer]:YES       Requesting Call back number:[phone]

## 2022-04-25 ENCOUNTER — TELEPHONE (OUTPATIENT)
Dept: NEUROLOGY | Facility: CLINIC | Age: 37
End: 2022-04-25

## 2022-04-25 ENCOUNTER — CLINICAL SUPPORT (OUTPATIENT)
Dept: REHABILITATION | Facility: HOSPITAL | Age: 37
End: 2022-04-25
Payer: COMMERCIAL

## 2022-04-25 DIAGNOSIS — M62.81 MUSCLE WEAKNESS OF RIGHT UPPER EXTREMITY: Primary | ICD-10-CM

## 2022-04-25 PROCEDURE — 97110 THERAPEUTIC EXERCISES: CPT | Performed by: PHYSICAL THERAPIST

## 2022-04-25 NOTE — TELEPHONE ENCOUNTER
Spoke with the patient and rescheduled appt to April 29.    Per Fanny, patient signed out of the visit before appointment began. Provider waited for 15mins then logged out. Log history showed that the patient logged back in at 3:50. Patient has told me that she signed off from phone to log back in to a computer for the appointment.      Informed the patient that she needs to stay signed in on one device. If she signs out, she can not do the visit.

## 2022-04-25 NOTE — TELEPHONE ENCOUNTER
----- Message from Jude Jarquin sent at 4/25/2022  3:49 PM CDT -----  Contact: WILLIE CALVIN [[id number]]  Type: Patient Call Back    Who called:WILLIE CALVIN [[id number]]    What is the request in detail: The patient is concerned about wait time on virtual visit     Can the clinic reply by [facility]?    Would the patient rather a call back or a response via [facility]?     Best call back number: [phone] (mobile)    Additional Information:

## 2022-04-25 NOTE — PROGRESS NOTES
Physical Therapy Daily Treatment Note   [facility]      Visit Date: 4/25/2022    Name: Karen Purdy  Clinic Number: [id number]    Therapy Diagnosis:   Chronic R shoulder pain   Physician: Davy Herrera III, *    Dr Anderson     Physician Orders: PT Eval and Treat   Medical Diagnosis from Referral:   M25.311 (ICD-10-CM) - Shoulder instability, right M75.21 (ICD-10-CM) - Biceps tendinitis of right upper extremity      Evaluation Date: 2/3/2022  Authorization Period Expiration: pending  Plan of Care Expiration: 11/3/22  Progress Note Due: 5/3/22  Visit # / Visits authorized: 14/ 1   FOTO: Issued Visit # 2        Time In:  13:30  Time Out: 1400  Total Billable Time: 30 minutes     Precautions: Standard + surgical     DATE OF PROCEDURE: 01/13/2022     SURGEON: Ann Anderson M.D.    ASSISTANT: BART Hannah M.D.,PGY 3  ASSISTANT: ENIO Herrera PAMARK     OPERATION:   right  1. Shoulder arthroscopic anterior capsulorrhaphy (CPT 56536) (complex, -22 modifier)  2. Shoulder arthroscopic anterior labral repair (CPT 17571)   3. Shoulder arthroscopic posterior labral repair, capsulorrhaphy, 7:00 position (CPT 35598)  4. Shoulder arthroscopic extensive debridement (anterior, posterior glenohumeral joint) (CPT 36722)  5. Shoulder manipulation under anesthesia (CPT 31036)  6. Shoulder arthroscopic lysis of adhesions (CPT 39908):     Total of 7 a,chors posteriorly at 6:30, 7:30, 8:30, 10 and anteriorly at 5, 4, 3 o'clock      POST OPERATIVE PLAN: We will follow the arthroscopic Bankart repair guidelines. We discussed with the patient's family after surgery. The patient will remain in a sling for 6 weeks. We will start PT at the 3 week anthony.     Quality of tissue: Fair    Quality of the repair: Good    Subjective      Pt reports no new c/o this PM in R shoulder, pt willing to attempt Calvin as tolerated   Pt continues with posterior shoulder and c' soreness     she was compliant with home exercise program given last session.   Response to  previous treatment:good   Functional change: none this visit     Pain:   NA /10 at rest    Location: right shoulder      Objective     PO 95    Measurements taken:   None taken this visit     (Able to get FIR to glut)   (PROM s' flex 110 deg )  (AROM elevation to 90 deg, PROM s' flex 100 deg)   (AROM 75 deg elevation)   (AROM flex 60 deg elevation)   (PROM flex 45 deg, abd 45 deg)     Patient received  therapy to increase strength x 30' with  activities as follows:     Supine lying over 1/2 roll x 5'   Extensive PROM 4D   Wand flex 5x;15  Ball on wall OH 3xburn green   Pulley POS and flex 1x15 each  AROM abd w/ s' hiking correction 3x5:01 0#    Home Exercises Provided and Patient Education Provided     Education provided:   - connective tissue healing model     Written Home Exercises Provided: Patient instructed to cont prior HEP.  Exercises were reviewed and Sandra Cruz was able to demonstrate them prior to the end of the session.  Sandra Cruz demonstrated good  understanding of the education provided.     See EMR under hand out  for exercises provided prior visit  3/21/2022      Assessment     darryl Rx without an increase in sxs,  Pt able to correct s' hiking     Sandra Cruz Is progressing well towards her goals.   Pt prognosis is Guarded.     Pt will continue to benefit from skilled outpatient physical therapy to address the deficits listed in the problem list box on initial evaluation, provide pt/family education and to maximize pt's level of independence in the home and community environment.     Pt's spiritual, cultural and educational needs considered and pt agreeable to plan of care and goals.    Anticipated barriers to physical therapy: none    Goals:   Short-Term Goals: 8 weeks  - The patient will be independent with initial home exercise program.  - The patient is independent with donning/doffing sling to protect tissue healing.  - The patient will demonstrate good unsupported sitting posture with min  verbal cues for 15 minutes.  - The patient will increase ROM 15 degrees to perform bathing and hygiene, grooming, toileting and dressing with pain < 010.  - The patient will increase strength by 1.2 muscle grade  to perform bathing and hygiene, grooming, toileting and dressing with pain < 0/10.     Long-Term Goals: 36 weeks  - The patient will be independent with home exercise program and symptom management.  - The patient will increase ROM = to uninvolved shoulder  to perform work duties and recreation/leisure activities   with pain < 0/10.  - The patient will increase strength = to uninvolved shoulder  to perform work duties and recreation/leisure activities   with pain < 0/10.         Plan     Focus on ROM and creating dynamic stability     Continue with established Plan of Care towards PT goals with focus on decreasing pain, increasing ROM, strength, neuromuscular control and functional status       Pipo Avelar, PT

## 2022-04-26 ENCOUNTER — PATIENT OUTREACH (OUTPATIENT)
Dept: ADMINISTRATIVE | Facility: OTHER | Age: 37
End: 2022-04-26
Payer: COMMERCIAL

## 2022-04-26 ENCOUNTER — OFFICE VISIT (OUTPATIENT)
Dept: SPINE | Facility: CLINIC | Age: 37
End: 2022-04-26
Attending: ANESTHESIOLOGY
Payer: COMMERCIAL

## 2022-04-26 VITALS
WEIGHT: 115.06 LBS | HEART RATE: 64 BPM | BODY MASS INDEX: 19.64 KG/M2 | DIASTOLIC BLOOD PRESSURE: 76 MMHG | HEIGHT: 64 IN | SYSTOLIC BLOOD PRESSURE: 112 MMHG

## 2022-04-26 DIAGNOSIS — G43.009 MIGRAINE WITHOUT AURA AND WITHOUT STATUS MIGRAINOSUS, NOT INTRACTABLE: ICD-10-CM

## 2022-04-26 DIAGNOSIS — M79.18 MYOFASCIAL PAIN: Primary | ICD-10-CM

## 2022-04-26 PROCEDURE — 99214 OFFICE O/P EST MOD 30 MIN: CPT | Mod: 25,S$GLB,, | Performed by: ANESTHESIOLOGY

## 2022-04-26 PROCEDURE — 99999 PR PBB SHADOW E&M-EST. PATIENT-LVL III: ICD-10-PCS | Mod: PBBFAC,,, | Performed by: ANESTHESIOLOGY

## 2022-04-26 PROCEDURE — 99999 PR PBB SHADOW E&M-EST. PATIENT-LVL III: CPT | Mod: PBBFAC,,, | Performed by: ANESTHESIOLOGY

## 2022-04-26 PROCEDURE — 1159F PR MEDICATION LIST DOCUMENTED IN MEDICAL RECORD: ICD-10-PCS | Mod: CPTII,S$GLB,, | Performed by: ANESTHESIOLOGY

## 2022-04-26 PROCEDURE — 3074F SYST BP LT 130 MM HG: CPT | Mod: CPTII,S$GLB,, | Performed by: ANESTHESIOLOGY

## 2022-04-26 PROCEDURE — 1160F RVW MEDS BY RX/DR IN RCRD: CPT | Mod: CPTII,S$GLB,, | Performed by: ANESTHESIOLOGY

## 2022-04-26 PROCEDURE — 20552 PR INJECT TRIGGER POINT, 1 OR 2: ICD-10-PCS | Mod: S$GLB,,, | Performed by: ANESTHESIOLOGY

## 2022-04-26 PROCEDURE — 3074F PR MOST RECENT SYSTOLIC BLOOD PRESSURE < 130 MM HG: ICD-10-PCS | Mod: CPTII,S$GLB,, | Performed by: ANESTHESIOLOGY

## 2022-04-26 PROCEDURE — 3078F PR MOST RECENT DIASTOLIC BLOOD PRESSURE < 80 MM HG: ICD-10-PCS | Mod: CPTII,S$GLB,, | Performed by: ANESTHESIOLOGY

## 2022-04-26 PROCEDURE — 3008F BODY MASS INDEX DOCD: CPT | Mod: CPTII,S$GLB,, | Performed by: ANESTHESIOLOGY

## 2022-04-26 PROCEDURE — 3078F DIAST BP <80 MM HG: CPT | Mod: CPTII,S$GLB,, | Performed by: ANESTHESIOLOGY

## 2022-04-26 PROCEDURE — 99214 PR OFFICE/OUTPT VISIT, EST, LEVL IV, 30-39 MIN: ICD-10-PCS | Mod: 25,S$GLB,, | Performed by: ANESTHESIOLOGY

## 2022-04-26 PROCEDURE — 3008F PR BODY MASS INDEX (BMI) DOCUMENTED: ICD-10-PCS | Mod: CPTII,S$GLB,, | Performed by: ANESTHESIOLOGY

## 2022-04-26 PROCEDURE — 1159F MED LIST DOCD IN RCRD: CPT | Mod: CPTII,S$GLB,, | Performed by: ANESTHESIOLOGY

## 2022-04-26 PROCEDURE — 1160F PR REVIEW ALL MEDS BY PRESCRIBER/CLIN PHARMACIST DOCUMENTED: ICD-10-PCS | Mod: CPTII,S$GLB,, | Performed by: ANESTHESIOLOGY

## 2022-04-26 PROCEDURE — 20552 NJX 1/MLT TRIGGER POINT 1/2: CPT | Mod: S$GLB,,, | Performed by: ANESTHESIOLOGY

## 2022-04-26 RX ORDER — TIZANIDINE 2 MG/1
TABLET ORAL
Qty: 90 TABLET | Refills: 1 | Status: SHIPPED | OUTPATIENT
Start: 2022-04-26 | End: 2022-05-19 | Stop reason: SDUPTHER
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
## 2022-05-02 ENCOUNTER — CLINICAL SUPPORT (OUTPATIENT)
Dept: REHABILITATION | Facility: HOSPITAL | Age: 37
End: 2022-05-02
Attending: PHYSICIAN ASSISTANT
Payer: COMMERCIAL

## 2022-05-02 DIAGNOSIS — M62.81 MUSCLE WEAKNESS OF RIGHT UPPER EXTREMITY: Primary | ICD-10-CM

## 2022-05-02 PROCEDURE — 97110 THERAPEUTIC EXERCISES: CPT | Performed by: PHYSICAL THERAPIST

## 2022-05-02 NOTE — PROGRESS NOTES
Physical Therapy Daily Treatment Note   [facility]      Visit Date: 5/2/2022    Name: Karen uPrdy  Clinic Number: [id number]    Therapy Diagnosis:   Chronic R shoulder pain   Physician: Davy Herrera III, *    Dr Anderson     Physician Orders: PT Eval and Treat   Medical Diagnosis from Referral:   M25.311 (ICD-10-CM) - Shoulder instability, right M75.21 (ICD-10-CM) - Biceps tendinitis of right upper extremity      Evaluation Date: 2/3/2022  Authorization Period Expiration: pending  Plan of Care Expiration: 11/3/22  Progress Note Due: 5/3/22  Visit # / Visits authorized: 15/ 1   FOTO: Issued Visit # 2        Time In:  13:30  Time Out: 1415  Total Billable Time: 45 minutes     Precautions: Standard + surgical     DATE OF PROCEDURE: 01/13/2022     SURGEON: Ann Anderson M.D.    ASSISTANT: BART Hannah M.D.,PGY 3  ASSISTANT: ENIO Herrera PA-C     OPERATION:   right  1. Shoulder arthroscopic anterior capsulorrhaphy (CPT 43118) (complex, -22 modifier)  2. Shoulder arthroscopic anterior labral repair (CPT 54247)   3. Shoulder arthroscopic posterior labral repair, capsulorrhaphy, 7:00 position (CPT 41438)  4. Shoulder arthroscopic extensive debridement (anterior, posterior glenohumeral joint) (CPT 67265)  5. Shoulder manipulation under anesthesia (CPT 25206)  6. Shoulder arthroscopic lysis of adhesions (CPT 41152):     Total of 7 a,chors posteriorly at 6:30, 7:30, 8:30, 10 and anteriorly at 5, 4, 3 o'clock      POST OPERATIVE PLAN: We will follow the arthroscopic [employer] repair guidelines. We discussed with the patient's family after surgery. The patient will remain in a sling for 6 weeks. We will start PT at the 3 week anthony.     Quality of tissue: Fair    Quality of the repair: Good    Subjective      Pt reports increased soreness in R shoulder/c' region during ball on wall exercise and after that lasted the better part of a week, however, sxs back to baseline this PM     she was compliant with home exercise program  "given last session.   Response to previous treatment:good   Functional change: none this visit     Pain:   2 /10 at rest    Location: right shoulder      Objective         Measurements taken:   AROM elevation 122 deg, PROM flex 120, abd 90, ER 35, IR 23, FIR -12"     (Able to get FIR to glut)   (PROM s' flex 110 deg )  (AROM elevation to 90 deg, PROM s' flex 100 deg)   (AROM 75 deg elevation)   (AROM flex 60 deg elevation)   (PROM flex 45 deg, abd 45 deg)     Patient received  therapy to increase ROM x 45' with  activities as follows:     Supine lying over 1/2 roll x 5'   Trigger point release axilla and medial scap region   Extensive PROM 4D     Home Exercises Provided and Patient Education Provided     Education provided:   - coealing model     Written Home Exercises Provided: Patient instructed to cont prior HEP.  Exercises were reviewed and Sandra Cruz was able to demonstrate them prior to the end of the session.  Sandra Cruz demonstrated good  understanding of the education provided.     See EMR under hand out  for exercises provided prior visit  3/21/2022      Assessment     darryl Rx without an increase in sxs,  Focus of this session was on increasing ROM, pt demonstrated a within session improvement in ROM in elevation     Sandra Cruz Is progressing well towards her goals.   Pt prognosis is Guarded.     Pt will continue to benefit from skilled outpatient physical therapy to address the deficits listed in the problem list box on initial evaluation, provide pt/family education and to maximize pt's level of independence in the home and community environment.     Pt's spiritual, cultural and educational needs considered and pt agreeable to plan of care and goals.    Anticipated barriers to physical therapy: none    Goals:   Short-Term Goals: 8 weeks  - The patient will be independent with initial home exercise program.  - The patient is independent with donning/doffing sling to protect tissue healing.  - The " patient will demonstrate good unsupported sitting posture with min verbal cues for 15 minutes.  - The patient will increase ROM 15 degrees to perform bathing and hygiene, grooming, toileting and dressing with pain < 010.  - The patient will increase strength by 1.2 muscle grade  to perform bathing and hygiene, grooming, toileting and dressing with pain < 0/10.     Long-Term Goals: 36 weeks  - The patient will be independent with home exercise program and symptom management.  - The patient will increase ROM = to uninvolved shoulder  to perform work duties and recreation/leisure activities   with pain < 0/10.  - The patient will increase strength = to uninvolved shoulder  to perform work duties and recreation/leisure activities   with pain < 0/10.         Plan     Focus on ROM and creating dynamic stability     Continue with established Plan of Care towards PT goals with focus on decreasing pain, increasing ROM, strength, neuromuscular control and functional status       Pipo Avelar, PT

## 2022-05-04 ENCOUNTER — CLINICAL SUPPORT (OUTPATIENT)
Dept: REHABILITATION | Facility: HOSPITAL | Age: 37
End: 2022-05-04
Attending: PHYSICIAN ASSISTANT
Payer: COMMERCIAL

## 2022-05-04 DIAGNOSIS — M62.81 MUSCLE WEAKNESS OF RIGHT UPPER EXTREMITY: Primary | ICD-10-CM

## 2022-05-04 PROCEDURE — 97110 THERAPEUTIC EXERCISES: CPT | Performed by: PHYSICAL THERAPIST

## 2022-05-04 NOTE — PROGRESS NOTES
Physical Therapy Daily Treatment Note   [facility]      Visit Date: 5/4/2022    Name: Karen Purdy  Clinic Number: [id number]    Therapy Diagnosis:   Chronic R shoulder pain   Physician: Davy Herrera III, *    Dr Anderson     Physician Orders: PT Eval and Treat   Medical Diagnosis from Referral:   M25.311 (ICD-10-CM) - Shoulder instability, right M75.21 (ICD-10-CM) - Biceps tendinitis of right upper extremity      Evaluation Date: 2/3/2022  Authorization Period Expiration: pending  Plan of Care Expiration: 11/3/22  Progress Note Due: 5/3/22  Visit # / Visits authorized: 16/ 1   FOTO: Issued Visit # 2        Time In:   9:15  Time Out: 1015  Total Billable Time: 45 minutes     Precautions: Standard + surgical     DATE OF PROCEDURE: 01/13/2022     SURGEON: Ann Anderson M.D.    ASSISTANT: BART Hannah M.D.,PGY 3  ASSISTANT: ENIO Herrera PA-C     OPERATION:   right  1. Shoulder arthroscopic anterior capsulorrhaphy (CPT 69884) (complex, -22 modifier)  2. Shoulder arthroscopic anterior labral repair (CPT 14901)   3. Shoulder arthroscopic posterior labral repair, capsulorrhaphy, 7:00 position (CPT 06059)  4. Shoulder arthroscopic extensive debridement (anterior, posterior glenohumeral joint) (CPT 78363)  5. Shoulder manipulation under anesthesia (CPT 26889)  6. Shoulder arthroscopic lysis of adhesions (CPT 75714):     Total of 7 a,chors posteriorly at 6:30, 7:30, 8:30, 10 and anteriorly at 5, 4, 3 o'clock      POST OPERATIVE PLAN: We will follow the arthroscopic Bankart repair guidelines. We discussed with the patient's family after surgery. The patient will remain in a sling for 6 weeks. We will start PT at the 3 week anthony.     Quality of tissue: Fair    Quality of the repair: Good    Subjective      Pt reports no new c/o this AM in R shoulder, pt willing to attempt Calvin as tolerated     she was compliant with home exercise program given last session.   Response to previous treatment:good   Functional change: none this  "visit     Pain:   2 /10 at rest    Location: right shoulder      Objective         Measurements taken:   None taken this visit     (AROM elevation 122 deg, PROM flex 120, abd 90, ER 35, IR 23, FIR -12" )  (Able to get FIR to glut)   (PROM s' flex 110 deg )  (AROM elevation to 90 deg, PROM s' flex 100 deg)   (AROM 75 deg elevation)   (AROM flex 60 deg elevation)   (PROM flex 45 deg, abd 45 deg)     Patient received  therapy to increase ROM x 45' with  activities as follows:     Pendulum swings 1x30 4D 0#  No money 1x15:05   Trigger point release axilla and medial scap region   Extensive PROM 4D     Home Exercises Provided and Patient Education Provided     Education provided:   - coealing model     Written Home Exercises Provided: Patient instructed to cont prior HEP.  Exercises were reviewed and Sandra Cruz was able to demonstrate them prior to the end of the session.  Sandra Cruz demonstrated good  understanding of the education provided.     See EMR under hand out  for exercises provided prior visit       Assessment     darryl Rx without an increase in sxs,  Again, the Focus of this session was on increasing ROM, pt demonstrated a within session improvement in ROM in elevation     Sandra Cruz Is progressing well towards her goals.   Pt prognosis is Guarded.     Pt will continue to benefit from skilled outpatient physical therapy to address the deficits listed in the problem list box on initial evaluation, provide pt/family education and to maximize pt's level of independence in the home and community environment.     Pt's spiritual, cultural and educational needs considered and pt agreeable to plan of care and goals.    Anticipated barriers to physical therapy: none    Goals:   Short-Term Goals: 8 weeks  - The patient will be independent with initial home exercise program.  - The patient is independent with donning/doffing sling to protect tissue healing.  - The patient will demonstrate good unsupported sitting " posture with min verbal cues for 15 minutes.  - The patient will increase ROM 15 degrees to perform bathing and hygiene, grooming, toileting and dressing with pain < 010.  - The patient will increase strength by 1.2 muscle grade  to perform bathing and hygiene, grooming, toileting and dressing with pain < 0/10.     Long-Term Goals: 36 weeks  - The patient will be independent with home exercise program and symptom management.  - The patient will increase ROM = to uninvolved shoulder  to perform work duties and recreation/leisure activities   with pain < 0/10.  - The patient will increase strength = to uninvolved shoulder  to perform work duties and recreation/leisure activities   with pain < 0/10.         Plan     Focus on ROM and creating dynamic stability     Continue with established Plan of Care towards PT goals with focus on decreasing pain, increasing ROM, strength, neuromuscular control and functional status       Pipo Avelar, PT

## 2022-05-09 ENCOUNTER — CLINICAL SUPPORT (OUTPATIENT)
Dept: REHABILITATION | Facility: HOSPITAL | Age: 37
End: 2022-05-09
Attending: PHYSICIAN ASSISTANT
Payer: COMMERCIAL

## 2022-05-09 DIAGNOSIS — M62.81 MUSCLE WEAKNESS OF RIGHT UPPER EXTREMITY: Primary | ICD-10-CM

## 2022-05-09 PROCEDURE — 97110 THERAPEUTIC EXERCISES: CPT | Performed by: PHYSICAL THERAPIST

## 2022-05-09 NOTE — PROGRESS NOTES
Physical Therapy Daily Treatment Note   [facility] 1      Visit Date: 5/9/2022    Name: Karen Purdy  Clinic Number: [id number]    Therapy Diagnosis:   Chronic R shoulder pain   Physician: Davy Herrera III, *    Dr Anderson     Physician Orders: PT Eval and Treat   Medical Diagnosis from Referral:   M25.311 (ICD-10-CM) - Shoulder instability, right M75.21 (ICD-10-CM) - Biceps tendinitis of right upper extremity      Evaluation Date: 2/3/2022  Authorization Period Expiration: pending  Plan of Care Expiration: 11/3/22  Progress Note Due: 5/3/22  Visit # / Visits authorized: 17/ 1   FOTO: Issued Visit # 2        Time In:   13:15  Time Out: 14:15  Total Billable Time: 45 minutes     Precautions: Standard + surgical     DATE OF PROCEDURE: 01/13/2022     SURGEON: Ann Anderson M.D.    ASSISTANT: BART Hannah M.D.,PGY 3  ASSISTANT: ENIO Herrera PA-C     OPERATION:   right  1. Shoulder arthroscopic anterior capsulorrhaphy (CPT 48275) (complex, -22 modifier)  2. Shoulder arthroscopic anterior labral repair (CPT 37152)   3. Shoulder arthroscopic posterior labral repair, capsulorrhaphy, 7:00 position (CPT 20024)  4. Shoulder arthroscopic extensive debridement (anterior, posterior glenohumeral joint) (CPT 37463)  5. Shoulder manipulation under anesthesia (CPT 37058)  6. Shoulder arthroscopic lysis of adhesions (CPT 38919):     Total of 7 a,chors posteriorly at 6:30, 7:30, 8:30, 10 and anteriorly at 5, 4, 3 o'clock      POST OPERATIVE PLAN: We will follow the arthroscopic Bankart repair guidelines. We discussed with the patient's family after surgery. The patient will remain in a sling for 6 weeks. We will start PT at the 3 week anthony.     Quality of tissue: Fair    Quality of the repair: Good    Subjective      Pt reports no change in her R shoulder condition, one cc at this time is difficulty with FIR     she was compliant with home exercise program given last session.   Response to previous treatment:good   Functional change:  "none this visit     Pain:   2 /10 at rest    Location: right shoulder      Objective         Measurements taken:  None taken this visit     (AROM elevation 122 deg, PROM flex 120, abd 90, ER 35, IR 23, FIR -12" )  (Able to get FIR to glut)   (PROM s' flex 110 deg )  (AROM elevation to 90 deg, PROM s' flex 100 deg)   (AROM 75 deg elevation)   (AROM flex 60 deg elevation)   (PROM flex 45 deg, abd 45 deg)     received the following manual therapy techniques: Joint mobilizations were applied to the R shoulder   For 5  minutes.       Trigger point release axilla and IS region    Patient received  therapy to increase ROM x 45' with  activities as follows:     Pendulum swings 1x30 4D 1#  Cross body stretch 5x:15  Self stretch s' ext using EOT 5x;15  IR hang 3'x2 0#   Sleeper hang 5x:15  Prone AROM s' ext 2x10:01   Prone AROM IR 2x10:01   Extensive PROM 4D   Hands on hips 5x:15 HAdd to HAbd    Home Exercises Provided and Patient Education Provided     Education provided:   - coealing model     Written Home Exercises Provided: Patient instructed to cont prior HEP.  Exercises were reviewed and Sandra Cruz was able to demonstrate them prior to the end of the session.  Sandra Cruz demonstrated good  understanding of the education provided.     See EMR under hand out  for exercises provided prior visit       Assessment     darryl Rx without an increase in sxs,  Within session improvement in ROM in all motions    Sandra Cruz Is progressing well towards her goals.   Pt prognosis is Guarded.     Pt will continue to benefit from skilled outpatient physical therapy to address the deficits listed in the problem list box on initial evaluation, provide pt/family education and to maximize pt's level of independence in the home and community environment.     Pt's spiritual, cultural and educational needs considered and pt agreeable to plan of care and goals.    Anticipated barriers to physical therapy: none    Goals:   Short-Term " Goals: 8 weeks  - The patient will be independent with initial home exercise program.  - The patient is independent with donning/doffing sling to protect tissue healing.  - The patient will demonstrate good unsupported sitting posture with min verbal cues for 15 minutes.  - The patient will increase ROM 15 degrees to perform bathing and hygiene, grooming, toileting and dressing with pain < 010.  - The patient will increase strength by 1.2 muscle grade  to perform bathing and hygiene, grooming, toileting and dressing with pain < 0/10.     Long-Term Goals: 36 weeks  - The patient will be independent with home exercise program and symptom management.  - The patient will increase ROM = to uninvolved shoulder  to perform work duties and recreation/leisure activities   with pain < 0/10.  - The patient will increase strength = to uninvolved shoulder  to perform work duties and recreation/leisure activities   with pain < 0/10.         Plan     Focus on ROM and creating dynamic stability     Continue with established Plan of Care towards PT goals with focus on decreasing pain, increasing ROM, strength, neuromuscular control and functional status       Pipo Avelar, PT

## 2022-05-10 ENCOUNTER — CLINICAL SUPPORT (OUTPATIENT)
Dept: REHABILITATION | Facility: HOSPITAL | Age: 37
End: 2022-05-10
Attending: PHYSICIAN ASSISTANT
Payer: COMMERCIAL

## 2022-05-10 DIAGNOSIS — M54.2 NECK PAIN: Primary | ICD-10-CM

## 2022-05-10 PROCEDURE — 97112 NEUROMUSCULAR REEDUCATION: CPT | Performed by: PHYSICAL THERAPIST

## 2022-05-10 PROCEDURE — 97140 MANUAL THERAPY 1/> REGIONS: CPT | Performed by: PHYSICAL THERAPIST

## 2022-05-10 NOTE — PROGRESS NOTES
"  Physical Therapy Daily Treatment Note     [place] [facility] [place] [id number]    Therapy Diagnosis:   Encounter Diagnosis   Name Primary?    Neck pain Yes     Physician: Davy Herrera III, *    Visit Date: 5/10/2022  Physician Orders: PT Eval and Treat   Medical Diagnosis from Referral:   M54.2 (ICD-10-CM) - Cervicalgia   M47.812 (ICD-10-CM) - Cervical spondylosis         Evaluation Date: 4/19/2022  Authorization Period Expiration: 12/31/2022  Plan of Care Expiration: 7/30/2022  Visit # / Visits authorized: 1/ 1      FOTO 1:  FOTO 2:  FOTO 3:    Time In: 1110  Time Out: 1200  Total Billable Time: 50 minutes    Precautions: Standard, R labral repair 1/13/2022, anxiety, reports of fainting and dizziness    Due to dizziness with end range rotation to the left; Awaiting consultation with neurologist prior to manual interventions in the cervical spine or exercises which introduce end range extension/rotation.    Subjective     Pt reports: Able to work on her home exercises. Is having right upper trapezius discomfort and just feels tight. Unable to attend her neuro appointments bc of virtual visits issues. Will be seeing her neurologist in person on 5/19.     She was compliant with home exercise program.  Response to previous treatment: NA  Functional change: NA    Pain: 5/10  Location: right neck and shoulder      Objective       Sandra Cruz received the following manual therapy techniques: Joint mobilizations were applied to the: thoracic spine and sholder for 15 minutes, including:  Assessment of ROM and joint mobility  1st rib mobilization with breathing  2nd rib mobilization with breathing    Sandra Cruz participated in neuromuscular re-education activities to improve: Balance, Coordination and Kinesthetic for 40 minutes. The following activities were included:  Chin tuck 10 x 10"  Bio feedback cuff DNF NMR 10 x 10"  Chin tuck with rotation (30 degrees) in supine 2 x 5  Ball on wall neck " proprioceptive drill  FMP cervical flexion 3x  Self 1st rib mobilization.      Home Exercises Provided and Patient Education Provided     Education provided:   Diagnosis and prognosis  Possible causes of dizziness     Written Home Exercises Provided: yes.  Exercises were reviewed and Sandra Cruz was able to demonstrate them prior to the end of the session.  Sandra Cruz demonstrated good  understanding of the education provided.      See EMR under Patient Instructions for exercises provided 4/19/2022.    Assessment     Showed improvements with DNF strengthening. Irritation along L shoulder are obviously linked to her stabilization rehab, but she shows stiffness in the 1st and 2nd ribs. Mobilizations here did not reduce symptoms. Would tyler to modulate C2/3 but can not until VBI is cleared. Cervical flexion is very poor and upper trapezius is lengthened. Will slowly progress as able.     Sandra Cruz Is progressing well towards her goals.   Pt prognosis is Guarded.     Pt will continue to benefit from skilled outpatient physical therapy to address the deficits listed in the problem list box on initial evaluation, provide pt/family education and to maximize pt's level of independence in the home and community environment.     Pt's spiritual, cultural and educational needs considered and pt agreeable to plan of care and goals.  Anticipated Barriers for therapy: wide range of symptoms, recent shoulder surgery, chronicity    GOALS: Short Term Goals: 6 weeks  1.Report decreased neck pain  <   / =  3  /10  to increase tolerance for exercise  2. Increase cervical ROM by 5-10 degrees in order to perform ADLs with decreased difficulty.  3. Increase strength in B shoulders/scapular stabilizers by 1/3 MMT grade to increase tolerance for ADL and work activities.  4. Pt to tolerate HEP to improve ROM and independence with ADL's  5. Refer for VBI testing     Long Term Goals: 12 weeks  1.Report decreased neck pain  <   / =  0  /10  to  increase tolerance for ADLs  2. Increase UE/neck flexibility in order to improve posture.    3.Increased strength in B shoulders/scapular stabilizers to >/= 4/5 MMT grade to increase tolerance for ADL and work activities.    Plan   Plan of care Certification: 4/19/2022 to 6/30/2022.     Refer for diagnostic testing for arterial insufficiency      Outpatient Physical Therapy 1 times weekly for 6 weeks to include the following interventions: manual therapy, therapeutic exercise, therapeutic activities, and neuromuscular re-education.    Alexis Mancia, PT

## 2022-05-12 ENCOUNTER — PATIENT MESSAGE (OUTPATIENT)
Dept: SPORTS MEDICINE | Facility: CLINIC | Age: 37
End: 2022-05-12
Payer: COMMERCIAL

## 2022-05-19 ENCOUNTER — OFFICE VISIT (OUTPATIENT)
Dept: NEUROLOGY | Facility: CLINIC | Age: 37
End: 2022-05-19
Payer: COMMERCIAL

## 2022-05-19 VITALS
DIASTOLIC BLOOD PRESSURE: 84 MMHG | SYSTOLIC BLOOD PRESSURE: 125 MMHG | WEIGHT: 112.19 LBS | BODY MASS INDEX: 19.15 KG/M2 | HEIGHT: 64 IN | HEART RATE: 65 BPM

## 2022-05-19 DIAGNOSIS — I95.1 ORTHOSTASIS: ICD-10-CM

## 2022-05-19 DIAGNOSIS — G43.009 MIGRAINE WITHOUT AURA AND WITHOUT STATUS MIGRAINOSUS, NOT INTRACTABLE: Primary | ICD-10-CM

## 2022-05-19 DIAGNOSIS — M79.18 MYOFASCIAL PAIN: ICD-10-CM

## 2022-05-19 DIAGNOSIS — H81.4 VERTIGO OF CENTRAL ORIGIN: ICD-10-CM

## 2022-05-19 PROCEDURE — 3074F SYST BP LT 130 MM HG: CPT | Mod: CPTII,S$GLB,, | Performed by: PSYCHIATRY & NEUROLOGY

## 2022-05-19 PROCEDURE — 1159F PR MEDICATION LIST DOCUMENTED IN MEDICAL RECORD: ICD-10-PCS | Mod: CPTII,S$GLB,, | Performed by: PSYCHIATRY & NEUROLOGY

## 2022-05-19 PROCEDURE — 3008F BODY MASS INDEX DOCD: CPT | Mod: CPTII,S$GLB,, | Performed by: PSYCHIATRY & NEUROLOGY

## 2022-05-19 PROCEDURE — 99999 PR PBB SHADOW E&M-EST. PATIENT-LVL III: ICD-10-PCS | Mod: PBBFAC,,, | Performed by: PSYCHIATRY & NEUROLOGY

## 2022-05-19 PROCEDURE — 99215 PR OFFICE/OUTPT VISIT, EST, LEVL V, 40-54 MIN: ICD-10-PCS | Mod: S$GLB,,, | Performed by: PSYCHIATRY & NEUROLOGY

## 2022-05-19 PROCEDURE — 3079F PR MOST RECENT DIASTOLIC BLOOD PRESSURE 80-89 MM HG: ICD-10-PCS | Mod: CPTII,S$GLB,, | Performed by: PSYCHIATRY & NEUROLOGY

## 2022-05-19 PROCEDURE — 3008F PR BODY MASS INDEX (BMI) DOCUMENTED: ICD-10-PCS | Mod: CPTII,S$GLB,, | Performed by: PSYCHIATRY & NEUROLOGY

## 2022-05-19 PROCEDURE — 99215 OFFICE O/P EST HI 40 MIN: CPT | Mod: S$GLB,,, | Performed by: PSYCHIATRY & NEUROLOGY

## 2022-05-19 PROCEDURE — 99999 PR PBB SHADOW E&M-EST. PATIENT-LVL III: CPT | Mod: PBBFAC,,, | Performed by: PSYCHIATRY & NEUROLOGY

## 2022-05-19 PROCEDURE — 3074F PR MOST RECENT SYSTOLIC BLOOD PRESSURE < 130 MM HG: ICD-10-PCS | Mod: CPTII,S$GLB,, | Performed by: PSYCHIATRY & NEUROLOGY

## 2022-05-19 PROCEDURE — 1159F MED LIST DOCD IN RCRD: CPT | Mod: CPTII,S$GLB,, | Performed by: PSYCHIATRY & NEUROLOGY

## 2022-05-19 PROCEDURE — 3079F DIAST BP 80-89 MM HG: CPT | Mod: CPTII,S$GLB,, | Performed by: PSYCHIATRY & NEUROLOGY

## 2022-05-19 RX ORDER — TIZANIDINE 2 MG/1
2-4 TABLET ORAL NIGHTLY
Qty: 180 TABLET | Refills: 2 | Status: SHIPPED | OUTPATIENT
Start: 2022-05-19 | End: 2023-06-13 | Stop reason: SDUPTHER
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
## 2022-05-20 ENCOUNTER — CLINICAL SUPPORT (OUTPATIENT)
Dept: REHABILITATION | Facility: HOSPITAL | Age: 37
End: 2022-05-20
Attending: PHYSICIAN ASSISTANT
Payer: COMMERCIAL

## 2022-05-20 DIAGNOSIS — M62.81 MUSCLE WEAKNESS OF RIGHT UPPER EXTREMITY: Primary | ICD-10-CM

## 2022-05-20 PROCEDURE — 97110 THERAPEUTIC EXERCISES: CPT | Performed by: PHYSICAL THERAPIST

## 2022-05-20 NOTE — PROGRESS NOTES
"  Physical Therapy Daily Treatment Note   [facility]      Visit Date: 5/20/2022    Name: Karen Purdy  Clinic Number: [id number]    Therapy Diagnosis:   Chronic R shoulder pain   Physician: Davy Herrera III, *    Dr Anderson     Physician Orders: PT Eval and Treat   Medical Diagnosis from Referral:   M25.311 (ICD-10-CM) - Shoulder instability, right M75.21 (ICD-10-CM) - Biceps tendinitis of right upper extremity      Evaluation Date: 2/3/2022  Authorization Period Expiration: pending  Plan of Care Expiration: 11/3/22  Progress Note Due: 5/3/22  Visit # / Visits authorized: 18/ 1   FOTO: Issued Visit # 2        Time In:   11:00  Time Out: 12:00  Total Billable Time: 45 minutes     Precautions: Standard + surgical     DATE OF PROCEDURE: 01/13/2022     SURGEON: Ann Anderson M.D.    ASSISTANT: BART Hannah M.D.,PGY 3  ASSISTANT: ENIO Herrera PA-C     OPERATION:   right  1. Shoulder arthroscopic anterior capsulorrhaphy (CPT 30134) (complex, -22 modifier)  2. Shoulder arthroscopic anterior labral repair (CPT 93461)   3. Shoulder arthroscopic posterior labral repair, capsulorrhaphy, 7:00 position (CPT 90792)  4. Shoulder arthroscopic extensive debridement (anterior, posterior glenohumeral joint) (CPT 96451)  5. Shoulder manipulation under anesthesia (CPT 18112)  6. Shoulder arthroscopic lysis of adhesions (CPT 86623):     Total of 7 a,chors posteriorly at 6:30, 7:30, 8:30, 10 and anteriorly at 5, 4, 3 o'clock      POST OPERATIVE PLAN: We will follow the arthroscopic Bankart repair guidelines. We discussed with the patient's family after surgery. The patient will remain in a sling for 6 weeks. We will start PT at the 3 week anthony.     Quality of tissue: Fair    Quality of the repair: Good    Subjective      Pt reports "I can tell it's getting better"     she was compliant with home exercise program given last session.   Response to previous treatment:good   Functional change: none this visit     Pain:  NA  /10 at rest  " "  Location: right shoulder      Objective         Measurements taken:  AROM elevation 129 deg      (AROM elevation 122 deg, PROM flex 120, abd 90, ER 35, IR 23, FIR -12" )  (Able to get FIR to glut)   (PROM s' flex 110 deg )  (AROM elevation to 90 deg, PROM s' flex 100 deg)   (AROM 75 deg elevation)   (AROM flex 60 deg elevation)   (PROM flex 45 deg, abd 45 deg)     received the following manual therapy techniques: Joint mobilizations were applied to the R shoulder   For 5  minutes.       Trigger point release axilla and IS region    Patient received  therapy to increase ROM x 45' with  activities as follows:     Extensive PROM 4D     Home Exercises Provided and Patient Education Provided     Education provided:   - coealing model     Written Home Exercises Provided: Patient instructed to cont prior HEP.  Exercises were reviewed and Sandra Cruz was able to demonstrate them prior to the end of the session.  Sandra Cruz demonstrated good  understanding of the education provided.     See EMR under hand out  for exercises provided prior visit       Assessment     darryl Rx without an increase in sxs,  Focus of this session was on ROM, pt demonstrated Within session improvement in ROM in all motions    Sandra Cruz Is progressing well towards her goals.   Pt prognosis is Guarded.     Pt will continue to benefit from skilled outpatient physical therapy to address the deficits listed in the problem list box on initial evaluation, provide pt/family education and to maximize pt's level of independence in the home and community environment.     Pt's spiritual, cultural and educational needs considered and pt agreeable to plan of care and goals.    Anticipated barriers to physical therapy: none    Goals:   Short-Term Goals: 8 weeks  - The patient will be independent with initial home exercise program.  - The patient is independent with donning/doffing sling to protect tissue healing.  - The patient will demonstrate good " unsupported sitting posture with min verbal cues for 15 minutes.  - The patient will increase ROM 15 degrees to perform bathing and hygiene, grooming, toileting and dressing with pain < 010.  - The patient will increase strength by 1.2 muscle grade  to perform bathing and hygiene, grooming, toileting and dressing with pain < 0/10.     Long-Term Goals: 36 weeks  - The patient will be independent with home exercise program and symptom management.  - The patient will increase ROM = to uninvolved shoulder  to perform work duties and recreation/leisure activities   with pain < 0/10.  - The patient will increase strength = to uninvolved shoulder  to perform work duties and recreation/leisure activities   with pain < 0/10.         Plan     Focus on ROM and creating dynamic stability     Continue with established Plan of Care towards PT goals with focus on decreasing pain, increasing ROM, strength, neuromuscular control and functional status       Pipo Avelar, PT

## 2022-05-23 ENCOUNTER — OFFICE VISIT (OUTPATIENT)
Dept: SPORTS MEDICINE | Facility: CLINIC | Age: 37
End: 2022-05-23
Payer: COMMERCIAL

## 2022-05-23 VITALS
SYSTOLIC BLOOD PRESSURE: 130 MMHG | HEART RATE: 73 BPM | DIASTOLIC BLOOD PRESSURE: 80 MMHG | WEIGHT: 112 LBS | HEIGHT: 64 IN | BODY MASS INDEX: 19.12 KG/M2

## 2022-05-23 DIAGNOSIS — Z98.890 S/P SHOULDER SURGERY: Primary | ICD-10-CM

## 2022-05-23 PROCEDURE — 1159F PR MEDICATION LIST DOCUMENTED IN MEDICAL RECORD: ICD-10-PCS | Mod: CPTII,S$GLB,, | Performed by: ORTHOPAEDIC SURGERY

## 2022-05-23 PROCEDURE — 99214 OFFICE O/P EST MOD 30 MIN: CPT | Mod: S$GLB,,, | Performed by: ORTHOPAEDIC SURGERY

## 2022-05-23 PROCEDURE — 1159F MED LIST DOCD IN RCRD: CPT | Mod: CPTII,S$GLB,, | Performed by: ORTHOPAEDIC SURGERY

## 2022-05-23 PROCEDURE — 3079F PR MOST RECENT DIASTOLIC BLOOD PRESSURE 80-89 MM HG: ICD-10-PCS | Mod: CPTII,S$GLB,, | Performed by: ORTHOPAEDIC SURGERY

## 2022-05-23 PROCEDURE — 3008F PR BODY MASS INDEX (BMI) DOCUMENTED: ICD-10-PCS | Mod: CPTII,S$GLB,, | Performed by: ORTHOPAEDIC SURGERY

## 2022-05-23 PROCEDURE — 99999 PR PBB SHADOW E&M-EST. PATIENT-LVL III: ICD-10-PCS | Mod: PBBFAC,,, | Performed by: ORTHOPAEDIC SURGERY

## 2022-05-23 PROCEDURE — 99214 PR OFFICE/OUTPT VISIT, EST, LEVL IV, 30-39 MIN: ICD-10-PCS | Mod: S$GLB,,, | Performed by: ORTHOPAEDIC SURGERY

## 2022-05-23 PROCEDURE — 3075F SYST BP GE 130 - 139MM HG: CPT | Mod: CPTII,S$GLB,, | Performed by: ORTHOPAEDIC SURGERY

## 2022-05-23 PROCEDURE — 3008F BODY MASS INDEX DOCD: CPT | Mod: CPTII,S$GLB,, | Performed by: ORTHOPAEDIC SURGERY

## 2022-05-23 PROCEDURE — 3075F PR MOST RECENT SYSTOLIC BLOOD PRESS GE 130-139MM HG: ICD-10-PCS | Mod: CPTII,S$GLB,, | Performed by: ORTHOPAEDIC SURGERY

## 2022-05-23 PROCEDURE — 99999 PR PBB SHADOW E&M-EST. PATIENT-LVL III: CPT | Mod: PBBFAC,,, | Performed by: ORTHOPAEDIC SURGERY

## 2022-05-23 PROCEDURE — 3079F DIAST BP 80-89 MM HG: CPT | Mod: CPTII,S$GLB,, | Performed by: ORTHOPAEDIC SURGERY

## 2022-05-23 NOTE — PROGRESS NOTES
Chief Complaint: Right shoulder pain    HISTORY OF PRESENT ILLNESS:   Pt is here today for post-operative followup of shoulder arthroscopy.  she is doing well.  We have reviewed patient's findings and discussed plan of care and future treatment options.      Patient has been attending physical therapy at the [facility] location, working with Pipo APONTE.    She notes that she is doing well    SANE preop 25  SANE 70    DATE OF PROCEDURE: 01/13/2022  SURGEON: Ann Anderson M.D.  OPERATION:   right  1. Shoulder arthroscopic anterior capsulorrhaphy (CPT 35709) (complex, -22 modifier)  2. Shoulder arthroscopic anterior labral repair (CPT 99455)   3. Shoulder arthroscopic posterior labral repair, capsulorrhaphy, 7:00 position (CPT 93786)  4. Shoulder arthroscopic extensive debridement (anterior, posterior glenohumeral joint) (CPT 00586)  5. Shoulder manipulation under anesthesia (CPT 28075)  6. Shoulder arthroscopic lysis of adhesions (CPT 71164):     7 anchors were used in total.     Review of Systems   Constitution: Negative. Negative for chills, fever and night sweats.   HENT: Negative for congestion and headaches.    Eyes: Negative for blurred vision, left vision loss and right vision loss.   Cardiovascular: Negative for chest pain and syncope.   Respiratory: Negative for cough and shortness of breath.    Endocrine: Negative for polydipsia, polyphagia and polyuria.   Hematologic/Lymphatic: Negative for bleeding problem. Does not bruise/bleed easily.   Skin: Negative for dry skin, itching and rash.   Musculoskeletal: Negative for falls and muscle weakness.   Gastrointestinal: Negative for abdominal pain and bowel incontinence.   Genitourinary: Negative for bladder incontinence and nocturia.   Neurological: Negative for disturbances in coordination, loss of balance and seizures.   Psychiatric/Behavioral: Negative for depression. The patient does not have insomnia.    Allergic/Immunologic: Negative for hives and persistent  infections.       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     Unspecified vitamin D deficiency      PAST SURGICAL HISTORY:   Past Surgical History:   Procedure Laterality Date    EPIDURAL STEROID INJECTION N/A 10/4/2021    Procedure: INJECTION, STEROID, EPIDURAL C7/T1;  Surgeon: Cony Ahmadi MD;  [facility];  Service: Pain Management;  Laterality: N/A;  9/16 l/m    KNEE SURGERY Right     torn meniscus    SHOULDER ARTHROSCOPY W/ SUPERIOR LABRAL ANTERIOR POSTERIOR LESION REPAIR Right 1/13/2022    Procedure: ARTHROSCOPY, SHOULDER, WITH INSTABILITY REPAIR;  Surgeon: Ann Anderson MD;  Location: [facility] OR;  Service: Orthopedics;  Laterality: Right;     FAMILY HISTORY:   Family History   Problem Relation Age of Onset    Cancer Maternal Grandfather         lung    Dementia Paternal Grandmother     Hyperlipidemia Mother     Hypertension Mother     Hypertension Father     Hyperlipidemia Father     Breast cancer Neg Hx     Colon cancer Neg Hx     Ovarian cancer Neg Hx      SOCIAL HISTORY:   Social History     Socioeconomic History    Marital status:      Spouse name: Mark    Number of children: 2   Occupational History    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   Tobacco Use    Smoking status: Former Smoker     Packs/day: 0.00     Years: 2.00     Pack years: 0.00    Smokeless tobacco: Never Used   Substance and Sexual Activity    Alcohol use: Not Currently     Alcohol/week: 1.0 standard drink     Types: 1 Standard drinks or equivalent per week     Comment: occ - 1/mo    Drug use: No    Sexual activity: Yes     Partners: Male     Birth control/protection: Coitus interruptus, None       MEDICATIONS:   Current Outpatient Medications:     gabapentin (NEURONTIN) 100 MG capsule, Take 100 mg by mouth once daily., Disp:  ", Rfl:     naratriptan (AMERGE) 1 MG Tab, Take at onset of migraine, can repeat in 2 hrs if needed.  No more than twice per day or 3 days/wk. (Patient taking differently: Take at onset of migraine, can repeat in 2 hrs if needed.  No more than twice per day or 3 days/wk.), Disp: 12 tablet, Rfl: 0    tiZANidine (ZANAFLEX) 2 MG tablet, Take 1-2 tablets (2-4 mg total) by mouth every evening., Disp: 180 tablet, Rfl: 2    traMADoL (ULTRAM) 50 mg tablet, Take 1-2 tablets ( mg total) by mouth every 6 (six) hours as needed for Pain., Disp: 21 tablet, Rfl: 0    VYVANSE 20 mg capsule, , Disp: , Rfl:     Current Facility-Administered Medications:     levonorgestreL (MIRENA) 20 mcg/24 hours (6 yrs) 52 mg IUD 1 Intra Uterine Device, 1 Intra Uterine Device, Intrauterine, , Michelle Pearson, DO, 1 Intra Uterine Device at 06/11/21 0945  ALLERGIES:   Review of patient's allergies indicates:   Allergen Reactions    Ceclor [cefaclor] Anaphylaxis, Swelling and Rash    Pcn [penicillins] Anaphylaxis, Swelling and Rash       VITAL SIGNS: /80   Pulse 73   Ht 5' 4" (1.626 m)   Wt 50.8 kg (112 lb)   BMI 19.22 kg/m²                                                                                   PHYSICAL EXAMINATION:     Incision sites healed well  No evidence of any erythema, infection or induration  elbow Range of motion full   No effusion  2+ Radial pulses  No swelling        Forward Flexion: 130  ER: 30  IR: L5                                                                           ASSESSMENT:                                                                                                                                               1. Status post above, doing well.                                                                                                                               PLAN:                                                                                                                         "                             1. Continue PT  2. Emphasized scapular function.  3. I have discussed return to activity in detail.  4. Patient will see us back in 6 weeks.                                      5. All questions were answered and the patient should contact us if she  has any questions or concerns in the interim.

## 2022-05-24 ENCOUNTER — CLINICAL SUPPORT (OUTPATIENT)
Dept: REHABILITATION | Facility: HOSPITAL | Age: 37
End: 2022-05-24
Attending: PHYSICIAN ASSISTANT
Payer: COMMERCIAL

## 2022-05-24 DIAGNOSIS — M62.81 MUSCLE WEAKNESS OF RIGHT UPPER EXTREMITY: Primary | ICD-10-CM

## 2022-05-24 PROCEDURE — 97110 THERAPEUTIC EXERCISES: CPT | Performed by: PHYSICAL THERAPIST

## 2022-05-24 NOTE — PROGRESS NOTES
"  Physical Therapy Daily Treatment Note   [facility]      Visit Date: 5/24/2022    Name: Karen Purdy  Clinic Number: [id number]    Therapy Diagnosis:   Chronic R shoulder pain   Physician: Davy Herrera III, *    Dr Anderson     Physician Orders: PT Eval and Treat   Medical Diagnosis from Referral:   M25.311 (ICD-10-CM) - Shoulder instability, right M75.21 (ICD-10-CM) - Biceps tendinitis of right upper extremity      Evaluation Date: 2/3/2022  Authorization Period Expiration: pending  Plan of Care Expiration: 11/3/22  Progress Note Due: 5/3/22  Visit # / Visits authorized: 19/ 1   FOTO: Issued Visit # 2        Time In:   13:00  Time Out: 14:00  Total Billable Time: 45 minutes     Precautions: Standard + surgical     DATE OF PROCEDURE: 01/13/2022     SURGEON: Ann Anderson M.D.    ASSISTANT: BART Hannah M.D.,PGY 3  ASSISTANT: ENIO Herrera PA-C     OPERATION:   right  1. Shoulder arthroscopic anterior capsulorrhaphy (CPT 79390) (complex, -22 modifier)  2. Shoulder arthroscopic anterior labral repair (CPT 95834)   3. Shoulder arthroscopic posterior labral repair, capsulorrhaphy, 7:00 position (CPT 73900)  4. Shoulder arthroscopic extensive debridement (anterior, posterior glenohumeral joint) (CPT 42763)  5. Shoulder manipulation under anesthesia (CPT 12350)  6. Shoulder arthroscopic lysis of adhesions (CPT 98652):     Total of 7 a,chors posteriorly at 6:30, 7:30, 8:30, 10 and anteriorly at 5, 4, 3 o'clock      POST OPERATIVE PLAN: We will follow the arthroscopic Bankart repair guidelines. We discussed with the patient's family after surgery. The patient will remain in a sling for 6 weeks. We will start PT at the 3 week anthony.     Quality of tissue: Fair    Quality of the repair: Good    Subjective      Pt reports seeing MD yesterday, feels she is doing well but needs more ROM CC "tightness in here (pec region)"     she was compliant with home exercise program given last session.   Response to previous treatment:good " "  Functional change: none this visit     Pain:  NA  /10 at rest    Location: right shoulder      Objective         Measurements taken:  None this visit     (AROM elevation 129 deg )   (AROM elevation 122 deg, PROM flex 120, abd 90, ER 35, IR 23, FIR -12" )  (Able to get FIR to glut)   (PROM s' flex 110 deg )  (AROM elevation to 90 deg, PROM s' flex 100 deg)   (AROM 75 deg elevation)   (AROM flex 60 deg elevation)   (PROM flex 45 deg, abd 45 deg)     received the following manual therapy techniques: Joint mobilizations were applied to the R shoulder   For 5  minutes.       Trigger point release axilla and IS region    Patient received  therapy to increase ROM x 45' with  activities as follows:     Supine lying over 1/2 roll x 7'   Extensive PROM 4D     Home Exercises Provided and Patient Education Provided     Education provided:   - coealing model     Written Home Exercises Provided: Patient instructed to cont prior HEP.  Exercises were reviewed and Sandra Cruz was able to demonstrate them prior to the end of the session.  Sandra Cruz demonstrated good  understanding of the education provided.     See EMR under hand out  for exercises provided prior visit       Assessment     darryl Rx without an increase in sxs,  Again, the Focus of this session was on ROM, pt demonstrated Within session improvement in ROM in all motions    Sandra Cruz Is progressing well towards her goals.   Pt prognosis is Guarded.     Pt will continue to benefit from skilled outpatient physical therapy to address the deficits listed in the problem list box on initial evaluation, provide pt/family education and to maximize pt's level of independence in the home and community environment.     Pt's spiritual, cultural and educational needs considered and pt agreeable to plan of care and goals.    Anticipated barriers to physical therapy: none    Goals:   Short-Term Goals: 8 weeks (MET)  - The patient will be independent with initial home " exercise program.  - The patient is independent with donning/doffing sling to protect tissue healing.  - The patient will demonstrate good unsupported sitting posture with min verbal cues for 15 minutes.  - The patient will increase ROM 15 degrees to perform bathing and hygiene, grooming, toileting and dressing with pain < 010.  - The patient will increase strength by 1.2 muscle grade  to perform bathing and hygiene, grooming, toileting and dressing with pain < 0/10.     Long-Term Goals: 36 weeks  - The patient will be independent with home exercise program and symptom management.  - The patient will increase ROM = to uninvolved shoulder  to perform work duties and recreation/leisure activities   with pain < 0/10.  - The patient will increase strength = to uninvolved shoulder  to perform work duties and recreation/leisure activities   with pain < 0/10.         Plan     Focus on ROM and creating dynamic stability     Continue with established Plan of Care towards PT goals with focus on decreasing pain, increasing ROM, strength, neuromuscular control and functional status       Pipo Avelar, PT

## 2022-05-25 ENCOUNTER — TELEPHONE (OUTPATIENT)
Dept: ELECTROPHYSIOLOGY | Facility: CLINIC | Age: 37
End: 2022-05-25
Payer: COMMERCIAL

## 2022-05-25 NOTE — TELEPHONE ENCOUNTER
I have attempted without success to contact this patient by phone to schedule her tilt table test ordered by Dr. Warner.   I left a message on answering machine. Call back number provided.

## 2022-05-26 ENCOUNTER — CLINICAL SUPPORT (OUTPATIENT)
Dept: REHABILITATION | Facility: HOSPITAL | Age: 37
End: 2022-05-26
Attending: PHYSICIAN ASSISTANT
Payer: COMMERCIAL

## 2022-05-26 DIAGNOSIS — M62.81 MUSCLE WEAKNESS OF RIGHT UPPER EXTREMITY: Primary | ICD-10-CM

## 2022-05-26 PROCEDURE — 97110 THERAPEUTIC EXERCISES: CPT | Performed by: PHYSICAL THERAPIST

## 2022-05-27 NOTE — PROGRESS NOTES
"  Physical Therapy Daily Treatment Note   [facility]      Visit Date: 5/26/2022    Name: Karen Purdy  Clinic Number: [id number]    Therapy Diagnosis:   Chronic R shoulder pain   Physician: Davy Herrera III, *    Dr Anderson     Physician Orders: PT Eval and Treat   Medical Diagnosis from Referral:   M25.311 (ICD-10-CM) - Shoulder instability, right M75.21 (ICD-10-CM) - Biceps tendinitis of right upper extremity      Evaluation Date: 2/3/2022  Authorization Period Expiration: pending  Plan of Care Expiration: 11/3/22  Progress Note Due: 5/3/22  Visit # / Visits authorized: 20/ 1   FOTO: Issued Visit # 2        Time In:   13:00  Time Out: 14:00  Total Billable Time: 45 minutes     Precautions: Standard + surgical     DATE OF PROCEDURE: 01/13/2022     SURGEON: Ann Anderson M.D.    ASSISTANT: BART Hannah M.D.,PGY 3  ASSISTANT: ENIO Herrera PA-C     OPERATION:   right  1. Shoulder arthroscopic anterior capsulorrhaphy (CPT 99970) (complex, -22 modifier)  2. Shoulder arthroscopic anterior labral repair (CPT 60901)   3. Shoulder arthroscopic posterior labral repair, capsulorrhaphy, 7:00 position (CPT 01496)  4. Shoulder arthroscopic extensive debridement (anterior, posterior glenohumeral joint) (CPT 26710)  5. Shoulder manipulation under anesthesia (CPT 04472)  6. Shoulder arthroscopic lysis of adhesions (CPT 66852):     Total of 7 a,chors posteriorly at 6:30, 7:30, 8:30, 10 and anteriorly at 5, 4, 3 o'clock      POST OPERATIVE PLAN: We will follow the arthroscopic Bankart repair guidelines. We discussed with the patient's family after surgery. The patient will remain in a sling for 6 weeks. We will start PT at the 3 week anthony.     Quality of tissue: Fair    Quality of the repair: Good    Subjective      Pt reports cc this PM in R UT at occipital region into UT and axilla, as well as anterior shoulder region "I could tell my rib was elevated yesterday so I used the sling technique to get it back down"     she was " "compliant with home exercise program given last session.   Response to previous treatment:good   Functional change: none this visit     Pain:  NA  /10 at rest    Location: right shoulder      Objective         Measurements taken:  None this visit     (AROM elevation 129 deg )   (AROM elevation 122 deg, PROM flex 120, abd 90, ER 35, IR 23, FIR -12" )  (Able to get FIR to glut)   (PROM s' flex 110 deg )  (AROM elevation to 90 deg, PROM s' flex 100 deg)   (AROM 75 deg elevation)   (AROM flex 60 deg elevation)   (PROM flex 45 deg, abd 45 deg)     received the following manual therapy techniques: Joint mobilizations were applied to the R shoulder   For 5  minutes.     Trigger point release axilla and IS region    Patient received  therapy to increase ROM x 45' with  activities as follows:     Supine lying over 1/2 roll x 7'   Extensive PROM 4D     Home Exercises Provided and Patient Education Provided     Education provided:   - coealing model     Written Home Exercises Provided: Patient instructed to cont prior HEP.  Exercises were reviewed and Sandra Cruz was able to demonstrate them prior to the end of the session.  Sandra Cruz demonstrated good  understanding of the education provided.     See EMR under hand out  for exercises provided prior visit       Assessment     darryl Rx without an increase in sxs,  Pt's ROM improving slowly with use of soft tissue work and PROM     Sandra Cruz Is progressing well towards her goals.   Pt prognosis is Guarded.     Pt will continue to benefit from skilled outpatient physical therapy to address the deficits listed in the problem list box on initial evaluation, provide pt/family education and to maximize pt's level of independence in the home and community environment.     Pt's spiritual, cultural and educational needs considered and pt agreeable to plan of care and goals.    Anticipated barriers to physical therapy: none    Goals:   Short-Term Goals: 8 weeks (MET)  - The " patient will be independent with initial home exercise program.  - The patient is independent with donning/doffing sling to protect tissue healing.  - The patient will demonstrate good unsupported sitting posture with min verbal cues for 15 minutes.  - The patient will increase ROM 15 degrees to perform bathing and hygiene, grooming, toileting and dressing with pain < 010.  - The patient will increase strength by 1.2 muscle grade  to perform bathing and hygiene, grooming, toileting and dressing with pain < 0/10.     Long-Term Goals: 36 weeks  - The patient will be independent with home exercise program and symptom management.  - The patient will increase ROM = to uninvolved shoulder  to perform work duties and recreation/leisure activities   with pain < 0/10.  - The patient will increase strength = to uninvolved shoulder  to perform work duties and recreation/leisure activities   with pain < 0/10.         Plan     Focus on ROM and creating dynamic stability     Continue with established Plan of Care towards PT goals with focus on decreasing pain, increasing ROM, strength, neuromuscular control and functional status       Pipo Avelar, PT

## 2022-05-31 ENCOUNTER — CLINICAL SUPPORT (OUTPATIENT)
Dept: REHABILITATION | Facility: HOSPITAL | Age: 37
End: 2022-05-31
Attending: PHYSICIAN ASSISTANT
Payer: COMMERCIAL

## 2022-05-31 DIAGNOSIS — M62.81 MUSCLE WEAKNESS OF RIGHT UPPER EXTREMITY: Primary | ICD-10-CM

## 2022-05-31 PROCEDURE — 97110 THERAPEUTIC EXERCISES: CPT | Performed by: PHYSICAL THERAPIST

## 2022-05-31 PROCEDURE — 97140 MANUAL THERAPY 1/> REGIONS: CPT | Performed by: PHYSICAL THERAPIST

## 2022-05-31 NOTE — PROGRESS NOTES
"  Physical Therapy Daily Treatment Note   [facility]      Visit Date: 5/31/2022    Name: Karen Purdy  Clinic Number: [id number]    Therapy Diagnosis:   Chronic R shoulder pain   Physician: Davy Herrera III, *    Dr Anderson     Physician Orders: PT Eval and Treat   Medical Diagnosis from Referral:   M25.311 (ICD-10-CM) - Shoulder instability, right M75.21 (ICD-10-CM) - Biceps tendinitis of right upper extremity      Evaluation Date: 2/3/2022  Authorization Period Expiration: pending  Plan of Care Expiration: 11/3/22  Progress Note Due: 5/3/22  Visit # / Visits authorized: 21/ 1   FOTO: Issued Visit # 2        Time In:   9:00  Time Out: 10:00  Total Billable Time: 45 minutes     Precautions: Standard + surgical     DATE OF PROCEDURE: 01/13/2022     SURGEON: Ann Anderson M.D.    ASSISTANT: BART Hannah M.D.,PGY 3  ASSISTANT: ENIO Herrera PA-C     OPERATION:   right  1. Shoulder arthroscopic anterior capsulorrhaphy (CPT 66692) (complex, -22 modifier)  2. Shoulder arthroscopic anterior labral repair (CPT 02796)   3. Shoulder arthroscopic posterior labral repair, capsulorrhaphy, 7:00 position (CPT 68307)  4. Shoulder arthroscopic extensive debridement (anterior, posterior glenohumeral joint) (CPT 34375)  5. Shoulder manipulation under anesthesia (CPT 60352)  6. Shoulder arthroscopic lysis of adhesions (CPT 42247):     Total of 7 a,chors posteriorly at 6:30, 7:30, 8:30, 10 and anteriorly at 5, 4, 3 o'clock      POST OPERATIVE PLAN: We will follow the arthroscopic [employer] repair guidelines. We discussed with the patient's family after surgery. The patient will remain in a sling for 6 weeks. We will start PT at the 3 week anthony.     Quality of tissue: Fair    Quality of the repair: Good    Subjective      Pt reports no change in her R shoulder condition, "I feel like my first rib is still elevated" but no formal c/o regarding R shoulder     she was compliant with home exercise program given last session.   Response to " "previous treatment:good   Functional change: none this visit     Pain:  NA  /10 at rest    Location: right shoulder      Objective         Measurements taken:  None this visit     (AROM elevation 129 deg )   (AROM elevation 122 deg, PROM flex 120, abd 90, ER 35, IR 23, FIR -12" )  (Able to get FIR to glut)   (PROM s' flex 110 deg )  (AROM elevation to 90 deg, PROM s' flex 100 deg)   (AROM 75 deg elevation)   (AROM flex 60 deg elevation)   (PROM flex 45 deg, abd 45 deg)     received the following manual therapy techniques: Joint mobilizations were applied to the R shoulder   For 8  minutes.     Trigger point release axilla and IS region    Patient received  therapy to increase ROM x 30' with  activities as follows:     IR hang 0# 3'x2   Hands behind head into ER/elevation 5x:15   Extensive PROM 4D with elevation performed while lying over 1/2 roll     CP x 10'     Home Exercises Provided and Patient Education Provided     Education provided:   - coealing model     Written Home Exercises Provided: Patient instructed to cont prior HEP.  Exercises were reviewed and Sandra Cruz was able to demonstrate them prior to the end of the session.  Sandra Cruz demonstrated good  understanding of the education provided.     See EMR under hand out  for exercises provided prior visit       Assessment     darryl Rx without an increase in sxs,  Again, focus of this session was on increasing pt's ROM and again, pt demonstrated a within session improvement in all motions     Sandra Cruz Is progressing well towards her goals.   Pt prognosis is Guarded.     Pt will continue to benefit from skilled outpatient physical therapy to address the deficits listed in the problem list box on initial evaluation, provide pt/family education and to maximize pt's level of independence in the home and community environment.     Pt's spiritual, cultural and educational needs considered and pt agreeable to plan of care and goals.    Anticipated " barriers to physical therapy: none    Goals:   Short-Term Goals: 8 weeks (MET)  - The patient will be independent with initial home exercise program.  - The patient is independent with donning/doffing sling to protect tissue healing.  - The patient will demonstrate good unsupported sitting posture with min verbal cues for 15 minutes.  - The patient will increase ROM 15 degrees to perform bathing and hygiene, grooming, toileting and dressing with pain < 010.  - The patient will increase strength by 1.2 muscle grade  to perform bathing and hygiene, grooming, toileting and dressing with pain < 0/10.     Long-Term Goals: 36 weeks  - The patient will be independent with home exercise program and symptom management.  - The patient will increase ROM = to uninvolved shoulder  to perform work duties and recreation/leisure activities   with pain < 0/10.  - The patient will increase strength = to uninvolved shoulder  to perform work duties and recreation/leisure activities   with pain < 0/10.         Plan     Hold PT at this time, pt to go to [place] for summer and may not attend formal PT, she will return in the fall and if needed, will re-start formal PT     Pipo Avelar, PT

## 2022-06-01 ENCOUNTER — PATIENT MESSAGE (OUTPATIENT)
Dept: ELECTROPHYSIOLOGY | Facility: CLINIC | Age: 37
End: 2022-06-01
Payer: COMMERCIAL

## 2022-06-01 ENCOUNTER — TELEPHONE (OUTPATIENT)
Dept: ELECTROPHYSIOLOGY | Facility: CLINIC | Age: 37
End: 2022-06-01
Payer: COMMERCIAL

## 2022-06-01 NOTE — TELEPHONE ENCOUNTER
I have attempted without success to contact this patient by phone to schedule her tilt table test.  Left message on voice machine.   Expect 2 hours, must have ride home.  Have dates in June available.  Will send message to portal.    Call back number provided.

## 2022-06-03 ENCOUNTER — PATIENT MESSAGE (OUTPATIENT)
Dept: NEUROLOGY | Facility: CLINIC | Age: 37
End: 2022-06-03
Payer: COMMERCIAL

## 2022-06-03 ENCOUNTER — HOSPITAL ENCOUNTER (OUTPATIENT)
Dept: RADIOLOGY | Facility: OTHER | Age: 37
Discharge: HOME | End: 2022-06-03
Attending: PSYCHIATRY & NEUROLOGY
Payer: COMMERCIAL

## 2022-06-03 DIAGNOSIS — H81.4 VERTIGO OF CENTRAL ORIGIN: ICD-10-CM

## 2022-06-03 PROCEDURE — 70544 MRA BRAIN WITHOUT CONTRAST: ICD-10-PCS | Mod: 26,,, | Performed by: RADIOLOGY

## 2022-06-03 PROCEDURE — 70544 MR ANGIOGRAPHY HEAD W/O DYE: CPT | Mod: 26,,, | Performed by: RADIOLOGY

## 2022-06-03 PROCEDURE — 70547 MR ANGIOGRAPHY NECK W/O DYE: CPT | Mod: TC

## 2022-06-03 PROCEDURE — 70547 MRA NECK WITHOUT CONTRAST: ICD-10-PCS | Mod: 26,,, | Performed by: RADIOLOGY

## 2022-06-03 PROCEDURE — 70547 MR ANGIOGRAPHY NECK W/O DYE: CPT | Mod: 26,,, | Performed by: RADIOLOGY

## 2022-06-03 PROCEDURE — 70544 MR ANGIOGRAPHY HEAD W/O DYE: CPT | Mod: TC

## 2022-06-06 ENCOUNTER — TELEPHONE (OUTPATIENT)
Dept: SPORTS MEDICINE | Facility: CLINIC | Age: 37
End: 2022-06-06
Payer: COMMERCIAL

## 2022-06-06 ENCOUNTER — APPOINTMENT (OUTPATIENT)
Dept: RADIOLOGY | Facility: OTHER | Age: 37
End: 2022-06-06
Attending: NEUROMUSCULOSKELETAL MEDICINE & OMM
Payer: COMMERCIAL

## 2022-06-06 ENCOUNTER — OFFICE VISIT (OUTPATIENT)
Dept: SPORTS MEDICINE | Facility: CLINIC | Age: 37
End: 2022-06-06
Payer: COMMERCIAL

## 2022-06-06 DIAGNOSIS — G89.29 CHRONIC TOE PAIN, RIGHT FOOT: Primary | ICD-10-CM

## 2022-06-06 DIAGNOSIS — M79.674 CHRONIC TOE PAIN, RIGHT FOOT: Primary | ICD-10-CM

## 2022-06-06 DIAGNOSIS — M79.674 CHRONIC TOE PAIN, RIGHT FOOT: ICD-10-CM

## 2022-06-06 DIAGNOSIS — G89.29 CHRONIC TOE PAIN, RIGHT FOOT: ICD-10-CM

## 2022-06-06 DIAGNOSIS — S92.514A CLOSED NONDISPLACED FRACTURE OF PROXIMAL PHALANX OF LESSER TOE OF RIGHT FOOT, INITIAL ENCOUNTER: Primary | ICD-10-CM

## 2022-06-06 PROCEDURE — 73660 X-RAY EXAM OF TOE(S): CPT | Mod: 26,RT,, | Performed by: RADIOLOGY

## 2022-06-06 PROCEDURE — 99999 PR PBB SHADOW E&M-EST. PATIENT-LVL II: CPT | Mod: PBBFAC,,, | Performed by: NEUROMUSCULOSKELETAL MEDICINE & OMM

## 2022-06-06 PROCEDURE — 73660 X-RAY EXAM OF TOE(S): CPT | Mod: TC,RT

## 2022-06-06 PROCEDURE — 73660 PR  X-RAY TOE(S): ICD-10-PCS | Mod: 26,RT,, | Performed by: RADIOLOGY

## 2022-06-06 PROCEDURE — 99214 PR OFFICE/OUTPT VISIT, EST, LEVL IV, 30-39 MIN: ICD-10-PCS | Mod: S$GLB,,, | Performed by: NEUROMUSCULOSKELETAL MEDICINE & OMM

## 2022-06-06 PROCEDURE — 1159F MED LIST DOCD IN RCRD: CPT | Mod: CPTII,S$GLB,, | Performed by: NEUROMUSCULOSKELETAL MEDICINE & OMM

## 2022-06-06 PROCEDURE — 1160F RVW MEDS BY RX/DR IN RCRD: CPT | Mod: CPTII,S$GLB,, | Performed by: NEUROMUSCULOSKELETAL MEDICINE & OMM

## 2022-06-06 PROCEDURE — 99214 OFFICE O/P EST MOD 30 MIN: CPT | Mod: S$GLB,,, | Performed by: NEUROMUSCULOSKELETAL MEDICINE & OMM

## 2022-06-06 PROCEDURE — 1160F PR REVIEW ALL MEDS BY PRESCRIBER/CLIN PHARMACIST DOCUMENTED: ICD-10-PCS | Mod: CPTII,S$GLB,, | Performed by: NEUROMUSCULOSKELETAL MEDICINE & OMM

## 2022-06-06 PROCEDURE — 1159F PR MEDICATION LIST DOCUMENTED IN MEDICAL RECORD: ICD-10-PCS | Mod: CPTII,S$GLB,, | Performed by: NEUROMUSCULOSKELETAL MEDICINE & OMM

## 2022-06-06 PROCEDURE — 99999 PR PBB SHADOW E&M-EST. PATIENT-LVL II: ICD-10-PCS | Mod: PBBFAC,,, | Performed by: NEUROMUSCULOSKELETAL MEDICINE & OMM

## 2022-06-06 NOTE — PROGRESS NOTES
Subjective:     Karen Cruz [facility] [place]    Chief Complaint   Patient presents with    Right Foot - Injury       HPI      Sandra Cruz is a 36 y.o. female coming in today for acute right foot pain.  Patient was packing yesterday and caught her pinky toe on her suitcase causing her toe to abduct,  Noting immediate pain and swelling of her pinky toe.  Patient describes the pain as a 5/10 achy pain that does not radiate.  Patient denies any numbness, tingling, or weakness of her toes.  Patient notes that is painful walking in her sandals currently.  Patient is leaving town tomorrow for 2 months.    Office note from 8/23/21 reviewed    Review of Systems   Constitutional: Negative for chills and fever.   Musculoskeletal: Positive for myalgias and neck pain. Negative for back pain, falls and joint pain.   Neurological: Negative for dizziness, tingling, focal weakness, weakness and headaches.       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     Unspecified vitamin D deficiency      PAST SURGICAL HISTORY:   Past Surgical History:   Procedure Laterality Date    EPIDURAL STEROID INJECTION N/A 10/4/2021    Procedure: INJECTION, STEROID, EPIDURAL C7/T1;  Surgeon: Cony Ahmadi MD;  [facility];  Service: Pain Management;  Laterality: N/A;  9/16 l/m    KNEE SURGERY Right     torn meniscus    SHOULDER ARTHROSCOPY W/ SUPERIOR LABRAL ANTERIOR POSTERIOR LESION REPAIR Right 1/13/2022    Procedure: ARTHROSCOPY, SHOULDER, WITH INSTABILITY REPAIR;  Surgeon: Ann Anderson MD;  Location: [facility];  Service: Orthopedics;  Laterality: Right;     FAMILY HISTORY:   Family History   Problem Relation Age of Onset    Cancer Maternal Grandfather         lung    Dementia Paternal Grandmother     Hyperlipidemia Mother     Hypertension  Mother     Hypertension Father     Hyperlipidemia Father     Breast cancer Neg Hx     Colon cancer Neg Hx     Ovarian cancer Neg Hx      SOCIAL HISTORY:   Social History     Socioeconomic History    Marital status:      Spouse name: Mark    Number of children: 2   Occupational History    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   Tobacco Use    Smoking status: Former Smoker     Packs/day: 0.00     Years: 2.00     Pack years: 0.00    Smokeless tobacco: Never Used   Substance and Sexual Activity    Alcohol use: Not Currently     Alcohol/week: 1.0 standard drink     Types: 1 Standard drinks or equivalent per week     Comment: occ - 1/mo    Drug use: No    Sexual activity: Yes     Partners: Male     Birth control/protection: Coitus interruptus, None       MEDICATIONS:   Current Outpatient Medications:     gabapentin (NEURONTIN) 100 MG capsule, Take 100 mg by mouth once daily., Disp: , Rfl:     naratriptan (AMERGE) 1 MG Tab, Take at onset of migraine, can repeat in 2 hrs if needed.  No more than twice per day or 3 days/wk. (Patient taking differently: Take at onset of migraine, can repeat in 2 hrs if needed.  No more than twice per day or 3 days/wk.), Disp: 12 tablet, Rfl: 0    tiZANidine (ZANAFLEX) 2 MG tablet, Take 1-2 tablets (2-4 mg total) by mouth every evening., Disp: 180 tablet, Rfl: 2    traMADoL (ULTRAM) 50 mg tablet, Take 1-2 tablets ( mg total) by mouth every 6 (six) hours as needed for Pain., Disp: 21 tablet, Rfl: 0    VYVANSE 20 mg capsule, , Disp: , Rfl:     Current Facility-Administered Medications:     levonorgestreL (MIRENA) 20 mcg/24 hours (6 yrs) 52 mg IUD 1 Intra Uterine Device, 1 Intra Uterine Device, Intrauterine, , Michelle Pearson DO, 1 Intra Uterine Device at 06/11/21 0945  ALLERGIES:   Review of patient's allergies indicates:   Allergen Reactions    Ceclor [cefaclor] Anaphylaxis, Swelling and Rash    Pcn [penicillins] Anaphylaxis, Swelling and Rash         Objective:     VITAL  SIGNS: There were no vitals taken for this visit.   General    Vitals reviewed.  Constitutional: She is oriented to person, place, and time. She appears well-developed and well-nourished.   Neurological: She is alert and oriented to person, place, and time.   Psychiatric: She has a normal mood and affect. Her behavior is normal.                 MUSCULOSKELETAL EXAM:     FOOT: right foot  The affected foot is compared to the contralateral foot    Observation:    + right small toe edema and mild ecchymosis  There is no erythema  Shoes reveal a normal wear pattern.  No structural deformities including pes planus/cavus, hallux valgus, or toe deformities.  Negative too-many toes sign.    Normal callus pattern on the feet bilaterally.    Achilles tendon and calcaneal insertion reveals no deformities  No leg or intrinsic foot muscle atrophy.  + right antalgic gait    ROM (* = with pain):  Active dorsiflexion to 20° on left and 20° on right  Active plantarflexion to 50° on left and 50° on right    Active ankle inversion to 35° on left and 35° on right  Active ankle eversion to 15° on left and 15° on right  Full active flexion/extension of the toes bilaterally, except for right small toe - noting decreased flexion and extension 2/2 to pain.   Heel cords without tightness bilaterally.    Tenderness To Palpation:  No tenderness at the ATFL, CFL, PTFL, or deltoid ligaments  No tenderness over the distal anterior syndesmosis, distal tibia/fibula, fibular head/shaft  No tenderness at medial or lateral malleoli   No tenderness at navicular, cuboid, cuneiforms, talus, or calcaneous  No tenderness along the metatarsals   + tenderness at the 5th right phalange  No tenderness at the Achilles tendon calcaneal insertion  No tenderness at the posterior tibial or peroneal tendons    Strength Testing (* = with pain):  Dorsiflexion - 5/5 on left and 5/5 on right  Platarflexion - 5/5 on left and 5/5 on right  Resisted Inversion - 5/5 on left  and 5/5 on right  Resisted Eversion - 5/5 on left and 5/5 on right  Great Toe Extension - 5/5 on left and 5/5 on right  Great Toe Flexion - 5/5 on left and 5/5 on right    Special Tests:    Metatarsal squeeze test - negative  Midfoot stress test - negative  Calcaneal squeeze test - negative      Vascular/Sensory Exam:  DP and PT pulses intact bilaterally  No skin changes, no abnormal hair distribution  Sensation intact to light touch throughout the saphenous, sural, superficial peroneal, deep peroneal, and tibial nerve distributions  Capillary refill intact <2 seconds in all toes bilaterally.    IMAGIN. X-ray ordered due to right toe pain. (AP, lateral, and oblique views) taken today.   2. X-ray images were reviewed personally by me and then directly with patient.  3. FINDINGS: X-ray images obtained demonstrate a nondisplaced fracture through the shaft of the proximal phalanx 5th digit.  Fracture appears to spare the articular surface.  Surrounding soft tissue edema.  4. IMPRESSION:   See above      Assessment:      Encounter Diagnosis   Name Primary?    Closed nondisplaced fracture of proximal phalanx of lesser toe of right foot, initial encounter Yes          Plan:   1.   Acute right small toe pain secondary to a nondisplaced proximal phalanx fracture of the 5th digit as noted on today's x-ray.  -  Recommend firm soled shoe or a postoperative shoe (given today) wear with buddy taping the next 6 weeks, then as needed for pain control.  -   Recommend follow-up with physician or at an urgent care while in [place] in 4 weeks to monitor healing with possible repeat x-rays  -  Recommend over-the-counter Tylenol as needed for pain control as well as ice of 20 minutes at a time  -  Recommend OTC vitamin-D (2,000 IU daily) and calcium (1,200 mg daily) supplementation for bone healing  -  Recommend avoiding hiking for increase walking the next month, or untill pain improves  -  X-ray images of right toes taken today (AP,  lateral, and oblique views) showed a nondisplaced fracture through the shaft of the proximal phalanx 5th digit.  Fracture appears to spare the articular surface.  Surrounding soft tissue edema. Images were personally reviewed with patient.    2. Follow-up once pt. Returns from trip for reevaluation.     3. Patient agreeable to today's plan and all questions were answered    This note is dictated using the M*Modal Fluency Direct word recognition program. There are word recognition mistakes that are occasionally missed on review.

## 2022-06-06 NOTE — TELEPHONE ENCOUNTER
Spoke to the Pt about her upcoming apt today for her toe pain.  She stated it was her R toe and thought it might be broken.   Informed her that we will get some images and asked if she can come early for them.  She stated yes and confirmed the details.

## 2022-06-08 ENCOUNTER — TELEPHONE (OUTPATIENT)
Dept: ELECTROPHYSIOLOGY | Facility: CLINIC | Age: 37
End: 2022-06-08
Payer: COMMERCIAL

## 2022-06-08 NOTE — TELEPHONE ENCOUNTER
Called patient to schedule for tilt table test.   She is currently out of the state and will not be back until August.   She would like us to call in July when we get the dates for August.

## 2022-07-13 ENCOUNTER — PATIENT MESSAGE (OUTPATIENT)
Dept: ELECTROPHYSIOLOGY | Facility: CLINIC | Age: 37
End: 2022-07-13
Payer: COMMERCIAL

## 2022-07-14 ENCOUNTER — PATIENT MESSAGE (OUTPATIENT)
Dept: SPORTS MEDICINE | Facility: CLINIC | Age: 37
End: 2022-07-14
Payer: COMMERCIAL

## 2022-07-28 ENCOUNTER — PATIENT MESSAGE (OUTPATIENT)
Dept: SPORTS MEDICINE | Facility: CLINIC | Age: 37
End: 2022-07-28
Payer: COMMERCIAL

## 2022-08-04 DIAGNOSIS — G89.29 CHRONIC TOE PAIN, RIGHT FOOT: Primary | ICD-10-CM

## 2022-08-04 DIAGNOSIS — M79.674 CHRONIC TOE PAIN, RIGHT FOOT: Primary | ICD-10-CM

## 2022-08-06 NOTE — PROGRESS NOTES
Subjective:     Karen Cruz [facility] [place]    Chief Complaint   Patient presents with    Follow-up       HPI      Sandra Cruz is a 36 y.o. female coming in today for right 5th toe pain. Since last visit the pain has Improved. Pt reports her pain is improving but still having intermittent pain. She has been gio taping her toes. The pain is better with rest and worse with activity, weight bearing. Pt. describes the pain as a 0/10 currently. There has not been any new a fall/injury/ or traumas since last visit.  Pt. denies any new musculoskeletal complaints at this time.     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     Unspecified vitamin D deficiency      PAST SURGICAL HISTORY:   Past Surgical History:   Procedure Laterality Date    EPIDURAL STEROID INJECTION N/A 10/4/2021    Procedure: INJECTION, STEROID, EPIDURAL C7/T1;  Surgeon: Cony Ahmadi MD;  Location: [facility];  Service: Pain Management;  Laterality: N/A;  9/16 l/m    KNEE SURGERY Right     torn meniscus    SHOULDER ARTHROSCOPY W/ SUPERIOR LABRAL ANTERIOR POSTERIOR LESION REPAIR Right 1/13/2022    Procedure: ARTHROSCOPY, SHOULDER, WITH INSTABILITY REPAIR;  Surgeon: Ann Anderson MD;  Location: [facility] OR;  Service: Orthopedics;  Laterality: Right;     FAMILY HISTORY:   Family History   Problem Relation Age of Onset    Cancer Maternal Grandfather         lung    Dementia Paternal Grandmother     Hyperlipidemia Mother     Hypertension Mother     Hypertension Father     Hyperlipidemia Father     Breast cancer Neg Hx     Colon cancer Neg Hx     Ovarian cancer Neg Hx      SOCIAL HISTORY:   Social History     Socioeconomic History    Marital status:      Spouse name: Mark    Number of children: 2   Occupational  "History    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   Tobacco Use    Smoking status: Former Smoker     Packs/day: 0.00     Years: 2.00     Pack years: 0.00    Smokeless tobacco: Never Used   Substance and Sexual Activity    Alcohol use: Not Currently     Alcohol/week: 1.0 standard drink     Types: 1 Standard drinks or equivalent per week     Comment: occ - 1/mo    Drug use: No    Sexual activity: Yes     Partners: Male     Birth control/protection: Coitus interruptus, None       MEDICATIONS:   Current Outpatient Medications:     gabapentin (NEURONTIN) 100 MG capsule, Take 100 mg by mouth once daily., Disp: , Rfl:     naratriptan (AMERGE) 1 MG Tab, Take at onset of migraine, can repeat in 2 hrs if needed.  No more than twice per day or 3 days/wk. (Patient taking differently: Take at onset of migraine, can repeat in 2 hrs if needed.  No more than twice per day or 3 days/wk.), Disp: 12 tablet, Rfl: 0    tiZANidine (ZANAFLEX) 2 MG tablet, Take 1-2 tablets (2-4 mg total) by mouth every evening., Disp: 180 tablet, Rfl: 2    traMADoL (ULTRAM) 50 mg tablet, Take 1-2 tablets ( mg total) by mouth every 6 (six) hours as needed for Pain., Disp: 21 tablet, Rfl: 0    VYVANSE 20 mg capsule, , Disp: , Rfl:     Current Facility-Administered Medications:     levonorgestreL (MIRENA) 20 mcg/24 hours (6 yrs) 52 mg IUD 1 Intra Uterine Device, 1 Intra Uterine Device, Intrauterine, , Michelle Pearson DO, 1 Intra Uterine Device at 06/11/21 0945  ALLERGIES:   Review of patient's allergies indicates:   Allergen Reactions    Ceclor [cefaclor] Anaphylaxis, Swelling and Rash    Pcn [penicillins] Anaphylaxis, Swelling and Rash         Objective:     VITAL SIGNS: /70   Ht 5' 4" (1.626 m)   Wt 50.8 kg (112 lb)   BMI 19.22 kg/m²    General    Vitals reviewed.  Constitutional: She is oriented to person, place, and time. She appears well-developed and well-nourished.   Neurological: She is alert and oriented to person, place, and time. "   Psychiatric: She has a normal mood and affect. Her behavior is normal.                 MUSCULOSKELETAL EXAM:     FOOT: right foot  The affected foot is compared to the contralateral foot    Observation:    interval resolution of right small toe edema and ecchymosis  There is no erythema  Shoes reveal a normal wear pattern.  No structural deformities including pes planus/cavus, hallux valgus, or toe deformities.  Negative too-many toes sign.    Normal callus pattern on the feet bilaterally.    Achilles tendon and calcaneal insertion reveals no deformities  No leg or intrinsic foot muscle atrophy.  Non-antalgic gait    ROM (* = with pain):  Active dorsiflexion to 20° on left and 20° on right  Active plantarflexion to 50° on left and 50° on right    Active ankle inversion to 35° on left and 35° on right  Active ankle eversion to 15° on left and 15° on right  Full active flexion/extension of the toes bilaterally, except for right small toe - noting decreased flexion and extension 2/2 to pain.   Heel cords without tightness bilaterally.    Tenderness To Palpation:  No tenderness at the ATFL, CFL, PTFL, or deltoid ligaments  No tenderness over the distal anterior syndesmosis, distal tibia/fibula, fibular head/shaft  No tenderness at medial or lateral malleoli   No tenderness at navicular, cuboid, cuneiforms, talus, or calcaneous  No tenderness along the metatarsals   No tenderness at the 5th right phalange  No tenderness at the Achilles tendon calcaneal insertion  No tenderness at the posterior tibial or peroneal tendons    Strength Testing (* = with pain):  Dorsiflexion - 5/5 on left and 5/5 on right  Platarflexion - 5/5 on left and 5/5 on right  Resisted Inversion - 5/5 on left and 5/5 on right  Resisted Eversion - 5/5 on left and 5/5 on right  Great Toe Extension - 5/5 on left and 5/5 on right  Great Toe Flexion - 5/5 on left and 5/5 on right    Special Tests:    Metatarsal squeeze test - negative  Midfoot stress  test - negative  Calcaneal squeeze test - negative      Vascular/Sensory Exam:  DP and PT pulses intact bilaterally  No skin changes, no abnormal hair distribution  Sensation intact to light touch throughout the saphenous, sural, superficial peroneal, deep peroneal, and tibial nerve distributions  Capillary refill intact <2 seconds in all toes bilaterally.    IMAGIN. X-ray ordered due to right toe pain. (AP, lateral, and oblique views) taken today.   2. X-ray images were reviewed personally by me and then directly with patient.  3. FINDINGS: Early periosteal reaction and callus formation along the fracture through the proximal phalanx 5th digit consistent with healing.  Alignment remains near anatomic.  Fracture lines remain visible.  4. IMPRESSION:   See above      Assessment:      Encounter Diagnosis   Name Primary?    Closed nondisplaced fracture of proximal phalanx of lesser toe of right foot with routine healing, subsequent encounter Yes          Plan:   1.  Right small toe pain secondary to a nondisplaced proximal phalanx fracture of the 5th digit - routine healing  -  Recommend continued firm soled shoe wear for the next 4 weeks, then as needed for pain control. Decreased buddy taping to only with increased activity.   -   Recommend follow-up with physician or at an urgent care while in [place] in 4 weeks to monitor healing with possible repeat x-rays  -  Recommend over-the-counter Tylenol as needed for pain control as well as ice of 20 minutes at a time  -  Recommend OTC vitamin-D (2,000 IU daily) and calcium (1,200 mg daily) supplementation for bone healing  -  X-ray images of right toes taken 22 (AP, lateral, and oblique views) showed a nondisplaced fracture through the shaft of the proximal phalanx 5th digit.  Fracture appears to spare the articular surface.  Surrounding soft tissue edema. Images were personally reviewed with patient.  - X-ray images of right toes taken today (AP, lateral, and  oblique views) showed early periosteal reaction and callus formation along the fracture through the proximal phalanx 5th digit consistent with healing.  Alignment remains near anatomic.  Fracture lines remain visible.. Images were personally reviewed with patient.    2. Follow-up in 4 weeks for reevaluation.     3. Patient agreeable to today's plan and all questions were answered    This note is dictated using the M*Modal Fluency Direct word recognition program. There are word recognition mistakes that are occasionally missed on review.

## 2022-08-08 ENCOUNTER — OFFICE VISIT (OUTPATIENT)
Dept: SPORTS MEDICINE | Facility: CLINIC | Age: 37
End: 2022-08-08
Payer: COMMERCIAL

## 2022-08-08 ENCOUNTER — APPOINTMENT (OUTPATIENT)
Dept: RADIOLOGY | Facility: OTHER | Age: 37
End: 2022-08-08
Attending: NEUROMUSCULOSKELETAL MEDICINE & OMM
Payer: COMMERCIAL

## 2022-08-08 VITALS
DIASTOLIC BLOOD PRESSURE: 70 MMHG | BODY MASS INDEX: 19.12 KG/M2 | SYSTOLIC BLOOD PRESSURE: 122 MMHG | WEIGHT: 112 LBS | HEIGHT: 64 IN

## 2022-08-08 DIAGNOSIS — M79.674 CHRONIC TOE PAIN, RIGHT FOOT: ICD-10-CM

## 2022-08-08 DIAGNOSIS — G89.29 CHRONIC TOE PAIN, RIGHT FOOT: ICD-10-CM

## 2022-08-08 DIAGNOSIS — S92.514D CLOSED NONDISPLACED FRACTURE OF PROXIMAL PHALANX OF LESSER TOE OF RIGHT FOOT WITH ROUTINE HEALING, SUBSEQUENT ENCOUNTER: Primary | ICD-10-CM

## 2022-08-08 PROCEDURE — 73660 XR TOE 2 OR MORE VIEWS RIGHT: ICD-10-PCS | Mod: 26,RT,, | Performed by: RADIOLOGY

## 2022-08-08 PROCEDURE — 3078F DIAST BP <80 MM HG: CPT | Mod: CPTII,S$GLB,, | Performed by: NEUROMUSCULOSKELETAL MEDICINE & OMM

## 2022-08-08 PROCEDURE — 99214 OFFICE O/P EST MOD 30 MIN: CPT | Mod: S$GLB,,, | Performed by: NEUROMUSCULOSKELETAL MEDICINE & OMM

## 2022-08-08 PROCEDURE — 73660 X-RAY EXAM OF TOE(S): CPT | Mod: 26,RT,, | Performed by: RADIOLOGY

## 2022-08-08 PROCEDURE — 3074F PR MOST RECENT SYSTOLIC BLOOD PRESSURE < 130 MM HG: ICD-10-PCS | Mod: CPTII,S$GLB,, | Performed by: NEUROMUSCULOSKELETAL MEDICINE & OMM

## 2022-08-08 PROCEDURE — 99999 PR PBB SHADOW E&M-EST. PATIENT-LVL III: CPT | Mod: PBBFAC,,, | Performed by: NEUROMUSCULOSKELETAL MEDICINE & OMM

## 2022-08-08 PROCEDURE — 1159F MED LIST DOCD IN RCRD: CPT | Mod: CPTII,S$GLB,, | Performed by: NEUROMUSCULOSKELETAL MEDICINE & OMM

## 2022-08-08 PROCEDURE — 1160F RVW MEDS BY RX/DR IN RCRD: CPT | Mod: CPTII,S$GLB,, | Performed by: NEUROMUSCULOSKELETAL MEDICINE & OMM

## 2022-08-08 PROCEDURE — 3074F SYST BP LT 130 MM HG: CPT | Mod: CPTII,S$GLB,, | Performed by: NEUROMUSCULOSKELETAL MEDICINE & OMM

## 2022-08-08 PROCEDURE — 3008F PR BODY MASS INDEX (BMI) DOCUMENTED: ICD-10-PCS | Mod: CPTII,S$GLB,, | Performed by: NEUROMUSCULOSKELETAL MEDICINE & OMM

## 2022-08-08 PROCEDURE — 3008F BODY MASS INDEX DOCD: CPT | Mod: CPTII,S$GLB,, | Performed by: NEUROMUSCULOSKELETAL MEDICINE & OMM

## 2022-08-08 PROCEDURE — 99999 PR PBB SHADOW E&M-EST. PATIENT-LVL III: ICD-10-PCS | Mod: PBBFAC,,, | Performed by: NEUROMUSCULOSKELETAL MEDICINE & OMM

## 2022-08-08 PROCEDURE — 1160F PR REVIEW ALL MEDS BY PRESCRIBER/CLIN PHARMACIST DOCUMENTED: ICD-10-PCS | Mod: CPTII,S$GLB,, | Performed by: NEUROMUSCULOSKELETAL MEDICINE & OMM

## 2022-08-08 PROCEDURE — 99214 PR OFFICE/OUTPT VISIT, EST, LEVL IV, 30-39 MIN: ICD-10-PCS | Mod: S$GLB,,, | Performed by: NEUROMUSCULOSKELETAL MEDICINE & OMM

## 2022-08-08 PROCEDURE — 73660 X-RAY EXAM OF TOE(S): CPT | Mod: TC,RT

## 2022-08-08 PROCEDURE — 1159F PR MEDICATION LIST DOCUMENTED IN MEDICAL RECORD: ICD-10-PCS | Mod: CPTII,S$GLB,, | Performed by: NEUROMUSCULOSKELETAL MEDICINE & OMM

## 2022-08-08 PROCEDURE — 3078F PR MOST RECENT DIASTOLIC BLOOD PRESSURE < 80 MM HG: ICD-10-PCS | Mod: CPTII,S$GLB,, | Performed by: NEUROMUSCULOSKELETAL MEDICINE & OMM

## 2022-08-15 ENCOUNTER — OFFICE VISIT (OUTPATIENT)
Dept: SPORTS MEDICINE | Facility: CLINIC | Age: 37
End: 2022-08-15
Payer: COMMERCIAL

## 2022-08-15 VITALS
DIASTOLIC BLOOD PRESSURE: 87 MMHG | WEIGHT: 110 LBS | SYSTOLIC BLOOD PRESSURE: 138 MMHG | HEIGHT: 64 IN | HEART RATE: 91 BPM | BODY MASS INDEX: 18.78 KG/M2

## 2022-08-15 DIAGNOSIS — M54.2 CERVICALGIA: ICD-10-CM

## 2022-08-15 DIAGNOSIS — M25.511 CHRONIC RIGHT SHOULDER PAIN: Primary | ICD-10-CM

## 2022-08-15 DIAGNOSIS — G89.29 CHRONIC RIGHT SHOULDER PAIN: Primary | ICD-10-CM

## 2022-08-15 PROCEDURE — 3008F PR BODY MASS INDEX (BMI) DOCUMENTED: ICD-10-PCS | Mod: CPTII,S$GLB,, | Performed by: ORTHOPAEDIC SURGERY

## 2022-08-15 PROCEDURE — 99999 PR PBB SHADOW E&M-EST. PATIENT-LVL III: ICD-10-PCS | Mod: PBBFAC,,, | Performed by: ORTHOPAEDIC SURGERY

## 2022-08-15 PROCEDURE — 99999 PR PBB SHADOW E&M-EST. PATIENT-LVL III: CPT | Mod: PBBFAC,,, | Performed by: ORTHOPAEDIC SURGERY

## 2022-08-15 PROCEDURE — 99214 PR OFFICE/OUTPT VISIT, EST, LEVL IV, 30-39 MIN: ICD-10-PCS | Mod: S$GLB,,, | Performed by: ORTHOPAEDIC SURGERY

## 2022-08-15 PROCEDURE — 3079F PR MOST RECENT DIASTOLIC BLOOD PRESSURE 80-89 MM HG: ICD-10-PCS | Mod: CPTII,S$GLB,, | Performed by: ORTHOPAEDIC SURGERY

## 2022-08-15 PROCEDURE — 3079F DIAST BP 80-89 MM HG: CPT | Mod: CPTII,S$GLB,, | Performed by: ORTHOPAEDIC SURGERY

## 2022-08-15 PROCEDURE — 3008F BODY MASS INDEX DOCD: CPT | Mod: CPTII,S$GLB,, | Performed by: ORTHOPAEDIC SURGERY

## 2022-08-15 PROCEDURE — 99214 OFFICE O/P EST MOD 30 MIN: CPT | Mod: S$GLB,,, | Performed by: ORTHOPAEDIC SURGERY

## 2022-08-15 PROCEDURE — 3075F SYST BP GE 130 - 139MM HG: CPT | Mod: CPTII,S$GLB,, | Performed by: ORTHOPAEDIC SURGERY

## 2022-08-15 PROCEDURE — 3075F PR MOST RECENT SYSTOLIC BLOOD PRESS GE 130-139MM HG: ICD-10-PCS | Mod: CPTII,S$GLB,, | Performed by: ORTHOPAEDIC SURGERY

## 2022-08-15 NOTE — PROGRESS NOTES
Chief Complaint: Right shoulder pain    HISTORY OF PRESENT ILLNESS:   Pt is here today for post-operative 18 week followup of shoulder arthroscopy.  she is doing well.  Reports 0/10 pain in shoulder. Radiating pain into neck. Patient has been out of town for the past two months but was previously attending physical therapy at the [facility] location, working with Pipo APONTEGina Needs a new prescription for PT.     SANE preop 25  SANE 70    DATE OF PROCEDURE: 01/13/2022  SURGEON: Ann Anderson M.D.  OPERATION:   right  1. Shoulder arthroscopic anterior capsulorrhaphy (CPT 00529) (complex, -22 modifier)  2. Shoulder arthroscopic anterior labral repair (CPT 00716)   3. Shoulder arthroscopic posterior labral repair, capsulorrhaphy, 7:00 position (CPT 84477)  4. Shoulder arthroscopic extensive debridement (anterior, posterior glenohumeral joint) (CPT 09684)  5. Shoulder manipulation under anesthesia (CPT 63686)  6. Shoulder arthroscopic lysis of adhesions (CPT 54980):     7 anchors were used in total.     Review of Systems   Constitution: Negative. Negative for chills, fever and night sweats.   HENT: Negative for congestion and headaches.    Eyes: Negative for blurred vision, left vision loss and right vision loss.   Cardiovascular: Negative for chest pain and syncope.   Respiratory: Negative for cough and shortness of breath.    Endocrine: Negative for polydipsia, polyphagia and polyuria.   Hematologic/Lymphatic: Negative for bleeding problem. Does not bruise/bleed easily.   Skin: Negative for dry skin, itching and rash.   Musculoskeletal: Negative for falls and muscle weakness.   Gastrointestinal: Negative for abdominal pain and bowel incontinence.   Genitourinary: Negative for bladder incontinence and nocturia.   Neurological: Negative for disturbances in coordination, loss of balance and seizures.   Psychiatric/Behavioral: Negative for depression. The patient does not have insomnia.    Allergic/Immunologic: Negative for  hives and persistent infections.       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     Unspecified vitamin D deficiency      PAST SURGICAL HISTORY:   Past Surgical History:   Procedure Laterality Date    EPIDURAL STEROID INJECTION N/A 10/4/2021    Procedure: INJECTION, STEROID, EPIDURAL C7/T1;  Surgeon: Cony Ahmadi MD;  [facility];  Service: Pain Management;  Laterality: N/A;  9/16 l/m    KNEE SURGERY Right     torn meniscus    SHOULDER ARTHROSCOPY W/ SUPERIOR LABRAL ANTERIOR POSTERIOR LESION REPAIR Right 1/13/2022    Procedure: ARTHROSCOPY, SHOULDER, WITH INSTABILITY REPAIR;  Surgeon: Ann Anderson MD;  Location: [facility] OR;  Service: Orthopedics;  Laterality: Right;     FAMILY HISTORY:   Family History   Problem Relation Age of Onset    Cancer Maternal Grandfather         lung    Dementia Paternal Grandmother     Hyperlipidemia Mother     Hypertension Mother     Hypertension Father     Hyperlipidemia Father     Breast cancer Neg Hx     Colon cancer Neg Hx     Ovarian cancer Neg Hx      SOCIAL HISTORY:   Social History     Socioeconomic History    Marital status:      Spouse name: Mark    Number of children: 2   Occupational History    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   Tobacco Use    Smoking status: Former Smoker     Packs/day: 0.00     Years: 2.00     Pack years: 0.00    Smokeless tobacco: Never Used   Substance and Sexual Activity    Alcohol use: Not Currently     Alcohol/week: 1.0 standard drink     Types: 1 Standard drinks or equivalent per week     Comment: occ - 1/mo    Drug use: No    Sexual activity: Yes     Partners: Male     Birth control/protection: Coitus interruptus, None       MEDICATIONS:   Current Outpatient Medications:     gabapentin (NEURONTIN) 100 MG capsule, Take 100 mg by  "mouth once daily., Disp: , Rfl:     naratriptan (AMERGE) 1 MG Tab, Take at onset of migraine, can repeat in 2 hrs if needed.  No more than twice per day or 3 days/wk. (Patient taking differently: Take at onset of migraine, can repeat in 2 hrs if needed.  No more than twice per day or 3 days/wk.), Disp: 12 tablet, Rfl: 0    tiZANidine (ZANAFLEX) 2 MG tablet, Take 1-2 tablets (2-4 mg total) by mouth every evening., Disp: 180 tablet, Rfl: 2    traMADoL (ULTRAM) 50 mg tablet, Take 1-2 tablets ( mg total) by mouth every 6 (six) hours as needed for Pain., Disp: 21 tablet, Rfl: 0    VYVANSE 20 mg capsule, , Disp: , Rfl:     Current Facility-Administered Medications:     levonorgestreL (MIRENA) 20 mcg/24 hours (6 yrs) 52 mg IUD 1 Intra Uterine Device, 1 Intra Uterine Device, Intrauterine, , Michelle Pearson DO, 1 Intra Uterine Device at 06/11/21 0945  ALLERGIES:   Review of patient's allergies indicates:   Allergen Reactions    Ceclor [cefaclor] Anaphylaxis, Swelling and Rash    Pcn [penicillins] Anaphylaxis, Swelling and Rash       VITAL SIGNS: /87   Pulse 91   Ht 5' 4" (1.626 m)   Wt 49.9 kg (110 lb)   BMI 18.88 kg/m²                                                                                   PHYSICAL EXAMINATION:     Incision sites healed well  No evidence of any erythema, infection or induration  elbow Range of motion full   No effusion  2+ Radial pulses  No swelling        Forward Flexion: 160  ER: 60  IR: L5                                                                           ASSESSMENT:                                                                                                                                               1. Status post above, doing well.                                                                                                                               PLAN:                                                                                                "                                                      1. Restart PT  2. Emphasized scapular function.  3. I have discussed return to activity in detail.  4. Patient will see us back in 6 weeks.                                      5. All questions were answered and the patient should contact us if she has any questions or concerns in the interim.

## 2022-08-22 ENCOUNTER — TELEPHONE (OUTPATIENT)
Dept: ELECTROPHYSIOLOGY | Facility: CLINIC | Age: 37
End: 2022-08-22
Payer: COMMERCIAL

## 2022-08-22 NOTE — TELEPHONE ENCOUNTER
Called to confirm procedure for tomorrow.  No answer, patient identified herself on the voice message.  Message left with the following information:      -Arrival time 8:30 am   -Directions to check in desk  -NPO after midnight night prior to procedure  -May take am medications with sips of water  -Must have ride home  -Plan for at least 2 hours for test.     Callback number provided.

## 2022-08-23 ENCOUNTER — PATIENT MESSAGE (OUTPATIENT)
Dept: ELECTROPHYSIOLOGY | Facility: CLINIC | Age: 37
End: 2022-08-23
Payer: COMMERCIAL

## 2022-08-23 ENCOUNTER — TELEPHONE (OUTPATIENT)
Dept: ELECTROPHYSIOLOGY | Facility: CLINIC | Age: 37
End: 2022-08-23
Payer: COMMERCIAL

## 2022-08-23 NOTE — TELEPHONE ENCOUNTER
Patient called at 0837, late for her procedure.  She returned my call and requested to be rescheduled.  Agreed on date 9/8/22 at 8:30 am for tilt table test.

## 2022-09-06 ENCOUNTER — TELEPHONE (OUTPATIENT)
Dept: ELECTROPHYSIOLOGY | Facility: CLINIC | Age: 37
End: 2022-09-06
Payer: COMMERCIAL

## 2022-09-06 ENCOUNTER — PATIENT MESSAGE (OUTPATIENT)
Dept: ELECTROPHYSIOLOGY | Facility: CLINIC | Age: 37
End: 2022-09-06
Payer: COMMERCIAL

## 2022-09-06 NOTE — TELEPHONE ENCOUNTER
Procedure:  Tilt Table Test for Sept. 8, 2022 is going to have to be rescheduled.  MD not available.   I have September 15 at 10:00am available, and will hold this time until I hear from the patient.   Left message and call back number for patient .

## 2022-09-14 ENCOUNTER — TELEPHONE (OUTPATIENT)
Dept: ELECTROPHYSIOLOGY | Facility: CLINIC | Age: 37
End: 2022-09-14
Payer: COMMERCIAL

## 2022-09-14 NOTE — TELEPHONE ENCOUNTER
Spoke to  Sandra Cruz      CONFIRMED procedure arrival time of  10:00am tomorrow for tilt table test.     Reiterated instructions including:  -Directions to check in desk  -NPO after midnight night prior to procedure  -May take am medications with sips of water  -Must have ride home  -Plan for at least 2 hours for test.     Patient verbalizes understanding of above and appreciates call.

## 2022-09-15 ENCOUNTER — HOSPITAL ENCOUNTER (OUTPATIENT)
Facility: HOSPITAL | Age: 37
Discharge: HOME | End: 2022-09-15
Attending: STUDENT IN AN ORGANIZED HEALTH CARE EDUCATION/TRAINING PROGRAM | Admitting: STUDENT IN AN ORGANIZED HEALTH CARE EDUCATION/TRAINING PROGRAM
Payer: COMMERCIAL

## 2022-09-15 VITALS — DIASTOLIC BLOOD PRESSURE: 50 MMHG | SYSTOLIC BLOOD PRESSURE: 110 MMHG | HEART RATE: 78 BPM

## 2022-09-15 DIAGNOSIS — G90.A POTS (POSTURAL ORTHOSTATIC TACHYCARDIA SYNDROME): ICD-10-CM

## 2022-09-15 DIAGNOSIS — R55 POSTURAL DIZZINESS WITH PRESYNCOPE: Primary | ICD-10-CM

## 2022-09-15 DIAGNOSIS — R42 POSTURAL DIZZINESS WITH PRESYNCOPE: Primary | ICD-10-CM

## 2022-09-15 DIAGNOSIS — I95.1 ORTHOSTASIS: ICD-10-CM

## 2022-09-15 PROCEDURE — 93660 PR TILT TABLE EVALUATION: ICD-10-PCS | Mod: 26,,, | Performed by: STUDENT IN AN ORGANIZED HEALTH CARE EDUCATION/TRAINING PROGRAM

## 2022-09-15 PROCEDURE — 93660 TILT TABLE EVALUATION: CPT | Performed by: STUDENT IN AN ORGANIZED HEALTH CARE EDUCATION/TRAINING PROGRAM

## 2022-09-15 PROCEDURE — 93660 TILT TABLE EVALUATION: CPT | Mod: 26,,, | Performed by: STUDENT IN AN ORGANIZED HEALTH CARE EDUCATION/TRAINING PROGRAM

## 2022-09-19 ENCOUNTER — TELEPHONE (OUTPATIENT)
Dept: PAIN MEDICINE | Facility: CLINIC | Age: 37
End: 2022-09-19
Payer: COMMERCIAL

## 2022-09-19 NOTE — TELEPHONE ENCOUNTER
This message is for patient in regards to his/her appointment 09/20/22 at 8:40 s.m.   With Dr. Cony Ahmadi.        [facility] Policy: For the safety of all patients and staff members.   During this visit we're informing all patients and visitors to wear face mask at all time here at [facility].  If you have any questions or concerns please contact [phone]

## 2022-09-20 ENCOUNTER — OFFICE VISIT (OUTPATIENT)
Dept: PAIN MEDICINE | Facility: CLINIC | Age: 37
End: 2022-09-20
Attending: ANESTHESIOLOGY
Payer: COMMERCIAL

## 2022-09-20 VITALS
HEART RATE: 81 BPM | DIASTOLIC BLOOD PRESSURE: 81 MMHG | BODY MASS INDEX: 19.04 KG/M2 | WEIGHT: 111.56 LBS | TEMPERATURE: 98 F | SYSTOLIC BLOOD PRESSURE: 122 MMHG | HEIGHT: 64 IN

## 2022-09-20 DIAGNOSIS — M79.18 MYOFASCIAL PAIN SYNDROME: ICD-10-CM

## 2022-09-20 DIAGNOSIS — G43.009 MIGRAINE WITHOUT AURA AND WITHOUT STATUS MIGRAINOSUS, NOT INTRACTABLE: Primary | ICD-10-CM

## 2022-09-20 PROCEDURE — 1159F MED LIST DOCD IN RCRD: CPT | Mod: CPTII,S$GLB,, | Performed by: ANESTHESIOLOGY

## 2022-09-20 PROCEDURE — 3008F BODY MASS INDEX DOCD: CPT | Mod: CPTII,S$GLB,, | Performed by: ANESTHESIOLOGY

## 2022-09-20 PROCEDURE — 99214 OFFICE O/P EST MOD 30 MIN: CPT | Mod: S$GLB,,, | Performed by: ANESTHESIOLOGY

## 2022-09-20 PROCEDURE — 1159F PR MEDICATION LIST DOCUMENTED IN MEDICAL RECORD: ICD-10-PCS | Mod: CPTII,S$GLB,, | Performed by: ANESTHESIOLOGY

## 2022-09-20 PROCEDURE — 3079F DIAST BP 80-89 MM HG: CPT | Mod: CPTII,S$GLB,, | Performed by: ANESTHESIOLOGY

## 2022-09-20 PROCEDURE — 99214 PR OFFICE/OUTPT VISIT, EST, LEVL IV, 30-39 MIN: ICD-10-PCS | Mod: S$GLB,,, | Performed by: ANESTHESIOLOGY

## 2022-09-20 PROCEDURE — 99999 PR PBB SHADOW E&M-EST. PATIENT-LVL III: ICD-10-PCS | Mod: PBBFAC,,, | Performed by: ANESTHESIOLOGY

## 2022-09-20 PROCEDURE — 3074F PR MOST RECENT SYSTOLIC BLOOD PRESSURE < 130 MM HG: ICD-10-PCS | Mod: CPTII,S$GLB,, | Performed by: ANESTHESIOLOGY

## 2022-09-20 PROCEDURE — 1160F RVW MEDS BY RX/DR IN RCRD: CPT | Mod: CPTII,S$GLB,, | Performed by: ANESTHESIOLOGY

## 2022-09-20 PROCEDURE — 3074F SYST BP LT 130 MM HG: CPT | Mod: CPTII,S$GLB,, | Performed by: ANESTHESIOLOGY

## 2022-09-20 PROCEDURE — 99999 PR PBB SHADOW E&M-EST. PATIENT-LVL III: CPT | Mod: PBBFAC,,, | Performed by: ANESTHESIOLOGY

## 2022-09-20 PROCEDURE — 1160F PR REVIEW ALL MEDS BY PRESCRIBER/CLIN PHARMACIST DOCUMENTED: ICD-10-PCS | Mod: CPTII,S$GLB,, | Performed by: ANESTHESIOLOGY

## 2022-09-20 PROCEDURE — 3079F PR MOST RECENT DIASTOLIC BLOOD PRESSURE 80-89 MM HG: ICD-10-PCS | Mod: CPTII,S$GLB,, | Performed by: ANESTHESIOLOGY

## 2022-09-20 PROCEDURE — 3008F PR BODY MASS INDEX (BMI) DOCUMENTED: ICD-10-PCS | Mod: CPTII,S$GLB,, | Performed by: ANESTHESIOLOGY
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
## 2022-09-21 ENCOUNTER — TELEPHONE (OUTPATIENT)
Dept: SPORTS MEDICINE | Facility: CLINIC | Age: 37
End: 2022-09-21
Payer: COMMERCIAL

## 2022-09-21 ENCOUNTER — TELEPHONE (OUTPATIENT)
Dept: PAIN MEDICINE | Facility: CLINIC | Age: 37
End: 2022-09-21
Payer: COMMERCIAL

## 2022-09-21 DIAGNOSIS — G43.009 MIGRAINE WITHOUT AURA AND WITHOUT STATUS MIGRAINOSUS, NOT INTRACTABLE: Primary | ICD-10-CM

## 2022-09-21 NOTE — TELEPHONE ENCOUNTER
I called the patient in regards to pushing back her appointment time on 09/28/22. Left a message explaining that clinic would not start until 9:45 and if she could come in then.

## 2022-09-27 ENCOUNTER — TELEPHONE (OUTPATIENT)
Dept: SPORTS MEDICINE | Facility: CLINIC | Age: 37
End: 2022-09-27
Payer: COMMERCIAL

## 2022-10-12 ENCOUNTER — TELEPHONE (OUTPATIENT)
Dept: REHABILITATION | Facility: HOSPITAL | Age: 37
End: 2022-10-12
Payer: COMMERCIAL

## 2022-10-12 NOTE — TELEPHONE ENCOUNTER
Called patient to confirm she will be here for her appointment with Zenon at 9am 10/13. She has rescheduled her eval 3 times now with the last cancellation being 9/21.

## 2022-10-18 ENCOUNTER — TELEPHONE (OUTPATIENT)
Dept: REHABILITATION | Facility: HOSPITAL | Age: 37
End: 2022-10-18
Payer: COMMERCIAL

## 2022-10-18 NOTE — TELEPHONE ENCOUNTER
Sandra Cruz now has 3 no show/cancels for this department and is required to see the doctor again should she want to attend physical therapy.

## 2022-10-20 ENCOUNTER — OFFICE VISIT (OUTPATIENT)
Dept: INTERNAL MEDICINE | Facility: CLINIC | Age: 37
End: 2022-10-20
Payer: COMMERCIAL

## 2022-10-20 ENCOUNTER — HOSPITAL ENCOUNTER (OUTPATIENT)
Dept: RADIOLOGY | Facility: OTHER | Age: 37
Discharge: HOME | End: 2022-10-20
Attending: STUDENT IN AN ORGANIZED HEALTH CARE EDUCATION/TRAINING PROGRAM
Payer: COMMERCIAL

## 2022-10-20 VITALS
DIASTOLIC BLOOD PRESSURE: 80 MMHG | HEART RATE: 76 BPM | WEIGHT: 114.19 LBS | SYSTOLIC BLOOD PRESSURE: 120 MMHG | HEIGHT: 64 IN | BODY MASS INDEX: 19.5 KG/M2 | OXYGEN SATURATION: 98 %

## 2022-10-20 DIAGNOSIS — B34.9 VIRAL SYNDROME: ICD-10-CM

## 2022-10-20 DIAGNOSIS — D80.9 IMMUNODEFICIENCY WITH PREDOMINANTLY ANTIBODY DEFECTS: Primary | ICD-10-CM

## 2022-10-20 DIAGNOSIS — D80.6 SPECIFIC ANTIBODY DEFICIENCY WITH NORMAL IMMUNOGLOBULIN CONCENTRATION AND NORMAL NUMBER OF B CELLS: ICD-10-CM

## 2022-10-20 DIAGNOSIS — Z87.09 HISTORY OF PNEUMOTHORAX: ICD-10-CM

## 2022-10-20 DIAGNOSIS — Z91.89 AT RISK FOR PNEUMONIA: ICD-10-CM

## 2022-10-20 DIAGNOSIS — R05.9 COUGH: ICD-10-CM

## 2022-10-20 DIAGNOSIS — R05.1 ACUTE COUGH: ICD-10-CM

## 2022-10-20 DIAGNOSIS — M79.10 MUSCLE PAIN: ICD-10-CM

## 2022-10-20 DIAGNOSIS — R06.02 SHORTNESS OF BREATH: ICD-10-CM

## 2022-10-20 PROBLEM — J93.9 PNEUMOTHORAX ON RIGHT: Status: RESOLVED | Noted: 2021-12-30 | Resolved: 2022-10-20

## 2022-10-20 LAB
CTP QC/QA: YES
POC MOLECULAR INFLUENZA A AGN: NEGATIVE
POC MOLECULAR INFLUENZA B AGN: NEGATIVE
SARS-COV-2 RNA RESP QL NAA+PROBE: NOT DETECTED

## 2022-10-20 PROCEDURE — 87502 INFLUENZA DNA AMP PROBE: CPT | Mod: QW,S$GLB,, | Performed by: STUDENT IN AN ORGANIZED HEALTH CARE EDUCATION/TRAINING PROGRAM

## 2022-10-20 PROCEDURE — 3074F PR MOST RECENT SYSTOLIC BLOOD PRESSURE < 130 MM HG: ICD-10-PCS | Mod: CPTII,S$GLB,, | Performed by: STUDENT IN AN ORGANIZED HEALTH CARE EDUCATION/TRAINING PROGRAM

## 2022-10-20 PROCEDURE — U0003 INFECTIOUS AGENT DETECTION BY NUCLEIC ACID (DNA OR RNA); SEVERE ACUTE RESPIRATORY SYNDROME CORONAVIRUS 2 (SARS-COV-2) (CORONAVIRUS DISEASE [COVID-19]), AMPLIFIED PROBE TECHNIQUE, MAKING USE OF HIGH THROUGHPUT TECHNOLOGIES AS DESCRIBED BY CMS-2020-01-R: HCPCS | Performed by: STUDENT IN AN ORGANIZED HEALTH CARE EDUCATION/TRAINING PROGRAM

## 2022-10-20 PROCEDURE — 99999 PR PBB SHADOW E&M-EST. PATIENT-LVL III: ICD-10-PCS | Mod: PBBFAC,,, | Performed by: STUDENT IN AN ORGANIZED HEALTH CARE EDUCATION/TRAINING PROGRAM

## 2022-10-20 PROCEDURE — 1159F PR MEDICATION LIST DOCUMENTED IN MEDICAL RECORD: ICD-10-PCS | Mod: CPTII,S$GLB,, | Performed by: STUDENT IN AN ORGANIZED HEALTH CARE EDUCATION/TRAINING PROGRAM

## 2022-10-20 PROCEDURE — 71046 XR CHEST PA AND LATERAL: ICD-10-PCS | Mod: 26,,, | Performed by: RADIOLOGY

## 2022-10-20 PROCEDURE — 99215 OFFICE O/P EST HI 40 MIN: CPT | Mod: S$GLB,,, | Performed by: STUDENT IN AN ORGANIZED HEALTH CARE EDUCATION/TRAINING PROGRAM

## 2022-10-20 PROCEDURE — 99215 PR OFFICE/OUTPT VISIT, EST, LEVL V, 40-54 MIN: ICD-10-PCS | Mod: S$GLB,,, | Performed by: STUDENT IN AN ORGANIZED HEALTH CARE EDUCATION/TRAINING PROGRAM

## 2022-10-20 PROCEDURE — 1159F MED LIST DOCD IN RCRD: CPT | Mod: CPTII,S$GLB,, | Performed by: STUDENT IN AN ORGANIZED HEALTH CARE EDUCATION/TRAINING PROGRAM

## 2022-10-20 PROCEDURE — U0005 INFEC AGEN DETEC AMPLI PROBE: HCPCS | Performed by: STUDENT IN AN ORGANIZED HEALTH CARE EDUCATION/TRAINING PROGRAM

## 2022-10-20 PROCEDURE — 3074F SYST BP LT 130 MM HG: CPT | Mod: CPTII,S$GLB,, | Performed by: STUDENT IN AN ORGANIZED HEALTH CARE EDUCATION/TRAINING PROGRAM

## 2022-10-20 PROCEDURE — 3079F DIAST BP 80-89 MM HG: CPT | Mod: CPTII,S$GLB,, | Performed by: STUDENT IN AN ORGANIZED HEALTH CARE EDUCATION/TRAINING PROGRAM

## 2022-10-20 PROCEDURE — 71046 X-RAY EXAM CHEST 2 VIEWS: CPT | Mod: 26,,, | Performed by: RADIOLOGY

## 2022-10-20 PROCEDURE — 99999 PR PBB SHADOW E&M-EST. PATIENT-LVL III: CPT | Mod: PBBFAC,,, | Performed by: STUDENT IN AN ORGANIZED HEALTH CARE EDUCATION/TRAINING PROGRAM

## 2022-10-20 PROCEDURE — 87502 POCT INFLUENZA A/B MOLECULAR: ICD-10-PCS | Mod: QW,S$GLB,, | Performed by: STUDENT IN AN ORGANIZED HEALTH CARE EDUCATION/TRAINING PROGRAM

## 2022-10-20 PROCEDURE — 71046 X-RAY EXAM CHEST 2 VIEWS: CPT | Mod: TC,FY

## 2022-10-20 PROCEDURE — 3079F PR MOST RECENT DIASTOLIC BLOOD PRESSURE 80-89 MM HG: ICD-10-PCS | Mod: CPTII,S$GLB,, | Performed by: STUDENT IN AN ORGANIZED HEALTH CARE EDUCATION/TRAINING PROGRAM

## 2022-10-20 RX ORDER — LEVOFLOXACIN 750 MG/1
750 TABLET ORAL DAILY
Qty: 5 TABLET | Refills: 0 | Status: SHIPPED | OUTPATIENT
Start: 2022-10-20 | End: 2023-01-21 | Stop reason: ALTCHOICE

## 2022-10-20 RX ORDER — ALBUTEROL SULFATE 90 UG/1
2 AEROSOL, METERED RESPIRATORY (INHALATION) EVERY 6 HOURS PRN
Qty: 18 G | Refills: 0 | Status: SHIPPED | OUTPATIENT
Start: 2022-10-20 | End: 2023-01-21

## 2022-10-20 NOTE — PROGRESS NOTES
[facility]    Subjective:       Patient ID: Karen Purdy is a 37 y.o. female.    Chief Complaint: Cough and Headache      History was obtained from the patient and supplemented through chart review.  This patient is normally followed by a colleague of [facility], who is unavailable today.  The patient is new to me.    HPI:    Patient is a 37 y.o. female who presents for respiratory complaints in setting of known immunodeficiency    Hx of pneumothorax late Dec 2021  Hx of pneumonia 2013, recurrent skin infections    Kids just with cough last week, no sinus symptoms, improving  No fevers  Pt started feeling poorly approx Sat, 6 days ago, Tuesday got worse   Body aches, headache  Has unspecified antibody deficiency, seen by immunology at 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 Negative for fever.   HENT:  Negative for trouble swallowing.    Respiratory:  Positive for cough (slightly productive) and shortness of breath.    Cardiovascular:         Chest irritation   Gastrointestinal:  Negative for constipation, diarrhea, nausea and vomiting.   Musculoskeletal:  Positive for myalgias. Negative for gait problem.   Neurological:  Negative for dizziness and seizures.   Psychiatric/Behavioral:  Negative for hallucinations.        Surgical hx, family hx, social hx   Have been reviewed      Current Outpatient Medications:     gabapentin (NEURONTIN) 100 MG capsule, Take 100 mg by mouth once daily., Disp: , Rfl:     tiZANidine (ZANAFLEX) 2  "MG tablet, Take 1-2 tablets (2-4 mg total) by mouth every evening., Disp: 180 tablet, Rfl: 2    VYVANSE 20 mg capsule, , Disp: , Rfl:     albuterol (VENTOLIN HFA) 90 mcg/actuation inhaler, Inhale 2 puffs into the lungs every 6 (six) hours as needed for Wheezing. Rescue, Disp: 18 g, Rfl: 0    levoFLOXacin (LEVAQUIN) 750 MG tablet, Take 1 tablet (750 mg total) by mouth once daily., Disp: 5 tablet, Rfl: 0    naratriptan (AMERGE) 1 MG Tab, Take at onset of migraine, can repeat in 2 hrs if needed.  No more than twice per day or 3 days/wk. (Patient taking differently: Take at onset of migraine, can repeat in 2 hrs if needed.  No more than twice per day or 3 days/wk.), Disp: 12 tablet, Rfl: 0    traMADoL (ULTRAM) 50 mg tablet, Take 1-2 tablets ( mg total) by mouth every 6 (six) hours as needed for Pain. (Patient not taking: No sig reported), Disp: 21 tablet, Rfl: 0    Current Facility-Administered Medications:     levonorgestreL (MIRENA) 20 mcg/24 hours (6 yrs) 52 mg IUD 1 Intra Uterine Device, 1 Intra Uterine Device, Intrauterine, , Michelle W. Band, DO, 1 Intra Uterine Device at 06/11/21 0945    Objective:        Body mass index is 19.6 kg/m².  Vitals:    10/20/22 1310   BP: 120/80   Pulse: 76   SpO2: 98%   Weight: 51.8 kg (114 lb 3.2 oz)   Height: 5' 4" (1.626 m)   PainSc:   7   PainLoc: Head     Physical Exam  Vitals and nursing note reviewed.   Constitutional:       General: She is not in acute distress.     Appearance: She is ill-appearing.      Comments: Quiet voice, shallow breaths, no respir distress, obviously feeling poorly   HENT:      Head: Normocephalic and atraumatic.      Nose:      Comments: Wearing a mask  Eyes:      General: No scleral icterus.  Cardiovascular:      Rate and Rhythm: Normal rate and regular rhythm.      Heart sounds: Normal heart sounds.   Pulmonary:      Effort: Pulmonary effort is normal.   Abdominal:      General: There is no distension.   Musculoskeletal:         General: No " deformity.   Skin:     General: Skin is warm and dry.   Neurological:      Mental Status: She is alert and oriented to person, place, and time.   Psychiatric:         Behavior: Behavior normal.         Lab Results   Component Value Date    WBC 8.98 12/29/2021    HGB 12.9 12/29/2021    HCT 40.5 12/29/2021     12/29/2021    CHOL 209 (H) 05/04/2021    TRIG 72 05/04/2021    HDL 56 05/04/2021    ALT 10 12/29/2021    AST 11 12/29/2021     12/29/2021    K 3.6 12/29/2021     12/29/2021    CREATININE 0.8 12/29/2021    BUN 11 12/29/2021    CO2 25 12/29/2021    TSH 1.683 05/26/2021    HGBA1C 5.1 05/26/2021       The ASCVD Risk score (Henny RAND, et al., 2019) failed to calculate for the following reasons:    The 2019 ASCVD risk score is only valid for ages 40 to 79    (Imaging have been independently reviewed)      Assessment:         1. Immunodeficiency with predominantly antibody defects    2. Shortness of breath    3. Acute cough    4. History of pneumothorax    5. Viral syndrome    6. At risk for pneumonia    7. Cough    8. Muscle pain    9. 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    Diagnoses and all orders for this visit:    Immunodeficiency with predominantly antibody defects  -     levoFLOXacin (LEVAQUIN) 750 MG tablet; Take 1 tablet (750 mg total) by mouth once daily.    Shortness of breath  Comments:  cough, feeling poorly  in otherwise health individual would test and monitor, but pt high risk  Abx prescribed with precautions (allergies), labs and cxr    Acute cough  -     CBC Auto Differential; Future  -     COMPREHENSIVE METABOLIC PANEL; Future  -     Procalcitonin; Future  -     levoFLOXacin (LEVAQUIN) 750 MG tablet; Take 1 tablet (750 mg total) by mouth once daily.    History of pneumothorax  -     X-Ray Chest PA And Lateral; Future    Viral syndrome  -     albuterol  (VENTOLIN HFA) 90 mcg/actuation inhaler; Inhale 2 puffs into the lungs every 6 (six) hours as needed for Wheezing. Rescue  -     POCT Influenza A/B Molecular    At risk for pneumonia  -     levoFLOXacin (LEVAQUIN) 750 MG tablet; Take 1 tablet (750 mg total) by mouth once daily.    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  Pt frustrated with difficulty with access.  Offered to assist.      Follow up for f/u with Dr. Garcia or Alejandra .        All medications were reviewed including potential side effects and risks/benefits.  Pt was counseled to call back if anything worsens or if questions arise.    Derek Bella MD  Family Medicine  [facility]  [facility]  [address] [place] [id number]  Phone [phone]  Fax [phone]

## 2022-11-22 ENCOUNTER — OFFICE VISIT (OUTPATIENT)
Dept: PAIN MEDICINE | Facility: CLINIC | Age: 37
End: 2022-11-22
Attending: ANESTHESIOLOGY
Payer: COMMERCIAL

## 2022-11-22 VITALS
DIASTOLIC BLOOD PRESSURE: 82 MMHG | WEIGHT: 114.63 LBS | BODY MASS INDEX: 19.57 KG/M2 | HEIGHT: 64 IN | SYSTOLIC BLOOD PRESSURE: 111 MMHG | HEART RATE: 85 BPM

## 2022-11-22 DIAGNOSIS — G43.119 INTRACTABLE MIGRAINE WITH AURA WITHOUT STATUS MIGRAINOSUS: Primary | ICD-10-CM

## 2022-11-22 PROCEDURE — 99999 PR PBB SHADOW E&M-EST. PATIENT-LVL III: ICD-10-PCS | Mod: PBBFAC,,, | Performed by: ANESTHESIOLOGY

## 2022-11-22 PROCEDURE — 64615 PR CHEMODENERVATION OF MUSCLE FOR CHRONIC MIGRAINE: ICD-10-PCS | Mod: S$GLB,,, | Performed by: ANESTHESIOLOGY

## 2022-11-22 PROCEDURE — 3074F SYST BP LT 130 MM HG: CPT | Mod: CPTII,S$GLB,, | Performed by: ANESTHESIOLOGY

## 2022-11-22 PROCEDURE — 1159F PR MEDICATION LIST DOCUMENTED IN MEDICAL RECORD: ICD-10-PCS | Mod: CPTII,S$GLB,, | Performed by: ANESTHESIOLOGY

## 2022-11-22 PROCEDURE — 3074F PR MOST RECENT SYSTOLIC BLOOD PRESSURE < 130 MM HG: ICD-10-PCS | Mod: CPTII,S$GLB,, | Performed by: ANESTHESIOLOGY

## 2022-11-22 PROCEDURE — 3079F DIAST BP 80-89 MM HG: CPT | Mod: CPTII,S$GLB,, | Performed by: ANESTHESIOLOGY

## 2022-11-22 PROCEDURE — 3008F PR BODY MASS INDEX (BMI) DOCUMENTED: ICD-10-PCS | Mod: CPTII,S$GLB,, | Performed by: ANESTHESIOLOGY

## 2022-11-22 PROCEDURE — 3008F BODY MASS INDEX DOCD: CPT | Mod: CPTII,S$GLB,, | Performed by: ANESTHESIOLOGY

## 2022-11-22 PROCEDURE — 99212 OFFICE O/P EST SF 10 MIN: CPT | Mod: 25,S$GLB,, | Performed by: ANESTHESIOLOGY

## 2022-11-22 PROCEDURE — 1159F MED LIST DOCD IN RCRD: CPT | Mod: CPTII,S$GLB,, | Performed by: ANESTHESIOLOGY

## 2022-11-22 PROCEDURE — 99212 PR OFFICE/OUTPT VISIT, EST, LEVL II, 10-19 MIN: ICD-10-PCS | Mod: 25,S$GLB,, | Performed by: ANESTHESIOLOGY

## 2022-11-22 PROCEDURE — 1160F RVW MEDS BY RX/DR IN RCRD: CPT | Mod: CPTII,S$GLB,, | Performed by: ANESTHESIOLOGY

## 2022-11-22 PROCEDURE — 3079F PR MOST RECENT DIASTOLIC BLOOD PRESSURE 80-89 MM HG: ICD-10-PCS | Mod: CPTII,S$GLB,, | Performed by: ANESTHESIOLOGY

## 2022-11-22 PROCEDURE — 99999 PR PBB SHADOW E&M-EST. PATIENT-LVL III: CPT | Mod: PBBFAC,,, | Performed by: ANESTHESIOLOGY

## 2022-11-22 PROCEDURE — 64615 CHEMODENERV MUSC MIGRAINE: CPT | Mod: S$GLB,,, | Performed by: ANESTHESIOLOGY

## 2022-11-22 PROCEDURE — 1160F PR REVIEW ALL MEDS BY PRESCRIBER/CLIN PHARMACIST DOCUMENTED: ICD-10-PCS | Mod: CPTII,S$GLB,, | Performed by: ANESTHESIOLOGY
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
## 2022-12-10 ENCOUNTER — PATIENT MESSAGE (OUTPATIENT)
Dept: INTERNAL MEDICINE | Facility: CLINIC | Age: 37
End: 2022-12-10
Payer: COMMERCIAL

## 2023-01-10 ENCOUNTER — TELEPHONE (OUTPATIENT)
Dept: PAIN MEDICINE | Facility: CLINIC | Age: 38
End: 2023-01-10
Payer: COMMERCIAL

## 2023-01-10 NOTE — TELEPHONE ENCOUNTER
Staff LVM for patient in regards of appointment with  on 01/11/23 at 8:45   Subjective:      Patient ID: Severiano Downing is a 69 y.o. female.    Chief Complaint: Annual Exam    Problem List Items Addressed This Visit     CAD (coronary artery disease)    Overview     The patient presents with coronary artery disease.  Denies chest pain, shortness of breath, left arm or neck pain, diaphoresis, nausea, vomiting, palpitations, paroxysmal nocturnal dyspnea, orthopnea, claudication or decreased exercise tolerance.  The patient reports excellent compliance.  Current treatment has included medications that are listed in medication list.  The cannot identify any exacerbating factors.             Dyslipidemia    Overview     The patient presents with hyperlipidemia.  The patient reports tolerating the medication well and is in excellent compliance.  There have been no medication side effects.  The patient denies chest pain, neuropathy, and myalgias.  The patient has reduced fat intake and has been exercising.  Current treatment has included the medications listed in the med card.    Lab Results   Component Value Date    CHOL 184 11/08/2018    CHOL 167 06/26/2017    CHOL 160 11/19/2015       Lab Results   Component Value Date    HDL 47 11/08/2018    HDL 44 06/26/2017    HDL 47 11/19/2015       Lab Results   Component Value Date    LDLCALC 106.8 11/08/2018    LDLCALC 99.8 06/26/2017    LDLCALC 90.2 11/19/2015       Lab Results   Component Value Date    TRIG 151 (H) 11/08/2018    TRIG 116 06/26/2017    TRIG 114 11/19/2015       Lab Results   Component Value Date    CHOLHDL 25.5 11/08/2018    CHOLHDL 26.3 06/26/2017    CHOLHDL 29.4 11/19/2015     Lab Results   Component Value Date    ALT 37 10/01/2018    AST 24 10/01/2018    GGT 44 02/13/2007    ALKPHOS 106 10/01/2018    BILITOT 0.5 10/01/2018              Essential hypertension    Overview     The patient presents with essential hypertension.  The patient is tolerating the medication well and is in excellent compliance.  The patient is experiencing no  side effects.  Counseling was offered regarding low salt diets.  The patient has a reduced salt intake.  The patient denies chest pain, palpitations, shortness of breath, dyspnea on exertion, left or murmur neck pain, nausea, vomiting, diaphoresis, paroxysmal nocturnal dyspnea, and orthopnea.   Hypertension Medications             carvedilol (COREG) 3.125 MG tablet Take 1 tablet (3.125 mg total) by mouth 2 (two) times daily with meals.                 Hyperglycemia - Primary    Overview     She was found to have hyperglcyemia in the past and her glucose as 143 and the 2 hour GTT was 210.  We discussed that this fit criteria for diabetes. She did not get on medicine as we suggested and opted for diet and is ready to recheck this now.         Hypothyroidism (Chronic)    Overview     The patient presents with hypothyroidism.  The patient denies agitation, anxiety, blurred vision, chest pain, cold intolerance, constipation, dizziness, dry skin, fatigue, lightheadedness, paresthesias, skin coarsening, tachycardia, tremor, weight gain or weight loss.  The patient's current treatment has included Synthroid with a good response.    Lab Results   Component Value Date    TSH 2.901 05/07/2018              Vitamin D deficiency    Overview     The patient has Vitamin D deficiency and has been on medication for this.  There have been no side effects from the medicine and levels need to be tracked over time.   Lab Results   Component Value Date    STRYXEHW17JD 38 03/01/2016    [id number] 36 09/17/2015    HZOWERKJ15MD 28 (L) 07/08/2015                    Past Medical History:  Past Medical History:   Diagnosis Date    Anticoagulant long-term use     plavix    Anxiety state 9/28/2010    CAD (coronary artery disease)     1 stent    Depression     Dyslipidemia     Encounter for blood transfusion     GERD (gastroesophageal reflux disease)     Hypothyroidism 8/17/2012    Kidney stone     Kidney stone     Myocardial  infarction 2012    Nephrolithiasis     passed stone x 1 (1994)    Osteopenia 2013    repeat dexa in 2015    S/P coronary artery stent placement     Thyroid disease     Vitamin D deficiency      Past Surgical History:   Procedure Laterality Date    CARDIAC SURGERY      PCI 2012    CHOLECYSTECTOMY      COLONOSCOPY  ~2000    ([place]).    COLONOSCOPY N/A 4/4/2018    Procedure: COLONOSCOPY;  Surgeon: Gus Chairez Jr., MD;  Location: [facility];  Service: Endoscopy;  Laterality: N/A;    COLONOSCOPY W/ POLYPECTOMY  10/16/2007    [facility].   One 2 mm polyp in the ascending colon.  A few sigmoid diverticula.    CORONARY ANGIOPLASTY WITH STENT PLACEMENT  2012    CORONARY STENT PLACEMENT N/A 10/29/2018    Procedure: INSERTION, STENT, CORONARY ARTERY;  Surgeon: Rick Hill MD;  Location: [facility] CATH;  Service: Cardiology;  Laterality: N/A;    ECTOPIC PREGNANCY SURGERY Left     ESOPHAGOGASTRODUODENOSCOPY  09/16/2008    [facility].   Gaping LES.  Hiatal hernia.  NERD.  Moderate antritis.    ESOPHAGOGASTRODUODENOSCOPY  11/29/2011    [facility].   Chronic GERD with no sign of Olguin's.  Hiatal hernia.  Normal stomach and duodenum.    ESOPHAGOGASTRODUODENOSCOPY N/A 7/23/2018    Procedure: EGD (ESOPHAGOGASTRODUODENOSCOPY);  Surgeon: Gus Chairez Jr., MD;  Location: [facility];  Service: Endoscopy;  Laterality: N/A;    EXTRACORPOREAL SHOCK WAVE LITHOTRIPSY Right 5/7/2019    Procedure: LITHOTRIPSY, ESWL;  Surgeon: Jack Velasquez IV, MD;  Location: [facility] OR;  Service: Urology;  Laterality: Right;    LEFT HEART CATHETERIZATION Left 10/29/2018    Procedure: Left heart cath;  Surgeon: Rick Hill MD;  Location: [facility] CATH;  Service: Cardiology;  Laterality: Left;    MINI-LAPAROTOMY      unilateral oopherectomy with salpingectomy for ECTOPIC pregnancy     Review of patient's allergies indicates:   Allergen Reactions    Iodine and iodide containing products Anaphylaxis    Ativan [lorazepam]  Other (See Comments)     Pt becomes overly sedated    Avelox [moxifloxacin] Nausea Only    Codeine Nausea Only    Doxycycline Rash    Sulfa (sulfonamide antibiotics) Rash     Current Outpatient Medications on File Prior to Visit   Medication Sig Dispense Refill    atorvastatin (LIPITOR) 10 MG tablet TAKE 1 TABLET (10 MG TOTAL) BY MOUTH ONCE DAILY. 90 tablet 3    carvedilol (COREG) 3.125 MG tablet Take 1 tablet (3.125 mg total) by mouth 2 (two) times daily with meals. 180 tablet 11    clopidogrel (PLAVIX) 75 mg tablet TAKE 1 TABLET ONE TIME DAILY 90 tablet 3    ergocalciferol (VITAMIN D2) 50,000 unit Cap TAKE 1 CAPSULE BY MOUTH TWICE A WEEK. 24 capsule 3    fish oil-omega-3 fatty acids 300-1,000 mg capsule Take by mouth once daily.      levothyroxine (SYNTHROID) 75 MCG tablet TAKE 1 TABLET ONE TIME DAILY 90 tablet 2    melatonin 5 mg Tab Take 1 tablet by mouth every evening.       zolpidem (AMBIEN) 5 MG Tab TAKE 1 TABLET BY MOUTH EVERY DAY AT NIGHT AS NEEDED (Patient not taking: Reported on 11/15/2019) 90 tablet 1    [DISCONTINUED] cetirizine (ZYRTEC) 10 MG tablet Take 10 mg by mouth every evening.       [DISCONTINUED] docusate sodium (COLACE) 100 MG capsule Take 1 capsule (100 mg total) by mouth 2 (two) times daily. 60 capsule 0    [DISCONTINUED] esomeprazole (NEXIUM) 20 MG capsule Take 20 mg by mouth every evening.       [DISCONTINUED] L. rhamnosus GG/inulin (CULTURELLE PROBIOTICS ORAL) Take by mouth 2 (two) times daily.      [DISCONTINUED] nitroGLYCERIN 0.4 MG/HR TD PT24 (NITRODUR) 0.4 mg/hr Place 1 patch onto the skin once daily. (Patient not taking: Reported on 11/15/2019) 90 patch 3    [DISCONTINUED] sertraline (ZOLOFT) 50 MG tablet Take 1 tablet (50 mg total) by mouth once daily. 90 tablet 11    [DISCONTINUED] tamsulosin (FLOMAX) 0.4 mg Cap Take 0.4 mg by mouth once daily.      [DISCONTINUED] tiZANidine (ZANAFLEX) 4 MG tablet TAKE 1 TABLET BY MOUTH EVERY 6 HOURS AS NEEDED 90 tablet 0     No  current facility-administered medications on file prior to visit.      Social History     Socioeconomic History    Marital status:      Spouse name: Not on file    Number of children: Not on file    Years of education: Not on file    Highest education level: Not on file   Occupational History    Not on file   Social Needs    Financial resource strain: Not on file    Food insecurity:     Worry: Not on file     Inability: Not on file    Transportation needs:     Medical: Not on file     Non-medical: Not on file   Tobacco Use    Smoking status: Former Smoker     Types: Cigarettes    Smokeless tobacco: Never Used    Tobacco comment: quit 35 years ago   Substance and Sexual Activity    Alcohol use: Yes     Alcohol/week: 1.0 standard drinks     Types: 1 Glasses of wine per week     Comment:  3 times week, no alcohol 72 hours prior to sx    Drug use: No    Sexual activity: Never     Birth control/protection: Post-menopausal   Lifestyle    Physical activity:     Days per week: Not on file     Minutes per session: Not on file    Stress: Not on file   Relationships    Social connections:     Talks on phone: Not on file     Gets together: Not on file     Attends Yazidi service: Not on file     Active member of club or organization: Not on file     Attends meetings of clubs or organizations: Not on file     Relationship status: Not on file   Other Topics Concern    Not on file   Social History Narrative    Not on file     Family History   Problem Relation Age of Onset    Heart disease Mother     Hyperlipidemia Mother     Hypertension Mother     Breast cancer Mother     Heart disease Father     Hyperlipidemia Father     Hypertension Father     Prostate cancer Father     Nephrolithiasis Father     Ovarian cancer Sister     Melanoma Brother        Review of Systems   Constitutional: Negative for activity change and unexpected weight change.   HENT: Positive for rhinorrhea. Negative for  "hearing loss and trouble swallowing.    Eyes: Negative for discharge and visual disturbance.   Respiratory: Negative for chest tightness and wheezing.    Cardiovascular: Positive for palpitations. Negative for chest pain.   Gastrointestinal: Negative for blood in stool, constipation, diarrhea and vomiting.   Endocrine: Negative for polydipsia and polyuria.   Genitourinary: Negative for difficulty urinating, dysuria, hematuria and menstrual problem.   Musculoskeletal: Positive for neck pain. Negative for arthralgias and joint swelling.   Neurological: Negative for weakness and headaches.   Psychiatric/Behavioral: Negative for confusion and dysphoric mood.       Objective:     /66   Pulse 82   Ht 5' 2" (1.575 m)   Wt 80.2 kg (176 lb 12.9 oz)   BMI 32.34 kg/m²     Physical Exam   Constitutional: She appears well-developed and well-nourished. She is cooperative.   HENT:   Head: Normocephalic and atraumatic.   Right Ear: Tympanic membrane, external ear and ear canal normal.   Left Ear: Tympanic membrane, external ear and ear canal normal.   Nose: Nose normal.   Mouth/Throat: Uvula is midline and mucous membranes are normal. No oral lesions. No oropharyngeal exudate, posterior oropharyngeal edema or posterior oropharyngeal erythema.   Eyes: Pupils are equal, round, and reactive to light. EOM and lids are normal. Right eye exhibits no discharge. Left eye exhibits no discharge. Right conjunctiva is not injected. Right conjunctiva has no hemorrhage. Left conjunctiva is not injected. Left conjunctiva has no hemorrhage. No scleral icterus. Right eye exhibits no nystagmus. Left eye exhibits no nystagmus.   Neck: Normal range of motion and full passive range of motion without pain. Neck supple. No JVD present. No tracheal tenderness present. Carotid bruit is not present. No tracheal deviation present. No thyroid mass and no thyromegaly present.   Cardiovascular: Normal rate, regular rhythm, S1 normal and S2 normal. "   No murmur heard.  Pulses:       Carotid pulses are 2+ on the right side, and 2+ on the left side.       Radial pulses are 2+ on the right side, and 2+ on the left side.        Posterior tibial pulses are 2+ on the right side, and 2+ on the left side.   Pulmonary/Chest: Effort normal and breath sounds normal. No respiratory distress. She has no wheezes. She has no rhonchi. She has no rales.   Abdominal: Soft. Normal appearance, normal aorta and bowel sounds are normal. She exhibits no distension, no abdominal bruit, no pulsatile midline mass and no mass. There is no hepatosplenomegaly. There is no tenderness. There is no rebound.   Musculoskeletal:        Right knee: She exhibits no swelling. No tenderness found.        Left knee: She exhibits no swelling. No tenderness found.   Lymphadenopathy:        Head (right side): No submental and no submandibular adenopathy present.        Head (left side): No submental and no submandibular adenopathy present.     She has no cervical adenopathy.   Neurological: She is alert. She has normal strength. No cranial nerve deficit or sensory deficit.   Skin: Skin is warm and dry. No rash noted. No cyanosis. Nails show no clubbing.   Psychiatric: She has a normal mood and affect. Her speech is normal and behavior is normal. Thought content normal. Cognition and memory are normal.     Assessment:     1. Hyperglycemia    2. Coronary artery disease involving native coronary artery of native heart without angina pectoris    3. Dyslipidemia    4. Essential hypertension    5. Vitamin D deficiency        Plan:     Problem List Items Addressed This Visit     CAD (coronary artery disease)    Dyslipidemia    Essential hypertension    Hyperglycemia - Primary    Hypothyroidism (Chronic)    Vitamin D deficiency        No follow-ups on file.      I am having Nicole Downing maintain her melatonin, atorvastatin, fish oil-omega-3 fatty acids, zolpidem, carvedilol, ergocalciferol, levothyroxine,  and clopidogrel.    Severiano was seen today for annual exam.    Diagnoses and all orders for this visit:    Hyperglycemia    Coronary artery disease involving native coronary artery of native heart without angina pectoris  -     nitroGLYCERIN (NITROSTAT) 0.3 MG SL tablet; Place 1 tablet (0.3 mg total) under the tongue every 5 (five) minutes as needed for Chest pain.  -     clopidogrel (PLAVIX) 75 mg tablet; TAKE 1 TABLET ONE TIME DAILY  -     carvedilol (COREG) 3.125 MG tablet; Take 1 tablet (3.125 mg total) by mouth 2 (two) times daily with meals.    Dyslipidemia  -     atorvastatin (LIPITOR) 10 MG tablet; Take 1 tablet (10 mg total) by mouth once daily.  -     Lipid panel; Future    Essential hypertension  -     carvedilol (COREG) 3.125 MG tablet; Take 1 tablet (3.125 mg total) by mouth 2 (two) times daily with meals.  -     Comprehensive metabolic panel; Future  -     CBC auto differential; Future  -     TSH; Future    Vitamin D deficiency  -     ergocalciferol (VITAMIN D2) 50,000 unit Cap; TAKE 1 CAPSULE BY MOUTH TWICE A WEEK.  -     Vitamin D; Future    Hypothyroidism, unspecified type    Other orders  -     levothyroxine (SYNTHROID) 75 MCG tablet; TAKE 1 TABLET ONE TIME DAILY         The patient was instructed to stop the following meds:  Medications Discontinued During This Encounter   Medication Reason    tiZANidine (ZANAFLEX) 4 MG tablet Patient no longer taking    tamsulosin (FLOMAX) 0.4 mg Cap Patient no longer taking    sertraline (ZOLOFT) 50 MG tablet Patient no longer taking    nitroGLYCERIN 0.4 MG/HR TD PT24 (NITRODUR) 0.4 mg/hr Patient no longer taking    L. rhamnosus GG/inulin (CULTURELLE PROBIOTICS ORAL) Patient no longer taking    esomeprazole (NEXIUM) 20 MG capsule Patient no longer taking    docusate sodium (COLACE) 100 MG capsule Patient no longer taking    cetirizine (ZYRTEC) 10 MG tablet Patient no longer taking     No orders of the defined types were placed in this encounter.

## 2023-01-11 ENCOUNTER — TELEPHONE (OUTPATIENT)
Dept: PAIN MEDICINE | Facility: CLINIC | Age: 38
End: 2023-01-11

## 2023-01-11 ENCOUNTER — OFFICE VISIT (OUTPATIENT)
Dept: PAIN MEDICINE | Facility: CLINIC | Age: 38
End: 2023-01-11
Attending: ANESTHESIOLOGY
Payer: COMMERCIAL

## 2023-01-11 VITALS
RESPIRATION RATE: 18 BRPM | SYSTOLIC BLOOD PRESSURE: 112 MMHG | HEIGHT: 64 IN | DIASTOLIC BLOOD PRESSURE: 79 MMHG | HEART RATE: 96 BPM | WEIGHT: 110 LBS | BODY MASS INDEX: 18.78 KG/M2

## 2023-01-11 DIAGNOSIS — G89.29 CHRONIC RIGHT SHOULDER PAIN: ICD-10-CM

## 2023-01-11 DIAGNOSIS — G43.009 MIGRAINE WITHOUT AURA AND WITHOUT STATUS MIGRAINOSUS, NOT INTRACTABLE: ICD-10-CM

## 2023-01-11 DIAGNOSIS — M25.511 CHRONIC RIGHT SHOULDER PAIN: ICD-10-CM

## 2023-01-11 DIAGNOSIS — G43.119 INTRACTABLE MIGRAINE WITH AURA WITHOUT STATUS MIGRAINOSUS: Primary | ICD-10-CM

## 2023-01-11 DIAGNOSIS — G24.3 CERVICAL DYSTONIA: Primary | ICD-10-CM

## 2023-01-11 PROCEDURE — 1159F MED LIST DOCD IN RCRD: CPT | Mod: CPTII,S$GLB,, | Performed by: ANESTHESIOLOGY

## 2023-01-11 PROCEDURE — 99213 OFFICE O/P EST LOW 20 MIN: CPT | Mod: S$GLB,,, | Performed by: ANESTHESIOLOGY

## 2023-01-11 PROCEDURE — 99999 PR PBB SHADOW E&M-EST. PATIENT-LVL III: CPT | Mod: PBBFAC,,, | Performed by: ANESTHESIOLOGY

## 2023-01-11 PROCEDURE — 3078F DIAST BP <80 MM HG: CPT | Mod: CPTII,S$GLB,, | Performed by: ANESTHESIOLOGY

## 2023-01-11 PROCEDURE — 3078F PR MOST RECENT DIASTOLIC BLOOD PRESSURE < 80 MM HG: ICD-10-PCS | Mod: CPTII,S$GLB,, | Performed by: ANESTHESIOLOGY

## 2023-01-11 PROCEDURE — 99999 PR PBB SHADOW E&M-EST. PATIENT-LVL III: ICD-10-PCS | Mod: PBBFAC,,, | Performed by: ANESTHESIOLOGY

## 2023-01-11 PROCEDURE — 3008F BODY MASS INDEX DOCD: CPT | Mod: CPTII,S$GLB,, | Performed by: ANESTHESIOLOGY

## 2023-01-11 PROCEDURE — 1160F RVW MEDS BY RX/DR IN RCRD: CPT | Mod: CPTII,S$GLB,, | Performed by: ANESTHESIOLOGY

## 2023-01-11 PROCEDURE — 99213 PR OFFICE/OUTPT VISIT, EST, LEVL III, 20-29 MIN: ICD-10-PCS | Mod: S$GLB,,, | Performed by: ANESTHESIOLOGY

## 2023-01-11 PROCEDURE — 1159F PR MEDICATION LIST DOCUMENTED IN MEDICAL RECORD: ICD-10-PCS | Mod: CPTII,S$GLB,, | Performed by: ANESTHESIOLOGY

## 2023-01-11 PROCEDURE — 3008F PR BODY MASS INDEX (BMI) DOCUMENTED: ICD-10-PCS | Mod: CPTII,S$GLB,, | Performed by: ANESTHESIOLOGY

## 2023-01-11 PROCEDURE — 1160F PR REVIEW ALL MEDS BY PRESCRIBER/CLIN PHARMACIST DOCUMENTED: ICD-10-PCS | Mod: CPTII,S$GLB,, | Performed by: ANESTHESIOLOGY

## 2023-01-11 PROCEDURE — 3074F SYST BP LT 130 MM HG: CPT | Mod: CPTII,S$GLB,, | Performed by: ANESTHESIOLOGY

## 2023-01-11 PROCEDURE — 3074F PR MOST RECENT SYSTOLIC BLOOD PRESSURE < 130 MM HG: ICD-10-PCS | Mod: CPTII,S$GLB,, | Performed by: ANESTHESIOLOGY

## 2023-01-11 RX ORDER — METHOCARBAMOL 500 MG/1
500 TABLET, FILM COATED ORAL 2 TIMES DAILY PRN
Qty: 60 TABLET | Refills: 2 | Status: SHIPPED | OUTPATIENT
Start: 2023-01-11 | End: 2023-04-11
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
## 2023-01-13 ENCOUNTER — PATIENT MESSAGE (OUTPATIENT)
Dept: INTERNAL MEDICINE | Facility: CLINIC | Age: 38
End: 2023-01-13
Payer: COMMERCIAL

## 2023-01-13 NOTE — TELEPHONE ENCOUNTER
Pt's son has Strep A, the pediatrician advised her to pass this along. Pt states she will monitor her symptoms, just wanted to know if there was anything else that is recommended outside of washing hands.

## 2023-01-17 ENCOUNTER — PATIENT MESSAGE (OUTPATIENT)
Dept: INTERNAL MEDICINE | Facility: CLINIC | Age: 38
End: 2023-01-17
Payer: COMMERCIAL

## 2023-01-17 RX ORDER — AZITHROMYCIN 250 MG/1
TABLET, FILM COATED ORAL
Qty: 6 TABLET | Refills: 0 | Status: SHIPPED | OUTPATIENT
Start: 2023-01-17 | End: 2023-01-23 | Stop reason: ALTCHOICE

## 2023-01-17 NOTE — TELEPHONE ENCOUNTER
Pt has sore throat with blisters on tonsils, unable to make it to urgent care. Wants to know if she could receive an antibiotic    Allergic to penicillins and ceclor

## 2023-01-18 ENCOUNTER — OFFICE VISIT (OUTPATIENT)
Dept: INTERNAL MEDICINE | Facility: CLINIC | Age: 38
End: 2023-01-18
Payer: COMMERCIAL

## 2023-01-18 VITALS
OXYGEN SATURATION: 99 % | HEART RATE: 82 BPM | HEIGHT: 64 IN | SYSTOLIC BLOOD PRESSURE: 120 MMHG | BODY MASS INDEX: 18.67 KG/M2 | WEIGHT: 109.38 LBS | DIASTOLIC BLOOD PRESSURE: 80 MMHG

## 2023-01-18 DIAGNOSIS — J02.9 SORE THROAT: Primary | ICD-10-CM

## 2023-01-18 PROCEDURE — 99214 OFFICE O/P EST MOD 30 MIN: CPT | Mod: S$GLB,,, | Performed by: PHYSICIAN ASSISTANT

## 2023-01-18 PROCEDURE — 99999 PR PBB SHADOW E&M-EST. PATIENT-LVL III: CPT | Mod: PBBFAC,,, | Performed by: PHYSICIAN ASSISTANT

## 2023-01-18 PROCEDURE — 87880 POCT RAPID STREP A: ICD-10-PCS | Mod: QW,S$GLB,, | Performed by: PHYSICIAN ASSISTANT

## 2023-01-18 PROCEDURE — 3079F DIAST BP 80-89 MM HG: CPT | Mod: CPTII,S$GLB,, | Performed by: PHYSICIAN ASSISTANT

## 2023-01-18 PROCEDURE — 99999 PR PBB SHADOW E&M-EST. PATIENT-LVL III: ICD-10-PCS | Mod: PBBFAC,,, | Performed by: PHYSICIAN ASSISTANT

## 2023-01-18 PROCEDURE — 3008F BODY MASS INDEX DOCD: CPT | Mod: CPTII,S$GLB,, | Performed by: PHYSICIAN ASSISTANT

## 2023-01-18 PROCEDURE — 3079F PR MOST RECENT DIASTOLIC BLOOD PRESSURE 80-89 MM HG: ICD-10-PCS | Mod: CPTII,S$GLB,, | Performed by: PHYSICIAN ASSISTANT

## 2023-01-18 PROCEDURE — 3074F SYST BP LT 130 MM HG: CPT | Mod: CPTII,S$GLB,, | Performed by: PHYSICIAN ASSISTANT

## 2023-01-18 PROCEDURE — 99214 PR OFFICE/OUTPT VISIT, EST, LEVL IV, 30-39 MIN: ICD-10-PCS | Mod: S$GLB,,, | Performed by: PHYSICIAN ASSISTANT

## 2023-01-18 PROCEDURE — 3074F PR MOST RECENT SYSTOLIC BLOOD PRESSURE < 130 MM HG: ICD-10-PCS | Mod: CPTII,S$GLB,, | Performed by: PHYSICIAN ASSISTANT

## 2023-01-18 PROCEDURE — 3008F PR BODY MASS INDEX (BMI) DOCUMENTED: ICD-10-PCS | Mod: CPTII,S$GLB,, | Performed by: PHYSICIAN ASSISTANT

## 2023-01-18 PROCEDURE — 87880 STREP A ASSAY W/OPTIC: CPT | Mod: QW,S$GLB,, | Performed by: PHYSICIAN ASSISTANT

## 2023-01-18 NOTE — PROGRESS NOTES
INTERNAL MEDICINE URGENT VISIT NOTE    CHIEF COMPLAINT     Chief Complaint   Patient presents with    Sore Throat       HPI     Karen Purdy is a 37 y.o. female who presents for an urgent visit today.    PCP is Leah Garcia MD, patient is known to me.     Patient presents with complaints of sore throat for the past two days. She reports that her son has recently been diagnosed with streph pharyngitis. She reports that he was prescribed azithromycin. She admits to taking a dose of his medication yesterday. She was prescribed azithromycin empirically by this clinic yesterday. 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 Authorizing Provider   albuterol (VENTOLIN HFA) 90 mcg/actuation inhaler Inhale 2 puffs into the lungs every 6 (six) hours as needed for Wheezing. Rescue 10/20/22 10/20/23  Derek Bella MD   azithromycin (Z-ARLETH) 250 MG tablet Take 2 tablets by mouth on day 1; Take 1 tablet by mouth on days 2-5 1/17/23 1/22/23  Alejandra Hampton PA-C   gabapentin (NEURONTIN) 100 MG capsule Take 100 mg by mouth once daily. 3/22/22   Historical Provider   levoFLOXacin (LEVAQUIN) 750 MG tablet Take 1 tablet (750 mg total) by mouth once daily. 10/20/22   Derek Bella MD   methocarbamoL (ROBAXIN) 500 MG Tab Take 1 tablet (500 mg total) by mouth 2 (two) times daily as needed (neck pain and spasm). 1/11/23 4/11/23  Zachariah N. Weilenman, MD   naratriptan (AMERGE) 1 MG Tab Take at onset of migraine, can repeat in 2 hrs if needed.  No more than twice per day or  "3 days/wk.  Patient taking differently: Take at onset of migraine, can repeat in 2 hrs if needed.  No more than twice per day or 3 days/wk. 2/24/22 9/20/22  Fanny Lagos PA-C   tiZANidine (ZANAFLEX) 2 MG tablet Take 1-2 tablets (2-4 mg total) by mouth every evening. 5/19/22   Ivan Warner MD   traMADoL (ULTRAM) 50 mg tablet Take 1-2 tablets ( mg total) by mouth every 6 (six) hours as needed for Pain.  Patient not taking: Reported on 9/20/2022 2/7/22   Davy Herrera III, PA-C   VYVANSE 20 mg capsule  3/12/22   Historical Provider       Review of Systems:  Review of Systems   Constitutional:  Negative for chills and fever.   HENT:  Negative for sore throat and trouble swallowing.    Eyes:  Negative for visual disturbance.   Respiratory:  Negative for cough and shortness of breath.    Cardiovascular:  Negative for chest pain.   Gastrointestinal:  Negative for abdominal pain, constipation, diarrhea, nausea and vomiting.   Genitourinary:  Negative for dysuria and flank pain.   Musculoskeletal:  Negative for back pain, neck pain and neck stiffness.   Skin:  Negative for rash.   Neurological:  Negative for dizziness, syncope, weakness and headaches.   Psychiatric/Behavioral:  Negative for confusion.      Health Maintainence:   Immunizations:  Health Maintenance         Date Due Completion Date    Pneumococcal Vaccines (Age 0-64) (2 - PCV) 11/11/2020 11/11/2019    Cervical Cancer Screening 09/29/2025 9/29/2020    Override on 7/27/2013: Done    TETANUS VACCINE 04/05/2028 4/5/2018             PHYSICAL EXAM     /80 (BP Location: Left arm, Patient Position: Sitting)   Pulse 82   Ht 5' 4" (1.626 m)   Wt 49.6 kg (109 lb 5.6 oz)   LMP 01/15/2023   SpO2 99%   BMI 18.77 kg/m²     Physical Exam  Vitals and nursing note reviewed.   Constitutional:       Appearance: Normal appearance.   HENT:      Head: Normocephalic and atraumatic.      Nose: Nose normal.      Mouth/Throat:      Pharynx: Oropharynx is " clear.      Comments: Mild posterior cobblestoning to the oropharynx. The tonsils appear normal.   No uvula deviation     Eyes:      Conjunctiva/sclera: Conjunctivae normal.   Cardiovascular:      Rate and Rhythm: Normal rate and regular rhythm.      Pulses: Normal pulses.   Pulmonary:      Effort: No respiratory distress.   Abdominal:      Tenderness: There is no abdominal tenderness.   Musculoskeletal:         General: Normal range of motion.      Cervical back: No rigidity.   Skin:     General: Skin is warm and dry.      Capillary Refill: Capillary refill takes less than 2 seconds.      Findings: No rash.   Neurological:      General: No focal deficit present.      Mental Status: She is alert.      Gait: Gait normal.   Psychiatric:         Mood and Affect: Mood normal.       LABS     Lab Results   Component Value Date    HGBA1C 5.1 05/26/2021     CMP  Sodium   Date Value Ref Range Status   10/20/2022 141 136 - 145 mmol/L Final     Potassium   Date Value Ref Range Status   10/20/2022 3.5 3.5 - 5.1 mmol/L Final     Chloride   Date Value Ref Range Status   10/20/2022 104 95 - 110 mmol/L Final     CO2   Date Value Ref Range Status   10/20/2022 28 23 - 29 mmol/L Final     Glucose   Date Value Ref Range Status   10/20/2022 82 70 - 110 mg/dL Final     BUN   Date Value Ref Range Status   10/20/2022 9 6 - 20 mg/dL Final     Creatinine   Date Value Ref Range Status   10/20/2022 0.8 0.5 - 1.4 mg/dL Final     Calcium   Date Value Ref Range Status   10/20/2022 10.4 8.7 - 10.5 mg/dL Final     Total Protein   Date Value Ref Range Status   10/20/2022 7.4 6.0 - 8.4 g/dL Final     Albumin   Date Value Ref Range Status   10/20/2022 4.3 3.5 - 5.2 g/dL Final     Total Bilirubin   Date Value Ref Range Status   10/20/2022 0.3 0.1 - 1.0 mg/dL Final     Comment:     For infants and newborns, interpretation of results should be based  on gestational age, weight and in agreement with clinical  observations.    Premature Infant recommended  reference ranges:  Up to 24 hours.............<8.0 mg/dL  Up to 48 hours............<12.0 mg/dL  3-5 days..................<15.0 mg/dL  6-29 days.................<15.0 mg/dL       Alkaline Phosphatase   Date Value Ref Range Status   10/20/2022 68 55 - 135 U/L Final     AST   Date Value Ref Range Status   10/20/2022 15 10 - 40 U/L Final     ALT   Date Value Ref Range Status   10/20/2022 11 10 - 44 U/L Final     Anion Gap   Date Value Ref Range Status   10/20/2022 9 8 - 16 mmol/L Final     eGFR if    Date Value Ref Range Status   12/29/2021 >60.0 >60 mL/min/1.73 m^2 Final     eGFR if non    Date Value Ref Range Status   12/29/2021 >60.0 >60 mL/min/1.73 m^2 Final     Comment:     Calculation used to obtain the estimated glomerular filtration  rate (eGFR) is the CKD-EPI equation.        Lab Results   Component Value Date    WBC 7.86 10/20/2022    HGB 14.3 10/20/2022    HCT 43.8 10/20/2022    MCV 87 10/20/2022     10/20/2022     Lab Results   Component Value Date    CHOL 209 (H) 05/04/2021    CHOL 201 (H) 05/29/2013    CHOL 213 (H) 01/21/2008     Lab Results   Component Value Date    HDL 56 05/04/2021    HDL 59 05/29/2013    HDL 57 01/21/2008     Lab Results   Component Value Date    LDLCALC 138.6 05/04/2021    LDLCALC 124.0 05/29/2013    LDLCALC 139.2 (H) 01/21/2008     Lab Results   Component Value Date    TRIG 72 05/04/2021    TRIG 92 05/29/2013    TRIG 84 01/21/2008     Lab Results   Component Value Date    CHOLHDL 26.8 05/04/2021    CHOLHDL 29.4 05/29/2013    CHOLHDL 26.8 01/21/2008     Lab Results   Component Value Date    TSH 1.683 05/26/2021       ASSESSMENT/PLAN     Karen Purdy is a 37 y.o. female     Karen was seen today for sore throat. Prescribed azithromycin yesterday -will start taking this today. Rapid strep pending. She is aware of ED prompts. Symptoms are mild at this time and clinically there is only evidence of post nasal drip. She is aware of ED  prompts.     Of note, I discussed with her PCP - PT is ok to receive empiric abx when requested (even if asx) so as not to delay treatment of possible bacterial infections in the future.     Diagnoses and all orders for this visit:    Sore throat  -     POCT Rapid Strep A    Patient was counseled on when and how to seek emergent care.       Alejandra Hampton PA-C   Department of Internal Medicine - [facility]   12:04 PM

## 2023-01-21 ENCOUNTER — OFFICE VISIT (OUTPATIENT)
Dept: URGENT CARE | Facility: CLINIC | Age: 38
End: 2023-01-21
Payer: COMMERCIAL

## 2023-01-21 VITALS
SYSTOLIC BLOOD PRESSURE: 101 MMHG | OXYGEN SATURATION: 98 % | HEIGHT: 64 IN | DIASTOLIC BLOOD PRESSURE: 69 MMHG | HEART RATE: 110 BPM | WEIGHT: 110 LBS | TEMPERATURE: 99 F | BODY MASS INDEX: 18.78 KG/M2

## 2023-01-21 DIAGNOSIS — J02.0 STREP PHARYNGITIS: ICD-10-CM

## 2023-01-21 DIAGNOSIS — J02.9 SORE THROAT: Primary | ICD-10-CM

## 2023-01-21 LAB
CTP QC/QA: YES
HETEROPH AB SER QL: NEGATIVE
MOLECULAR STREP A: POSITIVE
S PYO RRNA THROAT QL PROBE: POSITIVE

## 2023-01-21 PROCEDURE — 3078F PR MOST RECENT DIASTOLIC BLOOD PRESSURE < 80 MM HG: ICD-10-PCS | Mod: CPTII,S$GLB,, | Performed by: FAMILY MEDICINE

## 2023-01-21 PROCEDURE — 86308 POCT INFECTIOUS MONONUCLEOSIS: ICD-10-PCS | Mod: QW,S$GLB,, | Performed by: FAMILY MEDICINE

## 2023-01-21 PROCEDURE — 3078F DIAST BP <80 MM HG: CPT | Mod: CPTII,S$GLB,, | Performed by: FAMILY MEDICINE

## 2023-01-21 PROCEDURE — 86308 HETEROPHILE ANTIBODY SCREEN: CPT | Mod: QW,S$GLB,, | Performed by: FAMILY MEDICINE

## 2023-01-21 PROCEDURE — 3008F PR BODY MASS INDEX (BMI) DOCUMENTED: ICD-10-PCS | Mod: CPTII,S$GLB,, | Performed by: FAMILY MEDICINE

## 2023-01-21 PROCEDURE — 3008F BODY MASS INDEX DOCD: CPT | Mod: CPTII,S$GLB,, | Performed by: FAMILY MEDICINE

## 2023-01-21 PROCEDURE — 87651 STREP A DNA AMP PROBE: CPT | Mod: QW,S$GLB,, | Performed by: FAMILY MEDICINE

## 2023-01-21 PROCEDURE — 1159F PR MEDICATION LIST DOCUMENTED IN MEDICAL RECORD: ICD-10-PCS | Mod: CPTII,S$GLB,, | Performed by: FAMILY MEDICINE

## 2023-01-21 PROCEDURE — 99214 OFFICE O/P EST MOD 30 MIN: CPT | Mod: S$GLB,,, | Performed by: FAMILY MEDICINE

## 2023-01-21 PROCEDURE — 1159F MED LIST DOCD IN RCRD: CPT | Mod: CPTII,S$GLB,, | Performed by: FAMILY MEDICINE

## 2023-01-21 PROCEDURE — 87651 POCT STREP A MOLECULAR: ICD-10-PCS | Mod: QW,S$GLB,, | Performed by: FAMILY MEDICINE

## 2023-01-21 PROCEDURE — 3074F SYST BP LT 130 MM HG: CPT | Mod: CPTII,S$GLB,, | Performed by: FAMILY MEDICINE

## 2023-01-21 PROCEDURE — 3074F PR MOST RECENT SYSTOLIC BLOOD PRESSURE < 130 MM HG: ICD-10-PCS | Mod: CPTII,S$GLB,, | Performed by: FAMILY MEDICINE

## 2023-01-21 PROCEDURE — 99214 PR OFFICE/OUTPT VISIT, EST, LEVL IV, 30-39 MIN: ICD-10-PCS | Mod: S$GLB,,, | Performed by: FAMILY MEDICINE

## 2023-01-21 RX ORDER — CLARITHROMYCIN 500 MG/1
500 TABLET, FILM COATED ORAL EVERY 12 HOURS
Qty: 20 TABLET | Refills: 0 | Status: SHIPPED | OUTPATIENT
Start: 2023-01-21 | End: 2023-01-26 | Stop reason: SDUPTHER

## 2023-01-21 RX ORDER — LISDEXAMFETAMINE DIMESYLATE 10 MG/1
1 CAPSULE ORAL EVERY MORNING
COMMUNITY
Start: 2023-01-19 | End: 2024-01-04

## 2023-01-21 NOTE — PROGRESS NOTES
"Subjective:       Patient ID: Karen Purdy is a 37 y.o. female.    Vitals:  height is 5' 4" (1.626 m) and weight is 49.9 kg (110 lb).     Chief Complaint: Nausea (Sore throat, severe headache, chills - soaking wet in the night. Feels like mono. - Entered by patient)    Pt c/o having sore throat and also neck pain she suffers with headaches , she took prescribed for the headaches and she mentioned she had chills during the night and she did otc medications No fever Pt. Started feeling this pain yesterday she has been suffering with the sore throat for about 7days also she mention she has been nausea body aches as well she mention the neck pain is more so In the front it is a little tender to touch     Nausea  This is a new problem. The current episode started yesterday. The problem occurs constantly. The problem has been gradually worsening. Associated symptoms include chills, congestion, coughing, fatigue, headaches and nausea. Pertinent negatives include no vomiting. Nothing aggravates the symptoms. She has tried nothing for the symptoms. The treatment provided mild relief.     Constitution: Positive for chills and fatigue.   HENT:  Positive for congestion.    Respiratory:  Positive for cough.    Gastrointestinal:  Positive for nausea. Negative for vomiting.   Neurological:  Positive for headaches.     Objective:      Physical Exam   Constitutional: She is oriented to person, place, and time. She does not appear ill. No distress. normal  HENT:   Head: Normocephalic and atraumatic.   Ears:   Right Ear: Tympanic membrane, external ear and ear canal normal.   Left Ear: Tympanic membrane, external ear and ear canal normal.   Nose: No rhinorrhea or congestion.   Mouth/Throat: Mucous membranes are dry. Oropharyngeal exudate and posterior oropharyngeal erythema present.   Eyes: Conjunctivae are normal. Pupils are equal, round, and reactive to light. Extraocular movement intact   Neck: Neck supple. "   Cardiovascular: Regular rhythm, normal heart sounds and normal pulses. Tachycardia present.   Pulmonary/Chest: Effort normal and breath sounds normal. No respiratory distress.   Abdominal: Normal appearance and bowel sounds are normal. She exhibits no distension and no mass. Soft. flat abdomen   Musculoskeletal: Normal range of motion.         General: Normal range of motion.      Cervical back: She exhibits tenderness.   Neurological: no focal deficit. She is alert, oriented to person, place, and time and at baseline. No cranial nerve deficit or sensory deficit.   Skin: Skin is warm, dry and not pale. Capillary refill takes less than 2 seconds. jaundice  Psychiatric: Her behavior is normal. Mood, judgment and thought content normal.   Nursing note and vitals reviewed.      Assessment:Plan:     1. Sore throat  - POCT Strep A, Molecular  - POCT Infectious mononucleosis antibody    2. Strep pharyngitis  - clarithromycin (BIAXIN) 500 MG tablet; Take 1 tablet (500 mg total) by mouth every 12 (twelve) hours.  Dispense: 20 tablet; Refill: 0   All results discussed with patient prior to discharge

## 2023-01-23 ENCOUNTER — OFFICE VISIT (OUTPATIENT)
Dept: URGENT CARE | Facility: CLINIC | Age: 38
End: 2023-01-23
Payer: COMMERCIAL

## 2023-01-23 ENCOUNTER — PATIENT MESSAGE (OUTPATIENT)
Dept: INTERNAL MEDICINE | Facility: CLINIC | Age: 38
End: 2023-01-23
Payer: COMMERCIAL

## 2023-01-23 ENCOUNTER — TELEPHONE (OUTPATIENT)
Dept: INTERNAL MEDICINE | Facility: CLINIC | Age: 38
End: 2023-01-23
Payer: COMMERCIAL

## 2023-01-23 VITALS
SYSTOLIC BLOOD PRESSURE: 146 MMHG | HEIGHT: 64 IN | DIASTOLIC BLOOD PRESSURE: 87 MMHG | HEART RATE: 82 BPM | TEMPERATURE: 98 F | WEIGHT: 110 LBS | OXYGEN SATURATION: 99 % | BODY MASS INDEX: 18.78 KG/M2

## 2023-01-23 DIAGNOSIS — J02.9 PHARYNGITIS, UNSPECIFIED ETIOLOGY: Primary | ICD-10-CM

## 2023-01-23 DIAGNOSIS — E86.0 DEHYDRATION: ICD-10-CM

## 2023-01-23 DIAGNOSIS — J02.0 STREP PHARYNGITIS: ICD-10-CM

## 2023-01-23 PROCEDURE — 1160F RVW MEDS BY RX/DR IN RCRD: CPT | Mod: CPTII,S$GLB,, | Performed by: FAMILY MEDICINE

## 2023-01-23 PROCEDURE — 3077F SYST BP >= 140 MM HG: CPT | Mod: CPTII,S$GLB,, | Performed by: FAMILY MEDICINE

## 2023-01-23 PROCEDURE — 3079F DIAST BP 80-89 MM HG: CPT | Mod: CPTII,S$GLB,, | Performed by: FAMILY MEDICINE

## 2023-01-23 PROCEDURE — 87070 CULTURE OTHR SPECIMN AEROBIC: CPT | Performed by: FAMILY MEDICINE

## 2023-01-23 PROCEDURE — 99214 PR OFFICE/OUTPT VISIT, EST, LEVL IV, 30-39 MIN: ICD-10-PCS | Mod: S$GLB,,, | Performed by: FAMILY MEDICINE

## 2023-01-23 PROCEDURE — 3008F PR BODY MASS INDEX (BMI) DOCUMENTED: ICD-10-PCS | Mod: CPTII,S$GLB,, | Performed by: FAMILY MEDICINE

## 2023-01-23 PROCEDURE — 1160F PR REVIEW ALL MEDS BY PRESCRIBER/CLIN PHARMACIST DOCUMENTED: ICD-10-PCS | Mod: CPTII,S$GLB,, | Performed by: FAMILY MEDICINE

## 2023-01-23 PROCEDURE — 3008F BODY MASS INDEX DOCD: CPT | Mod: CPTII,S$GLB,, | Performed by: FAMILY MEDICINE

## 2023-01-23 PROCEDURE — 1159F MED LIST DOCD IN RCRD: CPT | Mod: CPTII,S$GLB,, | Performed by: FAMILY MEDICINE

## 2023-01-23 PROCEDURE — 1159F PR MEDICATION LIST DOCUMENTED IN MEDICAL RECORD: ICD-10-PCS | Mod: CPTII,S$GLB,, | Performed by: FAMILY MEDICINE

## 2023-01-23 PROCEDURE — 3077F PR MOST RECENT SYSTOLIC BLOOD PRESSURE >= 140 MM HG: ICD-10-PCS | Mod: CPTII,S$GLB,, | Performed by: FAMILY MEDICINE

## 2023-01-23 PROCEDURE — 3079F PR MOST RECENT DIASTOLIC BLOOD PRESSURE 80-89 MM HG: ICD-10-PCS | Mod: CPTII,S$GLB,, | Performed by: FAMILY MEDICINE

## 2023-01-23 PROCEDURE — 99214 OFFICE O/P EST MOD 30 MIN: CPT | Mod: S$GLB,,, | Performed by: FAMILY MEDICINE

## 2023-01-23 RX ORDER — SODIUM CHLORIDE 9 MG/ML
INJECTION, SOLUTION INTRAVENOUS ONCE
Status: COMPLETED | OUTPATIENT
Start: 2023-01-23 | End: 2023-01-23

## 2023-01-23 RX ADMIN — SODIUM CHLORIDE: 9 INJECTION, SOLUTION INTRAVENOUS at 07:01

## 2023-01-23 NOTE — TELEPHONE ENCOUNTER
Please schedule an appointment for Dr. Garcia's first available time - this apt is for symptom re-check please. Thank you   -JOYCE

## 2023-01-23 NOTE — TELEPHONE ENCOUNTER
----- Message from Lizzette Crawford sent at 1/23/2023  4:36 PM CST -----  Regarding: patient call back  Type: Patient Call Back    Who called: Self     What is the request in detail: Needs a call to know what to do the antibiotics is not working    Can the clinic reply by [facility]? No     Would the patient rather a call back or a response via [facility]? Call     Best call back number:   [phone] or [phone]

## 2023-01-23 NOTE — TELEPHONE ENCOUNTER
Called pt back, on second round of antibiotics, feels like it has spread to her sinuses; throat feels worse - sore, fatigued.    Has been in bed for 3 days; has received new antibiotic and does not feel like it is working

## 2023-01-24 NOTE — PROGRESS NOTES
"Subjective:       Patient ID: Karen Purdy is a 37 y.o. female.    Vitals:  height is 5' 4" (1.626 m) and weight is 49.9 kg (110 lb). Her oral temperature is 98.4 °F (36.9 °C). Her blood pressure is 146/87 (abnormal) and her pulse is 82. Her oxygen saturation is 99%.     Chief Complaint: Sore Throat    37-year-old female with known immune deficiency.  She started feeling poorly 9 days ago.  6 days ago she started a Z-Saul (prescribed for one of her children).  The following day, 5 days ago, she has positive strep A test through her primary care provider's office.  She continued the Z-Saul.  She is allergic to penicillin.  Two days ago she was seen here in urgent care with a repeat positive strep A test and was switched to clarithromycin which she has been taking twice daily.  She cannot tell that she is improving.  In fact she feels like her infection has moved to her sinuses.  She has not had a fever.  Her main complaint is sore throat and decreased appetite.  There has been no vomiting.  She states she had an improved appetite yesterday, but has not been able to eat today due to her throat pain.  She has been able to drink water, "but not enough".  She is requesting IV fluids.  She has been unable to reach her [facility] allergy and immunologist specialty provider, but she should be available tomorrow .            Sore Throat   This is a new problem. Episode onset: 2 days ago. The problem has been unchanged. There has been no fever. The fever has been present for Less than 1 day. The pain is at a severity of 10/10. The pain is severe. Associated symptoms include swollen glands and trouble swallowing. She has had exposure to strep. She has tried acetaminophen for the symptoms. The treatment provided mild relief.     Constitution: Positive for fatigue. Negative for fever.   HENT:  Positive for sore throat and trouble swallowing.      Objective:      Physical Exam   Constitutional: She is oriented to person, place, " and time. She appears well-developed. She appears ill. No distress.      Comments:Appears fatigued, dark circles under eyes.     HENT:   Head: Normocephalic and atraumatic.   Ears:   Right Ear: External ear normal.   Left Ear: External ear normal.   Mouth/Throat: Oropharyngeal exudate and posterior oropharyngeal erythema present.      Comments: 2+ tonsils with bilateral exudates.  No concerns for evolving peritonsillar abscess at this time.  Eyes: Pupils are equal, round, and reactive to light. Extraocular movement intact   Cardiovascular: Regular rhythm.   Pulmonary/Chest: Effort normal. No stridor. No respiratory distress. She has no wheezes. She has no rhonchi. She has no rales.   Abdominal: She exhibits no distension. Soft. There is no abdominal tenderness. There is no rebound and no guarding.   Neurological: She is alert and oriented to person, place, and time.   Skin: Skin is not diaphoretic.   Psychiatric: Her behavior is normal. Thought content normal.   Nursing note and vitals reviewed.      Assessment:       1. Pharyngitis, unspecified etiology    2. Dehydration    3. Strep pharyngitis          Plan:         Pharyngitis, unspecified etiology  -     Culture, Throat  -     (Magic mouthwash) 1:1:1 diphenhydrAMINE(Benadryl) 12.5mg/5ml liq, aluminum & magnesium hydroxide-simethicone (Maalox), LIDOcaine viscous 2%; Swish and spit 10 mLs every 4 (four) hours as needed (Sore throat).  Dispense: 90 mL; Refill: 0    Dehydration  -     0.9%  NaCl infusion    Strep pharyngitis    TRY TO DRINK MORE FLUIDS AT HOME.     IT WILL BE IMPORTANT FOR YOU TO SEE YOUR ALLERGY AND IMMUNOLOGY PROVIDER TOMORROW.     IF YOU START RUNNING A FEVER OR WITH WORSENING SYMPTOMS, GO TO THE EMERGENCY ROOM FOR FURTHER EVALUATION AND TREATMENT.

## 2023-01-24 NOTE — PATIENT INSTRUCTIONS
TRY TO DRINK MORE FLUIDS AT HOME.    IT WILL BE IMPORTANT FOR YOU TO SEE YOUR ALLERGY AND IMMUNOLOGY PROVIDER TOMORROW.     IF YOU START RUNNING A FEVER OR WITH WORSENING SYMPTOMS, GO TO THE EMERGENCY ROOM FOR FURTHER EVALUATION AND TREATMENT.

## 2023-01-25 ENCOUNTER — OFFICE VISIT (OUTPATIENT)
Dept: INTERNAL MEDICINE | Facility: CLINIC | Age: 38
End: 2023-01-25
Attending: INTERNAL MEDICINE
Payer: COMMERCIAL

## 2023-01-25 ENCOUNTER — PATIENT MESSAGE (OUTPATIENT)
Dept: INTERNAL MEDICINE | Facility: CLINIC | Age: 38
End: 2023-01-25
Payer: COMMERCIAL

## 2023-01-25 ENCOUNTER — TELEPHONE (OUTPATIENT)
Dept: INTERNAL MEDICINE | Facility: CLINIC | Age: 38
End: 2023-01-25
Payer: COMMERCIAL

## 2023-01-25 VITALS
HEIGHT: 64 IN | WEIGHT: 108.69 LBS | HEART RATE: 85 BPM | SYSTOLIC BLOOD PRESSURE: 134 MMHG | OXYGEN SATURATION: 99 % | DIASTOLIC BLOOD PRESSURE: 90 MMHG | BODY MASS INDEX: 18.56 KG/M2

## 2023-01-25 DIAGNOSIS — R05.9 COUGH, UNSPECIFIED TYPE: Primary | ICD-10-CM

## 2023-01-25 DIAGNOSIS — M24.9 HYPERMOBILE JOINTS: ICD-10-CM

## 2023-01-25 DIAGNOSIS — R05.9 COUGH: ICD-10-CM

## 2023-01-25 DIAGNOSIS — R09.81 SINUS CONGESTION: ICD-10-CM

## 2023-01-25 DIAGNOSIS — G43.009 MIGRAINE WITHOUT AURA AND WITHOUT STATUS MIGRAINOSUS, NOT INTRACTABLE: ICD-10-CM

## 2023-01-25 DIAGNOSIS — B95.0 STREPTOCOCCAL INFECTION GROUP A: Primary | ICD-10-CM

## 2023-01-25 DIAGNOSIS — J02.9 PHARYNGITIS, UNSPECIFIED ETIOLOGY: ICD-10-CM

## 2023-01-25 DIAGNOSIS — Z00.00 ROUTINE GENERAL MEDICAL EXAMINATION AT A HEALTH CARE FACILITY: Primary | ICD-10-CM

## 2023-01-25 DIAGNOSIS — D80.9 IMMUNODEFICIENCY WITH PREDOMINANTLY ANTIBODY DEFECTS: ICD-10-CM

## 2023-01-25 LAB
INFLUENZA A, MOLECULAR: NEGATIVE
INFLUENZA B, MOLECULAR: NEGATIVE
SARS-COV-2 RNA RESP QL NAA+PROBE: NOT DETECTED
SPECIMEN SOURCE: NORMAL

## 2023-01-25 PROCEDURE — 3075F SYST BP GE 130 - 139MM HG: CPT | Mod: CPTII,S$GLB,, | Performed by: INTERNAL MEDICINE

## 2023-01-25 PROCEDURE — 99214 OFFICE O/P EST MOD 30 MIN: CPT | Mod: S$GLB,,, | Performed by: INTERNAL MEDICINE

## 2023-01-25 PROCEDURE — 87070 CULTURE OTHR SPECIMN AEROBIC: CPT | Performed by: INTERNAL MEDICINE

## 2023-01-25 PROCEDURE — 3075F PR MOST RECENT SYSTOLIC BLOOD PRESS GE 130-139MM HG: ICD-10-PCS | Mod: CPTII,S$GLB,, | Performed by: INTERNAL MEDICINE

## 2023-01-25 PROCEDURE — 3008F PR BODY MASS INDEX (BMI) DOCUMENTED: ICD-10-PCS | Mod: CPTII,S$GLB,, | Performed by: INTERNAL MEDICINE

## 2023-01-25 PROCEDURE — 3008F BODY MASS INDEX DOCD: CPT | Mod: CPTII,S$GLB,, | Performed by: INTERNAL MEDICINE

## 2023-01-25 PROCEDURE — 99999 PR PBB SHADOW E&M-EST. PATIENT-LVL IV: CPT | Mod: PBBFAC,,, | Performed by: INTERNAL MEDICINE

## 2023-01-25 PROCEDURE — 1159F MED LIST DOCD IN RCRD: CPT | Mod: CPTII,S$GLB,, | Performed by: INTERNAL MEDICINE

## 2023-01-25 PROCEDURE — 99214 PR OFFICE/OUTPT VISIT, EST, LEVL IV, 30-39 MIN: ICD-10-PCS | Mod: S$GLB,,, | Performed by: INTERNAL MEDICINE

## 2023-01-25 PROCEDURE — 3080F DIAST BP >= 90 MM HG: CPT | Mod: CPTII,S$GLB,, | Performed by: INTERNAL MEDICINE

## 2023-01-25 PROCEDURE — 87502 INFLUENZA DNA AMP PROBE: CPT | Mod: 59 | Performed by: INTERNAL MEDICINE

## 2023-01-25 PROCEDURE — 3080F PR MOST RECENT DIASTOLIC BLOOD PRESSURE >= 90 MM HG: ICD-10-PCS | Mod: CPTII,S$GLB,, | Performed by: INTERNAL MEDICINE

## 2023-01-25 PROCEDURE — 87632 RESP VIRUS 6-11 TARGETS: CPT | Performed by: INTERNAL MEDICINE

## 2023-01-25 PROCEDURE — 1159F PR MEDICATION LIST DOCUMENTED IN MEDICAL RECORD: ICD-10-PCS | Mod: CPTII,S$GLB,, | Performed by: INTERNAL MEDICINE

## 2023-01-25 PROCEDURE — 99999 PR PBB SHADOW E&M-EST. PATIENT-LVL IV: ICD-10-PCS | Mod: PBBFAC,,, | Performed by: INTERNAL MEDICINE

## 2023-01-25 PROCEDURE — 87147 CULTURE TYPE IMMUNOLOGIC: CPT | Performed by: INTERNAL MEDICINE

## 2023-01-25 PROCEDURE — U0005 INFEC AGEN DETEC AMPLI PROBE: HCPCS | Performed by: INTERNAL MEDICINE

## 2023-01-25 PROCEDURE — 1160F PR REVIEW ALL MEDS BY PRESCRIBER/CLIN PHARMACIST DOCUMENTED: ICD-10-PCS | Mod: CPTII,S$GLB,, | Performed by: INTERNAL MEDICINE

## 2023-01-25 PROCEDURE — 1160F RVW MEDS BY RX/DR IN RCRD: CPT | Mod: CPTII,S$GLB,, | Performed by: INTERNAL MEDICINE

## 2023-01-25 NOTE — TELEPHONE ENCOUNTER
----- Message from Juany Connors sent at 1/25/2023  9:59 AM CST -----  Name of Who is Calling:WILLIE CALVIN [[id number]]              What is the request in detail:Requesting a call back immediately. Patient is extremely sick with strep throat, upper respiratory infection along with other auto immune issues and need to be seen immediately. Please assist.               Can the clinic reply by [facility]:yes              What Number to Call Back if not in [facility]:[phone]

## 2023-01-25 NOTE — PROGRESS NOTES
Subjective:   Patient ID: Karen Zavala is a 37 y.o. female  Chief complaint:   Chief Complaint   Patient presents with    URI       HPI  Pt here for uc appt   Pcp: Dr. Garcia   Pt is new to me     37F with antibody deficiency, hx of pneumonia      Child dx with strep throat last week  She was given zpack for sx after exposure and sx progressed   Went to  and swab + for strep   Was changed to clarithromycin  Pus on post pharynx improved but now with sinus and lung symptoms   Home covid tests negative     Details below:   Seen 1/18 in clinic for sore throat - cobblestoning on exam   Rapid strep +  Given azithro due to anaphylaxis on chart to pcn and cephalosporines     Seen in  1/21   Mono neg  Step still +   Switched to clarithromycin    Seen in  1/23  Went back that day as feels like infection has spread to sinuses throat worse, fatigued   Throat cx completed however she does not feel like it was adequate as it barely touched post pharynx - prelim reading is normal resp brian today  - using Magic mouthwash and improved    Followed by immunologist at [facility] - Dr. Ward  Dx after recurrent sinus infections with specific antibody def - opted to defer tx with IvIG at that time   Hx of cap 2014    Today:   Reports exudate decreased today since starting clarithro  More sinus congestion and eyes are crusted over today, Irritated feeling in chest, and Dry cough   Flu swab pending - had to walk to lab as unable to process in our clinic today   Throat culture prelim normal resp brian but she reports swab barely touched post throat   swallowing secretions and feeling somewhat better with injection today     Was able to be seen by ENT today - given steroid injection today and rec switch from clarithromycin after 5 days of use   Given LQ 500mg x 7 days - to start today     Also noted:   Fracture toe in [place] - delayed healing initially and then f/u with provider at [facility] - clinically improved - xray  "8/8/2022 showed changes c/w healing  She is working to get her records t/g and f/u with hypermobility clinic at [facility]  She is interested in genetics consult to inquire about Trev danlos     Review of Systems    Objective:  Vitals:    01/25/23 1434   BP: (!) 134/90   BP Location: Left arm   Patient Position: Sitting   Pulse: 85   SpO2: 99%   Weight: 49.3 kg (108 lb 11 oz)   Height: 5' 4" (1.626 m)     Body mass index is 18.66 kg/m².    Physical Exam  Vitals reviewed.   Constitutional:       Appearance: Normal appearance. She is well-developed.   HENT:      Head: Normocephalic and atraumatic.      Right Ear: Tympanic membrane, ear canal and external ear normal.      Left Ear: Tympanic membrane, ear canal and external ear normal.      Nose: Congestion present.      Mouth/Throat:      Mouth: Mucous membranes are moist.      Pharynx: Oropharyngeal exudate (scant at left lower tonsil > right) and posterior oropharyngeal erythema present.      Comments: Uvula midline  Eyes:      Extraocular Movements: Extraocular movements intact.      Conjunctiva/sclera: Conjunctivae normal.   Neck:      Thyroid: No thyromegaly.   Cardiovascular:      Rate and Rhythm: Normal rate and regular rhythm.      Pulses: Normal pulses.      Heart sounds: Normal heart sounds.   Pulmonary:      Effort: Pulmonary effort is normal. No respiratory distress.      Breath sounds: Normal breath sounds. No stridor. No wheezing, rhonchi or rales.      Comments: Talking in complete sentences  Abdominal:      General: Bowel sounds are normal.      Palpations: Abdomen is soft.   Musculoskeletal:         General: No swelling or tenderness.      Cervical back: Neck supple.   Lymphadenopathy:      Cervical: Cervical adenopathy present.   Skin:     General: Skin is warm and dry.      Capillary Refill: Capillary refill takes less than 2 seconds.   Neurological:      General: No focal deficit present.      Mental Status: She is alert and oriented to person, place, " and time. Mental status is at baseline.   Psychiatric:         Behavior: Behavior normal.         Thought Content: Thought content normal.     Assessment:  1. Streptococcal infection group A    2. Hypermobile joints    3. Sinus congestion    4. Pharyngitis, unspecified etiology    5. Cough    6. Migraine without aura and without status migrainosus, not intractable    7. Immunodeficiency with predominantly antibody defects        Plan:  Karen was seen today for uri.    Diagnoses and all orders for this visit:    Streptococcal infection group A    Hypermobile joints  -     Ambulatory referral/consult to Genetics; Future    Sinus congestion  -     Influenza A & B by Molecular  -     Respiratory Viral Panel by PCR (Sources other than NP Swab) [facility]; Nasal Swab    Pharyngitis, unspecified etiology  -     CULTURE, RESPIRATORY  - THROAT  -     Respiratory Viral Panel by PCR (Sources other than NP Swab) [facility]; Nasal Swab    Cough  -     COVID-19 Routine Screening    Migraine without aura and without status migrainosus, not intractable    Immunodeficiency with predominantly antibody defects    New sx of sinus congestion and cough   Will check for other causes beyond strep   Given lq by outside ent - plan is to stop clarithro and start lq  Has not tried clinda - she reports that her son is on this and not improving after 5-6 days of tx - she is leary to consider this in light of him not improving  Mild clinical improvement with dec exudate on posterior tonsils   Resp cx normal resp brian - prelim read however question as to how accurate as per pt this barely reached her pharynx   Cont supportive care, magic mouthwash helpful  Working to stay hydrated   Will reach out to ID to see if any further recs   Er prompts reviewed   Agree with close f/u with immunologist to revisit ivig tx in future   Declined cxr as reports has had mult over past year - lungs completely clear on exam - ok to defer for now    Addendum:    Reached out to id clinic and rec appt [place] am prior to giving further recs   Pt scheduled   Called and reviewed recs with pt and  this evening     Health Maintenance   Topic Date Due    TETANUS VACCINE  04/05/2028    Hepatitis C Screening  Completed    Lipid Panel  Completed

## 2023-01-25 NOTE — TELEPHONE ENCOUNTER
Orders placed for further evaluation. Please call the patient to schedule these tests today. We are trying to work on office availability to have her seen today in office. -JOYCE

## 2023-01-25 NOTE — TELEPHONE ENCOUNTER
Please add pt on to my schedule for 2:30p for in person visit today and follow plan per xiomara's prev note

## 2023-01-26 ENCOUNTER — LAB VISIT (OUTPATIENT)
Dept: LAB | Facility: HOSPITAL | Age: 38
End: 2023-01-26
Attending: INTERNAL MEDICINE
Payer: COMMERCIAL

## 2023-01-26 ENCOUNTER — PATIENT MESSAGE (OUTPATIENT)
Dept: INFECTIOUS DISEASES | Facility: CLINIC | Age: 38
End: 2023-01-26

## 2023-01-26 ENCOUNTER — OFFICE VISIT (OUTPATIENT)
Dept: INFECTIOUS DISEASES | Facility: CLINIC | Age: 38
End: 2023-01-26
Payer: COMMERCIAL

## 2023-01-26 ENCOUNTER — TELEPHONE (OUTPATIENT)
Dept: URGENT CARE | Facility: CLINIC | Age: 38
End: 2023-01-26
Payer: COMMERCIAL

## 2023-01-26 VITALS
WEIGHT: 109.38 LBS | HEIGHT: 64 IN | DIASTOLIC BLOOD PRESSURE: 72 MMHG | TEMPERATURE: 98 F | SYSTOLIC BLOOD PRESSURE: 118 MMHG | BODY MASS INDEX: 18.67 KG/M2

## 2023-01-26 DIAGNOSIS — D80.9 IMMUNODEFICIENCY WITH PREDOMINANTLY ANTIBODY DEFECTS: ICD-10-CM

## 2023-01-26 DIAGNOSIS — D80.3 IGG SUBCLASS DEFICIENCY: ICD-10-CM

## 2023-01-26 DIAGNOSIS — J02.9 SORE THROAT: ICD-10-CM

## 2023-01-26 DIAGNOSIS — D80.6 SPECIFIC ANTIBODY DEFICIENCY WITH NORMAL IMMUNOGLOBULIN CONCENTRATION AND NORMAL NUMBER OF B CELLS: ICD-10-CM

## 2023-01-26 DIAGNOSIS — D80.3 IGG SUBCLASS DEFICIENCY: Primary | ICD-10-CM

## 2023-01-26 DIAGNOSIS — J02.0 STREP PHARYNGITIS: ICD-10-CM

## 2023-01-26 LAB
BACTERIA THROAT CULT: NORMAL
BASOPHILS # BLD AUTO: 0.06 K/UL (ref 0–0.2)
BASOPHILS NFR BLD: 0.5 % (ref 0–1.9)
CD3+CD4+ CELLS # BLD: 1188 CELLS/UL (ref 300–1400)
CD3+CD4+ CELLS NFR BLD: 55.7 % (ref 28–57)
DIFFERENTIAL METHOD: ABNORMAL
EOSINOPHIL # BLD AUTO: 0.1 K/UL (ref 0–0.5)
EOSINOPHIL NFR BLD: 1 % (ref 0–8)
ERYTHROCYTE [DISTWIDTH] IN BLOOD BY AUTOMATED COUNT: 12.5 % (ref 11.5–14.5)
HCT VFR BLD AUTO: 41.7 % (ref 37–48.5)
HGB BLD-MCNC: 13.2 G/DL (ref 12–16)
IGA SERPL-MCNC: 183 MG/DL (ref 40–350)
IGE SERPL-ACNC: <35 IU/ML (ref 0–100)
IGG SERPL-MCNC: 1056 MG/DL (ref 650–1600)
IGM SERPL-MCNC: 145 MG/DL (ref 50–300)
IMM GRANULOCYTES # BLD AUTO: 0.03 K/UL (ref 0–0.04)
IMM GRANULOCYTES NFR BLD AUTO: 0.3 % (ref 0–0.5)
LYMPHOCYTES # BLD AUTO: 2.2 K/UL (ref 1–4.8)
LYMPHOCYTES NFR BLD: 19.3 % (ref 18–48)
MCH RBC QN AUTO: 27.6 PG (ref 27–31)
MCHC RBC AUTO-ENTMCNC: 31.7 G/DL (ref 32–36)
MCV RBC AUTO: 87 FL (ref 82–98)
MONOCYTES # BLD AUTO: 0.8 K/UL (ref 0.3–1)
MONOCYTES NFR BLD: 7.1 % (ref 4–15)
NEUTROPHILS # BLD AUTO: 8 K/UL (ref 1.8–7.7)
NEUTROPHILS NFR BLD: 71.8 % (ref 38–73)
NRBC BLD-RTO: 0 /100 WBC
PLATELET # BLD AUTO: 416 K/UL (ref 150–450)
PMV BLD AUTO: 9.8 FL (ref 9.2–12.9)
RBC # BLD AUTO: 4.79 M/UL (ref 4–5.4)
WBC # BLD AUTO: 11.19 K/UL (ref 3.9–12.7)

## 2023-01-26 PROCEDURE — 99204 OFFICE O/P NEW MOD 45 MIN: CPT | Mod: S$GLB,,, | Performed by: INTERNAL MEDICINE

## 2023-01-26 PROCEDURE — 99204 PR OFFICE/OUTPT VISIT, NEW, LEVL IV, 45-59 MIN: ICD-10-PCS | Mod: S$GLB,,, | Performed by: INTERNAL MEDICINE

## 2023-01-26 PROCEDURE — 1160F PR REVIEW ALL MEDS BY PRESCRIBER/CLIN PHARMACIST DOCUMENTED: ICD-10-PCS | Mod: CPTII,S$GLB,, | Performed by: INTERNAL MEDICINE

## 2023-01-26 PROCEDURE — 82785 ASSAY OF IGE: CPT | Performed by: INTERNAL MEDICINE

## 2023-01-26 PROCEDURE — 3078F PR MOST RECENT DIASTOLIC BLOOD PRESSURE < 80 MM HG: ICD-10-PCS | Mod: CPTII,S$GLB,, | Performed by: INTERNAL MEDICINE

## 2023-01-26 PROCEDURE — 99999 PR PBB SHADOW E&M-EST. PATIENT-LVL III: CPT | Mod: PBBFAC,,, | Performed by: INTERNAL MEDICINE

## 2023-01-26 PROCEDURE — 3008F PR BODY MASS INDEX (BMI) DOCUMENTED: ICD-10-PCS | Mod: CPTII,S$GLB,, | Performed by: INTERNAL MEDICINE

## 2023-01-26 PROCEDURE — 36415 COLL VENOUS BLD VENIPUNCTURE: CPT | Performed by: INTERNAL MEDICINE

## 2023-01-26 PROCEDURE — 1159F MED LIST DOCD IN RCRD: CPT | Mod: CPTII,S$GLB,, | Performed by: INTERNAL MEDICINE

## 2023-01-26 PROCEDURE — 1159F PR MEDICATION LIST DOCUMENTED IN MEDICAL RECORD: ICD-10-PCS | Mod: CPTII,S$GLB,, | Performed by: INTERNAL MEDICINE

## 2023-01-26 PROCEDURE — 3078F DIAST BP <80 MM HG: CPT | Mod: CPTII,S$GLB,, | Performed by: INTERNAL MEDICINE

## 2023-01-26 PROCEDURE — 82787 IGG 1 2 3 OR 4 EACH: CPT | Mod: 59 | Performed by: INTERNAL MEDICINE

## 2023-01-26 PROCEDURE — 85025 COMPLETE CBC W/AUTO DIFF WBC: CPT | Performed by: INTERNAL MEDICINE

## 2023-01-26 PROCEDURE — 86317 IMMUNOASSAY INFECTIOUS AGENT: CPT | Performed by: INTERNAL MEDICINE

## 2023-01-26 PROCEDURE — 99999 PR PBB SHADOW E&M-EST. PATIENT-LVL III: ICD-10-PCS | Mod: PBBFAC,,, | Performed by: INTERNAL MEDICINE

## 2023-01-26 PROCEDURE — 1160F RVW MEDS BY RX/DR IN RCRD: CPT | Mod: CPTII,S$GLB,, | Performed by: INTERNAL MEDICINE

## 2023-01-26 PROCEDURE — 82784 ASSAY IGA/IGD/IGG/IGM EACH: CPT | Performed by: INTERNAL MEDICINE

## 2023-01-26 PROCEDURE — 86361 T CELL ABSOLUTE COUNT: CPT | Performed by: INTERNAL MEDICINE

## 2023-01-26 PROCEDURE — 3074F PR MOST RECENT SYSTOLIC BLOOD PRESSURE < 130 MM HG: ICD-10-PCS | Mod: CPTII,S$GLB,, | Performed by: INTERNAL MEDICINE

## 2023-01-26 PROCEDURE — 3074F SYST BP LT 130 MM HG: CPT | Mod: CPTII,S$GLB,, | Performed by: INTERNAL MEDICINE

## 2023-01-26 PROCEDURE — 3008F BODY MASS INDEX DOCD: CPT | Mod: CPTII,S$GLB,, | Performed by: INTERNAL MEDICINE

## 2023-01-26 RX ORDER — CLARITHROMYCIN 500 MG/1
500 TABLET, FILM COATED ORAL EVERY 12 HOURS
Qty: 20 TABLET | Refills: 1 | Status: SHIPPED | OUTPATIENT
Start: 2023-01-26 | End: 2023-09-12

## 2023-01-26 NOTE — TELEPHONE ENCOUNTER
I called patient.  No answer.  I believe she is seeing her infectious disease physician now.  Called to notify respiratory/throat culture showed normal respiratory brian.  Left this message.

## 2023-01-26 NOTE — PROGRESS NOTES
36 yo female who presents with Strep A pharyngitis. States that she can't get well. Son (aged 6) has recurrent otitis and pharyngitis, daughter (aged 4) also sick with bronchitis.  Recently had pharyngitis - Strep A positive 1/21 and 1/26. No fever - feels better after steroid injection and starting Biaxin. PCN and Cef allergic    Seen by Derek Ward for immunology - diagnosed with IgG subclass deficiency - last tested in 2021 (when she was feeling great), IgG subclass 2 was low. Never received IVIG - insurance issues, thought she was doing well. Has appointment with Ed next week.    Complains of fatigue, sleep issues, pharyngitis, sinus congestion, copious rhinorrhea. No fever.    Review from June 2021 (Care Everywhere)            Physical Exam  Vitals and nursing note reviewed.   Constitutional:       Appearance: She is underweight.   HENT:      Head: Normocephalic and atraumatic.      Nose:      Right Sinus: No maxillary sinus tenderness or frontal sinus tenderness.      Left Sinus: No maxillary sinus tenderness or frontal sinus tenderness.      Mouth/Throat:      Mouth: Mucous membranes are moist.      Pharynx: Uvula midline. Posterior oropharyngeal erythema present. No oropharyngeal exudate.      Tonsils: No tonsillar exudate.   Cardiovascular:      Rate and Rhythm: Normal rate and regular rhythm.   Pulmonary:      Effort: Pulmonary effort is normal.      Breath sounds: Normal breath sounds. No wheezing or rales.   Abdominal:      General: Abdomen is flat.      Palpations: Abdomen is soft.   Musculoskeletal:         General: Normal range of motion.      Right lower leg: No edema.      Left lower leg: No edema.   Lymphadenopathy:      Cervical: No cervical adenopathy.   Skin:     General: Skin is warm and dry.   Neurological:      General: No focal deficit present.      Mental Status: She is alert and oriented to person, place, and time.     1. IgG subclass deficiency    2. Strep pharyngitis    3. Sore throat     4. 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 Immunodeficiency with predominantly antibody defects        Discussed IgG deficiency, explained lask of follow through by immune system after antibiotics  Discussed replacement with IVIG - she will coordinate with Ed  Discussed need for longer courses and IVIG  Check labs today  Will schedule infusion for next week.  Follow up in 2 weeks.   I spent over 45 minutes on this encounter today.

## 2023-01-27 LAB — BACTERIA THROAT CULT: ABNORMAL

## 2023-01-30 LAB
IGG1 SER-MCNC: 620 MG/DL (ref 382–929)
IGG2 SER-MCNC: 265 MG/DL (ref 242–700)
IGG3 SER-MCNC: 47 MG/DL (ref 22–176)
IGG4 SER-MCNC: 18 MG/DL (ref 4–86)

## 2023-02-02 ENCOUNTER — TELEPHONE (OUTPATIENT)
Dept: PAIN MEDICINE | Facility: CLINIC | Age: 38
End: 2023-02-02
Payer: COMMERCIAL

## 2023-02-02 ENCOUNTER — TELEPHONE (OUTPATIENT)
Dept: INTERNAL MEDICINE | Facility: CLINIC | Age: 38
End: 2023-02-02
Payer: COMMERCIAL

## 2023-02-02 DIAGNOSIS — G24.3 CERVICAL DYSTONIA: Primary | ICD-10-CM

## 2023-02-02 LAB
ENTEROVIRUS/RHINOVIRUS: POSITIVE
HUMAN BOCAVIRUS: NOT DETECTED
HUMAN CORONAVIRUS, COMMON COLD VIRUS: NOT DETECTED
INFLUENZA A - H1N1-09: NOT DETECTED
PARAINFLUENZA: NOT DETECTED
RVP - ADENOVIRUS: NOT DETECTED
RVP - HUMAN METAPNEUMOVIRUS (HMPV): NOT DETECTED
RVP - INFLUENZA A: NOT DETECTED
RVP - INFLUENZA B: NOT DETECTED
RVP - RESPIRATORY SYNCTIAL VIRUS (RSV) A: NOT DETECTED
RVP - RESPIRATORY VIRAL PANEL, SOURCE: ABNORMAL

## 2023-02-02 NOTE — TELEPHONE ENCOUNTER
----- Message from Racheal Sarmiento MD sent at 2/1/2023  9:13 PM CST -----  Please call lab and inquire about status of respiratory viral panel by pcr - it is still listed as processing - when will this be expected to return ?

## 2023-02-02 NOTE — TELEPHONE ENCOUNTER
----- Message from Jalyn Larsen sent at 2/2/2023 10:49 AM CST -----  Regarding: results  Name of Who is Calling: Paz, [facility] lab           What is the request in detail: Paz called and stated that she talked with Aida and that the patient results with be uploaded in about 20 minutes.           Can the clinic reply by [facility]: No           What Number to Call Back [facility]: [phone]

## 2023-02-02 NOTE — TELEPHONE ENCOUNTER
Spoke to lab about respiratory panel by pcr and states that they will call the office back, once she see what happened to specimen

## 2023-02-03 LAB
S PNEUM DA 1 IGG SER-MCNC: 30 MCG/ML
S PNEUM DA 10A IGG SER-MCNC: 16.8 MCG/ML
S PNEUM DA 11A IGG SER-MCNC: 2.5 MCG/ML
S PNEUM DA 12F IGG SER-MCNC: 0.7 MCG/ML
S PNEUM DA 14 IGG SER-MCNC: 3.7 MCG/ML
S PNEUM DA 15B IGG SER-MCNC: 3.5 MCG/ML
S PNEUM DA 17F IGG SER-MCNC: 23 MCG/ML
S PNEUM DA 18C IGG SER-MCNC: 1.1 MCG/ML
S PNEUM DA 19A IGG SER-MCNC: 58.8 MCG/ML
S PNEUM DA 2 IGG SER-MCNC: 6 MCG/ML
S PNEUM DA 20A IGG SER-MCNC: 4.6 MCG/ML
S PNEUM DA 22F IGG SER-MCNC: 43.1 MCG/ML
S PNEUM DA 23F IGG SER-MCNC: 57.4 MCG/ML
S PNEUM DA 3 IGG SER-MCNC: 5.9 MCG/ML
S PNEUM DA 33F IGG SER-MCNC: 3.2 MCG/ML
S PNEUM DA 4 IGG SER-MCNC: 4.3 MCG/ML
S PNEUM DA 5 IGG SER-MCNC: 3.5 MCG/ML
S PNEUM DA 6B IGG SER-MCNC: 48.8 MCG/ML
S PNEUM DA 7F IGG SER-MCNC: 17 MCG/ML
S PNEUM DA 8 IGG SER-MCNC: 14.6 MCG/ML
S PNEUM DA 9N IGG SER-MCNC: NORMAL MCG/ML
S PNEUM DA 9V IGG SER-MCNC: 16.3 MCG/ML
S.PNEUMONIAE TYPE 19F: 28.8 MCG/ML

## 2023-02-20 ENCOUNTER — TELEPHONE (OUTPATIENT)
Dept: PAIN MEDICINE | Facility: CLINIC | Age: 38
End: 2023-02-20
Payer: COMMERCIAL

## 2023-02-22 ENCOUNTER — PATIENT MESSAGE (OUTPATIENT)
Dept: INFECTIOUS DISEASES | Facility: CLINIC | Age: 38
End: 2023-02-22
Payer: COMMERCIAL

## 2023-02-22 RX ORDER — FLUCONAZOLE 200 MG/1
200 TABLET ORAL DAILY
Qty: 7 TABLET | Refills: 0 | Status: SHIPPED | OUTPATIENT
Start: 2023-02-22 | End: 2023-03-01

## 2023-02-28 ENCOUNTER — PATIENT MESSAGE (OUTPATIENT)
Dept: INTERNAL MEDICINE | Facility: CLINIC | Age: 38
End: 2023-02-28
Payer: COMMERCIAL

## 2023-02-28 RX ORDER — FLUCONAZOLE 200 MG/1
200 TABLET ORAL DAILY
Qty: 7 TABLET | Refills: 0 | Status: CANCELLED | OUTPATIENT
Start: 2023-02-28 | End: 2023-03-07

## 2023-03-01 ENCOUNTER — PATIENT MESSAGE (OUTPATIENT)
Dept: INTERNAL MEDICINE | Facility: CLINIC | Age: 38
End: 2023-03-01
Payer: COMMERCIAL

## 2023-03-07 ENCOUNTER — TELEPHONE (OUTPATIENT)
Dept: PAIN MEDICINE | Facility: CLINIC | Age: 38
End: 2023-03-07
Payer: COMMERCIAL

## 2023-03-07 NOTE — TELEPHONE ENCOUNTER
----- Message from Shayna Manzano sent at 3/7/2023  1:48 PM CST -----  Regarding: Patient call back  Who called:WILLIE MARC [[id number]]    What is the request in detail: Patient is requesting a call back. Pt would like to r/s her botox injection. She states she updated her insurance and would like to further discuss.   Please advise.    Can the clinic reply by [facility]? No    Best call back number: [phone]    Additional Information: N/A

## 2023-03-09 ENCOUNTER — TELEPHONE (OUTPATIENT)
Dept: PAIN MEDICINE | Facility: CLINIC | Age: 38
End: 2023-03-09
Payer: COMMERCIAL

## 2023-03-09 DIAGNOSIS — G24.3 CERVICAL DYSTONIA: Primary | ICD-10-CM

## 2023-03-09 DIAGNOSIS — G43.119 INTRACTABLE MIGRAINE WITH AURA WITHOUT STATUS MIGRAINOSUS: ICD-10-CM

## 2023-03-13 ENCOUNTER — OFFICE VISIT (OUTPATIENT)
Dept: OBSTETRICS AND GYNECOLOGY | Facility: CLINIC | Age: 38
End: 2023-03-13
Attending: OBSTETRICS & GYNECOLOGY
Payer: COMMERCIAL

## 2023-03-13 VITALS
DIASTOLIC BLOOD PRESSURE: 80 MMHG | SYSTOLIC BLOOD PRESSURE: 106 MMHG | WEIGHT: 110.25 LBS | HEIGHT: 64 IN | BODY MASS INDEX: 18.82 KG/M2

## 2023-03-13 DIAGNOSIS — N92.3 SPOTTING BETWEEN MENSES: ICD-10-CM

## 2023-03-13 DIAGNOSIS — M54.2 NECK PAIN: ICD-10-CM

## 2023-03-13 DIAGNOSIS — Z01.419 WELL WOMAN EXAM WITH ROUTINE GYNECOLOGICAL EXAM: Primary | ICD-10-CM

## 2023-03-13 DIAGNOSIS — Z23 NEED FOR HPV VACCINE: ICD-10-CM

## 2023-03-13 DIAGNOSIS — Z30.431 IUD CHECK UP: ICD-10-CM

## 2023-03-13 PROCEDURE — 99395 PR PREVENTIVE VISIT,EST,18-39: ICD-10-PCS | Mod: S$GLB,,, | Performed by: OBSTETRICS & GYNECOLOGY

## 2023-03-13 PROCEDURE — 88141 CYTOPATH C/V INTERPRET: CPT | Mod: ,,, | Performed by: STUDENT IN AN ORGANIZED HEALTH CARE EDUCATION/TRAINING PROGRAM

## 2023-03-13 PROCEDURE — 99999 PR PBB SHADOW E&M-EST. PATIENT-LVL IV: CPT | Mod: PBBFAC,,, | Performed by: OBSTETRICS & GYNECOLOGY

## 2023-03-13 PROCEDURE — 87624 HPV HI-RISK TYP POOLED RSLT: CPT | Performed by: OBSTETRICS & GYNECOLOGY

## 2023-03-13 PROCEDURE — 88141 PR  CYTOPATH CERV/VAG INTERPRET: ICD-10-PCS | Mod: ,,, | Performed by: STUDENT IN AN ORGANIZED HEALTH CARE EDUCATION/TRAINING PROGRAM

## 2023-03-13 PROCEDURE — 99999 PR PBB SHADOW E&M-EST. PATIENT-LVL IV: ICD-10-PCS | Mod: PBBFAC,,, | Performed by: OBSTETRICS & GYNECOLOGY

## 2023-03-13 PROCEDURE — 99395 PREV VISIT EST AGE 18-39: CPT | Mod: S$GLB,,, | Performed by: OBSTETRICS & GYNECOLOGY

## 2023-03-13 PROCEDURE — 88175 CYTOPATH C/V AUTO FLUID REDO: CPT | Performed by: STUDENT IN AN ORGANIZED HEALTH CARE EDUCATION/TRAINING PROGRAM

## 2023-03-13 PROCEDURE — 1159F PR MEDICATION LIST DOCUMENTED IN MEDICAL RECORD: ICD-10-PCS | Mod: CPTII,S$GLB,, | Performed by: OBSTETRICS & GYNECOLOGY

## 2023-03-13 PROCEDURE — 3008F PR BODY MASS INDEX (BMI) DOCUMENTED: ICD-10-PCS | Mod: CPTII,S$GLB,, | Performed by: OBSTETRICS & GYNECOLOGY

## 2023-03-13 PROCEDURE — 3008F BODY MASS INDEX DOCD: CPT | Mod: CPTII,S$GLB,, | Performed by: OBSTETRICS & GYNECOLOGY

## 2023-03-13 PROCEDURE — 1159F MED LIST DOCD IN RCRD: CPT | Mod: CPTII,S$GLB,, | Performed by: OBSTETRICS & GYNECOLOGY

## 2023-03-13 PROCEDURE — 3079F DIAST BP 80-89 MM HG: CPT | Mod: CPTII,S$GLB,, | Performed by: OBSTETRICS & GYNECOLOGY

## 2023-03-13 PROCEDURE — 3074F PR MOST RECENT SYSTOLIC BLOOD PRESSURE < 130 MM HG: ICD-10-PCS | Mod: CPTII,S$GLB,, | Performed by: OBSTETRICS & GYNECOLOGY

## 2023-03-13 PROCEDURE — 3074F SYST BP LT 130 MM HG: CPT | Mod: CPTII,S$GLB,, | Performed by: OBSTETRICS & GYNECOLOGY

## 2023-03-13 PROCEDURE — 3079F PR MOST RECENT DIASTOLIC BLOOD PRESSURE 80-89 MM HG: ICD-10-PCS | Mod: CPTII,S$GLB,, | Performed by: OBSTETRICS & GYNECOLOGY

## 2023-03-13 RX ORDER — LEVOFLOXACIN 500 MG/1
TABLET, FILM COATED ORAL
COMMUNITY
Start: 2023-01-25 | End: 2023-04-28 | Stop reason: ALTCHOICE

## 2023-03-13 NOTE — PROGRESS NOTES
CC: Well woman exam    Karen Zavala is a 37 y.o. female  presents for well woman exam.  LMP: No LMP recorded..  periods are monthly, though prolonged. 10 days of spotting.  IUD in place. Does need gardasil vaccine.    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     Unspecified vitamin D deficiency      Past Surgical History:   Procedure Laterality Date    EPIDURAL STEROID INJECTION N/A 10/4/2021    Procedure: INJECTION, STEROID, EPIDURAL C7/T1;  Surgeon: Cony Ahmadi MD;  Location: [facility] PAIN MGT;  Service: Pain Management;  Laterality: N/A;   l/m    KNEE SURGERY Right     torn meniscus    SHOULDER ARTHROSCOPY W/ SUPERIOR LABRAL ANTERIOR POSTERIOR LESION REPAIR Right 2022    Procedure: ARTHROSCOPY, SHOULDER, WITH INSTABILITY REPAIR;  Surgeon: Ann Anderson MD;  Location: [facility] OR;  Service: Orthopedics;  Laterality: Right;    TILT TABLE TEST N/A 9/15/2022    Procedure: TILT TABLE TEST;  Surgeon: RAMESH Ferguson MD;  Location: [facility] EP LAB;  Service: Cardiology;  Laterality: N/A;  orthostasis, Rule out POTS, Tilt table test, Dr. 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   Tobacco Use    Smoking status: Never    Smokeless tobacco: Never   Substance and Sexual Activity    Alcohol use: Not Currently     Alcohol/week: 1.0 standard drink     Types: 1 Standard drinks or equivalent per week     Comment: occ - 1/mo    Drug use: No    Sexual activity: Yes     Partners: Male     Birth control/protection: Coitus interruptus, None     Family History   Problem Relation Age of Onset    Cancer Maternal Grandfather         lung    Dementia Paternal Grandmother     Hyperlipidemia Mother     Hypertension Mother  "    Hypertension Father     Hyperlipidemia Father     Breast cancer Neg Hx     Colon cancer Neg Hx     Ovarian cancer Neg Hx      OB History          3    Para   2    Term   2       0    AB   1    Living   2         SAB   0    IAB   1    Ectopic   0    Multiple   0    Live Births   2                 /80   Ht 5' 4" (1.626 m)   Wt 50 kg (110 lb 3.7 oz)   BMI 18.92 kg/m²       ROS:  GENERAL: Denies weight gain or weight loss. Feeling well overall.   SKIN: Denies rash or lesions.   HEAD: Denies head injury or headache.   NODES: Denies enlarged lymph nodes.   CHEST: Denies chest pain or shortness of breath.   CARDIOVASCULAR: Denies palpitations or left sided chest pain.   ABDOMEN: No abdominal pain, constipation, diarrhea, nausea, vomiting or rectal bleeding.   URINARY: No frequency, dysuria, hematuria, or burning on urination.  REPRODUCTIVE: See HPI.   BREASTS: The patient performs breast self-examination and denies pain, lumps, or nipple discharge.   HEMATOLOGIC: No easy bruisability or excessive bleeding.   MUSCULOSKELETAL: Denies joint pain or swelling.   NEUROLOGIC: Denies syncope or weakness.   PSYCHIATRIC: Denies depression, anxiety or mood swings.    PHYSICAL EXAM:  APPEARANCE: Well nourished, well developed, in no acute distress.  AFFECT: WNL, alert and oriented x 3  SKIN: No acne or hirsutism  NECK: Neck symmetric without masses or thyromegaly  NODES: No inguinal, cervical, axillary, or femoral lymph node enlargement  CHEST: Good respiratory effect  ABDOMEN: Soft.  No tenderness or masses.  No hepatosplenomegaly.  No hernias.  BREASTS: Symmetrical, no skin changes or visible lesions.  No palpable masses, nipple discharge bilaterally.  PELVIC: Normal external genitalia without lesions.  Normal hair distribution.  Adequate perineal body, normal urethral meatus.  Vagina moist and well rugated without lesions or discharge.  Cervix pink, without lesions, discharge or tenderness.  No " significant cystocele or rectocele.  Bimanual exam shows uterus to be normal size, regular, mobile and nontender.  Adnexa without masses or tenderness.    EXTREMITIES: No edema.    Well woman exam with routine gynecological exam  -     Liquid-Based Pap Smear, Screening  -     HPV High Risk Genotypes, PCR  -     (In Office Administered) HPV Vaccine (9-Valent) (3 Dose) (IM)    Neck pain  -     Ambulatory referral/consult to Sports Medicine; Future; Expected date: 03/20/2023    IUD check up  -     US Pelvis Comp with Transvag NON-OB (xpd; Future; Expected date: 03/13/2023    Spotting between menses  -     US Pelvis Comp with Transvag NON-OB (xpd; Future; Expected date: 03/13/2023    Need for HPV vaccine  -     (In Office Administered) HPV Vaccine (9-Valent) (3 Dose) (IM)            Patient was counseled today on A.C.S. Pap guidelines and recommendations for yearly pelvic exams, mammograms and monthly self breast exams; to see her PCP for other health maintenance.     No follow-ups on file.

## 2023-03-17 ENCOUNTER — HOSPITAL ENCOUNTER (OUTPATIENT)
Dept: RADIOLOGY | Facility: OTHER | Age: 38
Discharge: HOME | End: 2023-03-17
Attending: OBSTETRICS & GYNECOLOGY
Payer: COMMERCIAL

## 2023-03-17 DIAGNOSIS — Z30.431 IUD CHECK UP: ICD-10-CM

## 2023-03-17 DIAGNOSIS — N92.3 SPOTTING BETWEEN MENSES: ICD-10-CM

## 2023-03-17 PROCEDURE — 76830 TRANSVAGINAL US NON-OB: CPT | Mod: 26,,, | Performed by: RADIOLOGY

## 2023-03-17 PROCEDURE — 76856 US EXAM PELVIC COMPLETE: CPT | Mod: 26,,, | Performed by: RADIOLOGY

## 2023-03-17 PROCEDURE — 76856 US EXAM PELVIC COMPLETE: CPT | Mod: TC

## 2023-03-17 PROCEDURE — 76856 US PELVIS COMP WITH TRANSVAG NON-OB (XPD): ICD-10-PCS | Mod: 26,,, | Performed by: RADIOLOGY

## 2023-03-17 PROCEDURE — 76830 US PELVIS COMP WITH TRANSVAG NON-OB (XPD): ICD-10-PCS | Mod: 26,,, | Performed by: RADIOLOGY

## 2023-03-20 ENCOUNTER — TELEPHONE (OUTPATIENT)
Dept: PAIN MEDICINE | Facility: CLINIC | Age: 38
End: 2023-03-20
Payer: COMMERCIAL

## 2023-03-20 LAB
FINAL PATHOLOGIC DIAGNOSIS: NORMAL
Lab: NORMAL

## 2023-03-20 NOTE — TELEPHONE ENCOUNTER
Staff left pt a voicemail reminding her of her schedule 11:30a Botox appt for tomorrow with Pain provider Dr. Cony Ahmadi.

## 2023-03-21 ENCOUNTER — OFFICE VISIT (OUTPATIENT)
Dept: PAIN MEDICINE | Facility: CLINIC | Age: 38
End: 2023-03-21
Attending: ANESTHESIOLOGY
Payer: COMMERCIAL

## 2023-03-21 VITALS
BODY MASS INDEX: 18.82 KG/M2 | SYSTOLIC BLOOD PRESSURE: 114 MMHG | DIASTOLIC BLOOD PRESSURE: 78 MMHG | WEIGHT: 110.25 LBS | RESPIRATION RATE: 17 BRPM | HEIGHT: 64 IN | HEART RATE: 80 BPM | OXYGEN SATURATION: 100 %

## 2023-03-21 DIAGNOSIS — G43.009 MIGRAINE WITHOUT AURA AND WITHOUT STATUS MIGRAINOSUS, NOT INTRACTABLE: Primary | ICD-10-CM

## 2023-03-21 PROCEDURE — 99999 PR PBB SHADOW E&M-EST. PATIENT-LVL IV: ICD-10-PCS | Mod: PBBFAC,,, | Performed by: ANESTHESIOLOGY

## 2023-03-21 PROCEDURE — 1159F MED LIST DOCD IN RCRD: CPT | Mod: CPTII,S$GLB,, | Performed by: ANESTHESIOLOGY

## 2023-03-21 PROCEDURE — 3008F BODY MASS INDEX DOCD: CPT | Mod: CPTII,S$GLB,, | Performed by: ANESTHESIOLOGY

## 2023-03-21 PROCEDURE — 3078F DIAST BP <80 MM HG: CPT | Mod: CPTII,S$GLB,, | Performed by: ANESTHESIOLOGY

## 2023-03-21 PROCEDURE — 3008F PR BODY MASS INDEX (BMI) DOCUMENTED: ICD-10-PCS | Mod: CPTII,S$GLB,, | Performed by: ANESTHESIOLOGY

## 2023-03-21 PROCEDURE — 3078F PR MOST RECENT DIASTOLIC BLOOD PRESSURE < 80 MM HG: ICD-10-PCS | Mod: CPTII,S$GLB,, | Performed by: ANESTHESIOLOGY

## 2023-03-21 PROCEDURE — 99499 NO LOS: ICD-10-PCS | Mod: S$GLB,,, | Performed by: ANESTHESIOLOGY

## 2023-03-21 PROCEDURE — 1159F PR MEDICATION LIST DOCUMENTED IN MEDICAL RECORD: ICD-10-PCS | Mod: CPTII,S$GLB,, | Performed by: ANESTHESIOLOGY

## 2023-03-21 PROCEDURE — 64615 CHEMODENERV MUSC MIGRAINE: CPT | Mod: S$GLB,,, | Performed by: ANESTHESIOLOGY

## 2023-03-21 PROCEDURE — 1160F RVW MEDS BY RX/DR IN RCRD: CPT | Mod: CPTII,S$GLB,, | Performed by: ANESTHESIOLOGY

## 2023-03-21 PROCEDURE — 3074F PR MOST RECENT SYSTOLIC BLOOD PRESSURE < 130 MM HG: ICD-10-PCS | Mod: CPTII,S$GLB,, | Performed by: ANESTHESIOLOGY

## 2023-03-21 PROCEDURE — 3074F SYST BP LT 130 MM HG: CPT | Mod: CPTII,S$GLB,, | Performed by: ANESTHESIOLOGY

## 2023-03-21 PROCEDURE — 99499 UNLISTED E&M SERVICE: CPT | Mod: S$GLB,,, | Performed by: ANESTHESIOLOGY

## 2023-03-21 PROCEDURE — 99999 PR PBB SHADOW E&M-EST. PATIENT-LVL IV: CPT | Mod: PBBFAC,,, | Performed by: ANESTHESIOLOGY

## 2023-03-21 PROCEDURE — 1160F PR REVIEW ALL MEDS BY PRESCRIBER/CLIN PHARMACIST DOCUMENTED: ICD-10-PCS | Mod: CPTII,S$GLB,, | Performed by: ANESTHESIOLOGY

## 2023-03-21 PROCEDURE — 64615 PR CHEMODENERVATION OF MUSCLE FOR CHRONIC MIGRAINE: ICD-10-PCS | Mod: S$GLB,,, | Performed by: ANESTHESIOLOGY
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
## 2023-03-23 ENCOUNTER — OFFICE VISIT (OUTPATIENT)
Dept: INTERNAL MEDICINE | Facility: CLINIC | Age: 38
End: 2023-03-23
Attending: INTERNAL MEDICINE
Payer: COMMERCIAL

## 2023-03-23 VITALS
HEART RATE: 74 BPM | SYSTOLIC BLOOD PRESSURE: 108 MMHG | OXYGEN SATURATION: 100 % | HEIGHT: 64 IN | BODY MASS INDEX: 18.78 KG/M2 | DIASTOLIC BLOOD PRESSURE: 82 MMHG | WEIGHT: 110 LBS

## 2023-03-23 DIAGNOSIS — D80.9 IMMUNODEFICIENCY WITH PREDOMINANTLY ANTIBODY DEFECTS: ICD-10-CM

## 2023-03-23 DIAGNOSIS — M24.9 HYPERMOBILE JOINTS: ICD-10-CM

## 2023-03-23 DIAGNOSIS — Z00.00 ANNUAL PHYSICAL EXAM: Primary | ICD-10-CM

## 2023-03-23 DIAGNOSIS — G43.009 MIGRAINE WITHOUT AURA AND WITHOUT STATUS MIGRAINOSUS, NOT INTRACTABLE: ICD-10-CM

## 2023-03-23 PROCEDURE — 3074F PR MOST RECENT SYSTOLIC BLOOD PRESSURE < 130 MM HG: ICD-10-PCS | Mod: CPTII,S$GLB,, | Performed by: INTERNAL MEDICINE

## 2023-03-23 PROCEDURE — 99999 PR PBB SHADOW E&M-EST. PATIENT-LVL III: CPT | Mod: PBBFAC,,, | Performed by: INTERNAL MEDICINE

## 2023-03-23 PROCEDURE — 99999 PR PBB SHADOW E&M-EST. PATIENT-LVL III: ICD-10-PCS | Mod: PBBFAC,,, | Performed by: INTERNAL MEDICINE

## 2023-03-23 PROCEDURE — 3008F PR BODY MASS INDEX (BMI) DOCUMENTED: ICD-10-PCS | Mod: CPTII,S$GLB,, | Performed by: INTERNAL MEDICINE

## 2023-03-23 PROCEDURE — 1159F PR MEDICATION LIST DOCUMENTED IN MEDICAL RECORD: ICD-10-PCS | Mod: CPTII,S$GLB,, | Performed by: INTERNAL MEDICINE

## 2023-03-23 PROCEDURE — 3008F BODY MASS INDEX DOCD: CPT | Mod: CPTII,S$GLB,, | Performed by: INTERNAL MEDICINE

## 2023-03-23 PROCEDURE — 3079F DIAST BP 80-89 MM HG: CPT | Mod: CPTII,S$GLB,, | Performed by: INTERNAL MEDICINE

## 2023-03-23 PROCEDURE — 3074F SYST BP LT 130 MM HG: CPT | Mod: CPTII,S$GLB,, | Performed by: INTERNAL MEDICINE

## 2023-03-23 PROCEDURE — 1159F MED LIST DOCD IN RCRD: CPT | Mod: CPTII,S$GLB,, | Performed by: INTERNAL MEDICINE

## 2023-03-23 PROCEDURE — 99395 PR PREVENTIVE VISIT,EST,18-39: ICD-10-PCS | Mod: S$GLB,,, | Performed by: INTERNAL MEDICINE

## 2023-03-23 PROCEDURE — 3079F PR MOST RECENT DIASTOLIC BLOOD PRESSURE 80-89 MM HG: ICD-10-PCS | Mod: CPTII,S$GLB,, | Performed by: INTERNAL MEDICINE

## 2023-03-23 PROCEDURE — 99395 PREV VISIT EST AGE 18-39: CPT | Mod: S$GLB,,, | Performed by: INTERNAL MEDICINE

## 2023-03-23 NOTE — PROGRESS NOTES
Subjective:       Patient ID: Karen Zavala is a 37 y.o. female.    Chief Complaint: Annual Exam     Karen Zavala is a 37 y.o.  female who presents for Annual Exam  .  History of Immunodeficiency followed by dr celaya. Has been unable to see her recently and has not had infusion in some time.  She will follow upw ith Dr Celaya or a new provider shortly.    Having injections with Dr Ahmadi which is helping her avoid migraines.  Also using tizanidine to help prevent neck pain from triggering her migraines.        Hx of frequent joint dislocations, extreme flexibility, chronic pain, postural dizziness with presyncope, translucent skin.       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    Chronic pain disorder    Myofascial pain syndrome    Migraine without aura and without status migrainosus, not intractable    Olfactory aura    Paresthesias    Postural dizziness with presyncope    Chronic pain    Migraine with aura    Glenoid labral tear, right, subsequent encounter    Muscle weakness of right upper extremity    Neck pain    Strep pharyngitis    Hypermobile joints           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     Unspecified vitamin D deficiency        Past Surgical History:   Procedure Laterality Date    EPIDURAL STEROID INJECTION N/A 10/4/2021    Procedure: INJECTION, STEROID, EPIDURAL C7/T1;  Surgeon: Cony Ahmadi MD;  Location: [facility] PAIN T;  Service: Pain Management;  Laterality: N/A;  9/16 l/m    KNEE SURGERY Right     torn meniscus    SHOULDER ARTHROSCOPY W/ SUPERIOR LABRAL ANTERIOR  "POSTERIOR LESION REPAIR Right 1/13/2022    Procedure: ARTHROSCOPY, SHOULDER, WITH INSTABILITY REPAIR;  Surgeon: Ann Anderson MD;  Location: [facility] OR;  Service: Orthopedics;  Laterality: Right;    TILT TABLE TEST N/A 9/15/2022    Procedure: TILT TABLE TEST;  Surgeon: RAMESH Ferguson MD;  Location: [facility] EP LAB;  Service: Cardiology;  Laterality: N/A;  orthostasis, Rule out POTS, Tilt table test, Dr. Warner,        Family History   Problem Relation Age of Onset    Cancer Maternal Grandfather         lung    Dementia Paternal Grandmother     Hyperlipidemia Mother     Hypertension Mother     Hypertension Father     Hyperlipidemia Father     Breast cancer Neg Hx     Colon cancer Neg Hx     Ovarian cancer Neg Hx        Social History     Tobacco Use    Smoking status: Never    Smokeless tobacco: Never   Substance Use Topics    Alcohol use: Not Currently     Alcohol/week: 1.0 standard drink     Types: 1 Standard drinks or equivalent per week     Comment: occ - 1/mo    Drug use: No         Review of Systems   Constitutional:  Negative for chills and fever.   Respiratory:  Negative for cough and shortness of breath.    Cardiovascular:  Negative for chest pain and palpitations.   Gastrointestinal:  Negative for nausea and vomiting.   Genitourinary:  Negative for dysuria and hematuria.       Objective:   Blood pressure 108/82, pulse 74, height 5' 4" (1.626 m), weight 49.9 kg (110 lb 0.2 oz), SpO2 100 %.     Physical Exam  Constitutional:       Appearance: Normal appearance. She is well-developed. She is not ill-appearing.   HENT:      Head: Normocephalic and atraumatic.   Eyes:      General: No scleral icterus.     Conjunctiva/sclera: Conjunctivae normal.   Cardiovascular:      Rate and Rhythm: Normal rate.      Heart sounds: Normal heart sounds. No murmur heard.    No friction rub. No gallop.   Pulmonary:      Effort: Pulmonary effort is normal.      Breath sounds: Normal breath sounds. No wheezing or rales.   Chest:      " Chest wall: No tenderness.   Abdominal:      General: Bowel sounds are normal.      Palpations: Abdomen is soft.   Musculoskeletal:         General: No tenderness or signs of injury.   Skin:     General: Skin is warm and dry.   Neurological:      Mental Status: She is alert and oriented to person, place, and time. Mental status is at baseline.   Psychiatric:         Behavior: Behavior normal.         Thought Content: Thought content normal.       Prior labs reviewed    Health Maintenance         Date Due Completion Date    Pneumococcal Vaccines (Age 0-64) (2 - PCV) 11/11/2020 11/11/2019    Cervical Cancer Screening 03/13/2028 3/13/2023    Override on 7/27/2013: Done    TETANUS VACCINE 04/05/2028 4/5/2018            Assessment/Plan:       1. Annual physical exam  -     Cancel: (In Office Administered) Pneumococcal Conjugate Vaccine (20 Valent) (IM)    2. Hypermobile joints  Comments:  suspect phoebe cordero  has apt with [facility] ortho specialist    3. Immunodeficiency with predominantly antibody defects  Comments:  followed by Allergy and Immunology  stable, will get pneumovax after return for trip  Overview:  Diagnosed by Dr Derek Ward at [facility].  She is being started on IVIG.  Recommended she receive immediate aggressive antiboitics for all acute infections and not wait the usual 7-10 days for possible viral infections.      4. Migraine without aura and without status migrainosus, not intractable  Comments:  managed with injections  stable    Joint hyperextension, resistance/isometric exercise, lifting heavy objects and high-impact activity such as contact sports should be avoided. Recreational swimming, jogging, biking and golf are more preferable. If objects need to be lifted from the ground, knees should be bent to grab the object and raise the body with the object, as opposed to lifting without bending the knees. The back is at a high risk for complications. Physical therapy for assistive devices (braces to  improve joint stability) is recommended. I can provide contact information of a physical therapist specializing in connective tissue disorders.      Myofascial release (any physical therapy modality that reduces spasm) provides short-term relief of pain, lasting hours to days. While the duration of benefit is short and it must be repeated frequently, this pain relief may be critical to facilitate participation in toning exercise for stabilization of the joints. Modalities must be tailored to the individual; a partial list includes heat, cold, massage, ultrasound, electrical stimulation, acupuncture, acupressure, biofeedback, and conscious relaxation. Low-resistance muscle toning exercise can improve joint stability and reduce future subluxations, dislocations, and pain. Progress should be made by gradually increasing repetitions, frequency, or duration, not resistance. It often takes months or years for significant progress to be recognized.     Improved joint stability may be achieved by low-resistance exercise to increase muscle tone (subconscious resting muscle contraction), as opposed to muscle strength (voluntary force exerted at will). Emphasis should be placed on both core and extremity muscle tone. Examples include walking, bicycling, low-impact aerobics, swimming or water exercise, and simple range-of-motion exercise without added resistance. Core toning activities, such as balance exercises and repetitive motions focusing on the abdominal, lumbar, and interscapular muscles, are also important. High-impact activity increases the risk for acute subluxation/dislocation, chronic pain, and osteoarthritis. Some sports, such as tackle football, are therefore contraindicated. However, most sports and activities are acceptable with appropriate precautions.     Wide- writing utensils can reduce strain on finger and hand joints. An unconventional grasp of a writing utensil, gently resting the shaft in the web  between the thumb and index finger and securing the tip between the distal interphalangeal joints or middle phalanges of the index and third fingers (rather than using the tips of the fingers), results in substantially reduced axial stress to the interphalangeal, metacarpophalangeal, and carpometacarpal joints. These adjustments frequently result in marked reduction of pain in the index finger and at the base of the thumb. Chiropractic adjustment is not strictly contraindicated, but must be performed cautiously to avoid iatrogenic subluxations or dislocations.     Braces are useful to improve joint stability. Orthopedists, rheumatologists, and physical therapists can assist in recommending appropriate devices for commonly problematic joints such as knees and ankles. Shoulders and hips present more of a challenge for external bracing. Occupational therapists may be consulted for ring splints (to stabilize interphalangeal joints) and wrist or wrist/thumb braces in affected individuals with small joint instability. A soft neck collar, if tolerated, may help with neck pain and headaches. A waterbed, adjustable air mattress, or viscoelastic foam mattress (and/or pillow) may provide increased support with improved sleep quality and less pain.        Medication List with Changes/Refills   Current Medications    (MAGIC MOUTHWASH) 1:1:1 DIPHENHYDRAMINE(BENADRYL) 12.5MG/5ML LIQ, ALUMINUM & MAGNESIUM HYDROXIDE-SIMETHICONE (MAALOX), LIDOCAINE VISCOUS 2%    Swish and spit 10 mLs every 4 (four) hours as needed (Sore throat).    CLARITHROMYCIN (BIAXIN) 500 MG TABLET    Take 1 tablet (500 mg total) by mouth every 12 (twelve) hours.    GABAPENTIN (NEURONTIN) 100 MG CAPSULE    Take 100 mg by mouth once daily.    LEVOFLOXACIN (LEVAQUIN) 500 MG TABLET        LEVOFLOXACIN ORAL    Take by mouth.    METHOCARBAMOL (ROBAXIN) 500 MG TAB    Take 1 tablet (500 mg total) by mouth 2 (two) times daily as needed (neck pain and spasm).     NARATRIPTAN (AMERGE) 1 MG TAB    Take at onset of migraine, can repeat in 2 hrs if needed.  No more than twice per day or 3 days/wk.    TIZANIDINE (ZANAFLEX) 2 MG TABLET    Take 1-2 tablets (2-4 mg total) by mouth every evening.    VYVANSE 10 MG CAP    Take 1 capsule by mouth every morning.    VYVANSE 20 MG CAPSULE           Recommend patient complete vaccinations as listed in the overdue health maintenance report. Pt may obtain vaccinations at their local pharmacy, any [facility] pharmacy or request vaccination in our clinic.  Please note that some insurances will only cover vaccination cost at specific locations.Please check with your insurance provider regarding your coverage.  Vaccines that are not covered are still recommended.

## 2023-04-03 ENCOUNTER — DOCUMENTATION ONLY (OUTPATIENT)
Dept: SPORTS MEDICINE | Facility: CLINIC | Age: 38
End: 2023-04-03
Payer: COMMERCIAL

## 2023-04-03 ENCOUNTER — OFFICE VISIT (OUTPATIENT)
Dept: SPORTS MEDICINE | Facility: CLINIC | Age: 38
End: 2023-04-03
Attending: OBSTETRICS & GYNECOLOGY
Payer: COMMERCIAL

## 2023-04-03 VITALS
DIASTOLIC BLOOD PRESSURE: 80 MMHG | BODY MASS INDEX: 18.78 KG/M2 | HEIGHT: 64 IN | SYSTOLIC BLOOD PRESSURE: 120 MMHG | WEIGHT: 110 LBS

## 2023-04-03 DIAGNOSIS — M79.10 MYALGIA: ICD-10-CM

## 2023-04-03 DIAGNOSIS — M99.01 CERVICAL (NECK) REGION SOMATIC DYSFUNCTION: ICD-10-CM

## 2023-04-03 DIAGNOSIS — M99.02 SOMATIC DYSFUNCTION OF THORACIC REGION: ICD-10-CM

## 2023-04-03 DIAGNOSIS — M24.80 GENERALIZED HYPERMOBILITY OF JOINTS: ICD-10-CM

## 2023-04-03 DIAGNOSIS — M47.812 CERVICAL SPONDYLOSIS: ICD-10-CM

## 2023-04-03 DIAGNOSIS — M54.2 NECK PAIN: Primary | ICD-10-CM

## 2023-04-03 DIAGNOSIS — M99.08 SOMATIC DYSFUNCTION OF RIB CAGE REGION: ICD-10-CM

## 2023-04-03 DIAGNOSIS — M26.609 TMJ (TEMPOROMANDIBULAR JOINT DISORDER): ICD-10-CM

## 2023-04-03 DIAGNOSIS — M99.00 SOMATIC DYSFUNCTION OF HEAD REGION: ICD-10-CM

## 2023-04-03 DIAGNOSIS — M99.07 UPPER EXTREMITY SOMATIC DYSFUNCTION: ICD-10-CM

## 2023-04-03 DIAGNOSIS — R29.3 POOR POSTURE: ICD-10-CM

## 2023-04-03 PROCEDURE — 3074F PR MOST RECENT SYSTOLIC BLOOD PRESSURE < 130 MM HG: ICD-10-PCS | Mod: CPTII,S$GLB,, | Performed by: NEUROMUSCULOSKELETAL MEDICINE & OMM

## 2023-04-03 PROCEDURE — 1160F PR REVIEW ALL MEDS BY PRESCRIBER/CLIN PHARMACIST DOCUMENTED: ICD-10-PCS | Mod: CPTII,S$GLB,, | Performed by: NEUROMUSCULOSKELETAL MEDICINE & OMM

## 2023-04-03 PROCEDURE — 3079F PR MOST RECENT DIASTOLIC BLOOD PRESSURE 80-89 MM HG: ICD-10-PCS | Mod: CPTII,S$GLB,, | Performed by: NEUROMUSCULOSKELETAL MEDICINE & OMM

## 2023-04-03 PROCEDURE — 99214 PR OFFICE/OUTPT VISIT, EST, LEVL IV, 30-39 MIN: ICD-10-PCS | Mod: 25,S$GLB,, | Performed by: NEUROMUSCULOSKELETAL MEDICINE & OMM

## 2023-04-03 PROCEDURE — 1160F RVW MEDS BY RX/DR IN RCRD: CPT | Mod: CPTII,S$GLB,, | Performed by: NEUROMUSCULOSKELETAL MEDICINE & OMM

## 2023-04-03 PROCEDURE — 3008F BODY MASS INDEX DOCD: CPT | Mod: CPTII,S$GLB,, | Performed by: NEUROMUSCULOSKELETAL MEDICINE & OMM

## 2023-04-03 PROCEDURE — 1159F PR MEDICATION LIST DOCUMENTED IN MEDICAL RECORD: ICD-10-PCS | Mod: CPTII,S$GLB,, | Performed by: NEUROMUSCULOSKELETAL MEDICINE & OMM

## 2023-04-03 PROCEDURE — 3008F PR BODY MASS INDEX (BMI) DOCUMENTED: ICD-10-PCS | Mod: CPTII,S$GLB,, | Performed by: NEUROMUSCULOSKELETAL MEDICINE & OMM

## 2023-04-03 PROCEDURE — 99999 PR PBB SHADOW E&M-EST. PATIENT-LVL IV: ICD-10-PCS | Mod: PBBFAC,,, | Performed by: NEUROMUSCULOSKELETAL MEDICINE & OMM

## 2023-04-03 PROCEDURE — 3079F DIAST BP 80-89 MM HG: CPT | Mod: CPTII,S$GLB,, | Performed by: NEUROMUSCULOSKELETAL MEDICINE & OMM

## 2023-04-03 PROCEDURE — 3074F SYST BP LT 130 MM HG: CPT | Mod: CPTII,S$GLB,, | Performed by: NEUROMUSCULOSKELETAL MEDICINE & OMM

## 2023-04-03 PROCEDURE — 99999 PR PBB SHADOW E&M-EST. PATIENT-LVL IV: CPT | Mod: PBBFAC,,, | Performed by: NEUROMUSCULOSKELETAL MEDICINE & OMM

## 2023-04-03 PROCEDURE — 99214 OFFICE O/P EST MOD 30 MIN: CPT | Mod: 25,S$GLB,, | Performed by: NEUROMUSCULOSKELETAL MEDICINE & OMM

## 2023-04-03 PROCEDURE — 98927 OSTEOPATH MANJ 5-6 REGIONS: CPT | Mod: S$GLB,,, | Performed by: NEUROMUSCULOSKELETAL MEDICINE & OMM

## 2023-04-03 PROCEDURE — 98927 PR OSTEOPATHIC MANIP,5-6 BODY REGN: ICD-10-PCS | Mod: S$GLB,,, | Performed by: NEUROMUSCULOSKELETAL MEDICINE & OMM

## 2023-04-03 PROCEDURE — 1159F MED LIST DOCD IN RCRD: CPT | Mod: CPTII,S$GLB,, | Performed by: NEUROMUSCULOSKELETAL MEDICINE & OMM

## 2023-04-03 NOTE — PROGRESS NOTES
PT orders faxed to [facility].     Alejandra Perry MS, OTC  Clinical Assistant to Dr. Lina Turpin
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
## 2023-04-27 ENCOUNTER — PATIENT MESSAGE (OUTPATIENT)
Dept: INTERNAL MEDICINE | Facility: CLINIC | Age: 38
End: 2023-04-27
Payer: COMMERCIAL

## 2023-04-27 ENCOUNTER — PATIENT MESSAGE (OUTPATIENT)
Dept: INFECTIOUS DISEASES | Facility: CLINIC | Age: 38
End: 2023-04-27
Payer: COMMERCIAL

## 2023-04-28 ENCOUNTER — PATIENT MESSAGE (OUTPATIENT)
Dept: INTERNAL MEDICINE | Facility: CLINIC | Age: 38
End: 2023-04-28

## 2023-04-28 ENCOUNTER — OFFICE VISIT (OUTPATIENT)
Dept: INTERNAL MEDICINE | Facility: CLINIC | Age: 38
End: 2023-04-28
Payer: COMMERCIAL

## 2023-04-28 VITALS
WEIGHT: 112 LBS | HEIGHT: 64 IN | HEART RATE: 75 BPM | DIASTOLIC BLOOD PRESSURE: 62 MMHG | OXYGEN SATURATION: 100 % | BODY MASS INDEX: 19.12 KG/M2 | SYSTOLIC BLOOD PRESSURE: 112 MMHG

## 2023-04-28 DIAGNOSIS — J02.9 PHARYNGITIS, UNSPECIFIED ETIOLOGY: ICD-10-CM

## 2023-04-28 DIAGNOSIS — D80.9 IMMUNODEFICIENCY WITH PREDOMINANTLY ANTIBODY DEFECTS: Primary | ICD-10-CM

## 2023-04-28 LAB
CTP QC/QA: YES
MOLECULAR STREP A: POSITIVE

## 2023-04-28 PROCEDURE — 87651 POCT STREP A MOLECULAR: ICD-10-PCS | Mod: QW,S$GLB,, | Performed by: PHYSICIAN ASSISTANT

## 2023-04-28 PROCEDURE — 99999 PR PBB SHADOW E&M-EST. PATIENT-LVL IV: ICD-10-PCS | Mod: PBBFAC,,, | Performed by: PHYSICIAN ASSISTANT

## 2023-04-28 PROCEDURE — 3008F PR BODY MASS INDEX (BMI) DOCUMENTED: ICD-10-PCS | Mod: CPTII,S$GLB,, | Performed by: PHYSICIAN ASSISTANT

## 2023-04-28 PROCEDURE — 3074F PR MOST RECENT SYSTOLIC BLOOD PRESSURE < 130 MM HG: ICD-10-PCS | Mod: CPTII,S$GLB,, | Performed by: PHYSICIAN ASSISTANT

## 2023-04-28 PROCEDURE — 87651 STREP A DNA AMP PROBE: CPT | Mod: QW,S$GLB,, | Performed by: PHYSICIAN ASSISTANT

## 2023-04-28 PROCEDURE — 1159F MED LIST DOCD IN RCRD: CPT | Mod: CPTII,S$GLB,, | Performed by: PHYSICIAN ASSISTANT

## 2023-04-28 PROCEDURE — 99999 PR PBB SHADOW E&M-EST. PATIENT-LVL IV: CPT | Mod: PBBFAC,,, | Performed by: PHYSICIAN ASSISTANT

## 2023-04-28 PROCEDURE — 99214 PR OFFICE/OUTPT VISIT, EST, LEVL IV, 30-39 MIN: ICD-10-PCS | Mod: S$GLB,,, | Performed by: PHYSICIAN ASSISTANT

## 2023-04-28 PROCEDURE — 99214 OFFICE O/P EST MOD 30 MIN: CPT | Mod: S$GLB,,, | Performed by: PHYSICIAN ASSISTANT

## 2023-04-28 PROCEDURE — 3008F BODY MASS INDEX DOCD: CPT | Mod: CPTII,S$GLB,, | Performed by: PHYSICIAN ASSISTANT

## 2023-04-28 PROCEDURE — 1159F PR MEDICATION LIST DOCUMENTED IN MEDICAL RECORD: ICD-10-PCS | Mod: CPTII,S$GLB,, | Performed by: PHYSICIAN ASSISTANT

## 2023-04-28 PROCEDURE — 3078F DIAST BP <80 MM HG: CPT | Mod: CPTII,S$GLB,, | Performed by: PHYSICIAN ASSISTANT

## 2023-04-28 PROCEDURE — 3078F PR MOST RECENT DIASTOLIC BLOOD PRESSURE < 80 MM HG: ICD-10-PCS | Mod: CPTII,S$GLB,, | Performed by: PHYSICIAN ASSISTANT

## 2023-04-28 PROCEDURE — 3074F SYST BP LT 130 MM HG: CPT | Mod: CPTII,S$GLB,, | Performed by: PHYSICIAN ASSISTANT

## 2023-04-28 RX ORDER — METHYLPREDNISOLONE 4 MG/1
TABLET ORAL
Qty: 21 EACH | Refills: 0 | Status: SHIPPED | OUTPATIENT
Start: 2023-04-28 | End: 2023-05-19

## 2023-04-28 RX ORDER — FLUCONAZOLE 150 MG/1
150 TABLET ORAL
Qty: 3 TABLET | Refills: 0 | Status: SHIPPED | OUTPATIENT
Start: 2023-04-28 | End: 2023-05-05

## 2023-04-28 RX ORDER — CLARITHROMYCIN 500 MG/1
500 TABLET, FILM COATED ORAL EVERY 12 HOURS
Qty: 14 TABLET | Refills: 0 | Status: SHIPPED | OUTPATIENT
Start: 2023-04-28 | End: 2023-05-05

## 2023-04-28 NOTE — PROGRESS NOTES
INTERNAL MEDICINE URGENT VISIT NOTE    CHIEF COMPLAINT     Chief Complaint   Patient presents with    Sore Throat       HPI     Karen Zavala is a 37 y.o. female who presents for an urgent visit today.    PCP is Leah Garcia MD, patient is known to me.     Patient presents with complaints of pharyngitis -she has IgG def and often gets strep infections and requires aggressive oral abx. According to chart check, she has already started levofloxacin (one dose) but admits she is fear full of side effect profile and would rather avoid this class of medication if possible.    No known sick contacts  No recent travel  Last Strep infection was aprox 6 months ago -required PO steroids and cleared with clarithromycin.     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 Authorizing Provider   (Magic mouthwash) 1:1:1 diphenhydrAMINE(Benadryl) 12.5mg/5ml liq, aluminum & magnesium hydroxide-simethicone (Maalox), LIDOcaine viscous 2% Swish and spit 10 mLs every 4 (four) hours as needed (Sore throat). 1/23/23  Yes Priscilla Cooley MD   clarithromycin (BIAXIN) 500 MG tablet Take 1 tablet (500 mg total) by mouth every 12 (twelve) hours. 1/26/23  Yes Oliverio Marroquin MD   gabapentin (NEURONTIN) 100 MG capsule Take 100 mg by mouth once daily. 3/22/22  Yes Historical Provider   levoFLOXacin (LEVAQUIN) 500 MG tablet  1/25/23  Yes Historical Provider   LEVOFLOXACIN ORAL Take by mouth.   Yes Historical Provider   naratriptan (AMERGE) 1 MG Tab Take at onset of migraine, can repeat in 2 hrs if needed.  No more than twice per day or 3 days/wk. 1/24/23 8/16/23 Yes Ivan Warner MD   tiZANidine  "(ZANAFLEX) 2 MG tablet Take 1-2 tablets (2-4 mg total) by mouth every evening. 5/19/22  Yes Ivan Warner MD   VYVANSE 10 mg Cap Take 1 capsule by mouth every morning. 1/19/23  Yes Historical Provider   VYVANSE 20 mg capsule  3/12/22  Yes Historical Provider       Review of Systems:  Review of Systems   Constitutional:  Negative for chills and fever.   HENT:  Positive for sore throat. Negative for trouble swallowing.    Eyes:  Negative for visual disturbance.   Respiratory:  Negative for cough and shortness of breath.    Cardiovascular:  Negative for chest pain.   Gastrointestinal:  Negative for abdominal pain, constipation, diarrhea, nausea and vomiting.   Genitourinary:  Negative for dysuria and flank pain.   Musculoskeletal:  Negative for back pain, neck pain and neck stiffness.   Skin:  Negative for rash.   Neurological:  Negative for dizziness, syncope, weakness and headaches.   Psychiatric/Behavioral:  Negative for confusion.      Health Maintainence:   Immunizations:  Health Maintenance         Date Due Completion Date    Pneumococcal Vaccines (Age 0-64) (2 - PCV) 11/11/2020 11/11/2019    Cervical Cancer Screening 03/13/2028 3/13/2023    Override on 7/27/2013: Done    TETANUS VACCINE 04/05/2028 4/5/2018    Abdominal Aortic Aneurysm Screening 10/18/2049 5/26/2011             PHYSICAL EXAM     /62   Pulse 75   Ht 5' 4" (1.626 m)   Wt 50.8 kg (111 lb 15.9 oz)   SpO2 100%   BMI 19.22 kg/m²     Physical Exam  Vitals and nursing note reviewed.   Constitutional:       Appearance: Normal appearance.      Comments: Healthy appearing female in NAD or apparent pain. She makes good eye contact, speaks in clear full sentences and ambulates with ease.        HENT:      Head: Normocephalic and atraumatic.      Nose: Nose normal.      Mouth/Throat:      Pharynx: Oropharynx is clear.      Comments: Tonsillar exudate on the right greater than left.   Mild erythema, no edema   Posterior cobblestoning. "     Eyes:      Conjunctiva/sclera: Conjunctivae normal.   Cardiovascular:      Rate and Rhythm: Normal rate and regular rhythm.      Pulses: Normal pulses.   Pulmonary:      Effort: No respiratory distress.   Abdominal:      Tenderness: There is no abdominal tenderness.   Musculoskeletal:         General: Normal range of motion.      Cervical back: No rigidity.   Skin:     General: Skin is warm and dry.      Capillary Refill: Capillary refill takes less than 2 seconds.      Findings: No rash.   Neurological:      General: No focal deficit present.      Mental Status: She is alert.      Gait: Gait normal.   Psychiatric:         Mood and Affect: Mood normal.       LABS     Lab Results   Component Value Date    HGBA1C 5.1 05/26/2021     CMP  Sodium   Date Value Ref Range Status   10/20/2022 141 136 - 145 mmol/L Final     Potassium   Date Value Ref Range Status   10/20/2022 3.5 3.5 - 5.1 mmol/L Final     Chloride   Date Value Ref Range Status   10/20/2022 104 95 - 110 mmol/L Final     CO2   Date Value Ref Range Status   10/20/2022 28 23 - 29 mmol/L Final     Glucose   Date Value Ref Range Status   10/20/2022 82 70 - 110 mg/dL Final     BUN   Date Value Ref Range Status   10/20/2022 9 6 - 20 mg/dL Final     Creatinine   Date Value Ref Range Status   10/20/2022 0.8 0.5 - 1.4 mg/dL Final     Calcium   Date Value Ref Range Status   10/20/2022 10.4 8.7 - 10.5 mg/dL Final     Total Protein   Date Value Ref Range Status   10/20/2022 7.4 6.0 - 8.4 g/dL Final     Albumin   Date Value Ref Range Status   10/20/2022 4.3 3.5 - 5.2 g/dL Final     Total Bilirubin   Date Value Ref Range Status   10/20/2022 0.3 0.1 - 1.0 mg/dL Final     Comment:     For infants and newborns, interpretation of results should be based  on gestational age, weight and in agreement with clinical  observations.    Premature Infant recommended reference ranges:  Up to 24 hours.............<8.0 mg/dL  Up to 48 hours............<12.0 mg/dL  3-5  days..................<15.0 mg/dL  6-29 days.................<15.0 mg/dL       Alkaline Phosphatase   Date Value Ref Range Status   10/20/2022 68 55 - 135 U/L Final     AST   Date Value Ref Range Status   10/20/2022 15 10 - 40 U/L Final     ALT   Date Value Ref Range Status   10/20/2022 11 10 - 44 U/L Final     Anion Gap   Date Value Ref Range Status   10/20/2022 9 8 - 16 mmol/L Final     eGFR if    Date Value Ref Range Status   12/29/2021 >60.0 >60 mL/min/1.73 m^2 Final     eGFR if non    Date Value Ref Range Status   12/29/2021 >60.0 >60 mL/min/1.73 m^2 Final     Comment:     Calculation used to obtain the estimated glomerular filtration  rate (eGFR) is the CKD-EPI equation.        Lab Results   Component Value Date    WBC 11.19 01/26/2023    HGB 13.2 01/26/2023    HCT 41.7 01/26/2023    MCV 87 01/26/2023     01/26/2023     Lab Results   Component Value Date    CHOL 209 (H) 05/04/2021    CHOL 201 (H) 05/29/2013    CHOL 213 (H) 01/21/2008     Lab Results   Component Value Date    HDL 56 05/04/2021    HDL 59 05/29/2013    HDL 57 01/21/2008     Lab Results   Component Value Date    LDLCALC 138.6 05/04/2021    LDLCALC 124.0 05/29/2013    LDLCALC 139.2 (H) 01/21/2008     Lab Results   Component Value Date    TRIG 72 05/04/2021    TRIG 92 05/29/2013    TRIG 84 01/21/2008     Lab Results   Component Value Date    CHOLHDL 26.8 05/04/2021    CHOLHDL 29.4 05/29/2013    CHOLHDL 26.8 01/21/2008     Lab Results   Component Value Date    TSH 1.683 05/26/2021       ASSESSMENT/PLAN     Karen Zavala is a 37 y.o. female     Karen was seen today for sore throat. Will treat empirically for strep with biaxin. Asked patient to stop levo for now, managed symptoms with magic mouth wash but medrol dose pack prescribed but only to be started if magic mouth was does not improve symptoms enough. Pt encouraged to try to avoid steroids. Diflucan prescribed empirically as well. Pt is aware of  ED prompts and will follow-up If symptoms do not improve as expected.       Diagnoses and all orders for this visit:    Immunodeficiency     Pharyngitis, unspecified etiology  -     POCT Strep A, Molecular    Other orders  -     clarithromycin (BIAXIN) 500 MG tablet; Take 1 tablet (500 mg total) by mouth every 12 (twelve) hours. for 7 days  -     methylPREDNISolone (MEDROL DOSEPACK) 4 mg tablet; use as directed  -     diphenhydrAMINE-aluminum-magnesium hydroxide-simethicone-LIDOcaine HCl 2%; Swish and spit 15 mLs every 4 (four) hours as needed (throat pain).  -     fluconazole (DIFLUCAN) 150 MG Tab; Take 1 tablet (150 mg total) by mouth every 72 hours. for 3 doses       Patient was counseled on when and how to seek emergent care.       Alejandra Hampton PA-C   Department of Internal Medicine - [facility]   8:41 AM

## 2023-05-17 ENCOUNTER — PATIENT MESSAGE (OUTPATIENT)
Dept: SPORTS MEDICINE | Facility: CLINIC | Age: 38
End: 2023-05-17
Payer: COMMERCIAL

## 2023-06-13 ENCOUNTER — TELEPHONE (OUTPATIENT)
Dept: SPINE | Facility: CLINIC | Age: 38
End: 2023-06-13
Payer: COMMERCIAL

## 2023-06-13 ENCOUNTER — OFFICE VISIT (OUTPATIENT)
Dept: PAIN MEDICINE | Facility: CLINIC | Age: 38
End: 2023-06-13
Attending: ANESTHESIOLOGY
Payer: COMMERCIAL

## 2023-06-13 VITALS
DIASTOLIC BLOOD PRESSURE: 80 MMHG | SYSTOLIC BLOOD PRESSURE: 129 MMHG | RESPIRATION RATE: 18 BRPM | HEIGHT: 64 IN | BODY MASS INDEX: 18.44 KG/M2 | HEART RATE: 86 BPM | WEIGHT: 108 LBS

## 2023-06-13 DIAGNOSIS — M79.18 MYOFASCIAL PAIN: Primary | ICD-10-CM

## 2023-06-13 DIAGNOSIS — G43.009 MIGRAINE WITHOUT AURA AND WITHOUT STATUS MIGRAINOSUS, NOT INTRACTABLE: ICD-10-CM

## 2023-06-13 PROCEDURE — 99499 NO LOS: ICD-10-PCS | Mod: S$GLB,,, | Performed by: ANESTHESIOLOGY

## 2023-06-13 PROCEDURE — 99499 UNLISTED E&M SERVICE: CPT | Mod: S$GLB,,, | Performed by: ANESTHESIOLOGY

## 2023-06-13 PROCEDURE — 99999 PR PBB SHADOW E&M-EST. PATIENT-LVL III: ICD-10-PCS | Mod: PBBFAC,,, | Performed by: ANESTHESIOLOGY

## 2023-06-13 PROCEDURE — 99999 PR PBB SHADOW E&M-EST. PATIENT-LVL III: CPT | Mod: PBBFAC,,, | Performed by: ANESTHESIOLOGY

## 2023-06-13 PROCEDURE — 64615 CHEMODENERV MUSC MIGRAINE: CPT | Mod: S$GLB,,, | Performed by: ANESTHESIOLOGY

## 2023-06-13 PROCEDURE — 64615 PR CHEMODENERVATION OF MUSCLE FOR CHRONIC MIGRAINE: ICD-10-PCS | Mod: S$GLB,,, | Performed by: ANESTHESIOLOGY

## 2023-06-13 RX ORDER — TIZANIDINE 2 MG/1
2-4 TABLET ORAL NIGHTLY
Qty: 180 TABLET | Refills: 2 | Status: SHIPPED | OUTPATIENT
Start: 2023-06-13
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
## 2023-09-01 ENCOUNTER — OFFICE VISIT (OUTPATIENT)
Dept: INTERNAL MEDICINE | Facility: CLINIC | Age: 38
End: 2023-09-01
Attending: INTERNAL MEDICINE
Payer: COMMERCIAL

## 2023-09-01 VITALS
WEIGHT: 111.31 LBS | SYSTOLIC BLOOD PRESSURE: 110 MMHG | HEART RATE: 81 BPM | BODY MASS INDEX: 19 KG/M2 | DIASTOLIC BLOOD PRESSURE: 60 MMHG | OXYGEN SATURATION: 100 % | HEIGHT: 64 IN

## 2023-09-01 DIAGNOSIS — J02.9 PHARYNGITIS, UNSPECIFIED ETIOLOGY: Primary | ICD-10-CM

## 2023-09-01 DIAGNOSIS — H60.91 OTITIS EXTERNA OF RIGHT EAR, UNSPECIFIED CHRONICITY, UNSPECIFIED TYPE: ICD-10-CM

## 2023-09-01 DIAGNOSIS — D80.6 SPECIFIC ANTIBODY DEFICIENCY WITH NORMAL IMMUNOGLOBULIN CONCENTRATION AND NORMAL NUMBER OF B CELLS: ICD-10-CM

## 2023-09-01 LAB
CTP QC/QA: YES
MOLECULAR STREP A: NEGATIVE

## 2023-09-01 PROCEDURE — 1160F RVW MEDS BY RX/DR IN RCRD: CPT | Mod: CPTII,S$GLB,, | Performed by: INTERNAL MEDICINE

## 2023-09-01 PROCEDURE — 3078F DIAST BP <80 MM HG: CPT | Mod: CPTII,S$GLB,, | Performed by: INTERNAL MEDICINE

## 2023-09-01 PROCEDURE — 99214 OFFICE O/P EST MOD 30 MIN: CPT | Mod: S$GLB,,, | Performed by: INTERNAL MEDICINE

## 2023-09-01 PROCEDURE — 1159F MED LIST DOCD IN RCRD: CPT | Mod: CPTII,S$GLB,, | Performed by: INTERNAL MEDICINE

## 2023-09-01 PROCEDURE — 99999 PR PBB SHADOW E&M-EST. PATIENT-LVL III: ICD-10-PCS | Mod: PBBFAC,,, | Performed by: INTERNAL MEDICINE

## 2023-09-01 PROCEDURE — 87651 POCT STREP A MOLECULAR: ICD-10-PCS | Mod: QW,S$GLB,, | Performed by: INTERNAL MEDICINE

## 2023-09-01 PROCEDURE — 3008F BODY MASS INDEX DOCD: CPT | Mod: CPTII,S$GLB,, | Performed by: INTERNAL MEDICINE

## 2023-09-01 PROCEDURE — 1160F PR REVIEW ALL MEDS BY PRESCRIBER/CLIN PHARMACIST DOCUMENTED: ICD-10-PCS | Mod: CPTII,S$GLB,, | Performed by: INTERNAL MEDICINE

## 2023-09-01 PROCEDURE — 3074F PR MOST RECENT SYSTOLIC BLOOD PRESSURE < 130 MM HG: ICD-10-PCS | Mod: CPTII,S$GLB,, | Performed by: INTERNAL MEDICINE

## 2023-09-01 PROCEDURE — 99999 PR PBB SHADOW E&M-EST. PATIENT-LVL III: CPT | Mod: PBBFAC,,, | Performed by: INTERNAL MEDICINE

## 2023-09-01 PROCEDURE — 99214 PR OFFICE/OUTPT VISIT, EST, LEVL IV, 30-39 MIN: ICD-10-PCS | Mod: S$GLB,,, | Performed by: INTERNAL MEDICINE

## 2023-09-01 PROCEDURE — 3078F PR MOST RECENT DIASTOLIC BLOOD PRESSURE < 80 MM HG: ICD-10-PCS | Mod: CPTII,S$GLB,, | Performed by: INTERNAL MEDICINE

## 2023-09-01 PROCEDURE — 3008F PR BODY MASS INDEX (BMI) DOCUMENTED: ICD-10-PCS | Mod: CPTII,S$GLB,, | Performed by: INTERNAL MEDICINE

## 2023-09-01 PROCEDURE — 87070 CULTURE OTHR SPECIMN AEROBIC: CPT | Performed by: INTERNAL MEDICINE

## 2023-09-01 PROCEDURE — 1159F PR MEDICATION LIST DOCUMENTED IN MEDICAL RECORD: ICD-10-PCS | Mod: CPTII,S$GLB,, | Performed by: INTERNAL MEDICINE

## 2023-09-01 PROCEDURE — 87651 STREP A DNA AMP PROBE: CPT | Mod: QW,S$GLB,, | Performed by: INTERNAL MEDICINE

## 2023-09-01 PROCEDURE — 3074F SYST BP LT 130 MM HG: CPT | Mod: CPTII,S$GLB,, | Performed by: INTERNAL MEDICINE

## 2023-09-01 RX ORDER — CIPROFLOXACIN AND DEXAMETHASONE 3; 1 MG/ML; MG/ML
4 SUSPENSION/ DROPS AURICULAR (OTIC) 2 TIMES DAILY
Qty: 7.5 ML | Refills: 0 | Status: SHIPPED | OUTPATIENT
Start: 2023-09-01 | End: 2023-09-12

## 2023-09-01 NOTE — PROGRESS NOTES
"Subjective:   Patient ID: Karen Zavala is a 37 y.o. female  Chief complaint:   Chief Complaint   Patient presents with    Sore Throat       HPI  Here for uc appt for intermittent sore throat and ear pain     Pcp: fredy  Pt is not new to me     - burning feeling   Hearing feels muffled   No flonase   No cough   Unsure if snoring   No nausea vomiting and diarrhea     Strep dx 4/2023 and 1/2023  Allx to pcn and cephalosporins - anaphylaxis     Antibody def:   Followed by immunologist   No fevers  Sore throat today - started about 1 week ago     Has 7 and 5 year old   Son dx with ASD in Spring     Review of Systems    Objective:  Vitals:    09/01/23 0909   BP: 110/60   Pulse: 81   SpO2: 100%   Weight: 50.5 kg (111 lb 5.3 oz)   Height: 5' 4" (1.626 m)     Body mass index is 19.11 kg/m².    Physical Exam  Vitals reviewed.   Constitutional:       Appearance: Normal appearance. She is well-developed.   HENT:      Head: Normocephalic and atraumatic.      Comments: Mild erythema at right canal 6o'clock     Right Ear: Tympanic membrane and external ear normal.      Left Ear: Tympanic membrane, ear canal and external ear normal.      Nose: Nose normal. No congestion.      Mouth/Throat:      Mouth: Mucous membranes are moist.      Pharynx: Oropharynx is clear. Posterior oropharyngeal erythema present. No oropharyngeal exudate.   Eyes:      Extraocular Movements: Extraocular movements intact.      Conjunctiva/sclera: Conjunctivae normal.   Neck:      Thyroid: No thyromegaly.   Cardiovascular:      Rate and Rhythm: Normal rate and regular rhythm.      Pulses: Normal pulses.      Heart sounds: Normal heart sounds.   Pulmonary:      Effort: Pulmonary effort is normal.      Breath sounds: Normal breath sounds.   Abdominal:      General: Bowel sounds are normal.      Palpations: Abdomen is soft.   Musculoskeletal:         General: No swelling or tenderness.      Cervical back: Neck supple.   Lymphadenopathy:      Cervical: " No cervical adenopathy.   Skin:     General: Skin is warm and dry.      Capillary Refill: Capillary refill takes less than 2 seconds.   Neurological:      General: No focal deficit present.      Mental Status: She is alert and oriented to person, place, and time. Mental status is at baseline.   Psychiatric:         Behavior: Behavior normal.         Thought Content: Thought content normal.       Assessment:  1. Pharyngitis, unspecified etiology    2. Otitis externa of right ear, unspecified chronicity, unspecified type    3. 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 Pharyngitis, unspecified etiology  -     POCT Strep A, Molecular  -     CULTURE, RESPIRATORY  - THROAT    2. Otitis externa of right ear, unspecified chronicity, unspecified type  -     Discontinue: ciprofloxacin-dexAMETHasone 0.3-0.1% (CIPRODEX) 0.3-0.1 % DrpS; Place 4 drops into the right ear 2 (two) times daily.  Dispense: 7.5 mL; Refill: 0    3. 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

## 2023-09-04 LAB — BACTERIA THROAT CULT: NORMAL

## 2023-09-11 ENCOUNTER — OFFICE VISIT (OUTPATIENT)
Dept: SPORTS MEDICINE | Facility: CLINIC | Age: 38
End: 2023-09-11
Payer: COMMERCIAL

## 2023-09-11 VITALS
SYSTOLIC BLOOD PRESSURE: 120 MMHG | HEART RATE: 65 BPM | BODY MASS INDEX: 18.79 KG/M2 | WEIGHT: 109.44 LBS | DIASTOLIC BLOOD PRESSURE: 72 MMHG

## 2023-09-11 DIAGNOSIS — G25.89 SCAPULAR DYSKINESIS: ICD-10-CM

## 2023-09-11 DIAGNOSIS — M25.511 RIGHT SHOULDER PAIN, UNSPECIFIED CHRONICITY: Primary | ICD-10-CM

## 2023-09-11 PROCEDURE — 3074F PR MOST RECENT SYSTOLIC BLOOD PRESSURE < 130 MM HG: ICD-10-PCS | Mod: CPTII,S$GLB,, | Performed by: ORTHOPAEDIC SURGERY

## 2023-09-11 PROCEDURE — 1159F PR MEDICATION LIST DOCUMENTED IN MEDICAL RECORD: ICD-10-PCS | Mod: CPTII,S$GLB,, | Performed by: ORTHOPAEDIC SURGERY

## 2023-09-11 PROCEDURE — 99214 PR OFFICE/OUTPT VISIT, EST, LEVL IV, 30-39 MIN: ICD-10-PCS | Mod: S$GLB,,, | Performed by: ORTHOPAEDIC SURGERY

## 2023-09-11 PROCEDURE — 99999 PR PBB SHADOW E&M-EST. PATIENT-LVL III: ICD-10-PCS | Mod: PBBFAC,,, | Performed by: ORTHOPAEDIC SURGERY

## 2023-09-11 PROCEDURE — 3008F BODY MASS INDEX DOCD: CPT | Mod: CPTII,S$GLB,, | Performed by: ORTHOPAEDIC SURGERY

## 2023-09-11 PROCEDURE — 99214 OFFICE O/P EST MOD 30 MIN: CPT | Mod: S$GLB,,, | Performed by: ORTHOPAEDIC SURGERY

## 2023-09-11 PROCEDURE — 3074F SYST BP LT 130 MM HG: CPT | Mod: CPTII,S$GLB,, | Performed by: ORTHOPAEDIC SURGERY

## 2023-09-11 PROCEDURE — 3078F DIAST BP <80 MM HG: CPT | Mod: CPTII,S$GLB,, | Performed by: ORTHOPAEDIC SURGERY

## 2023-09-11 PROCEDURE — 1159F MED LIST DOCD IN RCRD: CPT | Mod: CPTII,S$GLB,, | Performed by: ORTHOPAEDIC SURGERY

## 2023-09-11 PROCEDURE — 3008F PR BODY MASS INDEX (BMI) DOCUMENTED: ICD-10-PCS | Mod: CPTII,S$GLB,, | Performed by: ORTHOPAEDIC SURGERY

## 2023-09-11 PROCEDURE — 3078F PR MOST RECENT DIASTOLIC BLOOD PRESSURE < 80 MM HG: ICD-10-PCS | Mod: CPTII,S$GLB,, | Performed by: ORTHOPAEDIC SURGERY

## 2023-09-11 PROCEDURE — 99999 PR PBB SHADOW E&M-EST. PATIENT-LVL III: CPT | Mod: PBBFAC,,, | Performed by: ORTHOPAEDIC SURGERY

## 2023-09-11 NOTE — PROGRESS NOTES
Chief Complaint: Right shoulder pain    HISTORY OF PRESENT ILLNESS:   Pt is here today for follow up of shoulder arthroscopy.  she is doing well.      Patient notes a clicking in her shoulder that causes some discomfort, she notes this when reaching across her body     No other issues reported     SANE preop: 25  SANE post op: 90    Pain today 0/10    DATE OF PROCEDURE: 01/13/2022  SURGEON: Ann Anderson M.D.  OPERATION:   right  1. Shoulder arthroscopic anterior capsulorrhaphy (CPT 97405) (complex, -22 modifier)  2. Shoulder arthroscopic anterior labral repair (CPT 74554)   3. Shoulder arthroscopic posterior labral repair, capsulorrhaphy, 7:00 position (CPT 07849)  4. Shoulder arthroscopic extensive debridement (anterior, posterior glenohumeral joint) (CPT 60709)  5. Shoulder manipulation under anesthesia (CPT 47652)  6. Shoulder arthroscopic lysis of adhesions (CPT 44465):     7 anchors were used in total.     Review of Systems   Constitution: Negative. Negative for chills, fever and night sweats.   HENT: Negative for congestion and headaches.    Eyes: Negative for blurred vision, left vision loss and right vision loss.   Cardiovascular: Negative for chest pain and syncope.   Respiratory: Negative for cough and shortness of breath.    Endocrine: Negative for polydipsia, polyphagia and polyuria.   Hematologic/Lymphatic: Negative for bleeding problem. Does not bruise/bleed easily.   Skin: Negative for dry skin, itching and rash.   Musculoskeletal: Negative for falls and muscle weakness.   Gastrointestinal: Negative for abdominal pain and bowel incontinence.   Genitourinary: Negative for bladder incontinence and nocturia.   Neurological: Negative for disturbances in coordination, loss of balance and seizures.   Psychiatric/Behavioral: Negative for depression. The patient does not have insomnia.    Allergic/Immunologic: Negative for hives and persistent infections.       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     Unspecified vitamin D deficiency      PAST SURGICAL HISTORY:   Past Surgical History:   Procedure Laterality Date    EPIDURAL STEROID INJECTION N/A 10/4/2021    Procedure: INJECTION, STEROID, EPIDURAL C7/T1;  Surgeon: Cony Ahmadi MD;  Location: [facility];  Service: Pain Management;  Laterality: N/A;  9/16 l/m    KNEE SURGERY Right     torn meniscus    SHOULDER ARTHROSCOPY W/ SUPERIOR LABRAL ANTERIOR POSTERIOR LESION REPAIR Right 1/13/2022    Procedure: ARTHROSCOPY, SHOULDER, WITH INSTABILITY REPAIR;  Surgeon: Ann Anderson MD;  Location: [facility] OR;  Service: Orthopedics;  Laterality: Right;    TILT TABLE TEST N/A 9/15/2022    Procedure: TILT TABLE TEST;  Surgeon: RAMESH Ferguson MD;  Location: [facility] EP LAB;  Service: Cardiology;  Laterality: N/A;  orthostasis, Rule out POTS, Tilt table test, Dr. 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   Tobacco Use    Smoking status: Never    Smokeless tobacco: Never   Substance and Sexual Activity    Alcohol use: Not Currently     Alcohol/week: 1.0 standard drink of alcohol     Types: 1 Standard drinks or equivalent per week     Comment: occ - 1/mo    Drug use: No    Sexual activity: Yes     Partners: Male     Birth control/protection: Coitus interruptus, None     Social Determinants of Health     Financial Resource  Strain: Low Risk  (11/11/2019)    Overall Financial Resource Strain (CARDIA)     Difficulty of Paying Living Expenses: Not very hard   Food Insecurity: No Food Insecurity (11/11/2019)    Hunger Vital Sign     Worried About Running Out of Food in the Last Year: Never true     Ran Out of Food in the Last Year: Never true   Transportation Needs: No Transportation Needs (11/11/2019)    PRAPARE - Transportation     Lack of Transportation (Medical): No     Lack of Transportation (Non-Medical): No   Physical Activity: Insufficiently Active (11/11/2019)    Exercise Vital Sign     Days of Exercise per Week: 1 day     Minutes of Exercise per Session: 60 min   Stress: Stress Concern Present (11/11/2019)    [employer] [place] [employer] - Occupational Stress Questionnaire     Feeling of Stress : To some extent   Social Connections: Somewhat Isolated (11/11/2019)    Social Connection and Isolation Panel [NHANES]     Frequency of Communication with Friends and Family: More than three times a week     Frequency of Social Gatherings with Friends and Family: More than three times a week     Attends Yazidism Services: Never     Active Member of Clubs or Organizations: No     Attends Club or Organization Meetings: Never     Marital Status:        MEDICATIONS:   Current Outpatient Medications:     gabapentin (NEURONTIN) 100 MG capsule, Take 100 mg by mouth once daily., Disp: , Rfl:     tiZANidine (ZANAFLEX) 2 MG tablet, Take 1-2 tablets (2-4 mg total) by mouth every evening., Disp: 180 tablet, Rfl: 2    VYVANSE 10 mg Cap, Take 1 capsule by mouth every morning., Disp: , Rfl:     VYVANSE 20 mg capsule, , Disp: , Rfl:     (Magic mouthwash) 1:1:1 diphenhydrAMINE(Benadryl) 12.5mg/5ml liq, aluminum & magnesium hydroxide-simethicone (Maalox), LIDOcaine viscous 2%, Swish and spit 10 mLs every 4 (four) hours as needed (Sore throat). (Patient not taking: Reported on 9/11/2023), Disp: 90 mL, Rfl: 0     ciprofloxacin-dexAMETHasone 0.3-0.1% (CIPRODEX) 0.3-0.1 % DrpS, Place 4 drops into the right ear 2 (two) times daily. (Patient not taking: Reported on 9/11/2023), Disp: 7.5 mL, Rfl: 0    clarithromycin (BIAXIN) 500 MG tablet, Take 1 tablet (500 mg total) by mouth every 12 (twelve) hours. (Patient not taking: Reported on 9/11/2023), Disp: 20 tablet, Rfl: 1    naratriptan (AMERGE) 1 MG Tab, Take at onset of migraine, can repeat in 2 hrs if needed.  No more than twice per day or 3 days/wk., Disp: 12 tablet, Rfl: 0    Current Facility-Administered Medications:     levonorgestreL (MIRENA) 20 mcg/24 hours (6 yrs) 52 mg IUD 1 Intra Uterine Device, 1 Intra Uterine Device, Intrauterine, , Band, Michelle WGina, DO, 1 Intra Uterine Device at 06/11/21 0945  ALLERGIES:   Review of patient's allergies indicates:   Allergen Reactions    Ceclor [cefaclor] Anaphylaxis, Swelling and Rash    Pcn [penicillins] Anaphylaxis, Swelling and Rash       VITAL SIGNS: /72   Pulse 65   Wt 49.6 kg (109 lb 7.3 oz)   BMI 18.79 kg/m²                                                                                   PHYSICAL EXAMINATION:     Incision sites healed well  No evidence of any erythema, infection or induration  elbow Range of motion full   No effusion  2+ Radial pulses  No swelling        Forward Flexion: 170  ER: 70 (compared to noninvovled side: 75)  IR: T8              Scapular dyskinesia: + mild                                                                 ASSESSMENT:                                                                                                                                               1. Status post above, doing well.                                                                                                                               PLAN:                                                                                                                                                     1. Restart  PT with Zenon NEWBERYR, continue HEP   2. Emphasized scapular function.  3. I have discussed return to activity in detail.  4. Patient will see us back in January.                                                                     5. All questions were answered, surgical technique was reviewed and interpreted, and patient should contact us with any questions or concerns in the interim

## 2023-09-12 ENCOUNTER — OFFICE VISIT (OUTPATIENT)
Dept: INTERNAL MEDICINE | Facility: CLINIC | Age: 38
End: 2023-09-12
Attending: INTERNAL MEDICINE
Payer: COMMERCIAL

## 2023-09-12 ENCOUNTER — PATIENT MESSAGE (OUTPATIENT)
Dept: INTERNAL MEDICINE | Facility: CLINIC | Age: 38
End: 2023-09-12

## 2023-09-12 VITALS
SYSTOLIC BLOOD PRESSURE: 112 MMHG | DIASTOLIC BLOOD PRESSURE: 68 MMHG | HEART RATE: 71 BPM | OXYGEN SATURATION: 99 % | BODY MASS INDEX: 18.78 KG/M2 | HEIGHT: 64 IN | WEIGHT: 110 LBS

## 2023-09-12 DIAGNOSIS — J02.9 SORE THROAT: Primary | ICD-10-CM

## 2023-09-12 DIAGNOSIS — J32.9 SINUSITIS, UNSPECIFIED CHRONICITY, UNSPECIFIED LOCATION: ICD-10-CM

## 2023-09-12 DIAGNOSIS — R09.81 SINUS CONGESTION: ICD-10-CM

## 2023-09-12 DIAGNOSIS — D80.6 SPECIFIC ANTIBODY DEFICIENCY WITH NORMAL IMMUNOGLOBULIN CONCENTRATION AND NORMAL NUMBER OF B CELLS: ICD-10-CM

## 2023-09-12 LAB
CTP QC/QA: YES
MOLECULAR STREP A: NEGATIVE
POC MOLECULAR INFLUENZA A AGN: NEGATIVE
POC MOLECULAR INFLUENZA B AGN: NEGATIVE
SARS-COV-2 AG RESP QL IA.RAPID: NEGATIVE

## 2023-09-12 PROCEDURE — 1159F MED LIST DOCD IN RCRD: CPT | Mod: CPTII,S$GLB,, | Performed by: INTERNAL MEDICINE

## 2023-09-12 PROCEDURE — 3074F SYST BP LT 130 MM HG: CPT | Mod: CPTII,S$GLB,, | Performed by: INTERNAL MEDICINE

## 2023-09-12 PROCEDURE — 99214 OFFICE O/P EST MOD 30 MIN: CPT | Mod: S$GLB,,, | Performed by: INTERNAL MEDICINE

## 2023-09-12 PROCEDURE — 87502 POCT INFLUENZA A/B MOLECULAR: ICD-10-PCS | Mod: QW,S$GLB,, | Performed by: INTERNAL MEDICINE

## 2023-09-12 PROCEDURE — 99999 PR PBB SHADOW E&M-EST. PATIENT-LVL III: CPT | Mod: PBBFAC,,, | Performed by: INTERNAL MEDICINE

## 2023-09-12 PROCEDURE — 87651 STREP A DNA AMP PROBE: CPT | Mod: QW,S$GLB,, | Performed by: INTERNAL MEDICINE

## 2023-09-12 PROCEDURE — 3074F PR MOST RECENT SYSTOLIC BLOOD PRESSURE < 130 MM HG: ICD-10-PCS | Mod: CPTII,S$GLB,, | Performed by: INTERNAL MEDICINE

## 2023-09-12 PROCEDURE — 87502 INFLUENZA DNA AMP PROBE: CPT | Mod: QW,S$GLB,, | Performed by: INTERNAL MEDICINE

## 2023-09-12 PROCEDURE — 3008F PR BODY MASS INDEX (BMI) DOCUMENTED: ICD-10-PCS | Mod: CPTII,S$GLB,, | Performed by: INTERNAL MEDICINE

## 2023-09-12 PROCEDURE — 3078F DIAST BP <80 MM HG: CPT | Mod: CPTII,S$GLB,, | Performed by: INTERNAL MEDICINE

## 2023-09-12 PROCEDURE — 99999 PR PBB SHADOW E&M-EST. PATIENT-LVL III: ICD-10-PCS | Mod: PBBFAC,,, | Performed by: INTERNAL MEDICINE

## 2023-09-12 PROCEDURE — 3008F BODY MASS INDEX DOCD: CPT | Mod: CPTII,S$GLB,, | Performed by: INTERNAL MEDICINE

## 2023-09-12 PROCEDURE — 87651 POCT STREP A MOLECULAR: ICD-10-PCS | Mod: QW,S$GLB,, | Performed by: INTERNAL MEDICINE

## 2023-09-12 PROCEDURE — 87811 SARS CORONAVIRUS 2 ANTIGEN POCT, MANUAL READ: ICD-10-PCS | Mod: QW,S$GLB,, | Performed by: INTERNAL MEDICINE

## 2023-09-12 PROCEDURE — 87811 SARS-COV-2 COVID19 W/OPTIC: CPT | Mod: QW,S$GLB,, | Performed by: INTERNAL MEDICINE

## 2023-09-12 PROCEDURE — 99214 PR OFFICE/OUTPT VISIT, EST, LEVL IV, 30-39 MIN: ICD-10-PCS | Mod: S$GLB,,, | Performed by: INTERNAL MEDICINE

## 2023-09-12 PROCEDURE — 3078F PR MOST RECENT DIASTOLIC BLOOD PRESSURE < 80 MM HG: ICD-10-PCS | Mod: CPTII,S$GLB,, | Performed by: INTERNAL MEDICINE

## 2023-09-12 PROCEDURE — 1160F RVW MEDS BY RX/DR IN RCRD: CPT | Mod: CPTII,S$GLB,, | Performed by: INTERNAL MEDICINE

## 2023-09-12 PROCEDURE — 1160F PR REVIEW ALL MEDS BY PRESCRIBER/CLIN PHARMACIST DOCUMENTED: ICD-10-PCS | Mod: CPTII,S$GLB,, | Performed by: INTERNAL MEDICINE

## 2023-09-12 PROCEDURE — 87632 RESP VIRUS 6-11 TARGETS: CPT | Performed by: INTERNAL MEDICINE

## 2023-09-12 PROCEDURE — 1159F PR MEDICATION LIST DOCUMENTED IN MEDICAL RECORD: ICD-10-PCS | Mod: CPTII,S$GLB,, | Performed by: INTERNAL MEDICINE

## 2023-09-12 RX ORDER — DOXYCYCLINE HYCLATE 100 MG
100 TABLET ORAL 2 TIMES DAILY
Qty: 14 TABLET | Refills: 0 | Status: SHIPPED | OUTPATIENT
Start: 2023-09-12 | End: 2023-09-19

## 2023-09-12 NOTE — PROGRESS NOTES
"Subjective:   Patient ID: Karen Zavala is a 37 y.o. female  Chief complaint:   Chief Complaint   Patient presents with    Nasal Congestion       HPI  Since seen now about 12 days ago more sinus congestion, green mucous   - more congestion since then   - prior tests reviewed    - scratchy throat   - ear improved   + sinus congestion and pressure   - has humifier at home   - taking mucinex at home   -  staying hydrated   - No fevers   - no nausea or vomiting   - Takes probiotic   - No diarrhea     Has appt with Dr. Piña soon to discuss ab infusion    Review of Systems    Objective:  Vitals:    09/12/23 1409   BP: 112/68   BP Location: Left arm   Patient Position: Sitting   Pulse: 71   SpO2: 99%   Weight: 49.9 kg (110 lb 0.2 oz)   Height: 5' 4" (1.626 m)     Body mass index is 18.88 kg/m².    Physical Exam  Vitals reviewed.   Constitutional:       Appearance: Normal appearance. She is well-developed.   HENT:      Head: Normocephalic and atraumatic.      Right Ear: Tympanic membrane, ear canal and external ear normal.      Left Ear: Tympanic membrane, ear canal and external ear normal.      Nose: Congestion present.      Mouth/Throat:      Mouth: Mucous membranes are moist.      Pharynx: Oropharynx is clear. No oropharyngeal exudate.   Eyes:      Extraocular Movements: Extraocular movements intact.      Conjunctiva/sclera: Conjunctivae normal.   Neck:      Thyroid: No thyromegaly.   Cardiovascular:      Rate and Rhythm: Normal rate and regular rhythm.      Pulses: Normal pulses.      Heart sounds: Normal heart sounds.   Pulmonary:      Effort: Pulmonary effort is normal.      Breath sounds: Normal breath sounds.   Abdominal:      General: Bowel sounds are normal.      Palpations: Abdomen is soft.   Musculoskeletal:         General: No swelling or tenderness.      Cervical back: Neck supple.   Lymphadenopathy:      Cervical: No cervical adenopathy.   Skin:     General: Skin is warm and dry.      Capillary " Refill: Capillary refill takes less than 2 seconds.   Neurological:      General: No focal deficit present.      Mental Status: She is alert and oriented to person, place, and time. Mental status is at baseline.   Psychiatric:         Behavior: Behavior normal.         Thought Content: Thought content normal.         Assessment:  1. Sore throat    2. Sinus congestion    3. Sinusitis, unspecified chronicity, unspecified location    4. 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 Sore throat  -     POCT Influenza A/B Molecular  -     SARS Coronavirus 2 Antigen, POCT Manual Read  -     POCT Strep A, Molecular  -     Respiratory Viral Panel by PCR (Sources other than NP Swab) [facility]; Nasal Swab; Future; Expected date: 09/12/2023    2. Sinus congestion  -     Respiratory Viral Panel by PCR (Sources other than NP Swab) [facility]; Nasal Swab; Future; Expected date: 09/12/2023    3. Sinusitis, unspecified chronicity, unspecified location  -     doxycycline (VIBRA-TABS) 100 MG tablet; Take 1 tablet (100 mg total) by mouth 2 (two) times daily. for 7 days  Dispense: 14 tablet; Refill: 0    4. 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

## 2023-09-15 LAB
ENTEROVIRUS/RHINOVIRUS: NOT DETECTED
HUMAN BOCAVIRUS: NOT DETECTED
HUMAN CORONAVIRUS, COMMON COLD VIRUS: NOT DETECTED
INFLUENZA A - H1N1-09: NOT DETECTED
PARAINFLUENZA: NOT DETECTED
RVP - ADENOVIRUS: NOT DETECTED
RVP - HUMAN METAPNEUMOVIRUS (HMPV): NOT DETECTED
RVP - INFLUENZA A: NOT DETECTED
RVP - INFLUENZA B: NOT DETECTED
RVP - RESPIRATORY SYNCTIAL VIRUS (RSV) A: NOT DETECTED
RVP - RESPIRATORY VIRAL PANEL, SOURCE: NORMAL

## 2023-09-17 ENCOUNTER — PATIENT MESSAGE (OUTPATIENT)
Dept: INTERNAL MEDICINE | Facility: CLINIC | Age: 38
End: 2023-09-17
Payer: COMMERCIAL

## 2023-09-18 NOTE — TELEPHONE ENCOUNTER
It is possible that doxy can cause headaches although it is typically very well tolerated    - can also be from respiratory illness - if not already done then recommend that she take her current migraine medication to see if resolves   - if worsens or does not resolve in the next day then can stop doxy and see if helpful and also recommend following up with a provider to be assessed for headache

## 2023-09-18 NOTE — TELEPHONE ENCOUNTER
It is possible that sinus infection could return however time will tell with stopping it - rec cont to monitor symptoms for now

## 2023-09-20 ENCOUNTER — TELEPHONE (OUTPATIENT)
Dept: PAIN MEDICINE | Facility: CLINIC | Age: 38
End: 2023-09-20

## 2023-09-20 ENCOUNTER — PATIENT MESSAGE (OUTPATIENT)
Dept: INTERNAL MEDICINE | Facility: CLINIC | Age: 38
End: 2023-09-20
Payer: COMMERCIAL

## 2023-09-20 ENCOUNTER — OFFICE VISIT (OUTPATIENT)
Dept: PAIN MEDICINE | Facility: CLINIC | Age: 38
End: 2023-09-20
Attending: ANESTHESIOLOGY
Payer: COMMERCIAL

## 2023-09-20 ENCOUNTER — TELEPHONE (OUTPATIENT)
Dept: SPINE | Facility: CLINIC | Age: 38
End: 2023-09-20
Payer: COMMERCIAL

## 2023-09-20 VITALS
TEMPERATURE: 98 F | DIASTOLIC BLOOD PRESSURE: 82 MMHG | SYSTOLIC BLOOD PRESSURE: 121 MMHG | BODY MASS INDEX: 18.9 KG/M2 | RESPIRATION RATE: 19 BRPM | WEIGHT: 110.69 LBS | HEIGHT: 64 IN | HEART RATE: 65 BPM

## 2023-09-20 DIAGNOSIS — G43.009 MIGRAINE WITHOUT AURA AND WITHOUT STATUS MIGRAINOSUS, NOT INTRACTABLE: Primary | ICD-10-CM

## 2023-09-20 DIAGNOSIS — J02.0 STREP THROAT: Primary | ICD-10-CM

## 2023-09-20 DIAGNOSIS — G43.719 CHRONIC MIGRAINE WITHOUT AURA, INTRACTABLE, WITHOUT STATUS MIGRAINOSUS: Primary | ICD-10-CM

## 2023-09-20 PROCEDURE — 3008F BODY MASS INDEX DOCD: CPT | Mod: CPTII,S$GLB,, | Performed by: ANESTHESIOLOGY

## 2023-09-20 PROCEDURE — 1159F MED LIST DOCD IN RCRD: CPT | Mod: CPTII,S$GLB,, | Performed by: ANESTHESIOLOGY

## 2023-09-20 PROCEDURE — 3079F PR MOST RECENT DIASTOLIC BLOOD PRESSURE 80-89 MM HG: ICD-10-PCS | Mod: CPTII,S$GLB,, | Performed by: ANESTHESIOLOGY

## 2023-09-20 PROCEDURE — 99213 PR OFFICE/OUTPT VISIT, EST, LEVL III, 20-29 MIN: ICD-10-PCS | Mod: 25,S$GLB,, | Performed by: ANESTHESIOLOGY

## 2023-09-20 PROCEDURE — 1160F RVW MEDS BY RX/DR IN RCRD: CPT | Mod: CPTII,S$GLB,, | Performed by: ANESTHESIOLOGY

## 2023-09-20 PROCEDURE — 1160F PR REVIEW ALL MEDS BY PRESCRIBER/CLIN PHARMACIST DOCUMENTED: ICD-10-PCS | Mod: CPTII,S$GLB,, | Performed by: ANESTHESIOLOGY

## 2023-09-20 PROCEDURE — 3074F SYST BP LT 130 MM HG: CPT | Mod: CPTII,S$GLB,, | Performed by: ANESTHESIOLOGY

## 2023-09-20 PROCEDURE — 99999 PR PBB SHADOW E&M-EST. PATIENT-LVL IV: CPT | Mod: PBBFAC,,, | Performed by: ANESTHESIOLOGY

## 2023-09-20 PROCEDURE — 64615 PR CHEMODENERVATION OF MUSCLE FOR CHRONIC MIGRAINE: ICD-10-PCS | Mod: S$GLB,,, | Performed by: ANESTHESIOLOGY

## 2023-09-20 PROCEDURE — 99213 OFFICE O/P EST LOW 20 MIN: CPT | Mod: 25,S$GLB,, | Performed by: ANESTHESIOLOGY

## 2023-09-20 PROCEDURE — 1159F PR MEDICATION LIST DOCUMENTED IN MEDICAL RECORD: ICD-10-PCS | Mod: CPTII,S$GLB,, | Performed by: ANESTHESIOLOGY

## 2023-09-20 PROCEDURE — 3079F DIAST BP 80-89 MM HG: CPT | Mod: CPTII,S$GLB,, | Performed by: ANESTHESIOLOGY

## 2023-09-20 PROCEDURE — 3008F PR BODY MASS INDEX (BMI) DOCUMENTED: ICD-10-PCS | Mod: CPTII,S$GLB,, | Performed by: ANESTHESIOLOGY

## 2023-09-20 PROCEDURE — 3074F PR MOST RECENT SYSTOLIC BLOOD PRESSURE < 130 MM HG: ICD-10-PCS | Mod: CPTII,S$GLB,, | Performed by: ANESTHESIOLOGY

## 2023-09-20 PROCEDURE — 64615 CHEMODENERV MUSC MIGRAINE: CPT | Mod: S$GLB,,, | Performed by: ANESTHESIOLOGY

## 2023-09-20 PROCEDURE — 99999 PR PBB SHADOW E&M-EST. PATIENT-LVL IV: ICD-10-PCS | Mod: PBBFAC,,, | Performed by: ANESTHESIOLOGY

## 2023-09-20 RX ORDER — AZITHROMYCIN 250 MG/1
TABLET, FILM COATED ORAL
Qty: 6 TABLET | Refills: 0 | Status: SHIPPED | OUTPATIENT
Start: 2023-09-20 | End: 2023-09-25

## 2023-09-20 NOTE — TELEPHONE ENCOUNTER
----- Message from Becki Olguin sent at 9/20/2023  1:48 PM CDT -----   Name of Who is Calling:     What is the request in detail:  patient en route running late 3mins away Please contact to further discuss and advise      Can the clinic reply by [facility]:     What Number to Call Back if not in [facility]:  [phone]
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
## 2023-09-22 DIAGNOSIS — G43.009 MIGRAINE WITHOUT AURA AND WITHOUT STATUS MIGRAINOSUS, NOT INTRACTABLE: ICD-10-CM

## 2023-09-23 RX ORDER — NARATRIPTAN 1 MG/1
TABLET ORAL
Qty: 12 TABLET | Refills: 0 | Status: SHIPPED | OUTPATIENT
Start: 2023-09-23 | End: 2024-05-10

## 2023-09-26 ENCOUNTER — CLINICAL SUPPORT (OUTPATIENT)
Dept: REHABILITATION | Facility: HOSPITAL | Age: 38
End: 2023-09-26
Payer: COMMERCIAL

## 2023-09-26 DIAGNOSIS — M25.511 RIGHT SHOULDER PAIN, UNSPECIFIED CHRONICITY: ICD-10-CM

## 2023-09-26 DIAGNOSIS — M25.512 CHRONIC PAIN OF BOTH SHOULDERS: ICD-10-CM

## 2023-09-26 DIAGNOSIS — G25.89 SCAPULAR DYSKINESIS: ICD-10-CM

## 2023-09-26 DIAGNOSIS — G89.29 CHRONIC PAIN OF BOTH SHOULDERS: ICD-10-CM

## 2023-09-26 DIAGNOSIS — M25.511 CHRONIC PAIN OF BOTH SHOULDERS: ICD-10-CM

## 2023-09-26 PROCEDURE — 97112 NEUROMUSCULAR REEDUCATION: CPT | Performed by: PHYSICAL THERAPIST

## 2023-09-26 PROCEDURE — 97161 PT EVAL LOW COMPLEX 20 MIN: CPT | Performed by: PHYSICAL THERAPIST

## 2023-09-27 PROBLEM — M25.512 CHRONIC PAIN OF BOTH SHOULDERS: Status: ACTIVE | Noted: 2023-09-27

## 2023-09-27 PROBLEM — M25.511 CHRONIC PAIN OF BOTH SHOULDERS: Status: ACTIVE | Noted: 2023-09-27

## 2023-09-27 PROBLEM — G89.29 CHRONIC PAIN OF BOTH SHOULDERS: Status: ACTIVE | Noted: 2023-09-27

## 2023-09-27 NOTE — PLAN OF CARE
[facility]   Physical Therapy Initial Evaluation      Name: Karen Cruz [facility] Number: [id number]    Therapy Diagnosis:   Encounter Diagnoses   Name Primary?    Right shoulder pain, unspecified chronicity     Scapular dyskinesis     Chronic pain of both shoulders         Physician: Ann Anderson MD    Physician Orders: PT Eval and Treat   Medical Diagnosis from Referral:   M25.511 (ICD-10-CM) - Right shoulder pain, unspecified chronicity   G25.89 (ICD-10-CM) - Scapular dyskinesis     Evaluation Date: 9/26/2023  Authorization Period Expiration: 12/31/2023  Plan of Care Expiration: 12/26/2023  Progress Note Due: 12/26/2023  Visit # / Visits authorized: 1/ 1   FOTO: 1/1    Precautions: Standard     Time In: 1600  Time Out: 1650  Total Appointment Time (timed & untimed codes): 50 minutes    Subjective     Date of onset: couple weeks  History of current condition - Sandra Cruz reports: Patient reports she underwent a shoulder surgery on 1/13/22 and is experiencing clicking and pain in bilateral shoulder today. She previously completed PT at this clinic and also notes neck pain causing migraines, relief from botox. She notes she was diagnoses with Ehler's Danlos by her PCP but has not done the genetic testing. She cares for 2 children, 5 and 7 years old, and wants to get back to gardening and ADLs without pain.   Falls: none    Imaging: MRI studies:     Impression:     Mild cervical spondylosis worst at C4-C5 resulting in mild spinal canal stenosis.    Prior Therapy: shoulder  Social History: She lives with their family  Occupation: [employer]- not currently working  Prior Level of Function: Ind - garden and cares for kids  Current Level of Function: Ind - pain with ADL    Pain:  Current 4/10, worst 7/10, best 3/10   Location: bilateral shoulder   Description: Aching, Tight, and Deep  Aggravating Factors: Flexing and Lifting  Easing Factors: rest    Patients goals: return to caring  for her kids pain free     Medical History:   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    Medications:   Karen has a current medication list which includes the following prescription(s): gabapentin, naratriptan, tizanidine, vyvanse, and vyvanse, and the following Facility-Administered Medications: levonorgestrel.    Allergies:   Review of patient's allergies indicates:   Allergen Reactions    Ceclor [cefaclor] Anaphylaxis, Swelling and Rash    Pcn [penicillins] Anaphylaxis, Swelling and Rash        Objective      Observation: Patient is a 37 y.o. female alert and oriented    Posture: winging scap with internal rotation and anterior tilt    Passive Range of Motion:   Shoulder Right Left   Flexion 180 180   Abduction 180 180   ER at 0 50 50   ER at 90 95 100   IR 60 65      Active Range of Motion:   Shoulder Right Left   Flexion 180 180   Abduction 180 180   ER at 0 45 45   ER at 90 80 pain 90   IR 70 70   Reach behind head C7 C7   Reach behind back  T10 T10     Strength:  Shoulder Right Left   Flexion 4 4   Abduction 5 5   ER 4 4   IR 4 4   Serratus Anterior 3+ 3+   Middle Trap 3 3+   Low Trap 3 3       Joint Mobility: Excessive upward rotation of the scapula with overhead motion     Palpation: Tender to mid scap R>L    Sensation: intact    Flexibility:   Lat: R normal ; L normal   Pec Minor: R short ; L short   Intake Outcome Measure for FOTO Shoulder Survey    Therapist reviewed FOTO scores for Karen Zavala on  9/26/2023.   FOTO documents entered into [employer] [facility] - see Media section.    Intake Score: 50%       Treatment     Total Treatment time (time-based codes) separate from Evaluation: 10 minutes     Sandra Cruz received the treatments listed below:        neuromuscular re-education activities to improve: Proprioception and Posture for 10 minutes. The following activities were included:  Serratus HHR 20X  HEP review      Patient Education and Home Exercises     Education provided:   - improve stabilization to shoulder    Written Home Exercises Provided: yes. Exercises were reviewed and Sandra Cruz was able to demonstrate them prior to the end of the session.  Sandra Cruz demonstrated good understanding of the education provided. See EMR under Patient Instructions for exercises provided during therapy sessions.    Assessment     Karen is a 37 y.o. female referred to outpatient Physical Therapy with a medical diagnosis of scapular dyskinesis. Patient presents with excessive ROM to the shoulder, strength deficits, hypermobility and laxity with EDS diagnosis, pain with ADL and caring for her children.     Patient prognosis is Excellent.   Patient will benefit from skilled outpatient Physical Therapy to address the deficits stated above and in the chart below, provide patient /family education, and to maximize patientt's level of independence.     Plan of care discussed with patient: Yes  Patient's spiritual, cultural and educational needs considered and patient is agreeable to the plan of care and goals as stated below:     Anticipated Barriers for therapy: EDS    Medical Necessity is demonstrated by the following  History  Co-morbidities and personal factors that may impact the plan of care [x] LOW: no personal factors / co-morbidities  [] MODERATE: 1-2 personal factors / co-morbidities  [] HIGH: 3+ personal factors / co-morbidities    Moderate / High Support Documentation:   Co-morbidities affecting plan of care:      Personal Factors:   no deficits     Examination  Body Structures and Functions, activity limitations and participation restrictions that may impact the plan of care [x] LOW: addressing 1-2 elements  [] MODERATE: 3+ elements  [] HIGH: 4+ elements (please support below)    Moderate / High Support Documentation:      Clinical Presentation [x] LOW: stable  [] MODERATE: Evolving  [] HIGH: Unstable     Decision Making/ Complexity Score: Low         GOALS: Short Term Goals:  4 weeks  1.Report decreased shoulder pain < / =  2/10  to increase tolerance for return to ADL without sharp pains  2. Increase PROM full motion without pain at end range   3. Increased strength by 1/3 MMT grade in serratus and mid trap to increase tolerance for ADL and work activities.  4. Pt to tolerate HEP to improve ROM and independence with ADL's    Long Term Goals: 8 weeks  1.Report decreased shoulder pain  < / =  0 /10  to increase tolerance for caring for kids  2.Increase AROM to full motion pain free  3.Increase strength to >/= 4/5 in serratus and mid/low trap to increase tolerance for ADL and work activities.  4. Pt goal: return to caring for kids and gardening pain free  5. Pt will have improved gcode of CJ (20-40% limited) on FOTO shoulder in order to demonstrate true functional improvement.   Plan     Plan of care Certification: 9/26/2023 to 12/26/2023.    Outpatient Physical Therapy 2 times weekly for 10 weeks to include the following interventions: Manual Therapy, Moist Heat/ Ice, Neuromuscular Re-ed, Patient Education, Self Care, Therapeutic Activities, and Therapeutic Exercise.     Zenon Hadley, PT, DPT, OCS, FAAOMPT

## 2023-10-03 ENCOUNTER — PATIENT MESSAGE (OUTPATIENT)
Dept: SPORTS MEDICINE | Facility: CLINIC | Age: 38
End: 2023-10-03
Payer: COMMERCIAL

## 2023-10-04 ENCOUNTER — PATIENT MESSAGE (OUTPATIENT)
Dept: NEUROLOGY | Facility: CLINIC | Age: 38
End: 2023-10-04
Payer: COMMERCIAL

## 2023-10-06 ENCOUNTER — HOSPITAL ENCOUNTER (OUTPATIENT)
Dept: RADIOLOGY | Facility: HOSPITAL | Age: 38
Discharge: HOME | End: 2023-10-06
Attending: NEUROMUSCULOSKELETAL MEDICINE & OMM
Payer: COMMERCIAL

## 2023-10-06 ENCOUNTER — OFFICE VISIT (OUTPATIENT)
Dept: SPORTS MEDICINE | Facility: CLINIC | Age: 38
End: 2023-10-06
Payer: COMMERCIAL

## 2023-10-06 VITALS
DIASTOLIC BLOOD PRESSURE: 84 MMHG | HEIGHT: 64 IN | BODY MASS INDEX: 18.78 KG/M2 | WEIGHT: 110 LBS | HEART RATE: 66 BPM | SYSTOLIC BLOOD PRESSURE: 115 MMHG

## 2023-10-06 DIAGNOSIS — S59.809A: Primary | ICD-10-CM

## 2023-10-06 DIAGNOSIS — M99.02 SOMATIC DYSFUNCTION OF THORACIC REGION: ICD-10-CM

## 2023-10-06 DIAGNOSIS — M99.08 SOMATIC DYSFUNCTION OF RIB CAGE REGION: ICD-10-CM

## 2023-10-06 DIAGNOSIS — M99.07 UPPER EXTREMITY SOMATIC DYSFUNCTION: ICD-10-CM

## 2023-10-06 DIAGNOSIS — G56.22 IRRITATION OF LEFT ULNAR NERVE: ICD-10-CM

## 2023-10-06 DIAGNOSIS — M99.00 SOMATIC DYSFUNCTION OF HEAD REGION: ICD-10-CM

## 2023-10-06 DIAGNOSIS — M54.2 CERVICALGIA: ICD-10-CM

## 2023-10-06 DIAGNOSIS — M25.522 LEFT ELBOW PAIN: ICD-10-CM

## 2023-10-06 DIAGNOSIS — M99.01 CERVICAL (NECK) REGION SOMATIC DYSFUNCTION: ICD-10-CM

## 2023-10-06 DIAGNOSIS — M79.10 MYALGIA: ICD-10-CM

## 2023-10-06 PROCEDURE — 1160F RVW MEDS BY RX/DR IN RCRD: CPT | Mod: CPTII,S$GLB,, | Performed by: NEUROMUSCULOSKELETAL MEDICINE & OMM

## 2023-10-06 PROCEDURE — 1159F PR MEDICATION LIST DOCUMENTED IN MEDICAL RECORD: ICD-10-PCS | Mod: CPTII,S$GLB,, | Performed by: NEUROMUSCULOSKELETAL MEDICINE & OMM

## 2023-10-06 PROCEDURE — 73080 X-RAY EXAM OF ELBOW: CPT | Mod: TC,LT

## 2023-10-06 PROCEDURE — 99999 PR PBB SHADOW E&M-EST. PATIENT-LVL III: CPT | Mod: PBBFAC,,, | Performed by: NEUROMUSCULOSKELETAL MEDICINE & OMM

## 2023-10-06 PROCEDURE — 3008F PR BODY MASS INDEX (BMI) DOCUMENTED: ICD-10-PCS | Mod: CPTII,S$GLB,, | Performed by: NEUROMUSCULOSKELETAL MEDICINE & OMM

## 2023-10-06 PROCEDURE — 73080 X-RAY EXAM OF ELBOW: CPT | Mod: 26,LT,, | Performed by: RADIOLOGY

## 2023-10-06 PROCEDURE — 98927 PR OSTEOPATHIC MANIP,5-6 BODY REGN: ICD-10-PCS | Mod: S$GLB,,, | Performed by: NEUROMUSCULOSKELETAL MEDICINE & OMM

## 2023-10-06 PROCEDURE — 3074F PR MOST RECENT SYSTOLIC BLOOD PRESSURE < 130 MM HG: ICD-10-PCS | Mod: CPTII,S$GLB,, | Performed by: NEUROMUSCULOSKELETAL MEDICINE & OMM

## 2023-10-06 PROCEDURE — 97110 PR THERAPEUTIC EXERCISES: ICD-10-PCS | Mod: S$GLB,,, | Performed by: NEUROMUSCULOSKELETAL MEDICINE & OMM

## 2023-10-06 PROCEDURE — 3008F BODY MASS INDEX DOCD: CPT | Mod: CPTII,S$GLB,, | Performed by: NEUROMUSCULOSKELETAL MEDICINE & OMM

## 2023-10-06 PROCEDURE — 1160F PR REVIEW ALL MEDS BY PRESCRIBER/CLIN PHARMACIST DOCUMENTED: ICD-10-PCS | Mod: CPTII,S$GLB,, | Performed by: NEUROMUSCULOSKELETAL MEDICINE & OMM

## 2023-10-06 PROCEDURE — 98927 OSTEOPATH MANJ 5-6 REGIONS: CPT | Mod: S$GLB,,, | Performed by: NEUROMUSCULOSKELETAL MEDICINE & OMM

## 2023-10-06 PROCEDURE — 1159F MED LIST DOCD IN RCRD: CPT | Mod: CPTII,S$GLB,, | Performed by: NEUROMUSCULOSKELETAL MEDICINE & OMM

## 2023-10-06 PROCEDURE — 3079F PR MOST RECENT DIASTOLIC BLOOD PRESSURE 80-89 MM HG: ICD-10-PCS | Mod: CPTII,S$GLB,, | Performed by: NEUROMUSCULOSKELETAL MEDICINE & OMM

## 2023-10-06 PROCEDURE — 3079F DIAST BP 80-89 MM HG: CPT | Mod: CPTII,S$GLB,, | Performed by: NEUROMUSCULOSKELETAL MEDICINE & OMM

## 2023-10-06 PROCEDURE — 99999 PR PBB SHADOW E&M-EST. PATIENT-LVL III: ICD-10-PCS | Mod: PBBFAC,,, | Performed by: NEUROMUSCULOSKELETAL MEDICINE & OMM

## 2023-10-06 PROCEDURE — 99214 PR OFFICE/OUTPT VISIT, EST, LEVL IV, 30-39 MIN: ICD-10-PCS | Mod: 25,S$GLB,, | Performed by: NEUROMUSCULOSKELETAL MEDICINE & OMM

## 2023-10-06 PROCEDURE — 3074F SYST BP LT 130 MM HG: CPT | Mod: CPTII,S$GLB,, | Performed by: NEUROMUSCULOSKELETAL MEDICINE & OMM

## 2023-10-06 PROCEDURE — 99214 OFFICE O/P EST MOD 30 MIN: CPT | Mod: 25,S$GLB,, | Performed by: NEUROMUSCULOSKELETAL MEDICINE & OMM

## 2023-10-06 PROCEDURE — 73080 XR ELBOW COMPLETE 3 VIEW LEFT: ICD-10-PCS | Mod: 26,LT,, | Performed by: RADIOLOGY

## 2023-10-06 PROCEDURE — 97110 THERAPEUTIC EXERCISES: CPT | Mod: S$GLB,,, | Performed by: NEUROMUSCULOSKELETAL MEDICINE & OMM

## 2023-10-06 NOTE — PROGRESS NOTES
"Subjective:     Karen Zavala     Chief Complaint   Patient presents with    Left Elbow - Pain     HPI    Sandra Cruz is a right hand dominant 37 y.o. female coming in today for left elbow pain that began 6 day(s) ago. Pt. describes the pain as a 2/10 achy pain that does not radiate. There was a fall/injury/ or trauma associated with the onset of symptoms. Pt reports a hyperextension mechanism where her son jumped on her arm. Reports feeling a "tear" in her posterior elbow. The pain is better with rest and worse with ROM, lifting. Pt. Denies any other musculoskeletal complaints at this time.     Joint instability? no  Mechanical locking/clicking? no  Affecting ADL's? yes  Affecting sleep? no    Review of Systems   Constitutional:  Negative for chills and fever.   Musculoskeletal:  Positive for joint pain. Negative for back pain, falls, myalgias and neck pain.   Neurological:  Negative for dizziness, tingling, focal weakness, weakness and headaches.     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     Unspecified vitamin D deficiency      PAST SURGICAL HISTORY:   Past Surgical History:   Procedure Laterality Date    EPIDURAL STEROID INJECTION N/A 10/4/2021    Procedure: INJECTION, STEROID, EPIDURAL C7/T1;  Surgeon: Cony Ahmadi MD;  Location: [facility];  Service: Pain Management;  Laterality: N/A;  9/16 l/m    KNEE SURGERY Right     torn meniscus    SHOULDER ARTHROSCOPY W/ SUPERIOR LABRAL ANTERIOR POSTERIOR LESION REPAIR Right 1/13/2022    Procedure: ARTHROSCOPY, SHOULDER, WITH INSTABILITY REPAIR;  Surgeon: Ann Anderson MD;  Location: [facility] OR;  Service: Orthopedics;  Laterality: Right;    TILT TABLE TEST N/A 9/15/2022    Procedure: TILT TABLE TEST;  Surgeon: RAMESH Ferguson MD;  Location: [facility] EP LAB;  Service: " Cardiology;  Laterality: N/A;  orthostasis, Rule out POTS, Tilt table test, Dr. 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   Tobacco Use    Smoking status: Never    Smokeless tobacco: Never   Substance and Sexual Activity    Alcohol use: Not Currently     Alcohol/week: 1.0 standard drink of alcohol     Types: 1 Standard drinks or equivalent per week     Comment: occ - 1/mo    Drug use: No    Sexual activity: Yes     Partners: Male     Birth control/protection: Coitus interruptus, None     Social Determinants of Health     Financial Resource Strain: Low Risk  (11/11/2019)    Overall Financial Resource Strain (CARDIA)     Difficulty of Paying Living Expenses: Not very hard   Food Insecurity: No Food Insecurity (11/11/2019)    Hunger Vital Sign     Worried About Running Out of Food in the Last Year: Never true     Ran Out of Food in the Last Year: Never true   Transportation Needs: No Transportation Needs (11/11/2019)    PRAPARE - Transportation     Lack of Transportation (Medical): No     Lack of Transportation (Non-Medical): No   Physical Activity: Insufficiently Active (11/11/2019)    Exercise Vital Sign     Days of Exercise per Week: 1 day     Minutes of Exercise per Session: 60 min   Stress: Stress Concern Present (11/11/2019)    [employer] - Occupational Stress Questionnaire     Feeling of Stress : To some extent   Social Connections: Somewhat Isolated (11/11/2019)    Social Connection and Isolation Panel [NHANES]      "Frequency of Communication with Friends and Family: More than three times a week     Frequency of Social Gatherings with Friends and Family: More than three times a week     Attends Latter-day Services: Never     Active Member of Clubs or Organizations: No     Attends Club or Organization Meetings: Never     Marital Status:      MEDICATIONS:   Current Outpatient Medications:     gabapentin (NEURONTIN) 100 MG capsule, Take 100 mg by mouth once daily., Disp: , Rfl:     naratriptan (AMERGE) 1 MG Tab, Take at onset of migraine, can repeat in 2 hrs if needed.  No more than twice per day or 3 days/wk., Disp: 12 tablet, Rfl: 0    tiZANidine (ZANAFLEX) 2 MG tablet, Take 1-2 tablets (2-4 mg total) by mouth every evening., Disp: 180 tablet, Rfl: 2    VYVANSE 20 mg capsule, , Disp: , Rfl:     VYVANSE 10 mg Cap, Take 1 capsule by mouth every morning., Disp: , Rfl:     Current Facility-Administered Medications:     levonorgestreL (MIRENA) 20 mcg/24 hours (6 yrs) 52 mg IUD 1 Intra Uterine Device, 1 Intra Uterine Device, Intrauterine, , Band, Michelle DUMONT DO, 1 Intra Uterine Device at 06/11/21 0945  ALLERGIES:   Review of patient's allergies indicates:   Allergen Reactions    Ceclor [cefaclor] Anaphylaxis, Swelling and Rash    Pcn [penicillins] Anaphylaxis, Swelling and Rash     Objective:     VITAL SIGNS: /84   Pulse 66   Ht 5' 4" (1.626 m)   Wt 49.9 kg (110 lb)   BMI 18.88 kg/m²    General    Nursing note and vitals reviewed.  Constitutional: She is oriented to person, place, and time. She appears well-developed and well-nourished.   HENT:   Head: Normocephalic and atraumatic.   no nasal discharge, no external ear redness or discharge   Eyes:   EOM is full and smooth  No eye redness or discharge   Neck: Neck supple. No tracheal deviation present.   Cardiovascular:  Normal rate.            2+ Radial pulse bilaterally  2+ Dorsalis Pedis pulse bilaterally  No LE edema appreciated   Pulmonary/Chest: Effort normal. " No respiratory distress.   Abdominal: She exhibits no distension.   No rigidity   Neurological: She is alert and oriented to person, place, and time. She exhibits normal muscle tone. Coordination normal.   See details below   Psychiatric: She has a normal mood and affect. Her behavior is normal.         MUSCULOSKELETAL EXAM  Elbow: left ELBOW  The affected elbow is compared to the contralateral elbow.    Observation:    There is no edema, erythema, or ecchymosis.  There is no obvious muscle atrophy, hypertonicity, or hypotonicity of arm muscles.  There is no abnormal carrying angle or gunstock deformity noted.    ROM (* = with pain):  Active flexion to 150° on left and 150° on right.    Active extension to 0° on left* and 0° on right. Without hyperextension bilaterally.   Active pronation to 80° on left and 80° on right.  Active supination to 80° on left and 80° on right.    Active radial deviation to 20° on left and 20° on right.   Active ulnar deviation to 30° on left and 30° on right.    Tenderness To Palpation:  No tenderness at the medial epicondyle or lateral epicondyle.  No tenderness at the olecranon.  No tenderness at the distal humerus or proximal radius/ulna.  No tenderness at the radial head.  No tenderness over the ulnar and radial collateral ligaments.  No tenderness over the posterior interosseous nerve or distal biceps tendon.  + tenderness at the cubital tunnel    Strength Testing (* = with pain):  Deltoid - 5/5 on left and 5/5 on right  Biceps - 5/5 on left and 5/5 on right  Triceps - 5/5 on left and 5/5 on right  Wrist extension - 5/5 on left and 5/5 on right  Wrist flexion - 5/5 on left and 5/5 on right   - 5/5 on left and 5/5 on right  Finger extension - 5/5 on left and 5/5 on right  Finger abduction - 5/5 on left and 5/5 on right    Special Tests:  No laxity of the MCL at 0 and 30 degrees.    Milking maneuver - negative  No laxity of the LCL at 0 and 30 degrees.  No laxity with  posterolateral and posteromedial rotary testing.    3rd digit extension resistance test - negative  Resisted supination - negative  Resisted pronation - negative  Resisted wrist extension - negative  Resisted wrist flexion - negative    Neurovascular Exam:  2+ radial pulses bilaterally.  Sensation to light touch intact in the forearm and hand.  Negative Tinnels at carpal tunnel.  Positive tinnel's at cubital tunnel.  2+/4 reflexes at triceps, biceps, and brachioradialis.    TART (Tissue texture abnormality, Asymmetry,  Restriction of motion and/or Tenderness) changes:    Head: Occipitoatlantal (OA) Joint ES-right, R-left     Cervical Spine   C1 TTA LEFT   C2 TTA LEFT   C3 Neutral   C4 Neutral   C5 Neutral   C6 Neutral   C7 ERS LEFT      Thoracic Spine   T1 FRS RIGHT   T2 FRS LEFT   T3 Neutral   T4 Neutral   T5 Neutral   T6 Neutral   T7 Neutral   T8 Neutral   T9 Neutral   T10 Neutral   T11 Neutral   T12 Neutral     Rib cage: R1 inhaled on left    Upper extremity:    Region Finding/resrtiction   SC joint Neutral   AC joint Neutral   GH joint Neutral   Radial head Anterior on Left   Ulna ADDucted on left   Distal Radiocapral Neutral   DRUJ Neutral   TFCC Neutral   Proximal carpal row Neutral   Distal carpal row Neutral   1st, 2nd, 3rd, 4th, and 5th Carpal-metacarpal  Neutral   1st, 2nd, 3rd, 4th, and 5th Metacarapal  Neutral   Thoracic outlet TTA left       Key   F= Flexed   E = Extended   R = Rotated   S = Sidebent   TTA = tissue texture abnormality     IMAGIN. X-ray ordered due to left elbow pain. (AP, lateral, and radial capitellar views) taken today.   2. X-ray images were reviewed personally by me and then directly with patient.  3. FINDINGS: X-ray images obtained demonstrate that the skeletal structures are intact.  No fracture is identified.  Elbow joint spaces look normal.  No erosion or joint effusion is seen.  4. IMPRESSION: No acute abnormality or significant arthritis    Assessment:      Encounter  Diagnoses   Name Primary?    Hyperextension injury of elbow, initial encounter Yes    Left elbow pain     Irritation of left ulnar nerve     Cervicalgia     Myalgia     Somatic dysfunction of head region     Somatic dysfunction of thoracic region     Somatic dysfunction of rib cage region     Upper extremity somatic dysfunction     Cervical (neck) region somatic dysfunction           Plan:   1. Left elbow pain with associated hyperextension injury resulting in ulnar nerve irritation at the cubital tunnel.  Negative left elbow x-rays today.   - recommend over-the-counter Aleve 220 mg p.o. b.i.d. x 5 days, then as needed for pain control  - OMT performed today to address associated biomechanical restrictions  and HEP started.   -  X-ray images of left elbow taken today (AP, lateral, and radial capitellar views) showed no abnormalities. Images were personally reviewed with patient.    2. Aggravation neck pain with restarting shoulder formal physical therapy.  - OMT performed today to address associated biomechanical restrictions  and HEP started.     3. OMT 5-6 regions. Oral consent obtained.  Reviewed benefits and potential side effects.   - OMT indicated today due to signs and symptoms as well as local and remote somatic dysfunction findings and their related neurokinetic, lymphatic, fascial and/or arteriovenous body connections.   - OMT techniques used: Myofascial Release, Muscle Energy, and Fascial Distortion Model   - Treatment was tolerated well. Improvement noted in segmental mobility post-treatment in dysfunctional regions. There were no adverse events and no complications immediately following treatment.     4. Pt. Given the following HEP:  A) Cervicothoracic junction mobilization exercise: 10-15 reps, twice daily. Handout also given.   B) Ulnar nerve glide exercise: 10-15 reps bilaterally,  twice daily. Handout also given.     95833 HOME EXERCISE PROGRAM (HEP):  The patient was taught a homegoing physical  therapy regimen as described above. The patient demonstrated understanding of the exercises and proper technique of their execution. This interaction took 15 minutes.     5. Follow-up in 2 weeks for reevaluation    6. Patient agreeable to today's plan and all questions were answered    This note is dictated using the M*Modal Fluency Direct word recognition program. There are word recognition mistakes that are occasionally missed on review.

## 2023-10-17 NOTE — ED NOTES
MD and resident at bedside for right side chest tube placement. Time out performed. Sterile technique maintained.    Xeljanz Counseling: I discussed with the patient the risks of Xeljanz therapy including increased risk of infection, liver issues, headache, diarrhea, or cold symptoms. Live vaccines should be avoided. They were instructed to call if they have any problems.

## 2023-11-07 ENCOUNTER — OFFICE VISIT (OUTPATIENT)
Dept: NEUROLOGY | Facility: CLINIC | Age: 38
End: 2023-11-07
Payer: COMMERCIAL

## 2023-11-07 VITALS
BODY MASS INDEX: 18.78 KG/M2 | WEIGHT: 110 LBS | SYSTOLIC BLOOD PRESSURE: 117 MMHG | HEIGHT: 64 IN | HEART RATE: 88 BPM | DIASTOLIC BLOOD PRESSURE: 78 MMHG

## 2023-11-07 DIAGNOSIS — G43.009 MIGRAINE WITHOUT AURA AND WITHOUT STATUS MIGRAINOSUS, NOT INTRACTABLE: ICD-10-CM

## 2023-11-07 DIAGNOSIS — R43.1 OLFACTORY AURA: Primary | ICD-10-CM

## 2023-11-07 PROCEDURE — 99999 PR PBB SHADOW E&M-EST. PATIENT-LVL III: CPT | Mod: PBBFAC,,, | Performed by: PSYCHIATRY & NEUROLOGY

## 2023-11-07 PROCEDURE — 1160F RVW MEDS BY RX/DR IN RCRD: CPT | Mod: CPTII,S$GLB,, | Performed by: PSYCHIATRY & NEUROLOGY

## 2023-11-07 PROCEDURE — 3078F DIAST BP <80 MM HG: CPT | Mod: CPTII,S$GLB,, | Performed by: PSYCHIATRY & NEUROLOGY

## 2023-11-07 PROCEDURE — 99215 OFFICE O/P EST HI 40 MIN: CPT | Mod: S$GLB,,, | Performed by: PSYCHIATRY & NEUROLOGY

## 2023-11-07 PROCEDURE — 3078F PR MOST RECENT DIASTOLIC BLOOD PRESSURE < 80 MM HG: ICD-10-PCS | Mod: CPTII,S$GLB,, | Performed by: PSYCHIATRY & NEUROLOGY

## 2023-11-07 PROCEDURE — 1159F PR MEDICATION LIST DOCUMENTED IN MEDICAL RECORD: ICD-10-PCS | Mod: CPTII,S$GLB,, | Performed by: PSYCHIATRY & NEUROLOGY

## 2023-11-07 PROCEDURE — 3074F SYST BP LT 130 MM HG: CPT | Mod: CPTII,S$GLB,, | Performed by: PSYCHIATRY & NEUROLOGY

## 2023-11-07 PROCEDURE — 99999 PR PBB SHADOW E&M-EST. PATIENT-LVL III: ICD-10-PCS | Mod: PBBFAC,,, | Performed by: PSYCHIATRY & NEUROLOGY

## 2023-11-07 PROCEDURE — 99215 PR OFFICE/OUTPT VISIT, EST, LEVL V, 40-54 MIN: ICD-10-PCS | Mod: S$GLB,,, | Performed by: PSYCHIATRY & NEUROLOGY

## 2023-11-07 PROCEDURE — 1159F MED LIST DOCD IN RCRD: CPT | Mod: CPTII,S$GLB,, | Performed by: PSYCHIATRY & NEUROLOGY

## 2023-11-07 PROCEDURE — 3008F PR BODY MASS INDEX (BMI) DOCUMENTED: ICD-10-PCS | Mod: CPTII,S$GLB,, | Performed by: PSYCHIATRY & NEUROLOGY

## 2023-11-07 PROCEDURE — 1160F PR REVIEW ALL MEDS BY PRESCRIBER/CLIN PHARMACIST DOCUMENTED: ICD-10-PCS | Mod: CPTII,S$GLB,, | Performed by: PSYCHIATRY & NEUROLOGY

## 2023-11-07 PROCEDURE — 3074F PR MOST RECENT SYSTOLIC BLOOD PRESSURE < 130 MM HG: ICD-10-PCS | Mod: CPTII,S$GLB,, | Performed by: PSYCHIATRY & NEUROLOGY

## 2023-11-07 PROCEDURE — 3008F BODY MASS INDEX DOCD: CPT | Mod: CPTII,S$GLB,, | Performed by: PSYCHIATRY & NEUROLOGY

## 2023-11-07 RX ORDER — RIMEGEPANT SULFATE 75 MG/75MG
75 TABLET, ORALLY DISINTEGRATING ORAL ONCE AS NEEDED
Qty: 8 TABLET | Refills: 11 | Status: SHIPPED | OUTPATIENT
Start: 2023-11-07 | End: 2023-11-07

## 2023-11-07 NOTE — PROGRESS NOTES
Outpatient Neurology Clinic F/U Note    Impression:  Episodic migraine with/without visual aura with cervical myofascial component (and remote medication overuse - ibuprofen)  Olfactory auras: possible underlying focal seizure disorder (hx CHI with possible seizure age 20)  Examination evidence of sensory polyneuropathy - small fiber involvement is possible  Multifocal muscle spasms, probably benign  Hx pre-syncope: nl tilt  Mild BLE hyperreflexia, non-pathologic    Plan:  EEG; consider ambulatory, if negative  Continue tizanidine 4mg qhs  Increase gabapentin to 100mg bid  Naratriptan prn  Nurtec ODT Rx as an alternative to naratriptan  Continue PT  Limit caffeine  F/U with Pain Management (Dr. Cordero)  RTC 4 months    Problem List Items Addressed This Visit          1 - High    Migraine without aura and without status migrainosus, not intractable    Relevant Medications    rimegepant (NURTEC) 75 mg odt       2     Olfactory aura - Primary    Relevant Orders    EEG,w/awake & asleep record       Returns for f/u.    HAs - Freq about a couple/mo but each lasting 2-3 days.  Naratriptan works immediately but HAs recur.   Having olfactory auras again and episodic dizziness (mostly when standing)  Still with orthostasis; Tilt was negative (although + symptoms)  MRA neck/brain were nl    Abortives:   Effective: naratriptan   Ineffective/not tolerated: ibuprofen (overuse), Tylenol  Prophylactics:   Ineffective/not tolerated:  Flexeril (sedation)   Unknown: topiramate   Effective: Botox, tizanidine 4mg, gabapentin      HA freq:  9/30 and 0/30    In her 20s while in [place] she had possible seizures in an elevator with head trauma.  Possible GHB         No data to display              Running history:  CC:  Multiple neurological complaints    HPI:  34 y/o WF referred by GINGER Lagos for evaluation of headaches and multiple other complaints.      She reports years of B shoulder itching that resolved and then returned.   R shoulder  "numbness over a few weeks.  Intermittent and lasts days/hours.  No radiation.   Also complains of tingling in R 4th/5th digits.  Not triggered by change in position. No weakness  Spasms "all over."  Thoracic bilateral, L trap, intrascapular.  Started months/years ago. Robaxin helps; a different agent caused sedation.   Years of great toe numbness (R>L).  No family history.    She was having migrainous headaches years ago that went away. Headaches returned about a year ago.  Pain can be uni- or bilateral.  Typically starts in cervico-occipital region and radiates anteriorly.  Last headache yesterday.  No nausea, + photo/phonophobia.  + throbbing.  Untreated duration is hours to all day.   HA Freq 5/30 and 2/30    Dry eyes/mouth  No constipation  +Orthostasis  Nl perspiration  Denies bloating    Several years of intermittent olfactory auras ("bad smell"). No identifiable triggers.  In 20s had head trauma.  She isn't aware of all events but multiple syncopal spells.  Olfaction changed at that point.     MRI c-spine 11/6/20 shows mild spondylosis only with C45 and C67 bulges.   She had nl wrist brachial indices last week.        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     Unspecified vitamin D deficiency       Past Surgical History:   Procedure Laterality Date    EPIDURAL STEROID INJECTION N/A 10/4/2021    Procedure: INJECTION, STEROID, EPIDURAL C7/T1;  Surgeon: Cony Ahmadi MD;  [facility];  Service: Pain Management;  Laterality: N/A;  9/16 l/m    KNEE SURGERY Right     torn meniscus    SHOULDER ARTHROSCOPY W/ SUPERIOR LABRAL ANTERIOR POSTERIOR LESION REPAIR Right 1/13/2022    Procedure: ARTHROSCOPY, SHOULDER, WITH INSTABILITY REPAIR;  Surgeon: Ann Anderson MD;  Location: [facility] OR;  Service: Orthopedics;  Laterality: Right;    TILT TABLE " TEST N/A 9/15/2022    Procedure: TILT TABLE TEST;  Surgeon: RAMESH Ferguson MD;  Location: [facility] EP LAB;  Service: Cardiology;  Laterality: N/A;  orthostasis, Rule out POTS, Tilt table test, Dr. Warner,       Outpatient Medications Marked as Taking for the 11/7/23 encounter (Office Visit) with Ivan Warner MD   Medication Sig Dispense Refill    gabapentin (NEURONTIN) 100 MG capsule Take 100 mg by mouth once daily.      naratriptan (AMERGE) 1 MG Tab Take at onset of migraine, can repeat in 2 hrs if needed.  No more than twice per day or 3 days/wk. 12 tablet 0    tiZANidine (ZANAFLEX) 2 MG tablet Take 1-2 tablets (2-4 mg total) by mouth every evening. 180 tablet 2    VYVANSE 10 mg Cap Take 1 capsule by mouth every morning.      VYVANSE 20 mg capsule        Current Facility-Administered Medications for the 11/7/23 encounter (Office Visit) with Ivan Warner MD   Medication Dose Route Frequency Provider Last Rate Last Admin    levonorgestreL (MIRENA) 20 mcg/24 hours (6 yrs) 52 mg IUD 1 Intra Uterine Device  1 Intra Uterine Device Intrauterine  Band, Michelle DUMONT DO   1 Intra Uterine Device at 06/11/21 0945      Review of patient's allergies indicates:   Allergen Reactions    Ceclor [cefaclor] Anaphylaxis, Swelling and Rash    Pcn [penicillins] Anaphylaxis, Swelling and Rash      Family History   Problem Relation Age of Onset    Cancer Maternal Grandfather         lung    Dementia Paternal Grandmother     Hyperlipidemia Mother     Hypertension Mother     Hypertension Father     Hyperlipidemia Father     Breast cancer Neg Hx     Colon cancer Neg Hx     Ovarian cancer Neg Hx       Social History     Socioeconomic History    Marital status:      Spouse name: Mark    Number of children: 2   Occupational History    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   Tobacco Use    Smoking status: Never    Smokeless tobacco: Never   Substance and Sexual Activity    Alcohol use: Not Currently     Alcohol/week: 1.0 standard  "drink of alcohol     Types: 1 Standard drinks or equivalent per week     Comment: occ - 1/mo    Drug use: No    Sexual activity: Yes     Partners: Male     Birth control/protection: Coitus interruptus, None     Social Determinants of Health     Financial Resource Strain: Low Risk  (11/11/2019)    Overall Financial Resource Strain (CARDIA)     Difficulty of Paying Living Expenses: Not very hard   Food Insecurity: No Food Insecurity (11/11/2019)    Hunger Vital Sign     Worried About Running Out of Food in the Last Year: Never true     Ran Out of Food in the Last Year: Never true   Transportation Needs: No Transportation Needs (11/11/2019)    PRAPARE - Transportation     Lack of Transportation (Medical): No     Lack of Transportation (Non-Medical): No   Physical Activity: Insufficiently Active (11/11/2019)    Exercise Vital Sign     Days of Exercise per Week: 1 day     Minutes of Exercise per Session: 60 min   Stress: Stress Concern Present (11/11/2019)    [place] [employer] - Occupational Stress Questionnaire     Feeling of Stress : To some extent   Social Connections: Somewhat Isolated (11/11/2019)    Social Connection and Isolation Panel [NHANES]     Frequency of Communication with Friends and Family: More than three times a week     Frequency of Social Gatherings with Friends and Family: More than three times a week     Attends [employer] Services: Never     Active Member of Clubs or Organizations: No     Attends Club or Organization Meetings: Never     Marital Status:      /78   Pulse 88   Ht 5' 4" (1.626 m)   Wt 49.9 kg (110 lb 0.2 oz)   BMI 18.88 kg/m²    Well developed, well nourished female  Neck: good ROM    Mental status:   Awake, alert and appropriately oriented   Normal recent and remote memory   Normal attention and concentration   Normal speech and language   Normal fund of knowledge  Cranial nerves:   Normal funduscopic - discs sharp   PERRLA   EOMF without " nystagmus   VFF  Gait   Normal base and gait      Data Reviewed:    Lab Results   Component Value Date    WBC 11.19 01/26/2023    HGB 13.2 01/26/2023    HCT 41.7 01/26/2023    MCV 87 01/26/2023     01/26/2023     CMP  Sodium   Date Value Ref Range Status   10/20/2022 141 136 - 145 mmol/L Final     Potassium   Date Value Ref Range Status   10/20/2022 3.5 3.5 - 5.1 mmol/L Final     Chloride   Date Value Ref Range Status   10/20/2022 104 95 - 110 mmol/L Final     CO2   Date Value Ref Range Status   10/20/2022 28 23 - 29 mmol/L Final     Glucose   Date Value Ref Range Status   10/20/2022 82 70 - 110 mg/dL Final     BUN   Date Value Ref Range Status   10/20/2022 9 6 - 20 mg/dL Final     Creatinine   Date Value Ref Range Status   10/20/2022 0.8 0.5 - 1.4 mg/dL Final     Calcium   Date Value Ref Range Status   10/20/2022 10.4 8.7 - 10.5 mg/dL Final     Total Protein   Date Value Ref Range Status   10/20/2022 7.4 6.0 - 8.4 g/dL Final     Albumin   Date Value Ref Range Status   10/20/2022 4.3 3.5 - 5.2 g/dL Final     Total Bilirubin   Date Value Ref Range Status   10/20/2022 0.3 0.1 - 1.0 mg/dL Final     Comment:     For infants and newborns, interpretation of results should be based  on gestational age, weight and in agreement with clinical  observations.    Premature Infant recommended reference ranges:  Up to 24 hours.............<8.0 mg/dL  Up to 48 hours............<12.0 mg/dL  3-5 days..................<15.0 mg/dL  6-29 days.................<15.0 mg/dL       Alkaline Phosphatase   Date Value Ref Range Status   10/20/2022 68 55 - 135 U/L Final     AST   Date Value Ref Range Status   10/20/2022 15 10 - 40 U/L Final     ALT   Date Value Ref Range Status   10/20/2022 11 10 - 44 U/L Final     Anion Gap   Date Value Ref Range Status   10/20/2022 9 8 - 16 mmol/L Final     eGFR if    Date Value Ref Range Status   12/29/2021 >60.0 >60 mL/min/1.73 m^2 Final     eGFR if non    Date Value Ref  "Range Status   12/29/2021 >60.0 >60 mL/min/1.73 m^2 Final     Comment:     Calculation used to obtain the estimated glomerular filtration  rate (eGFR) is the CKD-EPI equation.        Lab Results   Component Value Date    [id number] 1143 (H) 05/26/2021     Lab Results   Component Value Date    TSH 1.683 05/26/2021     No results found for: "CKTOTAL"    Lab Results   Component Value Date    SEDRATE 1 05/26/2021     No results found for: "CKTOTAL"  53 mins chart review, face to face, documentation    Ivan Warner MD      "

## 2023-11-10 ENCOUNTER — PATIENT MESSAGE (OUTPATIENT)
Dept: NEUROLOGY | Facility: CLINIC | Age: 38
End: 2023-11-10
Payer: COMMERCIAL

## 2023-11-15 ENCOUNTER — HOSPITAL ENCOUNTER (OUTPATIENT)
Dept: NEUROLOGY | Facility: CLINIC | Age: 38
Discharge: HOME | End: 2023-11-15
Payer: COMMERCIAL

## 2023-11-15 ENCOUNTER — PATIENT MESSAGE (OUTPATIENT)
Dept: NEUROLOGY | Facility: CLINIC | Age: 38
End: 2023-11-15
Payer: COMMERCIAL

## 2023-11-15 DIAGNOSIS — R43.1 OLFACTORY AURA: ICD-10-CM

## 2023-11-15 PROCEDURE — 95819 EEG AWAKE AND ASLEEP: CPT | Mod: S$GLB,,, | Performed by: PSYCHIATRY & NEUROLOGY

## 2023-11-15 PROCEDURE — 95819 PR EEG,W/AWAKE & ASLEEP RECORD: ICD-10-PCS | Mod: S$GLB,,, | Performed by: PSYCHIATRY & NEUROLOGY

## 2023-11-15 NOTE — PROCEDURES
Date of service  11/15/2023    Introduction  Electroencephalographic (EEG) recording is performed with electrodes placed according to the International 10-20 placement system. Thirty two (32) channels of digital signal (sampling rate of 512/sec) including T1 and T2 was simultaneously recorded from the scalp and may include EKG, EMG, and/or eye monitors. Recording band pass was 0.1 to 512 Hz. Digital video recording of the patient is simultaneously recorded with the EEG. The patient is instructed to report clinical symptoms which may occur during the recording session. EEG and video recording is stored and archived in digital format. Activation procedures which include photic stimulation, hyperventilation and instructing patients to perform simple tasks are done in selected patients.    The EEG is displayed on a monitor screen and can be reviewed using different montages. Computer assisted analysis is employed to detect spike and electrographic seizure activity. The entire record is submitted for computer analysis. The entire recording is visually reviewed and, the times identified by computer analysis as being spikes or seizures are reviewed again.     Compressed spectral analysis (CSA) is also performed on the activity recorded from each individual channel. This is displayed as a power display of frequencies from 0 to 30 Hz over time. The CSA is reviewed looking for asymmetries in power between homologous areas of the scalp and then compared with the original EEG recording.     Findings  The patient's background consists of a posteriorly dominant 9 Hz alpha rhythm, which is well-formed, modulated, and abolishes with eye opening.     Hyperventilation and photic stimulation did not produce any abnormal response.     During the course of the recording, the patient is noted to be awake, subsequently becomes drowsy, and falls asleep with normal, symmetric sleep architecture seen.     There is no focal slowing.  There  are no focal, lateralized, or epileptiform transients.  No electrographic seizures are seen.    EKG demonstrates regular rhythm.    Interpretation  This is a normal EEG during wakefulness and sleep. No potentially epileptiform activity was seen. Please be aware that a normal EEG does not exclude the possibility of an underlying seizure disorder.

## 2023-12-12 ENCOUNTER — TELEPHONE (OUTPATIENT)
Dept: PAIN MEDICINE | Facility: CLINIC | Age: 38
End: 2023-12-12
Payer: COMMERCIAL

## 2023-12-22 ENCOUNTER — TELEPHONE (OUTPATIENT)
Dept: INTERNAL MEDICINE | Facility: CLINIC | Age: 38
End: 2023-12-22
Payer: COMMERCIAL

## 2023-12-22 NOTE — TELEPHONE ENCOUNTER
----- Message from Roxann Chan sent at 12/22/2023 10:01 AM CST -----  Regarding: Returning Call                Name of Who is Calling: Karen Zavala    Who Left The Message: Karen Zavala      What is the request in detail:         Patient called stating she's returning the Office's call and would like you to please call again.  Thank  you      Reply by [facility]: NO      Preferred Call Back :  [phone] (P)

## 2023-12-22 NOTE — TELEPHONE ENCOUNTER
Message has been left in regards to missed call. According to chart, no one has contacted patient from Internal Med. Office contact provided.

## 2024-01-04 ENCOUNTER — OFFICE VISIT (OUTPATIENT)
Dept: PAIN MEDICINE | Facility: CLINIC | Age: 39
End: 2024-01-04
Attending: ANESTHESIOLOGY
Payer: COMMERCIAL

## 2024-01-04 ENCOUNTER — TELEPHONE (OUTPATIENT)
Dept: PAIN MEDICINE | Facility: CLINIC | Age: 39
End: 2024-01-04

## 2024-01-04 VITALS
WEIGHT: 108 LBS | RESPIRATION RATE: 18 BRPM | HEART RATE: 99 BPM | DIASTOLIC BLOOD PRESSURE: 80 MMHG | BODY MASS INDEX: 18.44 KG/M2 | SYSTOLIC BLOOD PRESSURE: 110 MMHG | HEIGHT: 64 IN | TEMPERATURE: 98 F | OXYGEN SATURATION: 100 %

## 2024-01-04 DIAGNOSIS — G43.119 INTRACTABLE MIGRAINE WITH AURA WITHOUT STATUS MIGRAINOSUS: Primary | ICD-10-CM

## 2024-01-04 DIAGNOSIS — G43.009 MIGRAINE WITHOUT AURA AND WITHOUT STATUS MIGRAINOSUS, NOT INTRACTABLE: Primary | ICD-10-CM

## 2024-01-04 PROCEDURE — 3079F DIAST BP 80-89 MM HG: CPT | Mod: CPTII,S$GLB,, | Performed by: ANESTHESIOLOGY

## 2024-01-04 PROCEDURE — 64615 CHEMODENERV MUSC MIGRAINE: CPT | Mod: S$GLB,,, | Performed by: ANESTHESIOLOGY

## 2024-01-04 PROCEDURE — 1159F MED LIST DOCD IN RCRD: CPT | Mod: CPTII,S$GLB,, | Performed by: ANESTHESIOLOGY

## 2024-01-04 PROCEDURE — 1160F RVW MEDS BY RX/DR IN RCRD: CPT | Mod: CPTII,S$GLB,, | Performed by: ANESTHESIOLOGY

## 2024-01-04 PROCEDURE — 99212 OFFICE O/P EST SF 10 MIN: CPT | Mod: 25,S$GLB,, | Performed by: ANESTHESIOLOGY

## 2024-01-04 PROCEDURE — 3008F BODY MASS INDEX DOCD: CPT | Mod: CPTII,S$GLB,, | Performed by: ANESTHESIOLOGY

## 2024-01-04 PROCEDURE — 99999 PR PBB SHADOW E&M-EST. PATIENT-LVL III: CPT | Mod: PBBFAC,,, | Performed by: ANESTHESIOLOGY

## 2024-01-04 PROCEDURE — 3074F SYST BP LT 130 MM HG: CPT | Mod: CPTII,S$GLB,, | Performed by: ANESTHESIOLOGY
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
## 2024-01-09 ENCOUNTER — TELEPHONE (OUTPATIENT)
Dept: PAIN MEDICINE | Facility: CLINIC | Age: 39
End: 2024-01-09
Payer: COMMERCIAL

## 2024-01-09 DIAGNOSIS — G43.009 MIGRAINE WITHOUT AURA AND WITHOUT STATUS MIGRAINOSUS, NOT INTRACTABLE: Primary | ICD-10-CM

## 2024-02-16 ENCOUNTER — PATIENT MESSAGE (OUTPATIENT)
Dept: INTERNAL MEDICINE | Facility: CLINIC | Age: 39
End: 2024-02-16
Payer: COMMERCIAL

## 2024-02-16 ENCOUNTER — ON-DEMAND VIRTUAL (OUTPATIENT)
Dept: URGENT CARE | Facility: CLINIC | Age: 39
End: 2024-02-16
Payer: COMMERCIAL

## 2024-02-16 DIAGNOSIS — U07.1 COVID-19: Primary | ICD-10-CM

## 2024-02-16 PROCEDURE — 99213 OFFICE O/P EST LOW 20 MIN: CPT | Mod: 95,,, | Performed by: NURSE PRACTITIONER

## 2024-02-16 RX ORDER — NIRMATRELVIR AND RITONAVIR 300-100 MG
KIT ORAL
Qty: 30 TABLET | Refills: 0 | Status: SHIPPED | OUTPATIENT
Start: 2024-02-16 | End: 2024-02-21

## 2024-02-16 NOTE — PROGRESS NOTES
Subjective:      Patient ID: Karen Zavala is a 38 y.o. female.    Vitals:  vitals were not taken for this visit.     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     Unspecified vitamin D deficiency      Past Surgical History:   Procedure Laterality Date    EPIDURAL STEROID INJECTION N/A 10/4/2021    Procedure: INJECTION, STEROID, EPIDURAL C7/T1;  Surgeon: Cony Ahmadi MD;  Location: [facility];  Service: Pain Management;  Laterality: N/A;  9/16 l/m    KNEE SURGERY Right     torn meniscus    SHOULDER ARTHROSCOPY W/ SUPERIOR LABRAL ANTERIOR POSTERIOR LESION REPAIR Right 1/13/2022    Procedure: ARTHROSCOPY, SHOULDER, WITH INSTABILITY REPAIR;  Surgeon: Ann Anderson MD;  Location: [facility] OR;  Service: Orthopedics;  Laterality: Right;    TILT TABLE TEST N/A 9/15/2022    Procedure: TILT TABLE TEST;  Surgeon: RAMESH Ferguson MD;  Location: [facility] EP LAB;  Service: Cardiology;  Laterality: N/A;  orthostasis, Rule out POTS, Tilt table test, Dr. Warner,      Review of patient's allergies indicates:   Allergen Reactions    Ceclor [cefaclor] Anaphylaxis, Swelling and Rash    Pcn [penicillins] Anaphylaxis, Swelling and Rash     Current Outpatient Medications on File Prior to Visit   Medication Sig Dispense Refill    gabapentin (NEURONTIN) 100 MG capsule Take 100 mg by mouth once daily.      naratriptan (AMERGE) 1 MG Tab Take at onset of migraine, can repeat in 2 hrs if needed.  No more than twice per day or 3 days/wk. 12 tablet 0    tiZANidine (ZANAFLEX) 2 MG tablet Take 1-2 tablets (2-4 mg total) by mouth every evening. 180 tablet 2    VYVANSE 20 mg capsule        Current Facility-Administered  Medications on File Prior to Visit   Medication Dose Route Frequency Provider Last Rate Last Admin    levonorgestreL (MIRENA) 20 mcg/24 hours (6 yrs) 52 mg IUD 1 Intra Uterine Device  1 Intra Uterine Device Intrauterine  Band, Michelle DUMONT DO   1 Intra Uterine Device at 06/11/21 0952     Family History   Problem Relation Age of Onset    Cancer Maternal Grandfather         lung    Dementia Paternal Grandmother     Hyperlipidemia Mother     Hypertension Mother     Hypertension Father     Hyperlipidemia Father     Breast cancer Neg Hx     Colon cancer Neg Hx     Ovarian cancer Neg Hx        Medications Ordered                [id number] - [facility] [address]   [address] [id number]    Telephone: [phone]   Fax: [phone]   Hours: Not open 24 hours                         E-Prescribed (1 of 1)              nirmatrelvir-ritonavir (PAXLOVID) 300 mg (150 mg x 2)-100 mg copackaged tablets (EUA)    Sig: Take 3 tablets by mouth 2 (two) times daily. Each dose contains 2 nirmatrelvir (pink tablets) and 1 ritonavir (white tablet). Take all 3 tablets together       Start: 2/16/24     Quantity: 30 tablet Refills: 0                           Ohs Peq Odvv Intake    2/16/2024  4:58 PM CST - Filed by Patient   What is your current physical address in the event of a medical emergency? [address] [place] [address]   Are you able to take your vital signs? No   Please attach any relevant images or files          37 yo female with c/o positive covid test. She states she started at 5 am with headache, fatigue. She denies fever, she has mild congestion.     URI   Associated symptoms include headaches. Pertinent negatives include no coughing.       Constitution: Positive for fever. Negative for fatigue.   HENT: Negative.  Negative for postnasal drip.    Cardiovascular: Negative.    Respiratory: Negative.  Negative for cough.    Gastrointestinal: Negative.     Endocrine: negative.   Genitourinary: Negative.  Negative for frequency and urgency.   Musculoskeletal: Negative.    Skin: Negative.    Allergic/Immunologic: Negative.    Neurological:  Positive for headaches.   Hematologic/Lymphatic: Negative.    Psychiatric/Behavioral: Negative.          Objective:   The physical exam was conducted virtually.  Physical Exam   Constitutional: She is oriented to person, place, and time. She appears well-developed.   HENT:   Head: Normocephalic and atraumatic.   Ears:   Right Ear: Hearing, tympanic membrane and external ear normal.   Left Ear: Hearing, tympanic membrane and external ear normal.   Nose: Nose normal.   Mouth/Throat: Uvula is midline, oropharynx is clear and moist and mucous membranes are normal.   Eyes: Conjunctivae and EOM are normal. Pupils are equal, round, and reactive to light.   Neck: Neck supple.   Cardiovascular: Normal rate.   Pulmonary/Chest: Effort normal and breath sounds normal.   Musculoskeletal: Normal range of motion.         General: Normal range of motion.   Neurological: She is alert and oriented to person, place, and time.   Skin: Skin is warm.   Psychiatric: Her behavior is normal. Thought content normal.   Nursing note and vitals reviewed.      Assessment:     1. COVID-19        Plan:   You have tested positive for COVID-19 today.      ISOLATION  If you tested positive and do not have symptoms, you must isolate for 5 days starting on the day of the positive test. I    If you tested positive and have symptoms, you must isolate for 5 days starting on the day of the first symptoms,  not the day of the positive test.     This is the most important part, both the CDC and the [facility] emphasize that you do not test out of isolation.     After 5 days, if your symptoms have improved and you have not had fever on day 5, you can return to the community on day 6- NO TESTING REQUIRED!      In fact, we do not retest if you were positive in the last 90  days.    After your 5 days of isolation are completed, the CDC recommends strict mask use for the first 5 days that you come out of isolation.  In order to return to activities of daily living per the CDC guidelines, you can be around other after: 5 days since symptoms first appeared, 24 hours with no fever without the use of fever-reducing medications, and other symptoms (besides loss of taste or smell) must be improving.  Once you meet all these requirements you may return to your normal activities including social distancing, wearing masks, and frequent handwashing - YOU DO NOT NEED ANOTHER TEST, OR TO TEST NEGATIVE, IN ORDER TO END YOUR QUARANTINE!    Anyone who has been exposed to you since 2 days before your symptoms started should follow CDC guidelines for quarantine.  The CDC recommends quarantine if you have been in close contact (within 6 feet of someone for a cumulative total of 15 minutes or more over a 24-hour period) with someone who has COVID-19, unless you have been fully vaccinated. People who are fully vaccinated do NOT need to quarantine after contact with someone who had COVID-19 unless they have symptoms. However, fully vaccinated people should get tested 3-5 days after their exposure, even they dont have symptoms and wear a mask indoors in public for 14 days following exposure or until their test result is negative.  Individuals are recommended to stay home for 14 days after your last contact with a person who has COVID-19.  Watch for symptoms of COVID-19.  If possible, stay away from people you live with, especially people who are at higher risk for getting very sick from COVID-19.  If you'd like to end your quarantine early, your options are as follows.  You may end your quarantine on day 7 IF you have a negative covid test at least 5 days from last known exposure with no covid symptoms by day 7.  Or you may end quarantine on day with no test and no covid symptoms.    Important home care  recommendations include: monitor your symptoms, if you have trouble breathing please go to the ER. Stay in a separate room from other household members, if possible.  Use a separate bathroom, if possible.  Avoid contact with other members of the household and pets.  Don't share personal household items, like cups, towels, and utensils.  Wear a mask when around other people if able.  Please refer to CDC's websit for more information about what to do if you you're sick and how to notify your contacts.    Stay home except to get medical care. Most people with COVID-19 have mild illness and can recover at home without medical care. Do not leave your home, except to get medical care. Do not visit public areas. Get rest and stay hydrated. Call before you get medical care. Be sure to get care if you have trouble breathing, or have any other emergency warning signs, or if you think it is an emergency.  Avoid public transportation, ride-sharing, or taxis.  Separate yourself from other people.  As much as possible, stay in a specific room and away from other people and pets in your home. If possible, you should use a separate bathroom. If you need to be around other people or animals in or outside of the home, wear a mask.    Tell your close contacts that they may have been exposed to COVID-19. An infected person can spread COVID-19 starting 48 hours (or 2 days) before the person has any symptoms or tests positive. By letting your close contacts know they may have been exposed to COVID-19, you are helping to protect everyone.    Additional guidance is available for those living in close quarters and shared housing on CDC's web site.  Please see COVID-19 and Animals if you have questions about pets.  If you are diagnosed with COVID-19, someone from the health department may call you. Answer the call to slow the spread.  Look for emergency warning signs* for COVID-19. If someone is showing any of these signs, seek emergency  medical care immediately:  Trouble breathing  Persistent pain or pressure in the chest  New confusion  Inability to wake or stay awake  Pale, gray, or blue-colored skin, lips, or nail beds, depending on skin tone  *This list is not all possible symptoms. Please call your medical provider for any other symptoms that are severe or concerning to you.    Call ahead before visiting your doctor  Call ahead. Many medical visits for routine care are being postponed or done by phone or telemedicine.  If you have a medical appointment that cannot be postponed, call your doctors office, and tell them you have or may have COVID-19. This will help the office protect themselves and other patients.    You dont need to wear the mask if you are alone. If you cant put on a mask (because of trouble breathing, for example), cover your coughs and sneezes in some other way. Try to stay at least 6 feet away from other people. This will help protect the people around you.  Masks should not be placed on young children under age 2 years, anyone who has trouble breathing, or anyone who is not able to remove the mask without help.    Cover your coughs and sneezes  Cover your mouth and nose with a tissue when you cough or sneeze.  Throw away used tissues in a lined trash can.  Immediately wash your hands with soap and water for at least 20 seconds. If soap and water are not available, clean your hands with an alcohol-based hand  that contains at least 60% alcohol.  hands wash light icon  Clean your hands often  Wash your hands often with soap and water for at least 20 seconds. This is especially important after blowing your nose, coughing, or sneezing; going to the bathroom; and before eating or preparing food.  Use hand  if soap and water are not available. Use an alcohol-based hand  with at least 60% alcohol, covering all surfaces of your hands and rubbing them together until they feel dry.  Soap and water are  the best option, especially if hands are visibly dirty.  Avoid touching your eyes, nose, and mouth with unwashed hands.  Handwashing Tips  Avoid sharing personal household items  Do not share dishes, drinking glasses, cups, eating utensils, towels, or bedding with other people in your home.  Wash these items thoroughly after using them with soap and water or put in the .  spraybottle icon  Clean all high-touch surfaces everyday  Clean and disinfect high-touch surfaces in your sick room and bathroom; wear disposable gloves. Let someone else clean and disinfect surfaces in common areas, but you should clean your bedroom and bathroom, if possible.  If a caregiver or other person needs to clean and disinfect a sick persons bedroom or bathroom, they should do so on an as-needed basis. The caregiver/other person should wear a mask and disposable gloves prior to cleaning. They should wait as long as possible after the person who is sick has used the bathroom before coming in to clean and use the bathroom.  High-touch surfaces include phones, remote controls, counters, tabletops, doorknobs, bathroom fixtures, toilets, keyboards, tablets, and bedside tables.    Clean and disinfect areas that may have blood, stool, or body fluids on them.  Use household  and disinfectants. Clean the area or item with soap and water or another detergent if it is dirty. Then, use a household disinfectant.  Be sure to follow the instructions on the label to ensure safe and effective use of the product. Many products recommend keeping the surface wet for several minutes to ensure germs are killed. Many also recommend precautions such as wearing gloves and making sure you have good ventilation during use of the product.  Use a product from EPAs List N: Disinfectants for Coronavirus (COVID-19)external icon.  Complete Disinfection Guidance      PLEASE READ YOUR DISCHARGE INSTRUCTIONS ENTIRELY AS IT CONTAINS IMPORTANT  INFORMATION.    Please drink plenty of fluids.    Please get plenty of rest.    If not allergic, please take over the counter Tylenol (Acetaminophen) and/or Motrin (Ibuprofen) as directed for control of muscle aches, pain, and/or fever.    Please go to the Emergency Department for any shortness of breath or difficulty breathing.    For congestion, runny nose, or post nasal drip please take an over the counter antihistamine medication (Claritin/Zyrtec/Allegra) of your choice as directed or try an over the counter decongestant like Sudafed. You buy this behind the pharmacy counter.  You can also buy combination OTC antihistamine plus decongestant medications such at Zyrtec-D or Claritin-D.  Do not take decongestant if you suffer from high blood pressure.    For cough, you may be prescribed medication.  Take as prescribed.  If you were not prescribed any medication, you can use over the counter cough medications such as Robitussin.  If you have chest congestion you can consider using over the counter Mucinex but make sure you drink plenty of water to encourage mobilization of the secretions.    If you do have high blood pressure or palpitations, it is safe to take Coricidin HBP for relief of sinus symptoms.    Sore throat recommendations: Warm fluids (tea with honey, soup, broth), warm salt water gargles, throat lozenges, rest, hydration.    If you  smoke, please stop smoking.    You must understand that you've received an Urgent Care treatment only and that you may be released before all of your medical problems are known or treated. You, the patient, will arrange for follow up as instructed. If your symptoms worsen or fail to improve you should go to the Emergency Room.     COVID-19  -     nirmatrelvir-ritonavir (PAXLOVID) 300 mg (150 mg x 2)-100 mg copackaged tablets (EUA); Take 3 tablets by mouth 2 (two) times daily. Each dose contains 2 nirmatrelvir (pink tablets) and 1 ritonavir (white tablet). Take all 3 tablets  together  Dispense: 30 tablet; Refill: 0

## 2024-03-28 ENCOUNTER — OFFICE VISIT (OUTPATIENT)
Dept: PAIN MEDICINE | Facility: CLINIC | Age: 39
End: 2024-03-28
Attending: ANESTHESIOLOGY
Payer: COMMERCIAL

## 2024-03-28 VITALS
SYSTOLIC BLOOD PRESSURE: 121 MMHG | WEIGHT: 110 LBS | HEART RATE: 69 BPM | RESPIRATION RATE: 18 BRPM | TEMPERATURE: 99 F | BODY MASS INDEX: 18.88 KG/M2 | OXYGEN SATURATION: 98 % | DIASTOLIC BLOOD PRESSURE: 79 MMHG

## 2024-03-28 DIAGNOSIS — G43.719 CHRONIC MIGRAINE WITHOUT AURA, INTRACTABLE, WITHOUT STATUS MIGRAINOSUS: Primary | ICD-10-CM

## 2024-03-28 PROCEDURE — 3008F BODY MASS INDEX DOCD: CPT | Mod: CPTII,S$GLB,, | Performed by: ANESTHESIOLOGY

## 2024-03-28 PROCEDURE — 99212 OFFICE O/P EST SF 10 MIN: CPT | Mod: 25,S$GLB,, | Performed by: ANESTHESIOLOGY

## 2024-03-28 PROCEDURE — 1159F MED LIST DOCD IN RCRD: CPT | Mod: CPTII,S$GLB,, | Performed by: ANESTHESIOLOGY

## 2024-03-28 PROCEDURE — 1160F RVW MEDS BY RX/DR IN RCRD: CPT | Mod: CPTII,S$GLB,, | Performed by: ANESTHESIOLOGY

## 2024-03-28 PROCEDURE — 99999 PR PBB SHADOW E&M-EST. PATIENT-LVL III: CPT | Mod: PBBFAC,,, | Performed by: ANESTHESIOLOGY

## 2024-03-28 PROCEDURE — 3074F SYST BP LT 130 MM HG: CPT | Mod: CPTII,S$GLB,, | Performed by: ANESTHESIOLOGY

## 2024-03-28 PROCEDURE — 3078F DIAST BP <80 MM HG: CPT | Mod: CPTII,S$GLB,, | Performed by: ANESTHESIOLOGY
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
## 2024-04-30 ENCOUNTER — PATIENT MESSAGE (OUTPATIENT)
Dept: NEUROLOGY | Facility: CLINIC | Age: 39
End: 2024-04-30
Payer: COMMERCIAL

## 2024-05-01 DIAGNOSIS — G43.009 MIGRAINE WITHOUT AURA AND WITHOUT STATUS MIGRAINOSUS, NOT INTRACTABLE: ICD-10-CM

## 2024-05-01 RX ORDER — TIZANIDINE 2 MG/1
2-4 TABLET ORAL NIGHTLY
Qty: 180 TABLET | Refills: 2 | Status: SHIPPED | OUTPATIENT
Start: 2024-05-01

## 2024-06-03 ENCOUNTER — OFFICE VISIT (OUTPATIENT)
Dept: INTERNAL MEDICINE | Facility: CLINIC | Age: 39
End: 2024-06-03
Payer: COMMERCIAL

## 2024-06-03 VITALS
SYSTOLIC BLOOD PRESSURE: 120 MMHG | BODY MASS INDEX: 18.71 KG/M2 | WEIGHT: 109.56 LBS | OXYGEN SATURATION: 99 % | HEIGHT: 64 IN | HEART RATE: 87 BPM | DIASTOLIC BLOOD PRESSURE: 78 MMHG

## 2024-06-03 DIAGNOSIS — J32.9 SINUSITIS, UNSPECIFIED CHRONICITY, UNSPECIFIED LOCATION: ICD-10-CM

## 2024-06-03 DIAGNOSIS — J02.9 PHARYNGITIS, UNSPECIFIED ETIOLOGY: Primary | ICD-10-CM

## 2024-06-03 PROCEDURE — 3074F SYST BP LT 130 MM HG: CPT | Mod: CPTII,S$GLB,, | Performed by: PHYSICIAN ASSISTANT

## 2024-06-03 PROCEDURE — 3008F BODY MASS INDEX DOCD: CPT | Mod: CPTII,S$GLB,, | Performed by: PHYSICIAN ASSISTANT

## 2024-06-03 PROCEDURE — 1159F MED LIST DOCD IN RCRD: CPT | Mod: CPTII,S$GLB,, | Performed by: PHYSICIAN ASSISTANT

## 2024-06-03 PROCEDURE — 3078F DIAST BP <80 MM HG: CPT | Mod: CPTII,S$GLB,, | Performed by: PHYSICIAN ASSISTANT

## 2024-06-03 PROCEDURE — 99214 OFFICE O/P EST MOD 30 MIN: CPT | Mod: S$GLB,,, | Performed by: PHYSICIAN ASSISTANT

## 2024-06-03 PROCEDURE — 99999 PR PBB SHADOW E&M-EST. PATIENT-LVL III: CPT | Mod: PBBFAC,,, | Performed by: PHYSICIAN ASSISTANT

## 2024-06-03 RX ORDER — FLUCONAZOLE 150 MG/1
150 TABLET ORAL DAILY
Qty: 1 TABLET | Refills: 0 | Status: SHIPPED | OUTPATIENT
Start: 2024-06-03 | End: 2024-06-04

## 2024-06-03 RX ORDER — CLARITHROMYCIN 500 MG/1
500 TABLET, FILM COATED ORAL EVERY 12 HOURS
Qty: 14 TABLET | Refills: 0 | Status: SHIPPED | OUTPATIENT
Start: 2024-06-03 | End: 2024-06-10

## 2024-06-03 NOTE — PROGRESS NOTES
INTERNAL MEDICINE URGENT VISIT NOTE    CHIEF COMPLAINT     Chief Complaint   Patient presents with    Cough    Headache    Fatigue    Rash       HPI     Karen Zavala is a 38 y.o. female who presents for an urgent visit today.    PCP is Leah Garcia MD, patient is known to me.     Patient with known Ig deficiency presents with 10 days of congestion sinus pressure and pain with cough and preceding pityriasis rosea - had 2 days of prednisone prescribed by dermatologist. She reports that some is slightly better today but given history and length of symptoms - she is here requesting treatment. She denies fever, chills, nausea, vomiting. She has no known sick contacts but kids are 8 and 6. She is currently unaccompanied in office.   No recent travel reported.     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 Authorizing Provider   gabapentin (NEURONTIN) 100 MG capsule Take 100 mg by mouth once daily. 3/22/22   Provider, Historical   naratriptan (AMERGE) 1 MG Tab Take at onset of migraine, can repeat in 2 hrs if needed.  No more than twice per day or 3 days/wk. 9/23/23 5/10/24  Ivan Warner MD   tiZANidine (ZANAFLEX) 2 MG tablet Take 1-2 tablets (2-4 mg total) by mouth every evening. 5/1/24   Ivan Warner MD   VYVANSE 20 mg capsule  3/12/22   Provider, Historical       Review of Systems:  Review of Systems   Constitutional:  Negative for chills and fever.   HENT:  Positive for congestion, sinus pressure, sinus pain and sore throat. Negative for trouble swallowing.    Eyes:  Negative for visual disturbance.   Respiratory:  Positive for cough. Negative for shortness  "of breath.    Cardiovascular:  Negative for chest pain.   Gastrointestinal:  Negative for abdominal pain, constipation, diarrhea, nausea and vomiting.   Genitourinary:  Negative for dysuria and flank pain.   Musculoskeletal:  Negative for back pain, neck pain and neck stiffness.   Skin:  Negative for rash.   Neurological:  Negative for dizziness, syncope, weakness and headaches.   Psychiatric/Behavioral:  Negative for confusion.        Health Maintainence:   Immunizations:  Health Maintenance         Date Due Completion Date    Pneumococcal Vaccines (Age 0-64) (2 of 2 - PCV) 11/11/2020 11/11/2019    COVID-19 Vaccine (8 - 2023-24 season) 09/01/2023 5/16/2022    Cervical Cancer Screening 03/13/2028 3/13/2023    Override on 7/27/2013: Done    TETANUS VACCINE 04/05/2028 4/5/2018             PHYSICAL EXAM     /78   Pulse 87   Ht 5' 4" (1.626 m)   Wt 49.7 kg (109 lb 9.1 oz)   SpO2 99%   BMI 18.81 kg/m²     Physical Exam  Vitals and nursing note reviewed.   Constitutional:       Appearance: Normal appearance.      Comments: Healthy appearing female in NAD or apparent pain. She makes good eye contact, speaks in clear full sentences and ambulates with ease.        HENT:      Head: Normocephalic and atraumatic.      Nose: Nose normal.      Mouth/Throat:      Pharynx: Oropharynx is clear.      Comments: Posterior oropharnyx is erythematous with cobblestoning. No edema. No uvular deviation. No trismus or lingual elevation.   Eyes:      Conjunctiva/sclera: Conjunctivae normal.   Neck:      Comments: No lymphadenopathy   Cardiovascular:      Rate and Rhythm: Normal rate and regular rhythm.      Pulses: Normal pulses.   Pulmonary:      Effort: No respiratory distress.   Abdominal:      Tenderness: There is no abdominal tenderness.   Musculoskeletal:         General: Normal range of motion.      Cervical back: No rigidity.   Skin:     General: Skin is warm and dry.      Capillary Refill: Capillary refill takes less than " 2 seconds.      Findings: No rash.   Neurological:      General: No focal deficit present.      Mental Status: She is alert.      Gait: Gait normal.   Psychiatric:         Mood and Affect: Mood normal.         LABS     Lab Results   Component Value Date    HGBA1C 5.1 05/26/2021     CMP  Sodium   Date Value Ref Range Status   10/20/2022 141 136 - 145 mmol/L Final     Potassium   Date Value Ref Range Status   10/20/2022 3.5 3.5 - 5.1 mmol/L Final     Chloride   Date Value Ref Range Status   10/20/2022 104 95 - 110 mmol/L Final     CO2   Date Value Ref Range Status   10/20/2022 28 23 - 29 mmol/L Final     Glucose   Date Value Ref Range Status   10/20/2022 82 70 - 110 mg/dL Final     BUN   Date Value Ref Range Status   10/20/2022 9 6 - 20 mg/dL Final     Creatinine   Date Value Ref Range Status   10/20/2022 0.8 0.5 - 1.4 mg/dL Final     Calcium   Date Value Ref Range Status   10/20/2022 10.4 8.7 - 10.5 mg/dL Final     Total Protein   Date Value Ref Range Status   10/20/2022 7.4 6.0 - 8.4 g/dL Final     Albumin   Date Value Ref Range Status   10/20/2022 4.3 3.5 - 5.2 g/dL Final     Total Bilirubin   Date Value Ref Range Status   10/20/2022 0.3 0.1 - 1.0 mg/dL Final     Comment:     For infants and newborns, interpretation of results should be based  on gestational age, weight and in agreement with clinical  observations.    Premature Infant recommended reference ranges:  Up to 24 hours.............<8.0 mg/dL  Up to 48 hours............<12.0 mg/dL  3-5 days..................<15.0 mg/dL  6-29 days.................<15.0 mg/dL       Alkaline Phosphatase   Date Value Ref Range Status   10/20/2022 68 55 - 135 U/L Final     AST   Date Value Ref Range Status   10/20/2022 15 10 - 40 U/L Final     ALT   Date Value Ref Range Status   10/20/2022 11 10 - 44 U/L Final     Anion Gap   Date Value Ref Range Status   10/20/2022 9 8 - 16 mmol/L Final     eGFR if    Date Value Ref Range Status   12/29/2021 >60.0 >60  "mL/min/1.73 m^2 Final     eGFR if non    Date Value Ref Range Status   12/29/2021 >60.0 >60 mL/min/1.73 m^2 Final     Comment:     Calculation used to obtain the estimated glomerular filtration  rate (eGFR) is the CKD-EPI equation.        Lab Results   Component Value Date    WBC 11.19 01/26/2023    HGB 13.2 01/26/2023    HCT 41.7 01/26/2023    MCV 87 01/26/2023     01/26/2023     Lab Results   Component Value Date    CHOL 209 (H) 05/04/2021    CHOL 201 (H) 05/29/2013    CHOL 213 (H) 01/21/2008     Lab Results   Component Value Date    HDL 56 05/04/2021    HDL 59 05/29/2013    HDL 57 01/21/2008     Lab Results   Component Value Date    LDLCALC 138.6 05/04/2021    LDLCALC 124.0 05/29/2013    LDLCALC 139.2 (H) 01/21/2008     Lab Results   Component Value Date    TRIG 72 05/04/2021    TRIG 92 05/29/2013    TRIG 84 01/21/2008     Lab Results   Component Value Date    CHOLHDL 26.8 05/04/2021    CHOLHDL 29.4 05/29/2013    CHOLHDL 26.8 01/21/2008     Lab Results   Component Value Date    TSH 1.683 05/26/2021       ASSESSMENT/PLAN     Karen Zavala is a 38 y.o. female     Karen Cruz" was seen today for cough, headache, fatigue and rash.    Diagnoses and all orders for this visit:    Pharyngitis, unspecified etiology    Sinusitis, unspecified chronicity, unspecified location    Other orders  -     clarithromycin (BIAXIN) 500 MG tablet; Take 1 tablet (500 mg total) by mouth every 12 (twelve) hours. for 7 days  -     fluconazole (DIFLUCAN) 150 MG Tab; Take 1 tablet (150 mg total) by mouth once daily. for 1 day      Patient was counseled on when and how to seek emergent care.       Alejandra Hampton PA-C   Department of Internal Medicine - [facility]   11:30 AM     "

## 2024-06-10 ENCOUNTER — PATIENT MESSAGE (OUTPATIENT)
Dept: INTERNAL MEDICINE | Facility: CLINIC | Age: 39
End: 2024-06-10
Payer: COMMERCIAL

## 2024-06-12 ENCOUNTER — OFFICE VISIT (OUTPATIENT)
Dept: OBSTETRICS AND GYNECOLOGY | Facility: CLINIC | Age: 39
End: 2024-06-12
Attending: OBSTETRICS & GYNECOLOGY
Payer: COMMERCIAL

## 2024-06-12 VITALS
BODY MASS INDEX: 18.06 KG/M2 | HEIGHT: 64 IN | SYSTOLIC BLOOD PRESSURE: 110 MMHG | DIASTOLIC BLOOD PRESSURE: 84 MMHG | WEIGHT: 105.81 LBS

## 2024-06-12 DIAGNOSIS — Z30.431 IUD CHECK UP: ICD-10-CM

## 2024-06-12 DIAGNOSIS — Z12.39 SCREENING BREAST EXAMINATION: ICD-10-CM

## 2024-06-12 DIAGNOSIS — G43.119 INTRACTABLE MIGRAINE WITH AURA WITHOUT STATUS MIGRAINOSUS: ICD-10-CM

## 2024-06-12 DIAGNOSIS — Z01.419 WELL WOMAN EXAM WITH ROUTINE GYNECOLOGICAL EXAM: Primary | ICD-10-CM

## 2024-06-12 PROCEDURE — 3008F BODY MASS INDEX DOCD: CPT | Mod: CPTII,S$GLB,, | Performed by: OBSTETRICS & GYNECOLOGY

## 2024-06-12 PROCEDURE — 3074F SYST BP LT 130 MM HG: CPT | Mod: CPTII,S$GLB,, | Performed by: OBSTETRICS & GYNECOLOGY

## 2024-06-12 PROCEDURE — 1159F MED LIST DOCD IN RCRD: CPT | Mod: CPTII,S$GLB,, | Performed by: OBSTETRICS & GYNECOLOGY

## 2024-06-12 PROCEDURE — 3079F DIAST BP 80-89 MM HG: CPT | Mod: CPTII,S$GLB,, | Performed by: OBSTETRICS & GYNECOLOGY

## 2024-06-12 PROCEDURE — 99395 PREV VISIT EST AGE 18-39: CPT | Mod: S$GLB,,, | Performed by: OBSTETRICS & GYNECOLOGY

## 2024-06-12 PROCEDURE — 99999 PR PBB SHADOW E&M-EST. PATIENT-LVL III: CPT | Mod: PBBFAC,,, | Performed by: OBSTETRICS & GYNECOLOGY

## 2024-06-12 RX ORDER — TRIAMCINOLONE ACETONIDE 1 MG/G
CREAM TOPICAL
COMMUNITY
Start: 2024-05-14

## 2024-06-12 RX ORDER — LISDEXAMFETAMINE DIMESYLATE 30 MG/1
30 CAPSULE ORAL EVERY MORNING
COMMUNITY
Start: 2024-05-22

## 2024-06-12 RX ORDER — RIMEGEPANT SULFATE 75 MG/75MG
TABLET, ORALLY DISINTEGRATING ORAL
COMMUNITY
Start: 2024-01-30

## 2024-06-12 RX ORDER — PREDNISONE 20 MG/1
20 TABLET ORAL
COMMUNITY
Start: 2024-05-14

## 2024-06-12 NOTE — PROGRESS NOTES
CC: Well woman exam    Karen Zavala is a 38 y.o. female  presents for well woman exam.  LMP: No LMP recorded..  No issues, problems, or complaints.  History of migraine, has ocular symptoms that may be migraine related in origin. Unsure if worse or better since IUD placement 3 years ago. Does continue to have periods, sometimes minimal, overall lighter than prior. UTD pap.    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     Unspecified vitamin D deficiency      Past Surgical History:   Procedure Laterality Date    EPIDURAL STEROID INJECTION N/A 10/4/2021    Procedure: INJECTION, STEROID, EPIDURAL C7/T1;  Surgeon: Cony Ahmadi MD;  Location: [facility] PAIN [facility]GT;  Service: Pain Management;  Laterality: N/A;   l/m    KNEE SURGERY Right     torn meniscus    SHOULDER ARTHROSCOPY W/ SUPERIOR LABRAL ANTERIOR POSTERIOR LESION REPAIR Right 2022    Procedure: ARTHROSCOPY, SHOULDER, WITH INSTABILITY REPAIR;  Surgeon: Ann Anderson MD;  Location: [facility] OR;  Service: Orthopedics;  Laterality: Right;    TILT TABLE TEST N/A 9/15/2022    Procedure: TILT TABLE TEST;  Surgeon: RAMESH Ferguson MD;  Location: [facility] EP LAB;  Service: Cardiology;  Laterality: N/A;  orthostasis, Rule out POTS, Tilt table test, Dr. 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  "    Social Determinants of Health     Financial Resource Strain: Low Risk  (2019)    Overall Financial Resource Strain (CARDIA)     Difficulty of Paying Living Expenses: Not very hard   Food Insecurity: No Food Insecurity (2019)    Hunger Vital Sign     Worried About Running Out of Food in the Last Year: Never true     Ran Out of Food in the Last Year: Never true   Transportation Needs: No Transportation Needs (2019)    PRAPARE - Transportation     Lack of Transportation (Medical): No     Lack of Transportation (Non-Medical): No   Physical Activity: Insufficiently Active (2019)    Exercise Vital Sign     Days of Exercise per Week: 1 day     Minutes of Exercise per Session: 60 min   Stress: Stress Concern Present (2019)    [employer] - Occupational Stress Questionnaire     Feeling of Stress : To some extent     Family History   Problem Relation Name Age of Onset    Cancer Maternal Grandfather Nithin Cruz         lung    Dementia Paternal Grandmother      Hyperlipidemia Mother Derek Purdy     Hypertension Mother Derek Carrillos     Hypertension Father William Cervanteseks     Hyperlipidemia Father William Carrillos     Breast cancer Neg Hx      Colon cancer Neg Hx      Ovarian cancer Neg Hx       OB History          3    Para   2    Term   2       0    AB   1    Living   2         SAB   0    IAB   1    Ectopic   0    Multiple   0    Live Births   2                 /84   Ht 5' 4" (1.626 m)   Wt 48 kg (105 lb 13.1 oz)   BMI 18.16 kg/m²       ROS:  GENERAL: Denies weight gain or weight loss. Feeling well overall.   SKIN: Denies rash or lesions.   HEAD: Denies head injury or headache.   NODES: Denies enlarged lymph nodes.   CHEST: Denies chest pain or shortness of breath.   CARDIOVASCULAR: Denies palpitations or left sided chest pain.   ABDOMEN: No abdominal pain, constipation, diarrhea, nausea, vomiting or rectal bleeding.   URINARY: No frequency, dysuria, " hematuria, or burning on urination.  REPRODUCTIVE: See HPI.   BREASTS: The patient performs breast self-examination and denies pain, lumps, or nipple discharge.   HEMATOLOGIC: No easy bruisability or excessive bleeding.   MUSCULOSKELETAL: Denies joint pain or swelling.   NEUROLOGIC: Denies syncope or weakness.   PSYCHIATRIC: Denies depression, anxiety or mood swings.    PHYSICAL EXAM:  APPEARANCE: Well nourished, well developed, in no acute distress.  AFFECT: WNL, alert and oriented x 3  SKIN: No acne or hirsutism  NECK: Neck symmetric without masses or thyromegaly  NODES: No inguinal, cervical, axillary, or femoral lymph node enlargement  CHEST: Good respiratory effect  ABDOMEN: Soft.  No tenderness or masses.  No hepatosplenomegaly.  No hernias.  BREASTS: Symmetrical, no skin changes or visible lesions.  No palpable masses, nipple discharge bilaterally.  PELVIC: Normal external genitalia without lesions.  Normal hair distribution.  Adequate perineal body, normal urethral meatus.  Vagina moist and well rugated without lesions or discharge.  Cervix pink, without lesions, discharge or tenderness.  No significant cystocele or rectocele.  Bimanual exam shows uterus to be normal size, regular, mobile and nontender.  Adnexa without masses or tenderness.    EXTREMITIES: No edema.    Well woman exam with routine gynecological exam    IUD check up    Intractable migraine with aura without status migrainosus    Screening breast examination  -     Mammo Digital Screening Bilat w/ Dayday; Future; Expected date: 10/12/2024            Patient was counseled today on A.C.S. Pap guidelines and recommendations for yearly pelvic exams, mammograms and monthly self breast exams; to see her PCP for other health maintenance.     No follow-ups on file.

## 2024-07-10 ENCOUNTER — OFFICE VISIT (OUTPATIENT)
Dept: INTERNAL MEDICINE | Facility: CLINIC | Age: 39
End: 2024-07-10
Payer: COMMERCIAL

## 2024-07-10 VITALS
WEIGHT: 110.25 LBS | OXYGEN SATURATION: 99 % | SYSTOLIC BLOOD PRESSURE: 136 MMHG | DIASTOLIC BLOOD PRESSURE: 90 MMHG | BODY MASS INDEX: 18.82 KG/M2 | HEIGHT: 64 IN | HEART RATE: 71 BPM

## 2024-07-10 DIAGNOSIS — F98.8 ATTENTION DEFICIT DISORDER (ADD) WITHOUT HYPERACTIVITY: ICD-10-CM

## 2024-07-10 DIAGNOSIS — Z00.00 ANNUAL PHYSICAL EXAM: Primary | ICD-10-CM

## 2024-07-10 DIAGNOSIS — R03.0 ELEVATED BP WITHOUT DIAGNOSIS OF HYPERTENSION: ICD-10-CM

## 2024-07-10 DIAGNOSIS — D80.9 IMMUNODEFICIENCY WITH PREDOMINANTLY ANTIBODY DEFECTS: ICD-10-CM

## 2024-07-10 DIAGNOSIS — D80.3 IGG SUBCLASS DEFICIENCY: ICD-10-CM

## 2024-07-10 DIAGNOSIS — Z87.09 HISTORY OF PNEUMOTHORAX: ICD-10-CM

## 2024-07-10 PROCEDURE — 90677 PCV20 VACCINE IM: CPT | Mod: S$GLB,,, | Performed by: INTERNAL MEDICINE

## 2024-07-10 PROCEDURE — 3008F BODY MASS INDEX DOCD: CPT | Mod: CPTII,S$GLB,, | Performed by: INTERNAL MEDICINE

## 2024-07-10 PROCEDURE — 90471 IMMUNIZATION ADMIN: CPT | Mod: S$GLB,,, | Performed by: INTERNAL MEDICINE

## 2024-07-10 PROCEDURE — 99395 PREV VISIT EST AGE 18-39: CPT | Mod: 25,S$GLB,, | Performed by: INTERNAL MEDICINE

## 2024-07-10 PROCEDURE — 3075F SYST BP GE 130 - 139MM HG: CPT | Mod: CPTII,S$GLB,, | Performed by: INTERNAL MEDICINE

## 2024-07-10 PROCEDURE — 99999 PR PBB SHADOW E&M-EST. PATIENT-LVL V: CPT | Mod: PBBFAC,,, | Performed by: INTERNAL MEDICINE

## 2024-07-10 PROCEDURE — 3080F DIAST BP >= 90 MM HG: CPT | Mod: CPTII,S$GLB,, | Performed by: INTERNAL MEDICINE

## 2024-07-10 NOTE — PROGRESS NOTES
Subjective:       Patient ID: Karen Zavala is a 39 y.o. female.    Chief Complaint: Annual Exam     Karen Zavala is a 39 y.o.  female who presents for Annual Exam  .  Has hypermobility syndrome- did see the specialist at [facility] but she did not complete the workup with him.       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    Chronic pain disorder    Myofascial pain syndrome    Migraine without aura and without status migrainosus, not intractable    Olfactory aura    Paresthesias    Postural dizziness with presyncope    Chronic pain    Migraine with aura    Glenoid labral tear, right, subsequent encounter    Muscle weakness of right upper extremity    Neck pain    Strep pharyngitis    Hypermobile joints    Chronic pain of both shoulders    History of pneumothorax    IgG subclass deficiency    Attention deficit disorder (ADD) without hyperactivity           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     Unspecified vitamin D deficiency        Past Surgical History:   Procedure Laterality Date    BREAST BIOPSY Right 03/16/2015    beingn    EPIDURAL STEROID INJECTION N/A 10/04/2021    Procedure: INJECTION, STEROID, EPIDURAL C7/T1;  Surgeon: Cony Ahmadi MD;  Location: [facility];  Service: Pain Management;  Laterality: N/A;  9/16 l/m    KNEE SURGERY Right     torn meniscus    SHOULDER ARTHROSCOPY W/ SUPERIOR LABRAL ANTERIOR POSTERIOR LESION REPAIR Right 01/13/2022    Procedure: ARTHROSCOPY, SHOULDER, WITH INSTABILITY REPAIR;  Surgeon: Ann  "MD Monica;  Location: [facility] OR;  Service: Orthopedics;  Laterality: Right;    TILT TABLE TEST N/A 09/15/2022    Procedure: TILT TABLE TEST;  Surgeon: RAMESH Ferguson MD;  Location: [facility] EP LAB;  Service: Cardiology;  Laterality: N/A;  orthostasis, Rule out POTS, Tilt table test, Dr. Warner,        Family History   Problem Relation Name Age of Onset    Hyperlipidemia Mother Derek Purdy     Hypertension Mother Derek Purdy     Hypertension Father William Purdy     Hyperlipidemia Father William Purdy     Cancer Maternal Grandfather Nithin Cruz         lung    Dementia Paternal Grandmother      Breast cancer Neg Hx      Colon cancer Neg Hx      Ovarian cancer Neg Hx         Social History     Tobacco Use    Smoking status: Never     Passive exposure: Never    Smokeless tobacco: Never   Substance Use Topics    Alcohol use: Not Currently     Alcohol/week: 1.0 standard drink of alcohol     Types: 1 Unspecified drink type per week     Comment: occ - 1/mo    Drug use: No         Review of Systems      Objective:   Blood pressure (!) 136/90, pulse 71, height 5' 4" (1.626 m), weight 50 kg (110 lb 3.7 oz), last menstrual period 06/20/2024, SpO2 99%.     Physical Exam  Constitutional:       Appearance: Normal appearance. She is well-developed. She is not ill-appearing.   HENT:      Head: Normocephalic and atraumatic.   Eyes:      General: No scleral icterus.     Conjunctiva/sclera: Conjunctivae normal.   Cardiovascular:      Rate and Rhythm: Normal rate.      Heart sounds: Normal heart sounds. No murmur heard.     No friction rub. No gallop.   Pulmonary:      Effort: Pulmonary effort is normal.      Breath sounds: Normal breath sounds. No wheezing or rales.   Chest:      Chest wall: No tenderness.   Abdominal:      General: Bowel sounds are normal.      Palpations: Abdomen is soft.   Musculoskeletal:         General: No tenderness or signs of injury.   Skin:     General: Skin is warm and dry.   Neurological:      Mental Status: " She is alert and oriented to person, place, and time. Mental status is at baseline.   Psychiatric:         Behavior: Behavior normal.         Thought Content: Thought content normal.         Prior labs reviewed    Health Maintenance         Date Due Completion Date    Influenza Vaccine (1) 09/01/2024 10/19/2023    Override on 10/8/2019: Done    COVID-19 Vaccine (8 - 2024-25 season) 09/01/2024 5/16/2022    Mammogram 08/29/2025 8/29/2024    Cervical Cancer Screening 03/13/2028 3/13/2023    Override on 7/27/2013: Done    TETANUS VACCINE 04/05/2028 4/5/2018    RSV Vaccine (Age 60+ and Pregnant patients) (1 - 1-dose 75+ series) 10/18/2059 ---            Assessment/Plan:       1. Annual physical exam  -     Comprehensive Metabolic Panel; Future; Expected date: 07/10/2024  -     Lipid Panel; Future; Expected date: 07/10/2024  -     Cancel: CBC Auto Differential; Future; Expected date: 07/10/2024  -     TSH; Future; Expected date: 07/10/2024    2. Immunodeficiency with predominantly antibody defects  Overview:  Diagnosed by Dr Derek Ward at [facility].    Recommended she receive immediate aggressive antiboitics for all acute infections and not wait the usual 7-10 days for possible viral infections.    Orders:  -     pneumoc 20-joel conj-dip cr(PF) (PREVNAR-20 (PF)) injection Syrg 0.5 mL  -     Ambulatory referral/consult to Allergy; Future; Expected date: 07/17/2024    3. History of pneumothorax    4. IgG subclass deficiency  -     pneumoc 20-joel conj-dip cr(PF) (PREVNAR-20 (PF)) injection Syrg 0.5 mL  -     Ambulatory referral/consult to Allergy; Future; Expected date: 07/17/2024    5. Attention deficit disorder (ADD) without hyperactivity  Overview:  Managed with vyvanse   Followed with dr fragoso      6. Elevated BP without diagnosis of hypertension  Comments:  repeat in one week  low salt diet            Medication List with Changes/Refills   Current Medications    GABAPENTIN (NEURONTIN) 100 MG CAPSULE    Take 100 mg by mouth  once daily.    NARATRIPTAN (AMERGE) 1 MG TAB    Take at onset of migraine, can repeat in 2 hrs if needed.  No more than twice per day or 3 days/wk.    TIZANIDINE (ZANAFLEX) 2 MG TABLET    Take 1-2 tablets (2-4 mg total) by mouth every evening.    VYVANSE 30 MG CAPSULE    Take 30 mg by mouth every morning.   Changed and/or Refilled Medications    Modified Medication Previous Medication    RIMEGEPANT (NURTEC) 75 MG ODT rimegepant (NURTEC) 75 mg odt       Take 1 tablet (75 mg total) by mouth every other day. Place ODT tablet on the tongue; alternatively the ODT tablet may be placed under the tongue    Take 1 tablet (75 mg total) by mouth every other day. Place ODT tablet on the tongue; alternatively the ODT tablet may be placed under the tongue   Discontinued Medications    NURTEC 75 MG ODT    Take by mouth.    PREDNISONE (DELTASONE) 20 MG TABLET    Take 20 mg by mouth.    TRIAMCINOLONE ACETONIDE 0.1% (KENALOG) 0.1 % CREAM    SMARTSI Application Topical 2-3 Times Daily    VYVANSE 20 MG CAPSULE           Recommend patient complete vaccinations as listed in the overdue health maintenance report. Pt may obtain vaccinations at their local pharmacy, any [facility] pharmacy or request vaccination in our clinic.  Please note that some insurances will only cover vaccination cost at specific locations.Please check with your insurance provider regarding your coverage.  Vaccines that are not covered are still recommended.         minutes spent in care of patient including history, physical, chart review, orders and coordination of care.

## 2024-07-17 ENCOUNTER — TELEPHONE (OUTPATIENT)
Dept: PAIN MEDICINE | Facility: CLINIC | Age: 39
End: 2024-07-17
Payer: COMMERCIAL

## 2024-07-17 ENCOUNTER — OFFICE VISIT (OUTPATIENT)
Dept: ALLERGY | Facility: CLINIC | Age: 39
End: 2024-07-17
Payer: COMMERCIAL

## 2024-07-17 ENCOUNTER — CLINICAL SUPPORT (OUTPATIENT)
Dept: INTERNAL MEDICINE | Facility: CLINIC | Age: 39
End: 2024-07-17
Payer: COMMERCIAL

## 2024-07-17 VITALS — WEIGHT: 111.31 LBS | BODY MASS INDEX: 19 KG/M2 | HEIGHT: 64 IN

## 2024-07-17 DIAGNOSIS — G43.719 CHRONIC MIGRAINE WITHOUT AURA, INTRACTABLE, WITHOUT STATUS MIGRAINOSUS: Primary | ICD-10-CM

## 2024-07-17 DIAGNOSIS — D80.6 SPECIFIC ANTIBODY DEFICIENCY WITH NORMAL IG CONCENTRATION AND NORMAL NUMBER OF B CELLS: ICD-10-CM

## 2024-07-17 DIAGNOSIS — J32.9 RECURRENT SINUSITIS: Primary | ICD-10-CM

## 2024-07-17 DIAGNOSIS — R03.0 ELEVATED BP WITHOUT DIAGNOSIS OF HYPERTENSION: Primary | ICD-10-CM

## 2024-07-17 DIAGNOSIS — Z88.0 PENICILLIN ALLERGY: ICD-10-CM

## 2024-07-17 PROCEDURE — 1159F MED LIST DOCD IN RCRD: CPT | Mod: CPTII,S$GLB,, | Performed by: ALLERGY & IMMUNOLOGY

## 2024-07-17 PROCEDURE — 3008F BODY MASS INDEX DOCD: CPT | Mod: CPTII,S$GLB,, | Performed by: ALLERGY & IMMUNOLOGY

## 2024-07-17 PROCEDURE — 99999 PR PBB SHADOW E&M-EST. PATIENT-LVL III: CPT | Mod: PBBFAC,,, | Performed by: ALLERGY & IMMUNOLOGY

## 2024-07-17 PROCEDURE — 99204 OFFICE O/P NEW MOD 45 MIN: CPT | Mod: S$GLB,,, | Performed by: ALLERGY & IMMUNOLOGY

## 2024-07-17 NOTE — PROGRESS NOTES
Subjective:       Patient ID: Karen Zavala is a 38 y.o. female.    Chief Complaint:  Sinusitis (Chronic URI, hx pneumonia )  Hx specific antibody deficiency    HPI    Pt presents to [facility] care for hx of specific antibody deficiency. Has previously seen [facility], Dr. Ward and later Dr. Piña.  Was diagnosed with specific antibody deficiency in 2014 by Dr. Ward. Failed pneumovax challenge. She has a remarkable hx of recurrent upper and lower respiratory infections since childhood. Sinus infections more common than lower resp tract infection. Typically does note at least temp improvement w abx.  In recent years infections have not been as frequent or severe. Does recall about 5 courses abx over last year. She recalls that social distancing during COVID pandemic helped minimize her respiratory infections.  Recent bronchitis  No sinus surgery  No skin infections    She recalls negative environmental skin testing w Dr. Ward    Immunoglobulins and pneumococcal titers last checked in Jan '23 were normal.    She received PCV20 7/10/24    She would like to re-evaluate hx of pcn allergy. Recalls hives, wheezing w pcn as a child. Initially thought poss serum sickness before onset of wheeze. Has been avoiding penicillins since.        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     Unspecified vitamin D deficiency    Mother w recurrent infections  8 and 7 yo kids w freq infections      Family History   Problem Relation Name Age of Onset    Hyperlipidemia Mother Derek Cervanteseks     Hypertension Mother Derek Cervanteseks     Hypertension Father William Cervanteseks     Hyperlipidemia Father William Cervanteseks     Cancer Maternal Grandfather Nithin Cruz         lung    Dementia Paternal Grandmother      Breast cancer Neg Hx      Colon  cancer Neg Hx      Ovarian cancer Neg Hx           Review of Systems   Constitutional:  Negative for activity change, fatigue and fever.   HENT:  Negative for congestion, postnasal drip, rhinorrhea, sinus pressure and sneezing.    Eyes:  Negative for discharge, redness and itching.   Respiratory:  Negative for cough, shortness of breath and wheezing.    Cardiovascular:  Negative for chest pain.   Gastrointestinal:  Negative for diarrhea, nausea and vomiting.   Genitourinary:  Negative for dysuria.   Musculoskeletal:  Negative for arthralgias and joint swelling.   Skin:  Negative for rash.   Neurological:  Negative for headaches.   Hematological:  Does not bruise/bleed easily.   Psychiatric/Behavioral:  Negative for behavioral problems and sleep disturbance.         Objective:   Physical Exam  Vitals and nursing note reviewed.   Constitutional:       General: She is not in acute distress.     Appearance: She is well-developed.   HENT:      Head: Normocephalic.      Right Ear: Tympanic membrane and external ear normal.      Left Ear: Tympanic membrane and external ear normal.      Nose: No septal deviation, mucosal edema or rhinorrhea.      Right Sinus: No maxillary sinus tenderness or frontal sinus tenderness.      Left Sinus: No maxillary sinus tenderness or frontal sinus tenderness.      Mouth/Throat:      Pharynx: Uvula midline. No uvula swelling.   Eyes:      General:         Right eye: No discharge.         Left eye: No discharge.      Conjunctiva/sclera: Conjunctivae normal.   Cardiovascular:      Rate and Rhythm: Normal rate and regular rhythm.   Pulmonary:      Effort: Pulmonary effort is normal. No respiratory distress.      Breath sounds: Normal breath sounds. No wheezing.   Abdominal:      General: Bowel sounds are normal.      Palpations: Abdomen is soft.      Tenderness: There is no abdominal tenderness.   Musculoskeletal:         General: No tenderness. Normal range of motion.      Cervical back: Normal  range of motion and neck supple.   Lymphadenopathy:      Cervical: No cervical adenopathy.   Skin:     General: Skin is warm.      Findings: No erythema or rash.   Neurological:      Mental Status: She is alert and oriented to person, place, and time.   Psychiatric:         Behavior: Behavior normal.         Thought Content: Thought content normal.         Judgment: Judgment normal.           IgG   Date Value Ref Range Status   01/26/2023 1056 650 - 1600 mg/dL Final     Comment:     IgG Cord Blood Reference Range: 650-1600 mg/dL.   12/22/2011 1099 650 - 1600 mg/dl Final     Comment:     Cord Blood Reference Range: 650 - 1600 mg/dL     IgM   Date Value Ref Range Status   01/26/2023 145 50 - 300 mg/dL Final     Comment:     IgM Cord Blood Reference Range: <25 mg/dL.     IgA   Date Value Ref Range Status   01/26/2023 183 40 - 350 mg/dL Final     Comment:     IgA Cord Blood Reference Range: <5 mg/dL.     Total IgE   Date Value Ref Range Status   01/26/2023 <35 0 - 100 IU/mL Final     Eos #   Date Value Ref Range Status   01/26/2023 0.1 0.0 - 0.5 K/uL Final   10/20/2022 0.2 0.0 - 0.5 K/uL Final   12/29/2021 0.2 0.0 - 0.5 K/uL Final     Eosinophil %   Date Value Ref Range Status   01/26/2023 1.0 0.0 - 8.0 % Final   10/20/2022 1.9 0.0 - 8.0 % Final   12/29/2021 1.7 0.0 - 8.0 % Final     Total IgE   Date Value Ref Range Status   01/26/2023 <35 0 - 100 IU/mL Final        Latest Reference Range & Units Most Recent   S.pneumoniae Type 1 >=2.3 mcg/mL 30.0  1/26/23 10:24   S.pneumoniae Type 12F >=0.6 mcg/mL 0.7  1/26/23 10:24   S.pneumoniae Type 18C >=3.3 mcg/mL 1.1  1/26/23 10:24   S.pneumoniae Type 19F >=15.0 mcg/mL 28.8  1/26/23 10:24   S.pneumoniae Type 23F >=8.0 mcg/mL 57.4  1/26/23 10:24   S.pneumoniae Type 3 >=1.8 mcg/mL 5.9  1/26/23 10:24   S.pneumoniae Type 5 >=10.7 mcg/mL 3.5  1/26/23 10:24   S.pneumoniae Type 6B >=4.7 mcg/mL 48.8  1/26/23 10:24   S.pneumoniae Type 7F >=3.2 mcg/mL 17.0  1/26/23 10:24   S.pneumoniae  "Type 8 >=2.9 mcg/mL 14.6  1/26/23 10:24   S.pneumoniae Type 9N >=9.2 mcg/mL SEE BELOW  1/26/23 10:24   S.pneumoniae Type 9V Abs >=2.6 mcg/mL 16.3  1/26/23 10:24   Pneumococcal Serotype 10A IgG (PNX) >=2.9 mcg/mL 16.8  1/26/23 10:24   Pneumococcal Serotype 11A IgG (PNX) >=2.4 mcg/mL 2.5  1/26/23 10:24   Pneumococcal Serotype 15B IgG (PNX) >=3.3 mcg/mL 3.5  1/26/23 10:24   Pneumococcal Serotype 17F IgG (PNX) >=7.8 mcg/mL  >=7.2 mcg/mL 23.0  1/26/23 10:24  43.1  1/26/23 10:24   Pneumococcal Serotype 19A IgG (P13,PNX) >=17.1 mcg/mL 58.8  1/26/23 10:24   Pneumococcal Serotype 2 IgG (PNX) >=1.0 mcg/mL 6.0  1/26/23 10:24   Pneumococcal Serotype 20 IgG (PNX) >=1.3 mcg/mL 4.6  1/26/23 10:24   Pneumococcal Serotype 33 IgG (PNX) >=1.7 mcg/mL 3.2  1/26/23 10:24       Assessment:       1. Recurrent sinusitis    2. Specific antibody deficiency with normal IG concentration and normal number of B cells    3. Penicillin allergy         Plan:       Karen "Sandra Cruz" was seen today for sinusitis.    Diagnoses and all orders for this visit:    Recurrent sinusitis    Specific antibody deficiency with normal IG concentration and normal number of B cells  Hx lab eval c/w SAD ~2014  Lab eval 1/23 not c/w SAD though    Repeat eval humoral immunity. Wait until 4 weeks post PCV20. Will order at fu visit for PCN skin testing    Penicillin allergy  PCN skin test in  1 mo             "

## 2024-07-17 NOTE — PROGRESS NOTES
Pt here for BP check today  Pt states she does not take any BP Meds  Started taking BP at home this week, diastolic has been in the 80's  BP today:  131/77 HR 77

## 2024-07-18 ENCOUNTER — TELEPHONE (OUTPATIENT)
Dept: PAIN MEDICINE | Facility: CLINIC | Age: 39
End: 2024-07-18
Payer: COMMERCIAL

## 2024-07-18 ENCOUNTER — TELEPHONE (OUTPATIENT)
Dept: PAIN MEDICINE | Facility: CLINIC | Age: 39
End: 2024-07-18

## 2024-07-18 ENCOUNTER — OFFICE VISIT (OUTPATIENT)
Dept: PAIN MEDICINE | Facility: CLINIC | Age: 39
End: 2024-07-18
Attending: ANESTHESIOLOGY
Payer: COMMERCIAL

## 2024-07-18 VITALS
TEMPERATURE: 98 F | DIASTOLIC BLOOD PRESSURE: 85 MMHG | WEIGHT: 111.13 LBS | HEART RATE: 80 BPM | BODY MASS INDEX: 19.06 KG/M2 | SYSTOLIC BLOOD PRESSURE: 122 MMHG

## 2024-07-18 DIAGNOSIS — G43.719 CHRONIC MIGRAINE WITHOUT AURA, INTRACTABLE, WITHOUT STATUS MIGRAINOSUS: Primary | ICD-10-CM

## 2024-07-18 DIAGNOSIS — M79.18 MYOFASCIAL PAIN SYNDROME: ICD-10-CM

## 2024-07-18 PROCEDURE — 99999 PR PBB SHADOW E&M-EST. PATIENT-LVL III: CPT | Mod: PBBFAC,,, | Performed by: ANESTHESIOLOGY

## 2024-07-18 NOTE — TELEPHONE ENCOUNTER
----- Message from Demetria Zaldivar sent at 7/18/2024 10:33 AM CDT -----  Name of Who is Calling:WILLIE MARC [[id number]]                   What is the request in detail:PT just missed your call and wants a call back she also wants to know if the insurance is covering her shot                    Can the clinic reply by [facility]: No                   What Number to Call Back if not in [facility]: [phone]
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
## 2024-07-18 NOTE — TELEPHONE ENCOUNTER
----- Message from Kristen Johnson sent at 7/18/2024 12:45 PM CDT -----  Regarding: call back  Name of caller: rima       What is the requesting detail: pt is requesting a call back marcos find whether the insurance has covered her botox procedure.Please give her a call back as soon as possible.       Can the clinic reply by [facility]:       What number to call back: [phone]

## 2024-08-14 ENCOUNTER — PATIENT MESSAGE (OUTPATIENT)
Dept: OBSTETRICS AND GYNECOLOGY | Facility: CLINIC | Age: 39
End: 2024-08-14
Payer: COMMERCIAL

## 2024-08-19 ENCOUNTER — OFFICE VISIT (OUTPATIENT)
Dept: OBSTETRICS AND GYNECOLOGY | Facility: CLINIC | Age: 39
End: 2024-08-19
Payer: COMMERCIAL

## 2024-08-19 VITALS
DIASTOLIC BLOOD PRESSURE: 82 MMHG | HEIGHT: 64 IN | WEIGHT: 112.63 LBS | SYSTOLIC BLOOD PRESSURE: 122 MMHG | BODY MASS INDEX: 19.23 KG/M2

## 2024-08-19 DIAGNOSIS — N64.4 BREAST PAIN, RIGHT: Primary | ICD-10-CM

## 2024-08-19 PROCEDURE — 3074F SYST BP LT 130 MM HG: CPT | Mod: CPTII,S$GLB,,

## 2024-08-19 PROCEDURE — 99213 OFFICE O/P EST LOW 20 MIN: CPT | Mod: S$GLB,,,

## 2024-08-19 PROCEDURE — 3008F BODY MASS INDEX DOCD: CPT | Mod: CPTII,S$GLB,,

## 2024-08-19 PROCEDURE — 1160F RVW MEDS BY RX/DR IN RCRD: CPT | Mod: CPTII,S$GLB,,

## 2024-08-19 PROCEDURE — 1159F MED LIST DOCD IN RCRD: CPT | Mod: CPTII,S$GLB,,

## 2024-08-19 PROCEDURE — 3079F DIAST BP 80-89 MM HG: CPT | Mod: CPTII,S$GLB,,

## 2024-08-19 PROCEDURE — 99999 PR PBB SHADOW E&M-EST. PATIENT-LVL III: CPT | Mod: PBBFAC,,,

## 2024-08-19 NOTE — PROGRESS NOTES
History & Physical  Gynecology      SUBJECTIVE:     Chief Complaint:   Breast Pain     History of Present Illness  Karen Zavala is a 38 y.o. female presents for right breast pain and small lump. Breast pain that began a few weeks ago.  She describes the pain as stinging- also tender to touch. She is not on OCPs or HRT- has IUD.  She denies skin changes.  She denies nipple discharge.  She is not breastfeeding or pumping.  She denies any pain to her left breast.  Denies any recent vigorous or repetitive use of pectoral muscles.  No associated neck, back, or shoulder problems.  No associated fever or erythema.  No recent history of trauma to the chest.  She has not tried any alleviating factors.  The pain does not affect her ability to perform daily activities.  Denies family history of breast cancer.  Does report breast biopsy in 2015 showing fibroadenoma.     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     Unspecified vitamin D deficiency      Past Surgical History:   Procedure Laterality Date    EPIDURAL STEROID INJECTION N/A 10/4/2021    Procedure: INJECTION, STEROID, EPIDURAL C7/T1;  Surgeon: Cony Ahmadi MD;  Location: [facility];  Service: Pain Management;  Laterality: N/A;  9/16 l/m    KNEE SURGERY Right     torn meniscus    SHOULDER ARTHROSCOPY W/ SUPERIOR LABRAL ANTERIOR POSTERIOR LESION REPAIR Right 1/13/2022    Procedure: ARTHROSCOPY, SHOULDER, WITH INSTABILITY REPAIR;  Surgeon: Ann  MD Monica;  Location: [facility] OR;  Service: Orthopedics;  Laterality: Right;    TILT TABLE TEST N/A 9/15/2022    Procedure: TILT TABLE TEST;  Surgeon: RAMESH Ferguson MD;  Location: [facility] EP LAB;  Service: Cardiology;  Laterality: N/A;  orthostasis, Rule out POTS, Tilt table test, Dr. Warner,      OB History          3    Para   2    Term   2       0    AB   1    Living   2         SAB   0    IAB   1    Ectopic   0    Multiple   0    Live Births   2               Family History   Problem Relation Name Age of Onset    Hyperlipidemia Mother Derek Purdy     Hypertension Mother Derek Purdy     Hypertension Father William Purdy     Hyperlipidemia Father William Purdy     Cancer Maternal Grandfather Nithin Cruz         lung    Dementia Paternal Grandmother      Breast cancer Neg Hx      Colon cancer Neg Hx      Ovarian cancer Neg Hx       Social History     Tobacco Use    Smoking status: Never     Passive exposure: Never    Smokeless tobacco: Never   Substance Use Topics    Alcohol use: Not Currently     Alcohol/week: 1.0 standard drink of alcohol     Types: 1 Standard drinks or equivalent per week     Comment: occ - 1/mo    Drug use: No       Current Outpatient Medications   Medication Sig    gabapentin (NEURONTIN) 100 MG capsule Take 100 mg by mouth once daily.    NURTEC 75 mg odt Take by mouth.    tiZANidine (ZANAFLEX) 2 MG tablet Take 1-2 tablets (2-4 mg total) by mouth every evening.    VYVANSE 30 mg capsule Take 30 mg by mouth every morning.    naratriptan (AMERGE) 1 MG Tab Take at onset of migraine, can repeat in 2 hrs if needed.  No more than twice per day or 3 days/wk. (Patient not taking: Reported on 2024)     Current Facility-Administered Medications   Medication    levonorgestreL (MIRENA) 20 mcg/24 hours (6 yrs) 52 mg IUD 1 Intra Uterine Device     Review of Systems:  Review of Systems   Constitutional:  Negative for fever.   Respiratory:  Negative for cough and shortness of breath.     Cardiovascular:  Negative for chest pain and leg swelling.   Integumentary:  Positive for breast tenderness. Negative for nipple discharge and breast skin changes.   Breast: Positive for lump and tenderness.Negative for nipple discharge and skin changes       OBJECTIVE:     Physical Exam:  Physical Exam  Pulmonary:      Effort: Pulmonary effort is normal.   Chest:   Breasts:     Right: No nipple discharge or skin change.      Left: Normal. No mass, nipple discharge, skin change or tenderness.          Comments: Fibrocystic breasts bilaterally   ~ 5mm mobile lump to right breast about 11 o clock   Lymphadenopathy:      Upper Body:      Right upper body: No supraclavicular adenopathy.      Left upper body: No supraclavicular adenopathy.   Skin:     General: Skin is warm and dry.   Neurological:      Mental Status: She is alert and oriented to person, place, and time.   Psychiatric:         Behavior: Behavior normal.         ASSESSMENT:       ICD-10-CM ICD-9-CM    1. Breast pain, right  N64.4 611.71 Mammo Digital Diagnostic Bilat with Dayday      US Breast Right Limited          Plan:      - Diagnostic mammo and ultrasound ordered   - Continue with OTC therapies such as acetaminophen or nonsteroidal anti-inflammatory drugs (NSAIDs). They can both be used to relieve breast pain. Topical NSAIDS such as OTC Diclofenac (Volteran) gel can be used. Other recommendations include use of a supportive bra. Some women obtain relief from application of warm compresses or ice packs.         I spent a total of 20 minutes on the day of the visit.This includes face to face time and non-face to face time preparing to see the patient (eg, review of tests), Obtaining and/or reviewing separately obtained history, Documenting clinical information in the electronic or other health record, Independently interpreting resultsand communicating results to the patient/family/caregiver, or Care coordination     SPENCER Ragsdale

## 2024-08-21 ENCOUNTER — PATIENT MESSAGE (OUTPATIENT)
Dept: INTERNAL MEDICINE | Facility: CLINIC | Age: 39
End: 2024-08-21
Payer: COMMERCIAL

## 2024-08-21 ENCOUNTER — OFFICE VISIT (OUTPATIENT)
Dept: ALLERGY | Facility: CLINIC | Age: 39
End: 2024-08-21
Payer: COMMERCIAL

## 2024-08-21 ENCOUNTER — LAB VISIT (OUTPATIENT)
Dept: LAB | Facility: HOSPITAL | Age: 39
End: 2024-08-21
Payer: COMMERCIAL

## 2024-08-21 VITALS
OXYGEN SATURATION: 99 % | SYSTOLIC BLOOD PRESSURE: 123 MMHG | HEART RATE: 80 BPM | BODY MASS INDEX: 18.97 KG/M2 | HEIGHT: 64 IN | DIASTOLIC BLOOD PRESSURE: 74 MMHG | WEIGHT: 111.13 LBS

## 2024-08-21 DIAGNOSIS — Z83.49 FAMILY HISTORY OF MTHFR DEFICIENCY: Primary | ICD-10-CM

## 2024-08-21 DIAGNOSIS — Z88.0 HX OF ALLERGY TO PENICILLIN: Primary | ICD-10-CM

## 2024-08-21 DIAGNOSIS — D80.6 SPECIFIC ANTIBODY DEFICIENCY WITH NORMAL IG CONCENTRATION AND NORMAL NUMBER OF B CELLS: ICD-10-CM

## 2024-08-21 LAB
BASOPHILS # BLD AUTO: 0.05 K/UL (ref 0–0.2)
BASOPHILS NFR BLD: 0.6 % (ref 0–1.9)
DIFFERENTIAL METHOD BLD: ABNORMAL
EOSINOPHIL # BLD AUTO: 0.1 K/UL (ref 0–0.5)
EOSINOPHIL NFR BLD: 1.1 % (ref 0–8)
ERYTHROCYTE [DISTWIDTH] IN BLOOD BY AUTOMATED COUNT: 12.7 % (ref 11.5–14.5)
HCT VFR BLD AUTO: 45.6 % (ref 37–48.5)
HGB BLD-MCNC: 14 G/DL (ref 12–16)
IGA SERPL-MCNC: 172 MG/DL (ref 40–350)
IGE SERPL-ACNC: <35 IU/ML (ref 0–100)
IGG SERPL-MCNC: 1182 MG/DL (ref 650–1600)
IGM SERPL-MCNC: 137 MG/DL (ref 50–300)
IMM GRANULOCYTES # BLD AUTO: 0.02 K/UL (ref 0–0.04)
IMM GRANULOCYTES NFR BLD AUTO: 0.3 % (ref 0–0.5)
LYMPHOCYTES # BLD AUTO: 2.1 K/UL (ref 1–4.8)
LYMPHOCYTES NFR BLD: 26.4 % (ref 18–48)
MCH RBC QN AUTO: 27.1 PG (ref 27–31)
MCHC RBC AUTO-ENTMCNC: 30.7 G/DL (ref 32–36)
MCV RBC AUTO: 88 FL (ref 82–98)
MONOCYTES # BLD AUTO: 0.7 K/UL (ref 0.3–1)
MONOCYTES NFR BLD: 8.3 % (ref 4–15)
NEUTROPHILS # BLD AUTO: 5 K/UL (ref 1.8–7.7)
NEUTROPHILS NFR BLD: 63.3 % (ref 38–73)
NRBC BLD-RTO: 0 /100 WBC
PLATELET # BLD AUTO: 374 K/UL (ref 150–450)
PMV BLD AUTO: 9.9 FL (ref 9.2–12.9)
RBC # BLD AUTO: 5.17 M/UL (ref 4–5.4)
WBC # BLD AUTO: 7.91 K/UL (ref 3.9–12.7)

## 2024-08-21 PROCEDURE — 36415 COLL VENOUS BLD VENIPUNCTURE: CPT | Performed by: ALLERGY & IMMUNOLOGY

## 2024-08-21 PROCEDURE — 82784 ASSAY IGA/IGD/IGG/IGM EACH: CPT | Mod: 59 | Performed by: ALLERGY & IMMUNOLOGY

## 2024-08-21 PROCEDURE — 86359 T CELLS TOTAL COUNT: CPT | Performed by: ALLERGY & IMMUNOLOGY

## 2024-08-21 PROCEDURE — 86317 IMMUNOASSAY INFECTIOUS AGENT: CPT | Performed by: ALLERGY & IMMUNOLOGY

## 2024-08-21 PROCEDURE — 1159F MED LIST DOCD IN RCRD: CPT | Mod: CPTII,S$GLB,, | Performed by: ALLERGY & IMMUNOLOGY

## 2024-08-21 PROCEDURE — 82785 ASSAY OF IGE: CPT | Performed by: ALLERGY & IMMUNOLOGY

## 2024-08-21 PROCEDURE — 86360 T CELL ABSOLUTE COUNT/RATIO: CPT | Performed by: ALLERGY & IMMUNOLOGY

## 2024-08-21 PROCEDURE — 86355 B CELLS TOTAL COUNT: CPT | Performed by: ALLERGY & IMMUNOLOGY

## 2024-08-21 PROCEDURE — 99213 OFFICE O/P EST LOW 20 MIN: CPT | Mod: 25,S$GLB,, | Performed by: ALLERGY & IMMUNOLOGY

## 2024-08-21 PROCEDURE — 95018 ALL TSTG PERQ&IQ DRUGS/BIOL: CPT | Mod: S$GLB,,, | Performed by: ALLERGY & IMMUNOLOGY

## 2024-08-21 PROCEDURE — 85025 COMPLETE CBC W/AUTO DIFF WBC: CPT | Performed by: ALLERGY & IMMUNOLOGY

## 2024-08-21 PROCEDURE — 86357 NK CELLS TOTAL COUNT: CPT | Performed by: ALLERGY & IMMUNOLOGY

## 2024-08-21 PROCEDURE — 3074F SYST BP LT 130 MM HG: CPT | Mod: CPTII,S$GLB,, | Performed by: ALLERGY & IMMUNOLOGY

## 2024-08-21 PROCEDURE — 3078F DIAST BP <80 MM HG: CPT | Mod: CPTII,S$GLB,, | Performed by: ALLERGY & IMMUNOLOGY

## 2024-08-21 PROCEDURE — 3008F BODY MASS INDEX DOCD: CPT | Mod: CPTII,S$GLB,, | Performed by: ALLERGY & IMMUNOLOGY

## 2024-08-21 PROCEDURE — 99999 PR PBB SHADOW E&M-EST. PATIENT-LVL III: CPT | Mod: PBBFAC,,, | Performed by: ALLERGY & IMMUNOLOGY

## 2024-08-22 ENCOUNTER — PATIENT MESSAGE (OUTPATIENT)
Dept: ALLERGY | Facility: CLINIC | Age: 39
End: 2024-08-22
Payer: COMMERCIAL

## 2024-08-22 LAB
CD3+CD4+ CELLS # BLD: 1076 CELLS/UL (ref 300–1400)
CD3+CD4+ CELLS NFR BLD: 47 % (ref 28–57)
LYMPHOCYTES NFR CSF MANUAL: 1.86 % (ref 0.9–3.6)
LYMPHOCYTES NFR CSF MANUAL: 12.3 % (ref 6–19)
LYMPHOCYTES NFR CSF MANUAL: 146 CELLS/UL (ref 90–600)
LYMPHOCYTES NFR CSF MANUAL: 1814 CELLS/UL (ref 700–2100)
LYMPHOCYTES NFR CSF MANUAL: 25.3 % (ref 10–39)
LYMPHOCYTES NFR CSF MANUAL: 262 CELLS/UL (ref 100–500)
LYMPHOCYTES NFR CSF MANUAL: 579 CELLS/UL (ref 200–900)
LYMPHOCYTES NFR CSF MANUAL: 6.9 % (ref 7–31)
LYMPHOCYTES NFR CSF MANUAL: 79.2 % (ref 55–83)

## 2024-08-23 ENCOUNTER — LAB VISIT (OUTPATIENT)
Dept: LAB | Facility: HOSPITAL | Age: 39
End: 2024-08-23
Attending: INTERNAL MEDICINE
Payer: COMMERCIAL

## 2024-08-23 DIAGNOSIS — Z00.00 ANNUAL PHYSICAL EXAM: ICD-10-CM

## 2024-08-23 LAB
ALBUMIN SERPL BCP-MCNC: 4.3 G/DL (ref 3.5–5.2)
ALP SERPL-CCNC: 65 U/L (ref 55–135)
ALT SERPL W/O P-5'-P-CCNC: 10 U/L (ref 10–44)
ANION GAP SERPL CALC-SCNC: 9 MMOL/L (ref 8–16)
AST SERPL-CCNC: 16 U/L (ref 10–40)
BILIRUB SERPL-MCNC: 0.7 MG/DL (ref 0.1–1)
BUN SERPL-MCNC: 12 MG/DL (ref 6–20)
CALCIUM SERPL-MCNC: 10 MG/DL (ref 8.7–10.5)
CHLORIDE SERPL-SCNC: 105 MMOL/L (ref 95–110)
CHOLEST SERPL-MCNC: 230 MG/DL (ref 120–199)
CHOLEST/HDLC SERPL: 3.7 {RATIO} (ref 2–5)
CO2 SERPL-SCNC: 25 MMOL/L (ref 23–29)
CREAT SERPL-MCNC: 0.9 MG/DL (ref 0.5–1.4)
EST. GFR  (NO RACE VARIABLE): >60 ML/MIN/1.73 M^2
GLUCOSE SERPL-MCNC: 98 MG/DL (ref 70–110)
HDLC SERPL-MCNC: 63 MG/DL (ref 40–75)
HDLC SERPL: 27.4 % (ref 20–50)
LDLC SERPL CALC-MCNC: 155.8 MG/DL (ref 63–159)
NONHDLC SERPL-MCNC: 167 MG/DL
POTASSIUM SERPL-SCNC: 4.6 MMOL/L (ref 3.5–5.1)
PROT SERPL-MCNC: 7.1 G/DL (ref 6–8.4)
SODIUM SERPL-SCNC: 139 MMOL/L (ref 136–145)
TRIGL SERPL-MCNC: 56 MG/DL (ref 30–150)
TSH SERPL DL<=0.005 MIU/L-ACNC: 2.52 UIU/ML (ref 0.4–4)

## 2024-08-23 PROCEDURE — 84443 ASSAY THYROID STIM HORMONE: CPT | Performed by: INTERNAL MEDICINE

## 2024-08-23 PROCEDURE — 80061 LIPID PANEL: CPT | Performed by: INTERNAL MEDICINE

## 2024-08-23 PROCEDURE — 36415 COLL VENOUS BLD VENIPUNCTURE: CPT | Performed by: INTERNAL MEDICINE

## 2024-08-23 PROCEDURE — 80053 COMPREHEN METABOLIC PANEL: CPT | Performed by: INTERNAL MEDICINE

## 2024-08-28 ENCOUNTER — PATIENT MESSAGE (OUTPATIENT)
Dept: ALLERGY | Facility: CLINIC | Age: 39
End: 2024-08-28
Payer: COMMERCIAL

## 2024-08-28 LAB
IMMUNOLOGIST REVIEW: NORMAL
S PN DA SERO 19F IGG SER-MCNC: 3.5 MCG/ML
S PNEUM DA 1 IGG SER-MCNC: 2.9 MCG/ML
S PNEUM DA 10A IGG SER-MCNC: 25.3 MCG/ML
S PNEUM DA 11A IGG SER-MCNC: 5.1 MCG/ML
S PNEUM DA 12F IGG SER-MCNC: 0.3 MCG/ML
S PNEUM DA 14 IGG SER-MCNC: NORMAL MCG/ML
S PNEUM DA 15B IGG SER-MCNC: 12.7 MCG/ML
S PNEUM DA 17F IGG SER-MCNC: 2.6 MCG/ML
S PNEUM DA 18C IGG SER-MCNC: 1.4 MCG/ML
S PNEUM DA 19A IGG SER-MCNC: 25.1 MCG/ML
S PNEUM DA 2 IGG SER-MCNC: 6.2 MCG/ML
S PNEUM DA 20A IGG SER-MCNC: 1.6 MCG/ML
S PNEUM DA 22F IGG SER-MCNC: 23.1 MCG/ML
S PNEUM DA 23F IGG SER-MCNC: 20.4 MCG/ML
S PNEUM DA 3 IGG SER-MCNC: 0.6 MCG/ML
S PNEUM DA 33F IGG SER-MCNC: 6.2 MCG/ML
S PNEUM DA 4 IGG SER-MCNC: 2.7 MCG/ML
S PNEUM DA 5 IGG SER-MCNC: 0.8 MCG/ML
S PNEUM DA 6B IGG SER-MCNC: 58.5 MCG/ML
S PNEUM DA 7F IGG SER-MCNC: 2.5 MCG/ML
S PNEUM DA 8 IGG SER-MCNC: 7.4 MCG/ML
S PNEUM DA 9N IGG SER-MCNC: 0.6 MCG/ML
S PNEUM DA 9V IGG SER-MCNC: 1.8 MCG/ML

## 2024-08-29 ENCOUNTER — HOSPITAL ENCOUNTER (OUTPATIENT)
Dept: RADIOLOGY | Facility: OTHER | Age: 39
Discharge: HOME | End: 2024-08-29
Payer: COMMERCIAL

## 2024-08-29 DIAGNOSIS — N64.4 BREAST PAIN, RIGHT: ICD-10-CM

## 2024-08-29 PROCEDURE — 77066 DX MAMMO INCL CAD BI: CPT | Mod: 26,,, | Performed by: RADIOLOGY

## 2024-08-29 PROCEDURE — 77066 DX MAMMO INCL CAD BI: CPT | Mod: TC

## 2024-08-29 PROCEDURE — 77062 BREAST TOMOSYNTHESIS BI: CPT | Mod: TC

## 2024-08-29 PROCEDURE — 77062 BREAST TOMOSYNTHESIS BI: CPT | Mod: 26,,, | Performed by: RADIOLOGY

## 2024-10-09 ENCOUNTER — PATIENT MESSAGE (OUTPATIENT)
Dept: NEUROLOGY | Facility: CLINIC | Age: 39
End: 2024-10-09

## 2024-10-09 ENCOUNTER — OFFICE VISIT (OUTPATIENT)
Dept: NEUROLOGY | Facility: CLINIC | Age: 39
End: 2024-10-09
Payer: COMMERCIAL

## 2024-10-09 ENCOUNTER — PATIENT MESSAGE (OUTPATIENT)
Dept: NEUROLOGY | Facility: CLINIC | Age: 39
End: 2024-10-09
Payer: COMMERCIAL

## 2024-10-09 DIAGNOSIS — M79.18 MYOFASCIAL PAIN SYNDROME: ICD-10-CM

## 2024-10-09 DIAGNOSIS — G43.009 MIGRAINE WITHOUT AURA AND WITHOUT STATUS MIGRAINOSUS, NOT INTRACTABLE: ICD-10-CM

## 2024-10-09 DIAGNOSIS — R20.2 PARESTHESIAS: ICD-10-CM

## 2024-10-09 DIAGNOSIS — M48.02 SPINAL STENOSIS, CERVICAL REGION: Primary | ICD-10-CM

## 2024-10-09 DIAGNOSIS — G43.009 MIGRAINE WITHOUT AURA AND WITHOUT STATUS MIGRAINOSUS, NOT INTRACTABLE: Primary | ICD-10-CM

## 2024-10-09 DIAGNOSIS — R43.1 OLFACTORY AURA: ICD-10-CM

## 2024-10-09 PROCEDURE — 99214 OFFICE O/P EST MOD 30 MIN: CPT | Mod: 95,,, | Performed by: PSYCHIATRY & NEUROLOGY

## 2024-10-09 PROCEDURE — G2211 COMPLEX E/M VISIT ADD ON: HCPCS | Mod: 95,,, | Performed by: PSYCHIATRY & NEUROLOGY

## 2024-10-09 RX ORDER — RIMEGEPANT SULFATE 75 MG/75MG
75 TABLET, ORALLY DISINTEGRATING ORAL EVERY OTHER DAY
Qty: 45 TABLET | Refills: 3 | Status: SHIPPED | OUTPATIENT
Start: 2024-10-09

## 2024-10-09 NOTE — PROGRESS NOTES
Neurology Clinic F/U - Virtual Visit    Impression:  Frequent episodic migraine with/without visual aura with cervical myofascial component (and remote medication overuse - ibuprofen) and catamenial exacerbation  Olfactory auras: less bothersome and etiology uncertain.  possible underlying focal seizure disorder (hx CHI with possible seizure age 20)  Examination evidence of sensory polyneuropathy - small fiber involvement is possible  Multifocal muscle spasms, probably benign  Hx pre-syncope: nl tilt  Mild BLE hyperreflexia, non-pathologic    Plan:  Switch Nurtec ODT to qod for prophylaxis  Continue tizanidine 4mg qhs  Continue gabapentin 100mg - 200mg qhs   Naratriptan prn  Continue PT  Limit caffeine  F/U with Pain Management (Dr. Cordero)  Ambulatory EEG if olfactory symptoms worsen  Re-examine for sensory PN at next in-person visit  RTC 4 months    Problem List Items Addressed This Visit          1 - High    Migraine without aura and without status migrainosus, not intractable - Primary       2     Myofascial pain syndrome    Olfactory aura    Paresthesias     Patient seen virtually for f/u.    HAs - Freq has increased to 10-15/month and more frequent around menses.  Nurtec prn is very effective.  She takes tizanidine 4mg and gabapentin 100mg nightly; the former helps with neck pain and the latter with insomnia.       Olfactory auras are very brief (seconds) intermittent and not a bother.  EEG was normal.     MRA neck/brain were nl    Abortives:   Effective: naratriptan, Nurtec ODT   Ineffective/not tolerated: ibuprofen (overuse), Tylenol  Prophylactics:   Ineffective/not tolerated:  Flexeril (sedation)   Unknown: topiramate   Effective: Botox, tizanidine 4mg, gabapentin      HA freq:  /30 and /30  (prior 9/30 and 0/30)         No data to display              Running history:  HA freq:  9/30 and 0/30    In her 20s while in [place] she had possible seizures in an elevator with head trauma.  Possible GHB      CC:   "Multiple neurological complaints    HPI:  36 y/o WF referred by GINGER Lagos for evaluation of headaches and multiple other complaints.      She reports years of B shoulder itching that resolved and then returned.   R shoulder numbness over a few weeks.  Intermittent and lasts days/hours.  No radiation.   Also complains of tingling in R 4th/5th digits.  Not triggered by change in position. No weakness  Spasms "all over."  Thoracic bilateral, L trap, intrascapular.  Started months/years ago. Robaxin helps; a different agent caused sedation.   Years of great toe numbness (R>L).  No family history.    She was having migrainous headaches years ago that went away. Headaches returned about a year ago.  Pain can be uni- or bilateral.  Typically starts in cervico-occipital region and radiates anteriorly.  Last headache yesterday.  No nausea, + photo/phonophobia.  + throbbing.  Untreated duration is hours to all day.   HA Freq 5/30 and 2/30    Dry eyes/mouth  No constipation  +Orthostasis  Nl perspiration  Denies bloating    Several years of intermittent olfactory auras ("bad smell"). No identifiable triggers.  In 20s had head trauma.  She isn't aware of all events but multiple syncopal spells.  Olfaction changed at that point.     MRI c-spine 11/6/20 shows mild spondylosis only with C45 and C67 bulges.   She had nl wrist brachial indices last week.        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     Unspecified vitamin D deficiency       Past Surgical History:   Procedure Laterality Date    BREAST BIOPSY Right 03/16/2015    beingn    EPIDURAL STEROID INJECTION N/A 10/04/2021    Procedure: INJECTION, STEROID, EPIDURAL C7/T1;  Surgeon: Cony Ahmadi MD;  Location: [facility];  Service: Pain " Management;  Laterality: N/A;  9/16 l/m    KNEE SURGERY Right     torn meniscus    SHOULDER ARTHROSCOPY W/ SUPERIOR LABRAL ANTERIOR POSTERIOR LESION REPAIR Right 01/13/2022    Procedure: ARTHROSCOPY, SHOULDER, WITH INSTABILITY REPAIR;  Surgeon: Ann Anderson MD;  Location: [facility] OR;  Service: Orthopedics;  Laterality: Right;    TILT TABLE TEST N/A 09/15/2022    Procedure: TILT TABLE TEST;  Surgeon: RAMESH Ferguson MD;  Location: [facility] EP LAB;  Service: Cardiology;  Laterality: N/A;  orthostasis, Rule out POTS, Tilt table test, Dr. Warner       No outpatient medications have been marked as taking for the 10/9/24 encounter (Appointment) with Ivan Warner MD.     Current Facility-Administered Medications for the 10/9/24 encounter (Appointment) with Ivan Warner MD   Medication Dose Route Frequency Provider Last Rate Last Admin    levonorgestreL (MIRENA) 20 mcg/24 hours (6 yrs) 52 mg IUD 1 Intra Uterine Device  1 Intra Uterine Device Intrauterine  Band, Michelle DUMONT, DO   1 Intra Uterine Device at 06/11/21 0945      Review of patient's allergies indicates:   Allergen Reactions    Ceclor [cefaclor] Anaphylaxis, Swelling and Rash      Family History   Problem Relation Name Age of Onset    Hyperlipidemia Mother Derek Purdy     Hypertension Mother Derek Purdy     Hypertension Father William Carrillos     Hyperlipidemia Father William Purdy     Cancer Maternal Grandfather Nithin Cruz         lung    Dementia Paternal Grandmother      Breast cancer Neg Hx      Colon cancer Neg Hx      Ovarian cancer Neg Hx        Social History     Socioeconomic History    Marital status:      Spouse name: Mark    Number of children: 2   Occupational History    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     Social Drivers of Health     Financial Resource Strain: Low Risk  (11/11/2019)    Overall Financial Resource Strain (CARDIA)     Difficulty of Paying Living Expenses: Not very hard   Food Insecurity: No Food Insecurity (11/11/2019)    Hunger Vital Sign     Worried About Running Out of Food in the Last Year: Never true     Ran Out of Food in the Last Year: Never true   Transportation Needs: No Transportation Needs (11/11/2019)    PRAPARE - Transportation     Lack of Transportation (Medical): No     Lack of Transportation (Non-Medical): No   Physical Activity: Insufficiently Active (11/11/2019)    Exercise Vital Sign     Days of Exercise per Week: 1 day     Minutes of Exercise per Session: 60 min   Stress: Stress Concern Present (11/11/2019)    [employer] - Occupational Stress Questionnaire     Feeling of Stress : To some extent     There were no vitals taken for this visit.   Well developed, well nourished female     Awake, alert and appropriately oriented   Normal recent and remote memory   Normal attention and concentration   Normal speech and language   Normal fund of knowledge   EOMF without nystagmus   Facial movements intact      Data Reviewed:    Lab Results   Component Value Date    WBC 7.91 08/21/2024    HGB 14.0 08/21/2024    HCT 45.6 08/21/2024    MCV 88 08/21/2024     08/21/2024     CMP  Sodium   Date Value Ref Range Status   08/23/2024 139 136 - 145 mmol/L Final     Potassium   Date Value Ref Range Status   08/23/2024 4.6 3.5 - 5.1 mmol/L Final     Chloride   Date Value Ref Range Status   08/23/2024 105 95 - 110 mmol/L Final     CO2   Date Value Ref Range Status   08/23/2024 25 23 - 29 mmol/L Final     Glucose   Date Value Ref Range Status   08/23/2024 98 70 - 110 mg/dL Final     BUN   Date Value Ref Range Status  "  08/23/2024 12 6 - 20 mg/dL Final     Creatinine   Date Value Ref Range Status   08/23/2024 0.9 0.5 - 1.4 mg/dL Final     Calcium   Date Value Ref Range Status   08/23/2024 10.0 8.7 - 10.5 mg/dL Final     Total Protein   Date Value Ref Range Status   08/23/2024 7.1 6.0 - 8.4 g/dL Final     Albumin   Date Value Ref Range Status   08/23/2024 4.3 3.5 - 5.2 g/dL Final     Total Bilirubin   Date Value Ref Range Status   08/23/2024 0.7 0.1 - 1.0 mg/dL Final     Comment:     For infants and newborns, interpretation of results should be based  on gestational age, weight and in agreement with clinical  observations.    Premature Infant recommended reference ranges:  Up to 24 hours.............<8.0 mg/dL  Up to 48 hours............<12.0 mg/dL  3-5 days..................<15.0 mg/dL  6-29 days.................<15.0 mg/dL       Alkaline Phosphatase   Date Value Ref Range Status   08/23/2024 65 55 - 135 U/L Final     AST   Date Value Ref Range Status   08/23/2024 16 10 - 40 U/L Final     ALT   Date Value Ref Range Status   08/23/2024 10 10 - 44 U/L Final     Anion Gap   Date Value Ref Range Status   08/23/2024 9 8 - 16 mmol/L Final     eGFR if    Date Value Ref Range Status   12/29/2021 >60.0 >60 mL/min/1.73 m^2 Final     eGFR if non    Date Value Ref Range Status   12/29/2021 >60.0 >60 mL/min/1.73 m^2 Final     Comment:     Calculation used to obtain the estimated glomerular filtration  rate (eGFR) is the CKD-EPI equation.        Lab Results   Component Value Date    [id number] 1143 (H) 05/26/2021     Lab Results   Component Value Date    TSH 2.515 08/23/2024     No results found for: "CKTOTAL"    Lab Results   Component Value Date    SEDRATE 1 05/26/2021     The patient location is: [place]  The chief complaint leading to consultation is: Migraine    Visit type: audiovisual    Face to Face time with patient: 6 mins  20 minutes of total time spent on the encounter, which includes face to face time and " non-face to face time preparing to see the patient (eg, review of tests), Obtaining and/or reviewing separately obtained history, Documenting clinical information in the electronic or other health record, Independently interpreting results (not separately reported) and communicating results to the patient/family/caregiver, or Care coordination (not separately reported).     Each patient to whom he or she provides medical services by telemedicine is:  (1) informed of the relationship between the physician and patient and the respective role of any other health care provider with respect to management of the patient; and (2) notified that he or she may decline to receive medical services by telemedicine and may withdraw from such care at any time.        Ivan Warner MD

## 2024-10-23 RX ORDER — RIMEGEPANT SULFATE 75 MG/75MG
75 TABLET, ORALLY DISINTEGRATING ORAL EVERY OTHER DAY
Qty: 45 TABLET | Refills: 3 | Status: SHIPPED | OUTPATIENT
Start: 2024-10-23

## 2024-10-31 NOTE — TELEPHONE ENCOUNTER
I spoke to patient in regards to her appointment time on 09/28. Ms. Purdy agreed to come in at 9:45 am due to clinic not started until then.   1

## 2024-11-04 ENCOUNTER — PATIENT MESSAGE (OUTPATIENT)
Dept: INTERNAL MEDICINE | Facility: CLINIC | Age: 39
End: 2024-11-04
Payer: COMMERCIAL

## 2024-11-04 NOTE — TELEPHONE ENCOUNTER
Pt providing update on care from Dr. Schmid with attached visit summary and lab results -- will print and scan into chart as well

## 2024-11-07 ENCOUNTER — OFFICE VISIT (OUTPATIENT)
Dept: PAIN MEDICINE | Facility: CLINIC | Age: 39
End: 2024-11-07
Attending: ANESTHESIOLOGY
Payer: COMMERCIAL

## 2024-11-07 ENCOUNTER — TELEPHONE (OUTPATIENT)
Dept: PAIN MEDICINE | Facility: CLINIC | Age: 39
End: 2024-11-07

## 2024-11-07 DIAGNOSIS — G43.719 CHRONIC MIGRAINE WITHOUT AURA, INTRACTABLE, WITHOUT STATUS MIGRAINOSUS: Primary | ICD-10-CM

## 2024-11-07 PROCEDURE — 99999 PR PBB SHADOW E&M-EST. PATIENT-LVL III: CPT | Mod: PBBFAC,,, | Performed by: ANESTHESIOLOGY
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
## 2024-11-07 NOTE — TELEPHONE ENCOUNTER
Called and spoke to pt on the phone -- explained that since the question is related to external information that requires review, we will need to set her up for a virtual visit to have a discussion with Dr. Garcia -- pt ok'ed and message sent to PCP for override for a virtual in an atypical spot

## 2024-11-07 NOTE — TELEPHONE ENCOUNTER
Message from Dr. Garcia requested to have pt do virtual during non in-person clinic times -- attempted to call pt to let her know but no answer LVM about appointment times and put patient for 1pm on 11/20 unless pt wants sooner when calls back

## 2024-11-20 ENCOUNTER — TELEPHONE (OUTPATIENT)
Dept: INTERNAL MEDICINE | Facility: CLINIC | Age: 39
End: 2024-11-20
Payer: COMMERCIAL

## 2024-11-20 ENCOUNTER — OFFICE VISIT (OUTPATIENT)
Dept: INTERNAL MEDICINE | Facility: CLINIC | Age: 39
End: 2024-11-20
Payer: COMMERCIAL

## 2024-11-20 VITALS — HEIGHT: 64 IN | BODY MASS INDEX: 18.97 KG/M2 | WEIGHT: 111.13 LBS

## 2024-11-20 DIAGNOSIS — M54.12 SPINAL STENOSIS OF CERVICAL REGION WITH RADICULOPATHY: Primary | ICD-10-CM

## 2024-11-20 DIAGNOSIS — M48.02 SPINAL STENOSIS OF CERVICAL REGION WITH RADICULOPATHY: Primary | ICD-10-CM

## 2024-11-20 PROCEDURE — 3008F BODY MASS INDEX DOCD: CPT | Mod: CPTII,95,, | Performed by: INTERNAL MEDICINE

## 2024-11-20 PROCEDURE — 1159F MED LIST DOCD IN RCRD: CPT | Mod: CPTII,95,, | Performed by: INTERNAL MEDICINE

## 2024-11-20 PROCEDURE — G2211 COMPLEX E/M VISIT ADD ON: HCPCS | Mod: 95,,, | Performed by: INTERNAL MEDICINE

## 2024-11-20 PROCEDURE — 99213 OFFICE O/P EST LOW 20 MIN: CPT | Mod: 95,,, | Performed by: INTERNAL MEDICINE

## 2024-11-20 NOTE — TELEPHONE ENCOUNTER
----- Message from Noa sent at 11/20/2024 11:14 AM CST -----  Contact: [phone]  Would like to receive medical advice.    Would they like a call back or a response via [facility]:  call back    Additional information:  Karen is calling to speak tot he provider or staff. Pt states she was told recently the appt for today was going to be face to face not virtual anymore but the appt still shows up as a virtual visit. Please call Karen back for advice      Pt states she would like to know which one is correct

## 2024-11-20 NOTE — PROGRESS NOTES
The patient location is:  Patient Home   The chief complaint leading to consultation is:  Medication Management (Supplement recommendations from [facility] and review imaging)    Subjective:         HPI   Karen Zavala is a 39 y.o.  female who presents for Medication Management (Supplement recommendations from [facility] and review imaging)  .   History of Present Illness    Ms. Zavala presents today for follow-up on neck pain and headaches.    She reports chronic neck pain radiating to her shoulders and arms, accompanied by headaches originating from her neck. The pain has been progressively worsening for years, impacting her daily activities and worsening with exercise. Botox injections and Nurtec have reduced migraine frequency from nearly continuous to sporadic, but significant pain persists.    Recent cervical spine MRI reveals a 3 mm broad-based disc herniation at C4-C5 with  contact on the thecal sac and spinal stenosis measuring 8 mm with ventral cord contact. A fissure is noted in the lower cervical spine. X-ray demonstrates loss of cervical lordosis. A subchondral cyst is identified in the previously surgically treated shoulder.    She continues Botox injections for headache management and Nurtec for migraines, reporting partial symptom relief. She has increased her Vitamin D supplementation from 2,000 to 4,000 units as recommended by Dr. Hamilton, PMR physician at the [facility].    She expresses concern about her Mirena IUD potentially contributing to headaches and plans to consult her gynecologist regarding possible removal.      ROS:  Musculoskeletal: +neck pain  Neurological: +headache       Review of Systems   Constitutional:  Negative for activity change.   HENT:  Negative for hearing loss and trouble swallowing.    Eyes:  Negative for discharge.   Respiratory:  Negative for chest tightness and wheezing.    Cardiovascular:  Negative for chest pain and palpitations.    Gastrointestinal:  Negative for constipation, diarrhea and vomiting.   Genitourinary:  Negative for difficulty urinating and hematuria.   Musculoskeletal:  Positive for arthralgias and neck pain.   Neurological:  Positive for headaches.   Psychiatric/Behavioral:  Negative for dysphoric mood.      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    Chronic pain disorder    Myofascial pain syndrome    Migraine without aura and without status migrainosus, not intractable    Olfactory aura    Paresthesias    Postural dizziness with presyncope    Chronic pain    Migraine with aura    Glenoid labral tear, right, subsequent encounter    Muscle weakness of right upper extremity    Neck pain    Strep pharyngitis    Hypermobile joints    Chronic pain of both shoulders    History of pneumothorax    IgG subclass deficiency    Attention deficit disorder (ADD) without hyperactivity       Past Medical History:   Diagnosis Date    Allergy     Anxiety     Cystic fibrosis carrier     Pneumonia     frequent bouts    Recurrent upper respiratory infection (URI)     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     Unspecified vitamin D deficiency        Past Surgical History:   Procedure Laterality Date    BREAST BIOPSY Right 03/16/2015    beingn    EPIDURAL STEROID INJECTION N/A 10/04/2021    Procedure: INJECTION, STEROID, EPIDURAL C7/T1;  Surgeon: Cony Ahmadi MD;  Location: [facility];  Service: Pain Management;  Laterality: N/A;  9/16 l/m    KNEE SURGERY Right     torn meniscus    SHOULDER ARTHROSCOPY W/ SUPERIOR LABRAL ANTERIOR POSTERIOR LESION REPAIR Right 01/13/2022    Procedure: ARTHROSCOPY, SHOULDER, WITH  "INSTABILITY REPAIR;  Surgeon: Ann Anderson MD;  Location: [facility] OR;  Service: Orthopedics;  Laterality: Right;    TILT TABLE TEST N/A 09/15/2022    Procedure: TILT TABLE TEST;  Surgeon: RAMESH Ferguson MD;  Location: [facility] EP LAB;  Service: Cardiology;  Laterality: N/A;  orthostasis, Rule out POTS, Tilt table test, Dr. Warner,        Family History   Problem Relation Name Age of Onset    Hyperlipidemia Mother Derek Purdy     Hypertension Mother Derek Purdy     Hypertension Father William Purdy     Hyperlipidemia Father William Purdy     Cancer Maternal Grandfather Nithin Cruz         lung    Dementia Paternal Grandmother      Breast cancer Neg Hx      Colon cancer Neg Hx      Ovarian cancer Neg Hx         Social History     Tobacco Use    Smoking status: Never     Passive exposure: Never    Smokeless tobacco: Never   Substance Use Topics    Alcohol use: Not Currently     Alcohol/week: 1.0 standard drink of alcohol     Types: 1 Unspecified drink type per week     Comment: occ - 1/mo    Drug use: No       Objective:   Height 5' 4" (1.626 m), weight 50.4 kg (111 lb 1.8 oz), last menstrual period 11/06/2024.     Physical Exam  Constitutional:       General: not in acute distress.     Appearance:  well-developed.   not diaphoretic.   HENT:      Head: Normocephalic and atraumatic.   Eyes:      General: No scleral icterus.        Right eye: No discharge.         Left eye: No discharge.   Pulmonary:      Effort: Pulmonary effort is normal. No respiratory distress.   Skin:     Coloration: Skin is not pale.      Findings: No erythema.   Neurological:      Mental Status:  alert and oriented to person, place, and time.   Psychiatric:         Behavior: Behavior normal.         Thought Content: Thought content normal.     Physical Exam                  Prior labs reviewed  Assessment/Plan:       1. Spinal stenosis of cervical region with radiculopathy  -     Ambulatory referral/consult to Neurosurgery; Future; " Expected date: 11/27/2024      Assessment & Plan    - Reviewed lab results, noting only minor abnormalities  - Assessed imaging results, including MRI findings of C4-C5 3mm broad-based disc herniation with ventral contact on thecal sac and spinal stenosis of 8mm with ventral cord contact  - Considered C4-C5 disc herniation as likely cause of chronic headaches and neck pain  - Evaluated current treatment approach, including Botox injections and Nurtec for migraine management  - Noted loss of cervical spine curvature as a contributing factor to patient's discomfort  - Acknowledged presence of subchondral cyst in shoulder, deemed not particularly concerning    CERVICAL DISC DISPLACEMENT AND DEGENERATION:  - Discussed general approaches to managing spinal issues, including non-invasive and surgical options.  - Provided brief overview of minimally invasive disc procedures and potential outcomes.  - Referred to neurosurgery for evaluation of minimally invasive surgical options for cervical disc herniation.    CHRONIC HEADACHE AND MIGRAINE:  - Explained connection between C4 area nerve distribution and chronic headaches.  - Continued Nurtec for migraine management.      FOLLOW-UP:  - Follow up with Dr. David regarding shoulder cyst.  - Follow up with gynecologist regarding potential IUD removal and its relation to headaches.             Visit type: Virtual visit with synchronous audio and video    Total time spent with patient: 24 minutes    Visit today included increased complexity associated with the care of the episodic problems and addressed and managing the longitudinal care of the patient due to the serious and/or complex managed problem(s) as above.     Each patient to whom he or she provides medical services by telemedicine is:  (1) informed of the relationship between the physician and patient and the respective role of any other health care provider with respect to management of the patient; and (2) notified that  he or she may decline to receive medical services by telemedicine and may withdraw from such care at any time.  Medication List with Changes/Refills   Current Medications    GABAPENTIN (NEURONTIN) 100 MG CAPSULE    Take 100 mg by mouth once daily.    NARATRIPTAN (AMERGE) 1 MG TAB    Take at onset of migraine, can repeat in 2 hrs if needed.  No more than twice per day or 3 days/wk.    RIMEGEPANT (NURTEC) 75 MG ODT    Take 1 tablet (75 mg total) by mouth every other day. Place ODT tablet on the tongue; alternatively the ODT tablet may be placed under the tongue    TIZANIDINE (ZANAFLEX) 2 MG TABLET    Take 1-2 tablets (2-4 mg total) by mouth every evening.    VYVANSE 30 MG CAPSULE    Take 30 mg by mouth every morning.       This note was generated with the assistance of ambient listening technology. Verbal consent was obtained by the patient and accompanying visitor(s) for the recording of patient appointment to facilitate this note. I attest to having reviewed and edited the generated note for accuracy, though some syntax or spelling errors may persist. Please contact the author of this note for any clarification.

## 2024-11-20 NOTE — TELEPHONE ENCOUNTER
Called and spoke to pt on the phone -- pt was confused as to whether or not virtual appointment was supposed to be virtual or in-person -- clarified and pt verbal understanding

## 2025-02-11 DIAGNOSIS — G43.009 MIGRAINE WITHOUT AURA AND WITHOUT STATUS MIGRAINOSUS, NOT INTRACTABLE: ICD-10-CM

## 2025-02-11 RX ORDER — TIZANIDINE 2 MG/1
TABLET ORAL
Qty: 180 TABLET | Refills: 2 | Status: SHIPPED | OUTPATIENT
Start: 2025-02-11

## 2025-02-13 ENCOUNTER — OFFICE VISIT (OUTPATIENT)
Dept: PAIN MEDICINE | Facility: CLINIC | Age: 40
End: 2025-02-13
Attending: ANESTHESIOLOGY
Payer: COMMERCIAL

## 2025-02-13 VITALS
BODY MASS INDEX: 19.07 KG/M2 | HEART RATE: 81 BPM | WEIGHT: 111.13 LBS | DIASTOLIC BLOOD PRESSURE: 77 MMHG | TEMPERATURE: 98 F | SYSTOLIC BLOOD PRESSURE: 116 MMHG

## 2025-02-13 DIAGNOSIS — M79.18 MYOFASCIAL PAIN SYNDROME: ICD-10-CM

## 2025-02-13 DIAGNOSIS — G43.719 CHRONIC MIGRAINE WITHOUT AURA, INTRACTABLE, WITHOUT STATUS MIGRAINOSUS: Primary | ICD-10-CM

## 2025-02-13 DIAGNOSIS — M48.02 CERVICAL SPINAL STENOSIS: ICD-10-CM

## 2025-02-13 DIAGNOSIS — G43.009 MIGRAINE WITHOUT AURA AND WITHOUT STATUS MIGRAINOSUS, NOT INTRACTABLE: ICD-10-CM

## 2025-02-13 DIAGNOSIS — G24.3 CERVICAL DYSTONIA: ICD-10-CM

## 2025-02-13 PROCEDURE — 99999 PR PBB SHADOW E&M-EST. PATIENT-LVL III: CPT | Mod: PBBFAC,,, | Performed by: ANESTHESIOLOGY
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
## 2025-02-14 ENCOUNTER — TELEPHONE (OUTPATIENT)
Dept: PAIN MEDICINE | Facility: CLINIC | Age: 40
End: 2025-02-14
Payer: COMMERCIAL

## 2025-02-14 DIAGNOSIS — G43.719 CHRONIC MIGRAINE WITHOUT AURA, INTRACTABLE, WITHOUT STATUS MIGRAINOSUS: Primary | ICD-10-CM

## 2025-04-07 ENCOUNTER — OFFICE VISIT (OUTPATIENT)
Dept: SPORTS MEDICINE | Facility: CLINIC | Age: 40
End: 2025-04-07
Payer: COMMERCIAL

## 2025-04-07 VITALS — DIASTOLIC BLOOD PRESSURE: 83 MMHG | SYSTOLIC BLOOD PRESSURE: 131 MMHG | HEART RATE: 68 BPM

## 2025-04-07 DIAGNOSIS — M48.02 CERVICAL STENOSIS OF SPINAL CANAL: ICD-10-CM

## 2025-04-07 DIAGNOSIS — M47.812 CERVICAL SPONDYLOSIS: ICD-10-CM

## 2025-04-07 DIAGNOSIS — M54.12 LEFT CERVICAL RADICULOPATHY: Primary | ICD-10-CM

## 2025-04-07 PROCEDURE — 3075F SYST BP GE 130 - 139MM HG: CPT | Mod: CPTII,S$GLB,, | Performed by: NEUROMUSCULOSKELETAL MEDICINE & OMM

## 2025-04-07 PROCEDURE — 3079F DIAST BP 80-89 MM HG: CPT | Mod: CPTII,S$GLB,, | Performed by: NEUROMUSCULOSKELETAL MEDICINE & OMM

## 2025-04-07 PROCEDURE — 99999 PR PBB SHADOW E&M-EST. PATIENT-LVL III: CPT | Mod: PBBFAC,,, | Performed by: NEUROMUSCULOSKELETAL MEDICINE & OMM

## 2025-04-07 PROCEDURE — 99214 OFFICE O/P EST MOD 30 MIN: CPT | Mod: S$GLB,,, | Performed by: NEUROMUSCULOSKELETAL MEDICINE & OMM

## 2025-04-07 PROCEDURE — 1160F RVW MEDS BY RX/DR IN RCRD: CPT | Mod: CPTII,S$GLB,, | Performed by: NEUROMUSCULOSKELETAL MEDICINE & OMM

## 2025-04-07 PROCEDURE — 1159F MED LIST DOCD IN RCRD: CPT | Mod: CPTII,S$GLB,, | Performed by: NEUROMUSCULOSKELETAL MEDICINE & OMM

## 2025-04-07 NOTE — PROGRESS NOTES
Subjective:     Karen Cruz Lucas    Chief Complaint   Patient presents with    Neck - Pain     HPI    Sandra Cruz is coming in today for neck pain. Pt reports she d/c her gabapentin several weeks ago due to cognitive issues side effects. Her neck pain flared up after a bike ride. She is doing home traction which helps. Pt. describes the pain as a 8/10 achy pain that does radiate down R UE. The pain is better with rest, gabapentin, traction, HEP and worse with activity, riding bike. Pt has been getting botox with Dr. Ahmadi which is very helpful for her headaches.  Patient notes that formal physical therapy in the past has also flared up her pain.  Pt. Denies any other musculoskeletal complaints at this time.     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 PAIN [facility]GT;  Service: Pain Management;  Laterality: N/A;  9/16 l/m    KNEE SURGERY Right     torn meniscus    SHOULDER ARTHROSCOPY W/ SUPERIOR LABRAL ANTERIOR POSTERIOR LESION REPAIR Right 01/13/2022    Procedure: ARTHROSCOPY, SHOULDER, WITH INSTABILITY REPAIR;  Surgeon: Ann Anderson MD;  Location: [facility] OR;  Service: Orthopedics;  Laterality: Right;    TILT TABLE TEST N/A 09/15/2022    Procedure: TILT TABLE TEST;  Surgeon: RAMESH Ferguson MD;  Location: [facility] EP LAB;  Service: Cardiology;  Laterality: N/A;  orthostasis, Rule out POTS, Tilt table test, Dr. Warner,      FAMILY HISTORY:   Family History   Problem  Relation Name Age of Onset    Hyperlipidemia Mother Derek Purdy     Hypertension Mother Derek Purdy     Hypertension Father William Purdy     Hyperlipidemia Father William Purdy     Cancer Maternal Grandfather Nithin Cruz         lung    Dementia Paternal Grandmother      Breast cancer Neg Hx      Colon cancer Neg Hx      Ovarian cancer Neg Hx       SOCIAL HISTORY: Social History[1]    MEDICATIONS: Current Medications[2]  ALLERGIES:   Review of patient's allergies indicates:   Allergen Reactions    Ceclor [cefaclor] Anaphylaxis, Swelling and Rash     Reviewed office visit on 2/13/25 with Dr. Ahmadi:  We discussed with the patient the assessment and recommendations. The following is the plan we agreed on:  Continue follow up with her neurology.  Follow up in 12 weeks for repeat, 200u Botox  Follow up with neurosurgery for MRI cervical spine findings. Can discuss further during clinic at next visit.      Procedure: Under clean technique and after discussing with the patient we mixed 200 units Botox with 50 U waste. We injected 150U in the midline proceruss, B , temporalis, masseters, splenius caps, L frontalis and R upper frontalis. She tolerated procedure well     Reviewed outside MRI 11/20/24:      Objective:     VITAL SIGNS: /83 (Patient Position: Sitting)   Pulse 68    General    Vitals reviewed.  Constitutional: She is oriented to person, place, and time. She appears well-developed and well-nourished.   Neurological: She is alert and oriented to person, place, and time.   Psychiatric: She has a normal mood and affect. Her behavior is normal.           MUSCULOSKELETAL EXAM:  Cervical Spine: bilateral cervical region    Observation:    Posture:  Anterior head carriage  No obvious shoulder un-leveling while standing.    No edema, erythema, or ecchymosis noted in cervical and thoraic spine regions.    No midline skin abnormalities.    No atrophy of upper limb musculature.  Gait: Non-antalgic      Tenderness:  No tenderness throughout the cervical spine upon spinous process palpation  No tenderness over the base of the occiput  No bony deformities or step-offs palpated.   + trigger point tenderness of left upper trapezius musculature    Range of Motion (* = with pain):  CERVICAL SPINE  decrased AROM in flexion, extension, sidebending, and rotation.    SHOULDER  Active flexion to 180° on left and 180° on right.  Active abduction to 180° on left and 180° on right.  Active internal rotation to T7 on left and T7 on right.    Active external rotation to T4 on left and T7 on right.    No scapular dyskinesia or winging.    Strength Testing (* = with pain):  Sternocleidomastoid - 5/5 on left and 5/5 on right  Upper trapezius-  5/5 on left and 5/5 on right  Deltoid - 5/5 on left and 5/5 on right  Biceps - 5/5 on left and 5/5 on right  Triceps - 5/5 on left and 5/5 on right  Wrist extension - 5/5 on left and 5/5 on right  Wrist flexion - 5/5 on left and 5/5 on right   - 5/5 on left and 5/5 on right  Finger extension - 5/5 on left and 5/5 on right  Finger abduction - 5/5 on left and 5/5 on right    Neurovascular Exam:  Spurling's - positive on left for C5 radicular symptoms and negative on right  Lhermitte's Sign - positive  Seated straight leg raise - negative on left and negative on right  Sensation intact to light touch in the C5-T1 dermatomes bilaterally.   Intact and symmetric radial pulses bilaterally.    Assessment:      Encounter Diagnoses   Name Primary?    Left cervical radiculopathy Yes    Cervical spondylosis     Cervical stenosis of spinal canal           Plan:   1. Acute flare of chronic neck pain with associated left C5 radicular symptoms on exam today and underlying cervical spondylosis and stenosis on previous MRI.  Recommend follow up with Dr. Ahmadi for further evaluation and possible cervical spine injection interventions, as patient is unable to tolerate gabapentin medication long term.    -  previous cervical spine MRI from 03/19/2022 noted mild cervical spondylosis worse at C4-5 resulting in mild spinal canal stenosis  - updated cervical spine outside imaging report noted straightening of cervical lordosis, C3-4 3 mm disc herniation with spinal stenosis, and C6-7 annular fissure    2.Follow-up in Dr. Ahmadi for consultation for cervical spine injection interventions, as discussed above.      3. Patient agreeable to today's plan and all questions were answered    This note is dictated using the M*Modal Fluency Direct word recognition program. There are word recognition mistakes that are occasionally missed on review.             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     Social Drivers of Health     Financial Resource Strain: Low Risk  (11/20/2024)    Overall Financial Resource Strain (CARDIA)     Difficulty of Paying Living Expenses: Not very hard   Food Insecurity: No Food Insecurity (11/20/2024)    Hunger Vital Sign     Worried About Running Out of Food in the Last Year: Never true     Ran Out of Food in the Last Year: Never true   Transportation Needs: No Transportation Needs (11/11/2019)    PRAPARE - Transportation     Lack of Transportation (Medical): No     Lack of Transportation (Non-Medical): No   Physical Activity: Insufficiently Active (11/20/2024)    Exercise Vital Sign     Days of Exercise per Week: 3 days     Minutes of Exercise per Session: 30 min   Stress: Stress Concern Present  (11/20/2024)    [employer] [place] [employer] - Occupational Stress Questionnaire     Feeling of Stress : To some extent   Housing Stability: Unknown (11/20/2024)    Housing Stability Vital Sign     Unable to Pay for Housing in the Last Year: No   [2]   Current Outpatient Medications:     rimegepant (NURTEC) 75 mg odt, Take 1 tablet (75 mg total) by mouth every other day. Place ODT tablet on the tongue; alternatively the ODT tablet may be placed under the tongue, Disp: 45 tablet, Rfl: 3    tiZANidine (ZANAFLEX) 2 MG tablet, TAKE 1 TO 2 TABLETS(2 TO 4 MG) BY MOUTH EVERY EVENING, Disp: 180 tablet, Rfl: 2    VYVANSE 30 mg capsule, Take 30 mg by mouth every morning., Disp: , Rfl:     gabapentin (NEURONTIN) 100 MG capsule, Take 100 mg by mouth once daily., Disp: , Rfl:     naratriptan (AMERGE) 1 MG Tab, Take at onset of migraine, can repeat in 2 hrs if needed.  No more than twice per day or 3 days/wk. (Patient not taking: Reported on 11/20/2024), Disp: 12 tablet, Rfl: 0    Current Facility-Administered Medications:     levonorgestreL (MIRENA) 20 mcg/24 hours (6 yrs) 52 mg IUD 1 Intra Uterine Device, 1 Intra Uterine Device, Intrauterine, , Band, Michelle DUMONT DO, 1 Intra Uterine Device at 06/11/21 0942

## 2025-04-10 ENCOUNTER — OFFICE VISIT (OUTPATIENT)
Dept: PAIN MEDICINE | Facility: CLINIC | Age: 40
End: 2025-04-10
Attending: ANESTHESIOLOGY
Payer: COMMERCIAL

## 2025-04-10 VITALS
BODY MASS INDEX: 18.47 KG/M2 | DIASTOLIC BLOOD PRESSURE: 59 MMHG | TEMPERATURE: 98 F | HEART RATE: 83 BPM | WEIGHT: 107.56 LBS | SYSTOLIC BLOOD PRESSURE: 107 MMHG

## 2025-04-10 DIAGNOSIS — M54.2 NECK PAIN: Primary | ICD-10-CM

## 2025-04-10 DIAGNOSIS — M54.12 CERVICAL RADICULOPATHY: ICD-10-CM

## 2025-04-10 DIAGNOSIS — M79.18 MYOFASCIAL PAIN SYNDROME: ICD-10-CM

## 2025-04-10 DIAGNOSIS — G89.4 CHRONIC PAIN SYNDROME: ICD-10-CM

## 2025-04-10 PROCEDURE — 3078F DIAST BP <80 MM HG: CPT | Mod: CPTII,S$GLB,, | Performed by: ANESTHESIOLOGY

## 2025-04-10 PROCEDURE — 99214 OFFICE O/P EST MOD 30 MIN: CPT | Mod: S$GLB,,, | Performed by: ANESTHESIOLOGY

## 2025-04-10 PROCEDURE — 3008F BODY MASS INDEX DOCD: CPT | Mod: CPTII,S$GLB,, | Performed by: ANESTHESIOLOGY

## 2025-04-10 PROCEDURE — 99999 PR PBB SHADOW E&M-EST. PATIENT-LVL III: CPT | Mod: PBBFAC,,, | Performed by: ANESTHESIOLOGY

## 2025-04-10 PROCEDURE — 1159F MED LIST DOCD IN RCRD: CPT | Mod: CPTII,S$GLB,, | Performed by: ANESTHESIOLOGY

## 2025-04-10 PROCEDURE — 3074F SYST BP LT 130 MM HG: CPT | Mod: CPTII,S$GLB,, | Performed by: ANESTHESIOLOGY

## 2025-04-10 PROCEDURE — 1160F RVW MEDS BY RX/DR IN RCRD: CPT | Mod: CPTII,S$GLB,, | Performed by: ANESTHESIOLOGY

## 2025-04-10 RX ORDER — PREGABALIN 50 MG/1
CAPSULE ORAL
Qty: 90 CAPSULE | Refills: 1 | Status: CANCELLED | OUTPATIENT
Start: 2025-04-10

## 2025-04-10 RX ORDER — PREGABALIN 50 MG/1
CAPSULE ORAL
Qty: 90 CAPSULE | Refills: 6 | Status: SHIPPED | OUTPATIENT
Start: 2025-04-10

## 2025-04-10 RX ORDER — PREGABALIN 75 MG/1
75 CAPSULE ORAL 2 TIMES DAILY
Qty: 60 CAPSULE | Refills: 6 | Status: CANCELLED | OUTPATIENT
Start: 2025-04-10 | End: 2025-10-09
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
## 2025-04-11 DIAGNOSIS — M54.12 CERVICAL RADICULOPATHY: Primary | ICD-10-CM

## 2025-04-15 ENCOUNTER — PATIENT MESSAGE (OUTPATIENT)
Dept: PAIN MEDICINE | Facility: OTHER | Age: 40
End: 2025-04-15
Payer: COMMERCIAL

## 2025-04-21 ENCOUNTER — PATIENT MESSAGE (OUTPATIENT)
Dept: PAIN MEDICINE | Facility: OTHER | Age: 40
End: 2025-04-21
Payer: COMMERCIAL

## 2025-04-24 ENCOUNTER — PATIENT MESSAGE (OUTPATIENT)
Dept: PAIN MEDICINE | Facility: OTHER | Age: 40
End: 2025-04-24
Payer: COMMERCIAL

## 2025-04-28 ENCOUNTER — HOSPITAL ENCOUNTER (OUTPATIENT)
Facility: OTHER | Age: 40
Discharge: HOME | End: 2025-04-28
Attending: ANESTHESIOLOGY | Admitting: ANESTHESIOLOGY
Payer: COMMERCIAL

## 2025-04-28 VITALS
DIASTOLIC BLOOD PRESSURE: 79 MMHG | BODY MASS INDEX: 18.78 KG/M2 | TEMPERATURE: 98 F | RESPIRATION RATE: 16 BRPM | OXYGEN SATURATION: 99 % | HEIGHT: 64 IN | HEART RATE: 70 BPM | SYSTOLIC BLOOD PRESSURE: 134 MMHG | WEIGHT: 110 LBS

## 2025-04-28 DIAGNOSIS — G89.29 CHRONIC PAIN: ICD-10-CM

## 2025-04-28 DIAGNOSIS — M54.12 CERVICAL RADICULOPATHY: Primary | ICD-10-CM

## 2025-04-28 PROCEDURE — 63600175 PHARM REV CODE 636 W HCPCS: Performed by: ANESTHESIOLOGY

## 2025-04-28 PROCEDURE — 62321 NJX INTERLAMINAR CRV/THRC: CPT | Mod: ,,, | Performed by: ANESTHESIOLOGY

## 2025-04-28 PROCEDURE — 62321 NJX INTERLAMINAR CRV/THRC: CPT | Performed by: ANESTHESIOLOGY

## 2025-04-28 RX ORDER — FENTANYL CITRATE 50 UG/ML
INJECTION, SOLUTION INTRAMUSCULAR; INTRAVENOUS
Status: DISCONTINUED | OUTPATIENT
Start: 2025-04-28 | End: 2025-04-28 | Stop reason: HOSPADM

## 2025-04-28 RX ORDER — SODIUM CHLORIDE 9 MG/ML
INJECTION, SOLUTION INTRAVENOUS CONTINUOUS
Status: DISCONTINUED | OUTPATIENT
Start: 2025-04-28 | End: 2025-04-28 | Stop reason: HOSPADM

## 2025-04-28 RX ORDER — MIDAZOLAM HYDROCHLORIDE 1 MG/ML
INJECTION INTRAMUSCULAR; INTRAVENOUS
Status: DISCONTINUED | OUTPATIENT
Start: 2025-04-28 | End: 2025-04-28 | Stop reason: HOSPADM

## 2025-04-28 NOTE — DISCHARGE SUMMARY
Discharge Note  Short Stay      SUMMARY     Admit Date: 4/28/2025    Attending Physician: Cony Ahmadi MD    Discharge Physician: Cony Ahmadi MD      Discharge Date: 4/28/2025 10:42 AM    Procedure(s) (LRB):  CERVICAL C7/T1 IL LOUISE (N/A)    Final Diagnosis:  Cervical radiculopathy [M54.12]      Disposition: Home or self care    Patient Instructions:   Current Discharge Medication List        CONTINUE these medications which have NOT CHANGED    Details   pregabalin (LYRICA) 50 MG capsule Take BID for one week, it well tolerated titrate up to TID.  Qty: 90 capsule, Refills: 6    Associated Diagnoses: Neck pain; Myofascial pain syndrome; Chronic pain syndrome; Cervical radiculopathy      rimegepant (NURTEC) 75 mg odt Take 1 tablet (75 mg total) by mouth every other day. Place ODT tablet on the tongue; alternatively the ODT tablet may be placed under the tongue  Qty: 45 tablet, Refills: 3    Associated Diagnoses: Migraine without aura and without status migrainosus, not intractable; Myofascial pain syndrome      tiZANidine (ZANAFLEX) 2 MG tablet TAKE 1 TO 2 TABLETS(2 TO 4 MG) BY MOUTH EVERY EVENING  Qty: 180 tablet, Refills: 2    Associated Diagnoses: Migraine without aura and without status migrainosus, not intractable      VYVANSE 30 mg capsule Take 30 mg by mouth every morning.                 Discharge Diagnosis: Same as above  Condition on Discharge: Stable with no complications to procedure   Diet on Discharge: Same as before.  Activity: as per instruction sheet.  Discharge to: Home with a responsible adult.  Follow up: 2-4 weeks       Please call my office or pager at [phone] if experienced any weakness or loss of sensation, fever > 101.5, pain uncontrolled with oral medications, persistent nausea/vomiting/or diarrhea, redness or drainage from the incisions, or any other worrisome concerns. If physician on call was not reached or could not communicate with our office for any reason please go to the nearest  emergency department     Peter Prather MD

## 2025-04-28 NOTE — DISCHARGE INSTRUCTIONS

## 2025-04-28 NOTE — DISCHARGE SUMMARY
Discharge Note  Short Stay      SUMMARY     Admit Date: 4/28/2025    Attending Physician: Cony Ahmadi      Discharge Physician: Cony Ahmadi      Discharge Date: 4/28/2025 10:50 AM    Procedure(s) (LRB):  CERVICAL C7/T1 IL LOUISE (N/A)    Final Diagnosis: Cervical radiculopathy [M54.12]    Disposition: Home or self care    Patient Instructions:   Current Discharge Medication List        CONTINUE these medications which have NOT CHANGED    Details   pregabalin (LYRICA) 50 MG capsule Take BID for one week, it well tolerated titrate up to TID.  Qty: 90 capsule, Refills: 6    Associated Diagnoses: Neck pain; Myofascial pain syndrome; Chronic pain syndrome; Cervical radiculopathy      rimegepant (NURTEC) 75 mg odt Take 1 tablet (75 mg total) by mouth every other day. Place ODT tablet on the tongue; alternatively the ODT tablet may be placed under the tongue  Qty: 45 tablet, Refills: 3    Associated Diagnoses: Migraine without aura and without status migrainosus, not intractable; Myofascial pain syndrome      tiZANidine (ZANAFLEX) 2 MG tablet TAKE 1 TO 2 TABLETS(2 TO 4 MG) BY MOUTH EVERY EVENING  Qty: 180 tablet, Refills: 2    Associated Diagnoses: Migraine without aura and without status migrainosus, not intractable      VYVANSE 30 mg capsule Take 30 mg by mouth every morning.                 Discharge Diagnosis: Cervical radiculopathy [M54.12]  Condition on Discharge: Stable with no complications to procedure   Diet on Discharge: Same as before.  Activity: as per instruction sheet.  Discharge to: Home with a responsible adult.  Follow up: 2-4 weeks       Please call my office or pager at [phone] if experienced any weakness or loss of sensation, fever > 101.5, pain uncontrolled with oral medications, persistent nausea/vomiting/or diarrhea, redness or drainage from the incisions, or any other worrisome concerns. If physician on call was not reached or could not communicate with our office for any reason please go to the  nearest emergency department

## 2025-04-28 NOTE — OP NOTE
Cervical Interlaminar Epidural Steroid Injection under Fluoroscopic Guidance    The procedure, risks, benefits, and options were discussed with the patient. There are no contraindications to the procedure. The patent expressed understanding and agreed to the procedure. Informed written consent was obtained prior to the start of the procedure and can be found in the patient's chart.     PATIENT NAME: Karen Zavala   MRN: [id number]     DATE OF PROCEDURE: 04/28/2025    PROCEDURE: Cervical Interlaminar Epidural Steroid Injection C7/T1 under Fluoroscopic Guidance    PRE-OP DIAGNOSIS: Cervical radiculopathy [M54.12] Cervical radiculopathy [M54.12]    POST-OP DIAGNOSIS: Same    PHYSICIAN: Cony Ahmadi MD     ASSISTANTS: Peter Prather MD [employer] Pain Fellow       MEDICATIONS INJECTED: Preservative-free Decadron 10mg with 1cc of Lidocaine 1% MPF and preservative free normal saline    LOCAL ANESTHETIC INJECTED: Xylocaine 2%     SEDATION: Versed 2mg and Fentanyl 25mcg                                                                                                                                                                                     Conscious sedation ordered by M.D. Patient re-evaluation prior to administration of conscious sedation. No changes noted in patient's status from initial evaluation. The patient's vital signs were monitored by RN and patient remained hemodynamically stable throughout the procedure.    Event Time In   Sedation Start 1045   Sedation End 1049       ESTIMATED BLOOD LOSS: None    COMPLICATIONS: None    TECHNIQUE: Time-out was performed to identify the patient and procedure to be performed. With the patient laying in a prone position, the surgical area was prepped and draped in the usual sterile fashion using ChloraPrep and a fenestrated drape. The level was determined under fluoroscopy guidance. Skin anesthesia was achieved by injecting Lidocaine 2% over the injection site.  The interlaminar space  was then approached with a 20 gauge, 3.5 inch Tuohy needle that was introduced under fluoroscopic guidance with AP, lateral and/or contralateral oblique imaging. Once the Ligamentum flavum was encountered loss of resistance to saline was used to enter the epidural space. With positive loss of resistance and negative aspiration for CSF or Blood, contrast dye  Omnipaque (300mg/mL) was injected to confirm placement and there was no vascular runoff. Then 2 mL of the medication mixture listed above was then injected slowly. Displacement of the radio opaque contrast after injection of the medication confirmed that the medication went into the area of the epidural space. The needles were removed, and bleeding was nil. A sterile dressing was applied. No specimens collected. The patient tolerated the procedure well.     PRE-PROCEDURE PAIN SCORE: 4-9/10    POST-PROCEDURE PAIN SCORE: 0/10    The patient was monitored after the procedure in the recovery area. They were given post-procedure and discharge instructions to follow at home. The patient was discharged in a stable condition.    Peter Prather MD     I reviewed and edited the fellow's note. I conducted my own interview and physical examination. I agree with the findings. I was present and supervising all critical portions of the procedure.

## 2025-05-12 ENCOUNTER — PATIENT MESSAGE (OUTPATIENT)
Dept: PAIN MEDICINE | Facility: CLINIC | Age: 40
End: 2025-05-12
Payer: COMMERCIAL

## 2025-05-15 ENCOUNTER — TELEPHONE (OUTPATIENT)
Dept: PAIN MEDICINE | Facility: CLINIC | Age: 40
End: 2025-05-15

## 2025-05-15 ENCOUNTER — OFFICE VISIT (OUTPATIENT)
Dept: PAIN MEDICINE | Facility: CLINIC | Age: 40
End: 2025-05-15
Attending: ANESTHESIOLOGY
Payer: COMMERCIAL

## 2025-05-15 VITALS
RESPIRATION RATE: 15 BRPM | WEIGHT: 110 LBS | BODY MASS INDEX: 18.88 KG/M2 | DIASTOLIC BLOOD PRESSURE: 61 MMHG | TEMPERATURE: 98 F | OXYGEN SATURATION: 100 % | SYSTOLIC BLOOD PRESSURE: 120 MMHG | HEART RATE: 69 BPM

## 2025-05-15 DIAGNOSIS — G24.3 ISOLATED CERVICAL DYSTONIA: ICD-10-CM

## 2025-05-15 DIAGNOSIS — G43.719 CHRONIC MIGRAINE WITHOUT AURA, INTRACTABLE, WITHOUT STATUS MIGRAINOSUS: Primary | ICD-10-CM

## 2025-05-15 DIAGNOSIS — G24.3 CERVICAL DYSTONIA: Primary | ICD-10-CM

## 2025-05-15 PROCEDURE — 99999 PR PBB SHADOW E&M-EST. PATIENT-LVL IV: CPT | Mod: PBBFAC,,, | Performed by: ANESTHESIOLOGY

## 2025-05-15 NOTE — PROGRESS NOTES
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 with the fellow's note as stated above.

## 2025-05-15 NOTE — TELEPHONE ENCOUNTER
Staff assisted patient with her concern in the clinic. Patient was given a paper specificing where she received Botox injections

## 2025-05-15 NOTE — TELEPHONE ENCOUNTER
----- Message from Jalyn sent at 5/15/2025  1:56 PM CDT -----  Regarding: information needed  Name of Who is Calling: Sandra What is the request in detail: Patient is requesting a call back to get some information about her botox that she received. She said she don't see it on the portal. Patient is walking back up.  Can the clinic reply by [facility]: Yes What Number to Call Back if not in [facility]:  [phone]

## 2025-06-12 ENCOUNTER — OFFICE VISIT (OUTPATIENT)
Dept: NEUROLOGY | Facility: CLINIC | Age: 40
End: 2025-06-12
Payer: COMMERCIAL

## 2025-06-12 VITALS
HEART RATE: 87 BPM | DIASTOLIC BLOOD PRESSURE: 74 MMHG | WEIGHT: 110 LBS | SYSTOLIC BLOOD PRESSURE: 120 MMHG | BODY MASS INDEX: 18.78 KG/M2 | HEIGHT: 64 IN

## 2025-06-12 DIAGNOSIS — R43.1 OLFACTORY AURA: ICD-10-CM

## 2025-06-12 DIAGNOSIS — M48.02 SPINAL STENOSIS, CERVICAL REGION: ICD-10-CM

## 2025-06-12 DIAGNOSIS — G43.009 MIGRAINE WITHOUT AURA AND WITHOUT STATUS MIGRAINOSUS, NOT INTRACTABLE: Primary | ICD-10-CM

## 2025-06-12 DIAGNOSIS — M79.18 MYOFASCIAL PAIN SYNDROME: ICD-10-CM

## 2025-06-12 PROCEDURE — 99999 PR PBB SHADOW E&M-EST. PATIENT-LVL III: CPT | Mod: PBBFAC,,, | Performed by: PSYCHIATRY & NEUROLOGY

## 2025-06-12 PROCEDURE — 99214 OFFICE O/P EST MOD 30 MIN: CPT | Mod: S$GLB,,, | Performed by: PSYCHIATRY & NEUROLOGY

## 2025-06-12 PROCEDURE — 3074F SYST BP LT 130 MM HG: CPT | Mod: CPTII,S$GLB,, | Performed by: PSYCHIATRY & NEUROLOGY

## 2025-06-12 PROCEDURE — 1159F MED LIST DOCD IN RCRD: CPT | Mod: CPTII,S$GLB,, | Performed by: PSYCHIATRY & NEUROLOGY

## 2025-06-12 PROCEDURE — 3078F DIAST BP <80 MM HG: CPT | Mod: CPTII,S$GLB,, | Performed by: PSYCHIATRY & NEUROLOGY

## 2025-06-12 PROCEDURE — 3008F BODY MASS INDEX DOCD: CPT | Mod: CPTII,S$GLB,, | Performed by: PSYCHIATRY & NEUROLOGY

## 2025-06-12 PROCEDURE — 1160F RVW MEDS BY RX/DR IN RCRD: CPT | Mod: CPTII,S$GLB,, | Performed by: PSYCHIATRY & NEUROLOGY

## 2025-06-12 NOTE — PROGRESS NOTES
Neurology Clinic F/U    Impression:  Frequent episodic migraine with/without visual aura with cervical myofascial component (and remote medication overuse - ibuprofen) and catamenial exacerbation: responsive to Nurtec ODT prophylaxis  Cervical spondylosis/stenosis without myelopathy  Olfactory auras: less bothersome and etiology uncertain.  possible underlying focal seizure disorder (hx CHI with possible seizure age 20)  Examination evidence of sensory polyneuropathy - small fiber involvement is possible  Multifocal muscle spasms, probably benign  Hx pre-syncope: nl tilt  Mild BLE hyperreflexia, non-pathologic    Plan:  Continue Nurtec ODT to qod for prophylaxis  Continue tizanidine 4mg qhs  Try naratriptan prn  Limit caffeine  RTC 6 months or sooner, prn    Problem List Items Addressed This Visit          1 - High    Migraine without aura and without status migrainosus, not intractable - Primary       2     Myofascial pain syndrome    Olfactory aura     Other Visit Diagnoses         Spinal stenosis, cervical region                Patient seen for f/u.  She had L sided cervical/shoulder pain and was diagnosed with L shoulder subluxation.    MRI c-spine with flexion in 11/15/24 - 3mm disc C34, C45 and fissure C67    HAs - Nurtec prophylaxis has helped HA severity.  Tizanidine continues to be helpful.  She stopped gabapentin (cognitive side effects).    Still with olfactory auras are very brief (seconds) intermittent and not a bother.  EEG was normal.       Abortives:   Effective: naratriptan, Nurtec ODT   Ineffective/not tolerated: ibuprofen (overuse), Tylenol  Prophylactics:   Ineffective/not tolerated:  Nurtec, Flexeril (sedation), gabapentin (cognitive side effects)   Unknown: topiramate   Effective: Botox, tizanidine 4mg, Nurtec qod      HA freq:  30/30 (mild)  4/30 and 0/30          No data to display              Running history:  HAs - Freq has increased to 10-15/month and more frequent around menses.  Nurtec  "prn is very effective.  She takes tizanidine 4mg and gabapentin 100mg nightly; the former helps with neck pain and the latter with insomnia.       Olfactory auras are very brief (seconds) intermittent and not a bother.  EEG was normal.     MRA neck/brain were nl  ........................................................    HA freq:  9/30 and 0/30    In her 20s while in [place] she had possible seizures in an elevator with head trauma.  Possible GHB      CC:  Multiple neurological complaints    HPI:  34 y/o WF referred by GINGER Lagos for evaluation of headaches and multiple other complaints.      She reports years of B shoulder itching that resolved and then returned.   R shoulder numbness over a few weeks.  Intermittent and lasts days/hours.  No radiation.   Also complains of tingling in R 4th/5th digits.  Not triggered by change in position. No weakness  Spasms "all over."  Thoracic bilateral, L trap, intrascapular.  Started months/years ago. Robaxin helps; a different agent caused sedation.   Years of great toe numbness (R>L).  No family history.    She was having migrainous headaches years ago that went away. Headaches returned about a year ago.  Pain can be uni- or bilateral.  Typically starts in cervico-occipital region and radiates anteriorly.  Last headache yesterday.  No nausea, + photo/phonophobia.  + throbbing.  Untreated duration is hours to all day.   HA Freq 5/30 and 2/30    Dry eyes/mouth  No constipation  +Orthostasis  Nl perspiration  Denies bloating    Several years of intermittent olfactory auras ("bad smell"). No identifiable triggers.  In 20s had head trauma.  She isn't aware of all events but multiple syncopal spells.  Olfaction changed at that point.     MRI c-spine 11/6/20 shows mild spondylosis only with C45 and C67 bulges.   She had nl wrist brachial indices last week.        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     Unspecified vitamin D deficiency       Past Surgical History:   Procedure Laterality Date    BREAST BIOPSY Right 03/16/2015    beingn    EPIDURAL STEROID INJECTION N/A 10/04/2021    Procedure: INJECTION, STEROID, EPIDURAL C7/T1;  Surgeon: Cony Ahmadi MD;  Location: [facility];  Service: Pain Management;  Laterality: N/A;  9/16 l/m    INJECTION, EPIDURAL, SPINE, CERVICOTHORACIC, TRANSFORAMINAL APPROACH N/A 4/28/2025    Procedure: CERVICAL C7/T1 IL LOUISE;  Surgeon: Cony Ahmadi MD;  Location: [facility];  Service: Pain Management;  Laterality: N/A;  2 WK F/U HERMELINDO    KNEE SURGERY Right     torn meniscus    SHOULDER ARTHROSCOPY W/ SUPERIOR LABRAL ANTERIOR POSTERIOR LESION REPAIR Right 01/13/2022    Procedure: ARTHROSCOPY, SHOULDER, WITH INSTABILITY REPAIR;  Surgeon: Ann Anderson MD;  Location: [facility];  Service: Orthopedics;  Laterality: Right;    TILT TABLE TEST N/A 09/15/2022    Procedure: TILT TABLE TEST;  Surgeon: RAMESH Ferguson MD;  Location: [facility];  Service: Cardiology;  Laterality: N/A;  orthostasis, Rule out POTS, Tilt table test, Dr. Warner,       Outpatient Medications Marked as Taking for the 6/12/25 encounter (Office Visit) with Ivan Warner MD   Medication Sig Dispense Refill    lisdexamfetamine (VYVANSE) 20 MG capsule Take 1 Capsule by mouth Every morning in morning; (Maximum 70 milligrams Per Day) 30 capsule 0    rimegepant (NURTEC) 75 mg odt Take 1 tablet (75 mg total) by mouth every other day. Place ODT tablet on the tongue; alternatively the ODT tablet may be placed under the tongue 45 tablet 3    tiZANidine (ZANAFLEX) 2 MG tablet TAKE 1 TO 2 TABLETS(2 TO 4 MG) BY MOUTH EVERY EVENING 180 tablet 2     Current Facility-Administered Medications for the 6/12/25 encounter (Office Visit) with Ivan Warner MD   Medication  Dose Route Frequency Provider Last Rate Last Admin    levonorgestreL (MIRENA) 20 mcg/24 hours (6 yrs) 52 mg IUD 1 Intra Uterine Device  1 Intra Uterine Device Intrauterine  Band, Michelle DUMONT DO   1 Intra Uterine Device at 06/11/21 0991      Review of patient's allergies indicates:   Allergen Reactions    Ceclor [cefaclor] Anaphylaxis, Swelling and Rash      Family History   Problem Relation Name Age of Onset    Hyperlipidemia Mother Derek Purdy     Hypertension Mother Derek Purdy     Hypertension Father William Purdy     Hyperlipidemia Father William Purdy     Cancer Maternal Grandfather Nithin Cruz         lung    Dementia Paternal Grandmother      Breast cancer Neg Hx      Colon cancer Neg Hx      Ovarian cancer Neg Hx        Social History     Socioeconomic History    Marital status:      Spouse name: Mark    Number of children: 2   Occupational History    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     Social Drivers of Health     Financial Resource Strain: Low Risk  (11/20/2024)    Overall Financial Resource Strain (CARDIA)     Difficulty of Paying Living Expenses: Not very hard   Food Insecurity: No Food Insecurity (11/20/2024)    Hunger Vital Sign     Worried About Running Out of Food in the Last Year: Never true     Ran Out of Food in the Last Year: Never true   Transportation Needs: No Transportation Needs (11/11/2019)    PRAPARE - Transportation     Lack of Transportation (Medical): No     Lack of Transportation (Non-Medical): No   Physical Activity: Insufficiently Active (11/20/2024)    Exercise Vital Sign     Days of Exercise per Week:  "3 days     Minutes of Exercise per Session: 30 min   Stress: Stress Concern Present (11/20/2024)    [employer] - Occupational Stress Questionnaire     Feeling of Stress : To some extent   Housing Stability: Unknown (11/20/2024)    Housing Stability Vital Sign     Unable to Pay for Housing in the Last Year: No     /74 (BP Location: Right arm, Patient Position: Sitting)   Pulse 87   Ht 5' 4" (1.626 m)   Wt 49.9 kg (110 lb 0.2 oz)   BMI 18.88 kg/m²    Well developed, well nourished female   Awake, alert    Normal attention and concentration   Normal speech and language   Normal fund of knowledge   EOMF without nystagmus   Facial movements intact  Power nl  DTRs 2+  Gait steady    Data Reviewed:      30 mins chart review, face to face, documentation    Ivan Warner MD          "

## 2025-06-25 ENCOUNTER — PATIENT MESSAGE (OUTPATIENT)
Dept: INTERNAL MEDICINE | Facility: CLINIC | Age: 40
End: 2025-06-25
Payer: COMMERCIAL

## 2025-06-25 NOTE — TELEPHONE ENCOUNTER
LOV with Leah Garcia MD , 2024  Fyi, pt is traveling and believes she has strep throat. You found an  zpack in her luggage which she has started and seems to be helping. She wanted to make sure you were aware

## 2025-06-26 NOTE — TELEPHONE ENCOUNTER
Pt states her last round of labs with  was ok and the plan was to monitor the injections to make sure they are not occurring at an increasing rate

## 2025-07-13 ENCOUNTER — E-VISIT (OUTPATIENT)
Dept: INTERNAL MEDICINE | Facility: CLINIC | Age: 40
End: 2025-07-13
Payer: COMMERCIAL

## 2025-07-13 DIAGNOSIS — U07.1 COVID: Primary | ICD-10-CM

## 2025-07-14 ENCOUNTER — OFFICE VISIT (OUTPATIENT)
Dept: INTERNAL MEDICINE | Facility: CLINIC | Age: 40
End: 2025-07-14
Payer: COMMERCIAL

## 2025-07-14 ENCOUNTER — PATIENT MESSAGE (OUTPATIENT)
Dept: INTERNAL MEDICINE | Facility: CLINIC | Age: 40
End: 2025-07-14

## 2025-07-14 DIAGNOSIS — D80.3 IGG SUBCLASS DEFICIENCY: ICD-10-CM

## 2025-07-14 DIAGNOSIS — U07.1 COVID: Primary | ICD-10-CM

## 2025-07-14 PROCEDURE — 98005 SYNCH AUDIO-VIDEO EST LOW 20: CPT | Mod: 95,,, | Performed by: INTERNAL MEDICINE

## 2025-07-14 NOTE — PROGRESS NOTES
Subjective:       Patient ID: Karen Zavala is a 39 y.o. female.    Chief Complaint: No chief complaint on file.      The patient location is: [place]   The chief complaint leading to consultation is: COVID INFECTION     Visit type: audiovisual    Face to Face time with patient: 15  minutes  20  minutes of total time spent on the encounter, which includes face to face time and non-face to face time preparing to see the patient (eg, review of tests), Obtaining and/or reviewing separately obtained history, Documenting clinical information in the electronic or other health record, Independently interpreting results (not separately reported) and communicating results to the patient/family/caregiver, or Care coordination (not separately reported).         Each patient to whom he or she provides medical services by telemedicine is:  (1) informed of the relationship between the physician and patient and the respective role of any other health care provider with respect to management of the patient; and (2) notified that he or she may decline to receive medical services by telemedicine and may withdraw from such care at any time.    Notes:     States she had began to develop sore throat last night.  Described as a burning and pain.  She tested herself for COVID which was positive.  She has been feeling much worse since yesterday.  Having some body aches and chills as well.  Having some association headaches in ear pain.  Denies any shortness of breath or chest pain.  Taking Tylenol for the body aches currently.    She has taken Paxlovid in the past for COVID and tolerated medication well.      Sore Throat   This is a new problem. The current episode started yesterday. The problem has been rapidly worsening. Neither side of throat is experiencing more pain than the other. There has been no fever. The pain is at a severity of 7/10. The pain is moderate. Associated symptoms include headaches, a hoarse voice, a  plugged ear sensation, neck pain, shortness of breath, swollen glands and trouble swallowing. Pertinent negatives include no abdominal pain, congestion, coughing, diarrhea, drooling, ear discharge, ear pain, stridor or vomiting. She has had exposure to strep. She has tried NSAIDs and acetaminophen for the symptoms. The treatment provided mild relief.     Review of Systems   HENT:  Positive for hoarse voice, sore throat and trouble swallowing. Negative for nasal congestion, drooling, ear discharge and ear pain.    Respiratory:  Positive for shortness of breath. Negative for cough and stridor.    Gastrointestinal:  Negative for abdominal pain, diarrhea and vomiting.   Musculoskeletal:  Positive for neck pain.   Neurological:  Positive for headaches.         Objective:      Physical Exam  Constitutional:       General: She is not in acute distress.     Appearance: Normal appearance. She is not ill-appearing.   HENT:      Head: Normocephalic and atraumatic.   Pulmonary:      Effort: Pulmonary effort is normal.   Neurological:      Mental Status: She is alert.   Psychiatric:         Mood and Affect: Mood normal.         Behavior: Behavior normal.         Assessment:       Problem List Items Addressed This Visit          Immunology/Multi System    IgG subclass deficiency     Other Visit Diagnoses         COVID    -  Primary            Plan:       Diagnoses and all orders for this visit:    COVID    IgG subclass deficiency    -     nirmatrelvir-ritonavir 300 mg (150 mg x 2)-100 mg copackaged tablets (EUA); Take 3 tablets by mouth 2 (two) times daily for 5 days. Each dose contains 2 nirmatrelvir (pink tablets) and 1 ritonavir (white tablet). Take all 3 tablets together- DO NOT TAKE WITH NURTEC           COVID sent today.  Did discuss potential interactions with medications including Nurteq.  Hold Nurtec while taking Paxlovid.    ER precautions given including shortness of breath and chest pain.    Ivania Rivas MD

## 2025-07-15 NOTE — PROGRESS NOTES
Patient ID: Karen Zavala is a 39 y.o. female.        E-Visit Time Tracking:             Chief Complaint: General Illness (Entered automatically based on patient selection in [employer].)      The patient initiated a request through [employer] on 7/13/2025 for evaluation and management with a chief complaint of General Illness (Entered automatically based on patient selection in [employer].)     I evaluated the questionnaire submission on 07/15/2025.    [facility]-Cough And Cold    7/13/2025 11:05 PM CDT - Filed by Patient   What do you need help with? Covid 19   Do you agree to participate in an E-Visit? Yes   If you have any of the following symptoms, go to your local emergency room or call 911: I acknowledge   Do you have any of the following pregnancy-related conditions? (Pregnant, Possibly pregnant, Breast feeding, None) None   What is the main issue you would like addressed today? Covid 19, positive test   Do you think you might have COVID-19 or the Flu? (COVID-19, Flu, No, Not sure) COVID-19   Have you tested positive for COVID-19 or Flu?(COVID-19, Flu, No) COVID-19   What symptoms do you have? (Chills, Cough, Diarrhea, Fatigue, Fever, Headache, Stuffy nose,  Loss of taste or smell, Muscule or body aches, Nausea, Runny nose, Sore throat, None) Chills;  Fatigue;  Headache;  Muscle or body aches;  Sore throat   When did your concern begin? 7/12/2025   What have you tried to help your symptoms?(Cold medication, Cough syrup, Drank fluids (water, tea), Gargled salt water, Ibuprofen or Naproxen, Rest and sleep, Tylenol, Other) Drinking more fluids;  Ibuprofen or Naproxen;  Tylenol   Provide any additional information you feel is important. Hi, my throat began hurting a bit last night and I woke up feeling unwell. Questioned if this was a rebound infection. Tested for covid/flu & was negative mid day. Feeling much worse, then retested at 10:00 and positive. Our daughter had a 103.8 fever last week, so  probably got it fron her.; Is it possible to get a perscription for paxlovid or equivelent? I have a trip planned for saturday.; I have an appointment scheduled for tomorrow? Di you want me to come in?   Please attach any relevant images or files    Are you able to take your vitals? No         No diagnosis found.     No orders of the defined types were placed in this encounter.           No follow-ups on file.

## 2025-07-23 ENCOUNTER — PATIENT MESSAGE (OUTPATIENT)
Dept: NEUROLOGY | Facility: CLINIC | Age: 40
End: 2025-07-23
Payer: COMMERCIAL

## 2025-07-28 NOTE — PROGRESS NOTES
Neurology Clinic F/U    Impression:  Frequent episodic migraine with/without visual aura with cervical myofascial component (and remote medication overuse - ibuprofen) and catamenial exacerbation: partially responsive to Nurtec ODT prophylaxis.  I do not think a low pressure syndrome explains all of her symptoms but we will begin an evaluation with MRI w/ w/out.  Cervical spondylosis/stenosis without myelopathy  Olfactory auras: less bothersome and etiology uncertain.  possible underlying focal seizure disorder (hx CHI with possible seizure age 20)  Examination evidence of sensory polyneuropathy - small fiber involvement is possible  Multifocal muscle spasms, probably benign  Hx pre-syncope: nl tilt  Mild BLE hyperreflexia, non-pathologic    Plan:  MRI w/ w/out  Consider LP pending MRI; we discussed the logic and want to pursue non-invasive approach to start  Continue Nurtec ODT to qod for prophylaxis  Increase tizanidine to 2mg/ 4mg qhs  Naratriptan prn  Limit caffeine  Next steps after MRI  RTC 6 months or sooner, prn    Problem List Items Addressed This Visit          1 - High    Migraine without aura and without status migrainosus, not intractable - Primary    Relevant Orders    MRI Brain W WO Contrast    Creatinine, serum     Other Visit Diagnoses         Postural headache        Relevant Orders    MRI Brain W WO Contrast    Creatinine, serum            Patient seen for f/u.  She requests a w/i for evaluation of possible low-pressure headaches.   She has noted relief with supine positioning.  Nurtec prophylaxis helps     Abortives:   Effective: naratriptan, Nurtec ODT   Ineffective/not tolerated: ibuprofen (overuse), Tylenol  Prophylactics:   Ineffective/not tolerated:  Nurtec, Flexeril (sedation), gabapentin (cognitive side effects)   Unknown: topiramate   Effective: Botox, tizanidine 4mg, Nurtec qod         No data to display              Running history:  6/12/25:  Patient seen for f/u.  She had L sided  "cervical/shoulder pain and was diagnosed with L shoulder subluxation.  MRI c-spine with flexion in 11/15/24 - 3mm disc C34, C45 and fissure C67  HAs - Nurtec prophylaxis has helped HA severity.  Tizanidine continues to be helpful.  She stopped gabapentin (cognitive side effects).  Still with olfactory auras are very brief (seconds) intermittent and not a bother.  EEG was normal.   ...............................................  HAs - Freq has increased to 10-15/month and more frequent around menses.  Nurtec prn is very effective.  She takes tizanidine 4mg and gabapentin 100mg nightly; the former helps with neck pain and the latter with insomnia.       Olfactory auras are very brief (seconds) intermittent and not a bother.  EEG was normal.     MRA neck/brain were nl  ........................................................    HA freq:  9/30 and 0/30    In her 20s while in [place] she had possible seizures in an elevator with head trauma.  Possible GHB      CC:  Multiple neurological complaints    HPI:  36 y/o WF referred by GINGER Lagos for evaluation of headaches and multiple other complaints.      She reports years of B shoulder itching that resolved and then returned.   R shoulder numbness over a few weeks.  Intermittent and lasts days/hours.  No radiation.   Also complains of tingling in R 4th/5th digits.  Not triggered by change in position. No weakness  Spasms "all over."  Thoracic bilateral, L trap, intrascapular.  Started months/years ago. Robaxin helps; a different agent caused sedation.   Years of great toe numbness (R>L).  No family history.    She was having migrainous headaches years ago that went away. Headaches returned about a year ago.  Pain can be uni- or bilateral.  Typically starts in cervico-occipital region and radiates anteriorly.  Last headache yesterday.  No nausea, + photo/phonophobia.  + throbbing.  Untreated duration is hours to all day.   HA Freq 5/30 and 2/30    Dry eyes/mouth  No " "constipation  +Orthostasis  Nl perspiration  Denies bloating    Several years of intermittent olfactory auras ("bad smell"). No identifiable triggers.  In 20s had head trauma.  She isn't aware of all events but multiple syncopal spells.  Olfaction changed at that point.     MRI c-spine 11/6/20 shows mild spondylosis only with C45 and C67 bulges.   She had nl wrist brachial indices last week.        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     Unspecified vitamin D deficiency       Past Surgical History:   Procedure Laterality Date    BREAST BIOPSY Right 03/16/2015    beingn    EPIDURAL STEROID INJECTION N/A 10/04/2021    Procedure: INJECTION, STEROID, EPIDURAL C7/T1;  Surgeon: Cony Ahmadi MD;  [facility];  Service: Pain Management;  Laterality: N/A;  9/16 l/m    INJECTION, EPIDURAL, SPINE, CERVICOTHORACIC, TRANSFORAMINAL APPROACH N/A 4/28/2025    Procedure: CERVICAL C7/T1 IL LOUISE;  Surgeon: Cony Ahmadi MD;  [facility];  Service: Pain Management;  Laterality: N/A;  2 WK F/U HERMELINDO    KNEE SURGERY Right     torn meniscus    SHOULDER ARTHROSCOPY W/ SUPERIOR LABRAL ANTERIOR POSTERIOR LESION REPAIR Right 01/13/2022    Procedure: ARTHROSCOPY, SHOULDER, WITH INSTABILITY REPAIR;  Surgeon: Ann Anderson MD;  Location: [facility] OR;  Service: Orthopedics;  Laterality: Right;    TILT TABLE TEST N/A 09/15/2022    Procedure: TILT TABLE TEST;  Surgeon: RAMESH Ferguson MD;  Location: [facility] EP LAB;  Service: Cardiology;  Laterality: N/A;  orthostasis, Rule out POTS, Tilt table test, Dr. Warner,       No outpatient medications have been marked as taking for the 7/29/25 encounter (Office Visit) with Ivan Warner MD.     Current Facility-Administered Medications for the " 7/29/25 encounter (Office Visit) with Ivan Warner MD   Medication Dose Route Frequency Provider Last Rate Last Admin    levonorgestreL (MIRENA) 20 mcg/24 hours (6 yrs) 52 mg IUD 1 Intra Uterine Device  1 Intra Uterine Device Intrauterine  Band, Michelle TATAGina, DO   1 Intra Uterine Device at 06/11/21 0945      Review of patient's allergies indicates:   Allergen Reactions    Ceclor [cefaclor] Anaphylaxis, Swelling and Rash      Family History   Problem Relation Name Age of Onset    Hyperlipidemia Mother Derek Purdy     Hypertension Mother Derek Purdy     Hypertension Father William Purdy     Hyperlipidemia Father William Purdy     Cancer Maternal Grandfather Nithin Cruz         lung    Dementia Paternal Grandmother      Breast cancer Neg Hx      Colon cancer Neg Hx      Ovarian cancer Neg Hx        Social History     Socioeconomic History    Marital status:      Spouse name: Mark    Number of children: 2   Occupational History    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     Social Drivers of Health     Financial Resource Strain: Low Risk  (7/14/2025)    Overall Financial Resource Strain (CARDIA)     Difficulty of Paying Living Expenses: Not very hard   Food Insecurity: No Food Insecurity (7/14/2025)    Hunger Vital Sign     Worried About Running Out of Food in the Last Year: Never true     Ran Out of Food in the Last Year: Never true   Transportation Needs: No Transportation Needs (7/14/2025)    PRAPARE - Transportation     Lack of Transportation (Medical): No     Lack of Transportation (Non-Medical): No   Physical Activity: Insufficiently  Active (7/14/2025)    Exercise Vital Sign     Days of Exercise per Week: 1 day     Minutes of Exercise per Session: 30 min   Stress: No Stress Concern Present (7/14/2025)    [employer] - Occupational Stress Questionnaire     Feeling of Stress : Not at all   Housing Stability: Low Risk  (7/14/2025)    Housing Stability Vital Sign     Unable to Pay for Housing in the Last Year: No     Homeless in the Last Year: No     /85 (BP Location: Right arm, Patient Position: Sitting)   Pulse 66    Well developed, well nourished female   Awake, alert    Normal attention and concentration   Normal speech and language   Normal fund of knowledge   EOMF without nystagmus   Facial movements intact  Gait steady    Data Reviewed:      Lab Results   Component Value Date    CREATININE 0.9 08/23/2024         20 mins chart review, face to face, documentation    Ivan Warner MD

## 2025-07-29 ENCOUNTER — OFFICE VISIT (OUTPATIENT)
Dept: NEUROLOGY | Facility: CLINIC | Age: 40
End: 2025-07-29
Payer: COMMERCIAL

## 2025-07-29 ENCOUNTER — LAB VISIT (OUTPATIENT)
Dept: LAB | Facility: HOSPITAL | Age: 40
End: 2025-07-29
Payer: COMMERCIAL

## 2025-07-29 VITALS — SYSTOLIC BLOOD PRESSURE: 119 MMHG | HEART RATE: 66 BPM | DIASTOLIC BLOOD PRESSURE: 85 MMHG

## 2025-07-29 DIAGNOSIS — G43.009 MIGRAINE WITHOUT AURA AND WITHOUT STATUS MIGRAINOSUS, NOT INTRACTABLE: Primary | ICD-10-CM

## 2025-07-29 DIAGNOSIS — G43.009 MIGRAINE WITHOUT AURA AND WITHOUT STATUS MIGRAINOSUS, NOT INTRACTABLE: ICD-10-CM

## 2025-07-29 DIAGNOSIS — R51.0 POSTURAL HEADACHE: ICD-10-CM

## 2025-07-29 LAB
CREAT SERPL-MCNC: 0.8 MG/DL (ref 0.5–1.4)
GFR SERPLBLD CREATININE-BSD FMLA CKD-EPI: >60 ML/MIN/1.73/M2

## 2025-07-29 PROCEDURE — 1160F RVW MEDS BY RX/DR IN RCRD: CPT | Mod: CPTII,S$GLB,, | Performed by: PSYCHIATRY & NEUROLOGY

## 2025-07-29 PROCEDURE — 99213 OFFICE O/P EST LOW 20 MIN: CPT | Mod: S$GLB,,, | Performed by: PSYCHIATRY & NEUROLOGY

## 2025-07-29 PROCEDURE — 99999 PR PBB SHADOW E&M-EST. PATIENT-LVL II: CPT | Mod: PBBFAC,,, | Performed by: PSYCHIATRY & NEUROLOGY

## 2025-07-29 PROCEDURE — 36415 COLL VENOUS BLD VENIPUNCTURE: CPT

## 2025-07-29 PROCEDURE — 1159F MED LIST DOCD IN RCRD: CPT | Mod: CPTII,S$GLB,, | Performed by: PSYCHIATRY & NEUROLOGY

## 2025-07-29 PROCEDURE — 3074F SYST BP LT 130 MM HG: CPT | Mod: CPTII,S$GLB,, | Performed by: PSYCHIATRY & NEUROLOGY

## 2025-07-29 PROCEDURE — 3079F DIAST BP 80-89 MM HG: CPT | Mod: CPTII,S$GLB,, | Performed by: PSYCHIATRY & NEUROLOGY

## 2025-07-29 PROCEDURE — 82565 ASSAY OF CREATININE: CPT

## 2025-07-29 PROCEDURE — G2211 COMPLEX E/M VISIT ADD ON: HCPCS | Mod: S$GLB,,, | Performed by: PSYCHIATRY & NEUROLOGY

## 2025-07-31 ENCOUNTER — TELEPHONE (OUTPATIENT)
Dept: PAIN MEDICINE | Facility: CLINIC | Age: 40
End: 2025-07-31
Payer: COMMERCIAL

## 2025-07-31 NOTE — TELEPHONE ENCOUNTER
Staff attempted to contact the patient to inform that their upcoming Botox appointment will need to be canceled and rescheduled with  or  as  is out for an extended period of time. A brief voicemail was left, and a message was also sent via the patient portal.

## 2025-07-31 NOTE — TELEPHONE ENCOUNTER
Copied from CRM #[id number]. Topic: General Inquiry - Return Call  >> Jul 31, 2025  9:50 AM Godwin wrote:  Type:  Patient Returning Call    Who Called: patient    Who Left Message for Patient:  Vilma,     Does the patient know what this is regarding?:returning call    Would the patient rather a call back or a response via [facility]? call    Best Call Back Number:[phone]    Additional Information:

## 2025-08-08 ENCOUNTER — HOSPITAL ENCOUNTER (OUTPATIENT)
Dept: RADIOLOGY | Facility: HOSPITAL | Age: 40
Discharge: HOME | End: 2025-08-08
Attending: PSYCHIATRY & NEUROLOGY
Payer: COMMERCIAL

## 2025-08-08 DIAGNOSIS — R51.0 POSTURAL HEADACHE: ICD-10-CM

## 2025-08-08 DIAGNOSIS — G43.009 MIGRAINE WITHOUT AURA AND WITHOUT STATUS MIGRAINOSUS, NOT INTRACTABLE: ICD-10-CM

## 2025-08-08 PROCEDURE — A9585 GADOBUTROL INJECTION: HCPCS | Performed by: PSYCHIATRY & NEUROLOGY

## 2025-08-08 PROCEDURE — 70553 MRI BRAIN STEM W/O & W/DYE: CPT | Mod: 26,,, | Performed by: RADIOLOGY

## 2025-08-08 PROCEDURE — 70553 MRI BRAIN STEM W/O & W/DYE: CPT | Mod: TC

## 2025-08-08 PROCEDURE — 25500020 PHARM REV CODE 255: Performed by: PSYCHIATRY & NEUROLOGY

## 2025-08-08 RX ORDER — GADOBUTROL 604.72 MG/ML
5 INJECTION INTRAVENOUS
Status: COMPLETED | OUTPATIENT
Start: 2025-08-08 | End: 2025-08-08

## 2025-08-08 RX ADMIN — GADOBUTROL 5 ML: 604.72 INJECTION INTRAVENOUS at 06:08

## 2025-08-09 ENCOUNTER — PATIENT MESSAGE (OUTPATIENT)
Dept: NEUROLOGY | Facility: CLINIC | Age: 40
End: 2025-08-09
Payer: COMMERCIAL

## 2025-08-14 DIAGNOSIS — Z12.31 VISIT FOR SCREENING MAMMOGRAM: Primary | ICD-10-CM

## 2025-08-19 ENCOUNTER — OFFICE VISIT (OUTPATIENT)
Dept: PAIN MEDICINE | Facility: CLINIC | Age: 40
End: 2025-08-19
Payer: COMMERCIAL

## 2025-08-19 VITALS
TEMPERATURE: 97 F | BODY MASS INDEX: 18.48 KG/M2 | WEIGHT: 108.25 LBS | HEIGHT: 64 IN | SYSTOLIC BLOOD PRESSURE: 136 MMHG | RESPIRATION RATE: 18 BRPM | DIASTOLIC BLOOD PRESSURE: 92 MMHG | OXYGEN SATURATION: 100 % | HEART RATE: 73 BPM

## 2025-08-19 DIAGNOSIS — G89.4 CHRONIC PAIN SYNDROME: ICD-10-CM

## 2025-08-19 DIAGNOSIS — G43.719 CHRONIC MIGRAINE WITHOUT AURA, INTRACTABLE, WITHOUT STATUS MIGRAINOSUS: Primary | ICD-10-CM

## 2025-08-19 PROCEDURE — 99999 PR PBB SHADOW E&M-EST. PATIENT-LVL V: CPT | Mod: PBBFAC,,, | Performed by: ANESTHESIOLOGY

## 2025-08-23 DIAGNOSIS — G43.009 MIGRAINE WITHOUT AURA AND WITHOUT STATUS MIGRAINOSUS, NOT INTRACTABLE: ICD-10-CM

## 2025-08-25 RX ORDER — TIZANIDINE 2 MG/1
2 TABLET ORAL NIGHTLY
Qty: 180 TABLET | Refills: 2 | Status: SHIPPED | OUTPATIENT
Start: 2025-08-25

## (undated) DEVICE — CLOSURE SKIN STERI STRIP 1/2X4

## (undated) DEVICE — Device

## (undated) DEVICE — NDL SPINAL 18GX3.5 SPINOCAN

## (undated) DEVICE — CANNULA TWIST IN FLEXIBLE

## (undated) DEVICE — UNDERGLOVES BIOGEL PI SIZE 7.5

## (undated) DEVICE — BNDG COFLEX FOAM LF2 ST 4X5YD

## (undated) DEVICE — PAD ELECTRODE STER 1.5X3

## (undated) DEVICE — DRAPE STERI U-SHAPED 47X51IN

## (undated) DEVICE — CANNULA ARTHRO 7MMX7CM

## (undated) DEVICE — SUT HOOK CRESENT MED SP

## (undated) DEVICE — DRAPE STERI INSTRUMENT 1018

## (undated) DEVICE — PAD ABD 8X10 STERILE

## (undated) DEVICE — CONNECTOR TUBING STR 5 IN 1

## (undated) DEVICE — PAD SHOULDER CARE POLAR

## (undated) DEVICE — POSITIONER IV ARMBOARD FOAM

## (undated) DEVICE — SUPPORT SLING SHOT II MEDIUM

## (undated) DEVICE — GOWN SMART IMP BREATHABLE XXLG

## (undated) DEVICE — GLOVE ORTHO PF SZ 8.5

## (undated) DEVICE — GLOVE SURGEON SYN PF SZ 9

## (undated) DEVICE — APPLICATOR CHLORAPREP ORN 26ML

## (undated) DEVICE — SLING MEDIUM

## (undated) DEVICE — GAUZE SPONGE 4X4 12PLY

## (undated) DEVICE — TUBE SET INFLOW/OUTFLOW

## (undated) DEVICE — NDL 18GA X1 1/2 REG BEVEL

## (undated) DEVICE — SOL 9P NACL IRR PIC IL

## (undated) DEVICE — SYR IRRIGATION BULB STER 60ML

## (undated) DEVICE — ADHESIVE MASTISOL VIAL 48/BX

## (undated) DEVICE — SOL IRR NACL .9% 3000ML

## (undated) DEVICE — CANNULA TRIPLEDAM 6MM X 7CM

## (undated) DEVICE — DRESSING XEROFORM FOIL PK 1X8

## (undated) DEVICE — KIT SHOULDER POSITIONER SPIDER

## (undated) DEVICE — SUT HOOK RIGHT 60DEG

## (undated) DEVICE — SHAVER ULTRAFFR 4.2MM

## (undated) DEVICE — BNDG COFLEX FOAM LF2 ST 6X5YD

## (undated) DEVICE — SLINGSHOT 70DEG UP

## (undated) DEVICE — SEE MEDLINE ITEM 157117

## (undated) DEVICE — SUT SUTURETAPE X 1.3MM 40IN

## (undated) DEVICE — SUT 1 36IN PDS II VIO MONO

## (undated) DEVICE — MARKER SKIN STND TIP BLUE BARR

## (undated) DEVICE — KIT ASSISTARM SH KN STRL

## (undated) DEVICE — TUBING SUC UNIV W/CONN 12FT

## (undated) DEVICE — SUT HOOK LEFT 60 DEG

## (undated) DEVICE — SUT MCRYL PLUS 4-0 PS2 27IN

## (undated) DEVICE — SYR 10CC LUER LOCK

## (undated) DEVICE — GLOVE BIOGEL SKINSENSE PI 7.0

## (undated) DEVICE — KIT SUTURETAK PERC INSRT 3MM

## (undated) DEVICE — SHOULDER POSITION KIT

## (undated) DEVICE — SUT VICRYL PLUS 0 CT1 18IN